# Patient Record
Sex: FEMALE | Race: BLACK OR AFRICAN AMERICAN | NOT HISPANIC OR LATINO | Employment: FULL TIME | URBAN - METROPOLITAN AREA
[De-identification: names, ages, dates, MRNs, and addresses within clinical notes are randomized per-mention and may not be internally consistent; named-entity substitution may affect disease eponyms.]

---

## 2018-03-06 ENCOUNTER — OFFICE VISIT (OUTPATIENT)
Dept: INTERNAL MEDICINE | Facility: CLINIC | Age: 43
End: 2018-03-06
Payer: COMMERCIAL

## 2018-03-06 VITALS
OXYGEN SATURATION: 98 % | SYSTOLIC BLOOD PRESSURE: 144 MMHG | HEART RATE: 67 BPM | TEMPERATURE: 98 F | DIASTOLIC BLOOD PRESSURE: 92 MMHG | BODY MASS INDEX: 46.39 KG/M2 | WEIGHT: 278.44 LBS | HEIGHT: 65 IN

## 2018-03-06 DIAGNOSIS — R60.0 BILATERAL LEG EDEMA: ICD-10-CM

## 2018-03-06 DIAGNOSIS — E04.9 PALPABLE THYROID: ICD-10-CM

## 2018-03-06 DIAGNOSIS — Z00.00 ANNUAL PHYSICAL EXAM: Primary | ICD-10-CM

## 2018-03-06 DIAGNOSIS — M32.9 SYSTEMIC LUPUS ERYTHEMATOSUS, UNSPECIFIED SLE TYPE, UNSPECIFIED ORGAN INVOLVEMENT STATUS: ICD-10-CM

## 2018-03-06 DIAGNOSIS — I10 ESSENTIAL HYPERTENSION: ICD-10-CM

## 2018-03-06 PROCEDURE — 99999 PR PBB SHADOW E&M-NEW PATIENT-LVL IV: CPT | Mod: PBBFAC,,, | Performed by: INTERNAL MEDICINE

## 2018-03-06 PROCEDURE — 99386 PREV VISIT NEW AGE 40-64: CPT | Mod: S$GLB,,, | Performed by: INTERNAL MEDICINE

## 2018-03-06 RX ORDER — FERROUS SULFATE 325(65) MG
325 TABLET ORAL
COMMUNITY
End: 2018-04-09

## 2018-03-06 NOTE — PROGRESS NOTES
Subjective:      Nitin Mcconnell is a 42 y.o. female who presents for annual exam.    Family History:  family history includes Diabetes in her maternal grandfather and paternal grandmother; Heart attack in her maternal grandmother; Heart failure in her maternal grandmother and mother; Kidney disease in her maternal grandfather.     2008 - diagnosed with SLE    BP home records show 128-154/  and previous provider wanted patient to begin treatment for HTN    Health Maintenance:  Health Maintenance       Date Due Completion Date    Lipid Panel 1975 ---    TETANUS VACCINE 06/16/1993 ---    Mammogram 06/16/2015 ---    Influenza Vaccine 08/01/2017 ---        Sees Dr. Ian Crawford with Rheumatology  PCP was Dr. Beau Purvis performed bypass  Dr. Eb Aponte with Vascular Surgery evaluated the patient  Dr. Ricardo Friday Endocrinology  Dr. Inge Becerra with Dermatology      Eye exam: Sept 2017, readers  Dental Exam: December 2017, needs dental work    MMG: due in June    Influenza: Oct 2017  Tetanus: UTD    Exercise: treadmill or elliptical at gym, start 4x weekly  Diet: fairly healthy  Body mass index is 46.34 kg/m².    Meds:   Current Outpatient Prescriptions:     CALCIUM CARB/VITAMIN D3/VIT K1 (VIACTIV ORAL), Take by mouth., Disp: , Rfl:     ferrous sulfate 325 mg (65 mg iron) Tab tablet, Take 325 mg by mouth daily with breakfast., Disp: , Rfl:     hydroxychloroquine (PLAQUENIL) 200 mg tablet, Take 1 tablet by mouth Twice daily., Disp: , Rfl:     multivitamin-Ca-iron-minerals (ONE-A-DAY WOMENS FORMULA) 27-0.4 mg Tab, Take 1 tablet by mouth Daily., Disp: , Rfl:     torsemide (DEMADEX) 10 MG Tab, Take 20 mg by mouth daily as needed. Ankle swellling, Disp: , Rfl:     PMHx:   Past Medical History:   Diagnosis Date    Blood transfusion     Connective tissue disease     Hypertension     Lupus mild       PSHx:  Past Surgical History:   Procedure Laterality Date    GASTRIC BYPASS   2009    HYSTERECTOMY      LIPECTOMY      LYMPH NODE BIOPSY      right arm    panniculectomy         SocHx:   Social History     Social History    Marital status: Single     Spouse name: N/A    Number of children: N/A    Years of education: N/A     Social History Main Topics    Smoking status: Never Smoker    Smokeless tobacco: Never Used    Alcohol use Yes      Comment: socially    Drug use: No    Sexual activity: Not Asked     Other Topics Concern    None     Social History Narrative    Works for [employer]       Review of Systems   Constitutional: Negative for chills, fatigue and fever.   HENT: Negative for congestion, ear discharge, ear pain, mouth sores, postnasal drip, rhinorrhea, sinus pressure and sore throat.    Eyes: Negative for redness and visual disturbance.   Respiratory: Negative for cough, chest tightness and shortness of breath.    Cardiovascular: Negative for chest pain, palpitations and leg swelling.   Gastrointestinal: Negative for abdominal pain, blood in stool, constipation, diarrhea, nausea and vomiting.   Endocrine: Positive for cold intolerance. Negative for heat intolerance.   Genitourinary: Negative for dysuria, hematuria and urgency.   Musculoskeletal: Negative for arthralgias and myalgias.   Skin: Negative for rash.        + skin changes after hysterectomy, patient concerned about dimpling appearance of thighs and fatty deposits   Neurological: Negative for dizziness, weakness, light-headedness, numbness and headaches.   Hematological: Negative for adenopathy.   Psychiatric/Behavioral: Negative for dysphoric mood and sleep disturbance. The patient is not nervous/anxious.        Objective:      Physical Exam   Constitutional: She is oriented to person, place, and time. Vital signs are normal. She appears well-developed and well-nourished. No distress.   HENT:   Head: Normocephalic and atraumatic.   Right Ear: Hearing, tympanic membrane, external ear and ear canal normal.  Tympanic membrane is not erythematous and not bulging.   Left Ear: Hearing, tympanic membrane, external ear and ear canal normal. Tympanic membrane is not erythematous and not bulging.   Nose: Nose normal.   Mouth/Throat: Uvula is midline, oropharynx is clear and moist and mucous membranes are normal. No oropharyngeal exudate or posterior oropharyngeal erythema.   Eyes: Conjunctivae, EOM and lids are normal. Pupils are equal, round, and reactive to light. No scleral icterus.   Neck: Normal range of motion. Neck supple. No thyroid mass present. Thyromegaly: thyroid feels more prominent, no distinct nodules palpated.   Cardiovascular: Normal rate, regular rhythm, normal heart sounds and intact distal pulses.    No murmur heard.  Pulmonary/Chest: Effort normal and breath sounds normal. She has no wheezes.   Abdominal: Soft. Bowel sounds are normal. She exhibits no distension. There is no tenderness. There is no rigidity, no rebound and no guarding.   Musculoskeletal: Normal range of motion. She exhibits no edema.   Lymphadenopathy:     She has no cervical adenopathy.        Right: No supraclavicular adenopathy present.        Left: No supraclavicular adenopathy present.   Neurological: She is alert and oriented to person, place, and time. She has normal strength and normal reflexes. Coordination and gait normal.   Skin: Skin is warm, dry and intact. No rash noted. She is not diaphoretic.   Psychiatric: She has a normal mood and affect.   Vitals reviewed.      Assessment:       1. Annual physical exam    2. Essential hypertension    3. Systemic lupus erythematosus, unspecified SLE type, unspecified organ involvement status    4. Bilateral leg edema    5. Palpable thyroid        Plan:       1. Annual physical exam  - CBC auto differential; Future  - Comprehensive metabolic panel; Future  - Ferritin; Future  - Hemoglobin A1c; Future  - Lipid panel; Future  - TSH; Future  - Urinalysis; Future  - Vitamin D; Future  - labs  sent to [facility]    2. Essential hypertension  - check labs and kidney function  - consider starting ACE-I, discussed some of adverse effects  - reduce sodium intake    3. Systemic lupus erythematosus, unspecified SLE type, unspecified organ involvement status  - symptoms stable, on HCQ, managed by Rheumatologist Dr. Crawford at outside facility    4. Bilateral leg edema  - Brain natriuretic peptide; Future    5. Palpable thyroid  - US Soft Tissue Head Neck Thyroid; Future    RTC in 1 month or sooner if needed    Chasity Dixon MD

## 2018-03-08 ENCOUNTER — TELEPHONE (OUTPATIENT)
Dept: INTERNAL MEDICINE | Facility: CLINIC | Age: 43
End: 2018-03-08

## 2018-03-08 LAB
ALBUMIN SERPL-MCNC: 4.1 G/DL (ref 3.6–5.1)
ALBUMIN/GLOB SERPL: 1.5 (CALC) (ref 1–2.5)
ALP SERPL-CCNC: 72 U/L (ref 33–115)
ALT SERPL-CCNC: 26 U/L (ref 6–29)
APPEARANCE UR: CLEAR
AST SERPL-CCNC: 22 U/L (ref 10–30)
BASOPHILS # BLD AUTO: 29 CELLS/UL (ref 0–200)
BASOPHILS NFR BLD AUTO: 0.7 %
BILIRUB SERPL-MCNC: 0.5 MG/DL (ref 0.2–1.2)
BILIRUB UR QL STRIP: NEGATIVE
BNP SERPL-MCNC: 64 PG/ML
BUN SERPL-MCNC: 13 MG/DL (ref 7–25)
BUN/CREAT SERPL: NORMAL (CALC) (ref 6–22)
CALCIUM SERPL-MCNC: 8.9 MG/DL (ref 8.6–10.2)
CHLORIDE SERPL-SCNC: 106 MMOL/L (ref 98–110)
CHOLEST SERPL-MCNC: 163 MG/DL
CHOLEST/HDLC SERPL: 2.9 (CALC)
CO2 SERPL-SCNC: 29 MMOL/L (ref 20–31)
COLOR UR: YELLOW
CREAT SERPL-MCNC: 0.94 MG/DL (ref 0.5–1.1)
EOSINOPHIL # BLD AUTO: 41 CELLS/UL (ref 15–500)
EOSINOPHIL NFR BLD AUTO: 1 %
ERYTHROCYTE [DISTWIDTH] IN BLOOD BY AUTOMATED COUNT: 11.9 % (ref 11–15)
FERRITIN SERPL-MCNC: 269 NG/ML (ref 10–232)
GFR SERPL CREATININE-BSD FRML MDRD: 75 ML/MIN/1.73M2
GLOBULIN SER CALC-MCNC: 2.8 G/DL (CALC) (ref 1.9–3.7)
GLUCOSE SERPL-MCNC: 84 MG/DL (ref 65–99)
GLUCOSE UR QL STRIP: NEGATIVE
HBA1C MFR BLD: 4.9 % OF TOTAL HGB
HCT VFR BLD AUTO: 37.6 % (ref 35–45)
HDLC SERPL-MCNC: 57 MG/DL
HGB BLD-MCNC: 11.9 G/DL (ref 11.7–15.5)
HGB UR QL STRIP: NEGATIVE
KETONES UR QL STRIP: NEGATIVE
LDLC SERPL CALC-MCNC: 90 MG/DL (CALC)
LEUKOCYTE ESTERASE UR QL STRIP: NEGATIVE
LYMPHOCYTES # BLD AUTO: 1349 CELLS/UL (ref 850–3900)
LYMPHOCYTES NFR BLD AUTO: 32.9 %
MCH RBC QN AUTO: 27.3 PG (ref 27–33)
MCHC RBC AUTO-ENTMCNC: 31.6 G/DL (ref 32–36)
MCV RBC AUTO: 86.2 FL (ref 80–100)
MONOCYTES # BLD AUTO: 361 CELLS/UL (ref 200–950)
MONOCYTES NFR BLD AUTO: 8.8 %
NEUTROPHILS # BLD AUTO: 2321 CELLS/UL (ref 1500–7800)
NEUTROPHILS NFR BLD AUTO: 56.6 %
NITRITE UR QL STRIP: NEGATIVE
NONHDLC SERPL-MCNC: 106 MG/DL (CALC)
PH UR STRIP: 5.5 [PH] (ref 5–8)
PLATELET # BLD AUTO: 185 THOUSAND/UL (ref 140–400)
PMV BLD REES-ECKER: 11.3 FL (ref 7.5–12.5)
POTASSIUM SERPL-SCNC: 4 MMOL/L (ref 3.5–5.3)
PROT SERPL-MCNC: 6.9 G/DL (ref 6.1–8.1)
PROT UR QL STRIP: NEGATIVE
RBC # BLD AUTO: 4.36 MILLION/UL (ref 3.8–5.1)
SODIUM SERPL-SCNC: 141 MMOL/L (ref 135–146)
SP GR UR STRIP: 1.02 (ref 1–1.03)
TRIGL SERPL-MCNC: 75 MG/DL
TSH SERPL-ACNC: 1.5 MIU/L
WBC # BLD AUTO: 4.1 THOUSAND/UL (ref 3.8–10.8)

## 2018-03-08 RX ORDER — FUROSEMIDE 40 MG/1
40 TABLET ORAL DAILY
Qty: 30 TABLET | Refills: 0 | Status: SHIPPED | OUTPATIENT
Start: 2018-03-08 | End: 2018-04-09 | Stop reason: ALTCHOICE

## 2018-03-08 NOTE — TELEPHONE ENCOUNTER
----- Message from Chasity Dixon MD sent at 3/8/2018  2:20 PM CST -----  Thyroid function, kidney function, liver function and cholesterol are normal.     Urinalysis is normal.     Hemoglobin A1c is normal so there is no diabetes or pre-diabetes.     Recommend trial of Lasix 40mg daily instead of demadex. Monitor blood pressures closely.     Vitamin D and iron levels are being processed.

## 2018-03-12 LAB
1,25(OH)2D SERPL-MCNC: 96 PG/ML (ref 18–72)
1,25(OH)2D2 SERPL-MCNC: <8 PG/ML
1,25(OH)2D3 SERPL-MCNC: 96 PG/ML

## 2018-03-18 ENCOUNTER — TELEPHONE (OUTPATIENT)
Dept: INTERNAL MEDICINE | Facility: CLINIC | Age: 43
End: 2018-03-18

## 2018-03-18 DIAGNOSIS — E04.2 MULTIPLE THYROID NODULES: Primary | ICD-10-CM

## 2018-03-19 NOTE — TELEPHONE ENCOUNTER
Spoke with patient, reviewed Dr Dixon notes. Will await call from Renetta to schedule referral and will fax b/p results tomorrow from work.

## 2018-03-19 NOTE — TELEPHONE ENCOUNTER
Imaging at [facility] showed multiple thyroid nodules that are large enough to sample. Recommend further evaluation and nodule aspiration with Endocrinology.    Has patient checked BP? Would recommend starting lisinopril 5mg daily if BP > 140/90's at home.

## 2018-03-22 ENCOUNTER — OFFICE VISIT (OUTPATIENT)
Dept: ENDOCRINOLOGY | Facility: CLINIC | Age: 43
End: 2018-03-22
Payer: COMMERCIAL

## 2018-03-22 VITALS
SYSTOLIC BLOOD PRESSURE: 134 MMHG | WEIGHT: 274.94 LBS | BODY MASS INDEX: 45.81 KG/M2 | DIASTOLIC BLOOD PRESSURE: 82 MMHG | HEIGHT: 65 IN | HEART RATE: 69 BPM

## 2018-03-22 DIAGNOSIS — E55.9 VITAMIN D DEFICIENCY: ICD-10-CM

## 2018-03-22 DIAGNOSIS — E04.2 MULTIPLE THYROID NODULES: Primary | ICD-10-CM

## 2018-03-22 PROCEDURE — 99999 PR PBB SHADOW E&M-EST. PATIENT-LVL IV: CPT | Mod: PBBFAC,,, | Performed by: INTERNAL MEDICINE

## 2018-03-22 PROCEDURE — 99204 OFFICE O/P NEW MOD 45 MIN: CPT | Mod: S$GLB,,, | Performed by: INTERNAL MEDICINE

## 2018-03-22 NOTE — ASSESSMENT & PLAN NOTE
History of vitamin D deficiency    Ordered level, as patient recently completed ergo 50K x8 weeks.

## 2018-03-22 NOTE — ASSESSMENT & PLAN NOTE
I have reviewed management options including observation, FNA or surgery.  All of the patients questions were answered.    Discussed indications for a FNA  Discussed possible FNA results (benign, FLUS,  follicular or hurthle lesion, suspicious for cancer, cancer and non diagnostic)     Reviewed that a non diagnostic or FLUS would require a repeat FNA with molecular markers    Will proceed with FNA - >2cm     Discussed indications for repeat FNA as well as surgical indications (abnormal FNA, compressive symptoms or interval change)     If FNA is negative then plan follow up in 1 year with TSH and thyroid u/s prior

## 2018-03-22 NOTE — PROGRESS NOTES
Subjective:       Patient ID: Nitin Mcconnell is a 42 y.o. female.    Chief Complaint: Thyroid Problem    HPI     With regards to the thyroid nodule:    No difficulty breathing swallowing   No voice changes  No FH of thyroid cancer  No personal history of radiation treatment or exposure     No signs or symptoms of hyper or hypothyroidism    No weight changes  No bowel changes  No heat or cold intolerance   No hair nail or skin changes  No cp, palpations or sob    3 aunts - with thyroidectomy secondary to enlarged gland     Review of Systems   Constitutional: Negative for unexpected weight change.   Eyes: Negative for visual disturbance.   Respiratory: Negative for shortness of breath.    Cardiovascular: Negative for chest pain.   Gastrointestinal: Negative for abdominal pain.   Genitourinary: Negative for urgency.   Musculoskeletal: Negative for arthralgias.   Skin: Negative for wound.   Neurological: Negative for headaches.   Hematological: Does not bruise/bleed easily.   Psychiatric/Behavioral: Negative for sleep disturbance.         Objective:      Physical Exam   Constitutional: She appears well-developed.   HENT:   Right Ear: External ear normal.   Left Ear: External ear normal.   Nose: Nose normal.   Hearing normal  Dentition good   Neck: No tracheal deviation present. No thyromegaly present.   Cardiovascular: Normal rate.    No murmur heard.  Pulmonary/Chest: Effort normal and breath sounds normal.   Abdominal: Soft. There is no tenderness. No hernia.   Musculoskeletal: She exhibits no edema.   Neurological: She has normal reflexes. No cranial nerve deficit.   Skin: No rash noted.   No nodules   Psychiatric: She has a normal mood and affect. Judgment normal.   Vitals reviewed.        US 3/12/18 done at [facility], 2 smaller nodules on right, left lobe has a 3.8 x 3.3 x 2.8 cm nodule and 2.6 x 1.7 x 1.5 cm nodule    Ultrasound was scanned to chart.    Assessment:       1. Multiple thyroid nodules    2. Vitamin D  deficiency          Plan:       Multiple thyroid nodules  I have reviewed management options including observation, FNA or surgery.  All of the patients questions were answered.    Discussed indications for a FNA  Discussed possible FNA results (benign, FLUS,  follicular or hurthle lesion, suspicious for cancer, cancer and non diagnostic)     Reviewed that a non diagnostic or FLUS would require a repeat FNA with molecular markers    Will proceed with FNA - >2cm     Discussed indications for repeat FNA as well as surgical indications (abnormal FNA, compressive symptoms or interval change)     If FNA is negative then plan follow up in 1 year with TSH and thyroid u/s prior        Vitamin D deficiency  History of vitamin D deficiency    Ordered level, as patient recently completed ergo 50K x8 weeks.    Rica Gerardo MD  Endocrinology Fellow     This case has been reviewed with staff, Dr. Dumont.

## 2018-03-23 NOTE — PROGRESS NOTES
I have reviewed the case, examined the patient, discussed with the fellow, and concur with the fellow's history, physical, assessment, and plan.    Luis Carlos Dumont MD

## 2018-04-09 ENCOUNTER — OFFICE VISIT (OUTPATIENT)
Dept: INTERNAL MEDICINE | Facility: CLINIC | Age: 43
End: 2018-04-09
Payer: COMMERCIAL

## 2018-04-09 ENCOUNTER — LAB VISIT (OUTPATIENT)
Dept: LAB | Facility: HOSPITAL | Age: 43
End: 2018-04-09
Attending: INTERNAL MEDICINE
Payer: COMMERCIAL

## 2018-04-09 VITALS
DIASTOLIC BLOOD PRESSURE: 86 MMHG | HEIGHT: 65 IN | WEIGHT: 272.06 LBS | HEART RATE: 72 BPM | BODY MASS INDEX: 45.33 KG/M2 | TEMPERATURE: 98 F | SYSTOLIC BLOOD PRESSURE: 138 MMHG

## 2018-04-09 DIAGNOSIS — E04.2 MULTIPLE THYROID NODULES: ICD-10-CM

## 2018-04-09 DIAGNOSIS — I10 ESSENTIAL HYPERTENSION: ICD-10-CM

## 2018-04-09 DIAGNOSIS — R60.0 BILATERAL LEG EDEMA: ICD-10-CM

## 2018-04-09 DIAGNOSIS — E65 LOCALIZED ADIPOSITY: ICD-10-CM

## 2018-04-09 DIAGNOSIS — I10 ESSENTIAL HYPERTENSION: Primary | ICD-10-CM

## 2018-04-09 LAB
ANION GAP SERPL CALC-SCNC: 9 MMOL/L
BUN SERPL-MCNC: 15 MG/DL
CALCIUM SERPL-MCNC: 9 MG/DL
CHLORIDE SERPL-SCNC: 103 MMOL/L
CO2 SERPL-SCNC: 29 MMOL/L
CREAT SERPL-MCNC: 0.9 MG/DL
EST. GFR  (AFRICAN AMERICAN): >60 ML/MIN/1.73 M^2
EST. GFR  (NON AFRICAN AMERICAN): >60 ML/MIN/1.73 M^2
GLUCOSE SERPL-MCNC: 84 MG/DL
POTASSIUM SERPL-SCNC: 3.6 MMOL/L
SODIUM SERPL-SCNC: 141 MMOL/L

## 2018-04-09 PROCEDURE — 36415 COLL VENOUS BLD VENIPUNCTURE: CPT | Mod: PO

## 2018-04-09 PROCEDURE — 99999 PR PBB SHADOW E&M-EST. PATIENT-LVL III: CPT | Mod: PBBFAC,,, | Performed by: INTERNAL MEDICINE

## 2018-04-09 PROCEDURE — 80048 BASIC METABOLIC PNL TOTAL CA: CPT

## 2018-04-09 PROCEDURE — 99214 OFFICE O/P EST MOD 30 MIN: CPT | Mod: S$GLB,,, | Performed by: INTERNAL MEDICINE

## 2018-04-09 RX ORDER — TORSEMIDE 20 MG/1
20 TABLET ORAL DAILY
Qty: 30 TABLET | Refills: 2 | Status: SHIPPED | OUTPATIENT
Start: 2018-04-09 | End: 2019-03-28 | Stop reason: SDUPTHER

## 2018-04-09 NOTE — PROGRESS NOTES
Subjective:       Patient ID: Nitin Mcconnell is a 42 y.o. female who presents for Follow-up (1 mo); Hypertension; and Leg Swelling      Hypertension   This is a chronic problem. The current episode started more than 1 month ago. The problem has been waxing and waning since onset. The problem is controlled. Associated symptoms include peripheral edema (leg edema). Pertinent negatives include no chest pain, headaches, orthopnea, palpitations, PND or shortness of breath. There are no associated agents to hypertension. Risk factors for coronary artery disease include obesity. Past treatments include diuretics. The current treatment provides mild improvement. There are no compliance problems.  There is no history of kidney disease or heart failure. There is no history of chronic renal disease.   Edema   This is a chronic problem. The current episode started more than 1 month ago. The problem occurs constantly. The problem has been gradually improving. Pertinent negatives include no abdominal pain, arthralgias, chest pain, chills, congestion, coughing, fatigue, fever, headaches, myalgias, nausea, rash, vomiting or weakness. Nothing aggravates the symptoms. She has tried nothing for the symptoms.      Lost 6 lbs since last visit, has been exercising regularly, 30 minutes on elliptical regularly. Avoids high-sodium foods. Has used Demadex in the past with good results. Was last prescribed Lasix so she has been alternating between Lasix and Demadex since last visit. Mild improvement in swelling.      Review of Systems   Constitutional: Negative for chills, fatigue and fever.   HENT: Negative for congestion and rhinorrhea.    Eyes: Negative for redness and visual disturbance.   Respiratory: Negative for cough, chest tightness and shortness of breath.    Cardiovascular: Positive for leg swelling. Negative for chest pain, palpitations, orthopnea and PND.   Gastrointestinal: Negative for abdominal pain, diarrhea, nausea and  vomiting.   Musculoskeletal: Negative for arthralgias and myalgias.   Skin: Negative for rash.   Neurological: Negative for dizziness, weakness, light-headedness and headaches.       Objective:      Physical Exam   Constitutional: She is oriented to person, place, and time. Vital signs are normal. She appears well-developed and well-nourished. No distress.   HENT:   Head: Normocephalic and atraumatic.   Right Ear: Hearing and external ear normal.   Left Ear: Hearing and external ear normal.   Nose: Nose normal.   Mouth/Throat: Uvula is midline.   Eyes: Lids are normal. No scleral icterus.   Neck: Full passive range of motion without pain.   Cardiovascular: Normal rate, regular rhythm, normal heart sounds and intact distal pulses.    No murmur heard.  Pulmonary/Chest: Effort normal and breath sounds normal. She has no wheezes.   Abdominal: Soft. Bowel sounds are normal. She exhibits no distension. There is no hepatosplenomegaly. There is no tenderness. There is no rigidity, no rebound and no guarding.   Musculoskeletal: Normal range of motion. She exhibits edema (trace-1+ nonpitting BLE).        Right ankle: She exhibits swelling.        Left ankle: She exhibits swelling.   Neurological: She is alert and oriented to person, place, and time.   Skin: Skin is warm, dry and intact. No rash noted. She is not diaphoretic.   Psychiatric: She has a normal mood and affect.   Vitals reviewed.      Assessment:       1. Essential hypertension    2. Bilateral leg edema    3. Multiple thyroid nodules    4. Localized adiposity        Plan:       1. Essential hypertension  - continue to monitor BP periodically  - change from Lasix ro Demadex  - torsemide (DEMADEX) 20 MG Tab; Take 1 tablet (20 mg total) by mouth once daily.  Dispense: 30 tablet; Refill: 2  - Basic metabolic panel; Future    2. Bilateral leg edema  - kidney function, liver function and BNP normal  - torsemide (DEMADEX) 20 MG Tab; Take 1 tablet (20 mg total) by mouth  once daily.  Dispense: 30 tablet; Refill: 2  - 2D echo with color flow doppler; Future  - COMPRESSION STOCKINGS    3. Multiple thyroid nodules  - evaluated by Endocrinology, planning FNA     4. Localized adiposity  - will obtain records from Dermatologist and other providers    RTC in 3 months or sooner if needed    Chasity Dixon MD

## 2018-04-10 ENCOUNTER — CLINICAL SUPPORT (OUTPATIENT)
Dept: CARDIOLOGY | Facility: CLINIC | Age: 43
End: 2018-04-10
Attending: INTERNAL MEDICINE
Payer: COMMERCIAL

## 2018-04-10 DIAGNOSIS — R60.0 BILATERAL LEG EDEMA: ICD-10-CM

## 2018-04-10 LAB
DIASTOLIC DYSFUNCTION: NO
ESTIMATED PA SYSTOLIC PRESSURE: 30.04
MITRAL VALVE REGURGITATION: NORMAL
RETIRED EF AND QEF - SEE NOTES: 55 (ref 55–65)
TRICUSPID VALVE REGURGITATION: NORMAL

## 2018-04-10 PROCEDURE — 93306 TTE W/DOPPLER COMPLETE: CPT | Mod: S$GLB,,, | Performed by: INTERNAL MEDICINE

## 2018-04-16 ENCOUNTER — PATIENT MESSAGE (OUTPATIENT)
Dept: INTERNAL MEDICINE | Facility: CLINIC | Age: 43
End: 2018-04-16

## 2018-04-16 DIAGNOSIS — R60.0 BILATERAL LEG EDEMA: Primary | ICD-10-CM

## 2018-04-19 ENCOUNTER — PATIENT MESSAGE (OUTPATIENT)
Dept: ENDOCRINOLOGY | Facility: CLINIC | Age: 43
End: 2018-04-19

## 2018-04-23 ENCOUNTER — PATIENT MESSAGE (OUTPATIENT)
Dept: ENDOCRINOLOGY | Facility: CLINIC | Age: 43
End: 2018-04-23

## 2018-04-25 ENCOUNTER — TELEPHONE (OUTPATIENT)
Dept: ENDOCRINOLOGY | Facility: CLINIC | Age: 43
End: 2018-04-25

## 2018-04-25 NOTE — TELEPHONE ENCOUNTER
Talk to pt she tells that jose from preauthorization said that she will contract her stated that she needs a referral for her biopsy. I told her that pre service deal with that matter. We just have the order put in a scheduled it for the patient. She say that jose from pre authorized will call her back.

## 2018-04-25 NOTE — TELEPHONE ENCOUNTER
----- Message from Monika Luis sent at 4/25/2018  1:00 PM CDT -----  Contact: Self- [phone]  Pt is schedule to have tarun on 4/27. She call PA & was told that it needs a referral attach  To the order to get an authorization/ pre- service w/ [facility]. Please call pt once complete if she can keep her appt.

## 2018-04-27 ENCOUNTER — HOSPITAL ENCOUNTER (OUTPATIENT)
Dept: ENDOCRINOLOGY | Facility: CLINIC | Age: 43
Discharge: HOME | End: 2018-04-27
Attending: INTERNAL MEDICINE
Payer: COMMERCIAL

## 2018-04-27 DIAGNOSIS — E04.2 MULTIPLE THYROID NODULES: ICD-10-CM

## 2018-04-27 PROCEDURE — 76942 ECHO GUIDE FOR BIOPSY: CPT | Mod: S$GLB,,, | Performed by: INTERNAL MEDICINE

## 2018-04-27 PROCEDURE — 88173 CYTOPATH EVAL FNA REPORT: CPT | Mod: 26,,, | Performed by: PATHOLOGY

## 2018-04-27 PROCEDURE — 88173 CYTOPATH EVAL FNA REPORT: CPT | Performed by: PATHOLOGY

## 2018-04-27 PROCEDURE — 10022 US FINE NEEDLE ASPIRATION THYROID MULTI SITE (XPD): CPT | Mod: 59,S$GLB,, | Performed by: INTERNAL MEDICINE

## 2018-05-01 ENCOUNTER — TELEPHONE (OUTPATIENT)
Dept: ENDOCRINOLOGY | Facility: CLINIC | Age: 43
End: 2018-05-01

## 2018-05-01 NOTE — TELEPHONE ENCOUNTER
----- Message from Valentine Newman sent at 5/1/2018  8:31 AM CDT -----  Contact: Oleg / Jazmyn [phone]  Lab has too many slides. The paperwork says 9 but she has 10 slides. Oleg can be reached at [phone].

## 2018-05-02 ENCOUNTER — TELEPHONE (OUTPATIENT)
Dept: ENDOCRINOLOGY | Facility: CLINIC | Age: 43
End: 2018-05-02

## 2018-05-02 DIAGNOSIS — E04.2 MULTIPLE THYROID NODULES: Primary | ICD-10-CM

## 2018-05-02 NOTE — TELEPHONE ENCOUNTER
Called patient to discuss biopsy results.    1. FNA Left Mid Thyroid- [facility] Thyroid Cytology Category: Benign  Benign follicular epithelial cells, colloid and macrophages  2. FNA Left Inferior Thyroid- [facility] Thyroid Cytology Category: Unsatisfactory    Recommended repeat FNA of L inferior nodule with molecular markers in 4-6 weeks.  Patient v/u, and agreed to plan of care.  Patient notes that her birthday is around that time and she would like to schedule the procedure after her birthday - around the week of June 18.    Rica Gerardo MD  Endocrinology Fellow

## 2018-06-04 ENCOUNTER — PATIENT MESSAGE (OUTPATIENT)
Dept: INTERNAL MEDICINE | Facility: CLINIC | Age: 43
End: 2018-06-04

## 2018-06-04 DIAGNOSIS — Z12.39 BREAST CANCER SCREENING: Primary | ICD-10-CM

## 2018-06-20 ENCOUNTER — HOSPITAL ENCOUNTER (OUTPATIENT)
Dept: RADIOLOGY | Facility: HOSPITAL | Age: 43
Discharge: HOME | End: 2018-06-20
Attending: INTERNAL MEDICINE
Payer: COMMERCIAL

## 2018-06-20 DIAGNOSIS — Z12.39 BREAST CANCER SCREENING: ICD-10-CM

## 2018-06-20 PROCEDURE — 77067 SCR MAMMO BI INCL CAD: CPT | Mod: 26,,, | Performed by: RADIOLOGY

## 2018-06-20 PROCEDURE — 77063 BREAST TOMOSYNTHESIS BI: CPT | Mod: 26,,, | Performed by: RADIOLOGY

## 2018-06-20 PROCEDURE — 77067 SCR MAMMO BI INCL CAD: CPT | Mod: TC

## 2018-06-22 ENCOUNTER — HOSPITAL ENCOUNTER (OUTPATIENT)
Dept: ENDOCRINOLOGY | Facility: CLINIC | Age: 43
Discharge: HOME | End: 2018-06-22
Attending: INTERNAL MEDICINE
Payer: COMMERCIAL

## 2018-06-22 DIAGNOSIS — E04.2 MULTIPLE THYROID NODULES: ICD-10-CM

## 2018-06-22 PROCEDURE — 76942 ECHO GUIDE FOR BIOPSY: CPT | Mod: S$GLB,,, | Performed by: INTERNAL MEDICINE

## 2018-06-22 PROCEDURE — 88173 CYTOPATH EVAL FNA REPORT: CPT | Mod: 26,,, | Performed by: PATHOLOGY

## 2018-06-22 PROCEDURE — 88173 CYTOPATH EVAL FNA REPORT: CPT | Performed by: PATHOLOGY

## 2018-06-22 PROCEDURE — 10022 US FINE NEEDLE ASPIRATION WITH IMAGING: CPT | Mod: S$GLB,,, | Performed by: INTERNAL MEDICINE

## 2018-06-27 ENCOUNTER — TELEPHONE (OUTPATIENT)
Dept: ENDOCRINOLOGY | Facility: CLINIC | Age: 43
End: 2018-06-27

## 2018-06-27 NOTE — TELEPHONE ENCOUNTER
----- Message from Papo Nguyen MD sent at 6/27/2018  7:37 AM CDT -----  Your patient with Dr. Gerardo.

## 2018-06-27 NOTE — TELEPHONE ENCOUNTER
Hi - please let pt know - L inferior thyroid nodule biopsy was benign (aka not cancer).  I would recommend patient followup with us in 1 year for repeat US, call ahead 1-2 months for ultrasound order.     Thank you.

## 2018-06-28 ENCOUNTER — TELEPHONE (OUTPATIENT)
Dept: ENDOCRINOLOGY | Facility: CLINIC | Age: 43
End: 2018-06-28

## 2018-06-28 NOTE — TELEPHONE ENCOUNTER
Called patient to discuss biopsy.  Directed patient to review on the patient portal.    Benign.

## 2018-07-09 ENCOUNTER — OFFICE VISIT (OUTPATIENT)
Dept: INTERNAL MEDICINE | Facility: CLINIC | Age: 43
End: 2018-07-09
Payer: COMMERCIAL

## 2018-07-09 ENCOUNTER — LAB VISIT (OUTPATIENT)
Dept: LAB | Facility: HOSPITAL | Age: 43
End: 2018-07-09
Attending: INTERNAL MEDICINE
Payer: COMMERCIAL

## 2018-07-09 VITALS
OXYGEN SATURATION: 96 % | DIASTOLIC BLOOD PRESSURE: 82 MMHG | BODY MASS INDEX: 45.77 KG/M2 | SYSTOLIC BLOOD PRESSURE: 126 MMHG | RESPIRATION RATE: 18 BRPM | TEMPERATURE: 98 F | HEART RATE: 72 BPM | HEIGHT: 65 IN | WEIGHT: 274.69 LBS

## 2018-07-09 DIAGNOSIS — R60.0 BILATERAL LEG EDEMA: Primary | ICD-10-CM

## 2018-07-09 DIAGNOSIS — M32.9 SYSTEMIC LUPUS ERYTHEMATOSUS, UNSPECIFIED SLE TYPE, UNSPECIFIED ORGAN INVOLVEMENT STATUS: ICD-10-CM

## 2018-07-09 DIAGNOSIS — I10 ESSENTIAL HYPERTENSION: ICD-10-CM

## 2018-07-09 DIAGNOSIS — E04.2 MULTIPLE THYROID NODULES: ICD-10-CM

## 2018-07-09 DIAGNOSIS — R60.0 BILATERAL LEG EDEMA: ICD-10-CM

## 2018-07-09 LAB
ANION GAP SERPL CALC-SCNC: 8 MMOL/L
BUN SERPL-MCNC: 13 MG/DL
CALCIUM SERPL-MCNC: 8.9 MG/DL
CHLORIDE SERPL-SCNC: 108 MMOL/L
CO2 SERPL-SCNC: 25 MMOL/L
CREAT SERPL-MCNC: 0.9 MG/DL
EST. GFR  (AFRICAN AMERICAN): >60 ML/MIN/1.73 M^2
EST. GFR  (NON AFRICAN AMERICAN): >60 ML/MIN/1.73 M^2
GLUCOSE SERPL-MCNC: 82 MG/DL
POTASSIUM SERPL-SCNC: 4 MMOL/L
SODIUM SERPL-SCNC: 141 MMOL/L

## 2018-07-09 PROCEDURE — 80048 BASIC METABOLIC PNL TOTAL CA: CPT

## 2018-07-09 PROCEDURE — 99213 OFFICE O/P EST LOW 20 MIN: CPT | Mod: S$GLB,,, | Performed by: INTERNAL MEDICINE

## 2018-07-09 PROCEDURE — 99999 PR PBB SHADOW E&M-EST. PATIENT-LVL III: CPT | Mod: PBBFAC,,, | Performed by: INTERNAL MEDICINE

## 2018-07-09 PROCEDURE — 36415 COLL VENOUS BLD VENIPUNCTURE: CPT | Mod: PO

## 2018-07-09 NOTE — PROGRESS NOTES
Subjective:       Patient ID: Nitin Mcconnell is a 43 y.o. female who presents for Follow-up (thyroid check and mammogram); Hypertension; and Edema  MMG done 6/2018, thyroid FNA benign.  She is accompanied by her mother.    Hypertension   This is a chronic problem. The current episode started more than 1 month ago. The problem is unchanged. The problem is controlled. Associated symptoms include peripheral edema. Pertinent negatives include no anxiety, chest pain, headaches, palpitations or shortness of breath. There are no associated agents to hypertension. Risk factors for coronary artery disease include obesity. Past treatments include diuretics. The current treatment provides moderate improvement. Compliance problems include exercise.  There is no history of kidney disease or heart failure. There is no history of chronic renal disease.   Edema   This is a chronic problem. The current episode started more than 1 month ago. The problem occurs intermittently. The problem has been waxing and waning. Pertinent negatives include no abdominal pain, arthralgias, chest pain, chills, congestion, coughing, fatigue, fever, headaches, joint swelling, myalgias, nausea, rash, urinary symptoms, vertigo, vomiting or weakness. She has tried position changes for the symptoms.      Tried compression stockings but uncomfortable, reports good urine output since changed from Lasix to Demadex. Has not been exercising regularly but plans to resume soon.       Review of Systems   Constitutional: Negative for chills, fatigue and fever.   HENT: Negative for congestion and sinus pressure.    Eyes: Negative for redness and visual disturbance.   Respiratory: Negative for cough and shortness of breath.    Cardiovascular: Positive for leg swelling. Negative for chest pain and palpitations.   Gastrointestinal: Negative for abdominal pain, diarrhea, nausea and vomiting.   Genitourinary: Negative for dysuria and hematuria.   Musculoskeletal:  Negative for arthralgias, joint swelling and myalgias.        Intermittent muscle cramping   Skin: Negative for rash.   Neurological: Negative for dizziness, vertigo, weakness, light-headedness and headaches.       Objective:      Physical Exam   Constitutional: She is oriented to person, place, and time. Vital signs are normal. She appears well-developed and well-nourished. No distress.   HENT:   Head: Normocephalic and atraumatic.   Right Ear: Hearing and external ear normal.   Left Ear: Hearing and external ear normal.   Nose: Nose normal.   Mouth/Throat: Uvula is midline.   Eyes: Lids are normal.   Neck: Full passive range of motion without pain.   Cardiovascular: Normal rate, regular rhythm, normal heart sounds and intact distal pulses.    No murmur heard.  Pulmonary/Chest: Effort normal and breath sounds normal. She has no wheezes.   Abdominal: Soft. Bowel sounds are normal. She exhibits no distension. There is no tenderness.   Musculoskeletal: Normal range of motion. She exhibits edema.   Neurological: She is alert and oriented to person, place, and time.   Skin: Skin is warm, dry and intact. No rash noted. She is not diaphoretic.   Psychiatric: She has a normal mood and affect.   Vitals reviewed.      Assessment:       1. Bilateral leg edema    2. Essential hypertension    3. Multiple thyroid nodules    4. Systemic lupus erythematosus, unspecified SLE type, unspecified organ involvement status        Plan:       1. Bilateral leg edema  - Basic metabolic panel; Future  - elevate legs daily, continue diuretic daily  - will await information records from outside vascular surgeon    2. Essential hypertension  - BP stable, continue demadex  - Basic metabolic panel; Future    3. Multiple thyroid nodules  - s/p FNA, benign    4. Systemic lupus erythematosus, unspecified SLE type, unspecified organ involvement status  - stable, on HCQ    RTC in 4 months or sooner if needed    Chasity Dixon MD

## 2018-08-29 ENCOUNTER — OFFICE VISIT (OUTPATIENT)
Dept: INTERNAL MEDICINE | Facility: CLINIC | Age: 43
End: 2018-08-29
Payer: COMMERCIAL

## 2018-08-29 ENCOUNTER — LAB VISIT (OUTPATIENT)
Dept: LAB | Facility: HOSPITAL | Age: 43
End: 2018-08-29
Attending: INTERNAL MEDICINE
Payer: COMMERCIAL

## 2018-08-29 DIAGNOSIS — R10.9 INTESTINAL CRAMPS: ICD-10-CM

## 2018-08-29 DIAGNOSIS — R25.2 MUSCLE CRAMP: ICD-10-CM

## 2018-08-29 DIAGNOSIS — R10.84 GENERALIZED ABDOMINAL PAIN: Primary | ICD-10-CM

## 2018-08-29 DIAGNOSIS — R03.0 ELEVATED BLOOD PRESSURE READING: ICD-10-CM

## 2018-08-29 LAB
ALBUMIN SERPL BCP-MCNC: 3.9 G/DL
ALP SERPL-CCNC: 93 U/L
ALT SERPL W/O P-5'-P-CCNC: 24 U/L
ANION GAP SERPL CALC-SCNC: 10 MMOL/L
AST SERPL-CCNC: 24 U/L
BILIRUB SERPL-MCNC: 0.5 MG/DL
BUN SERPL-MCNC: 18 MG/DL
CALCIUM SERPL-MCNC: 8.9 MG/DL
CHLORIDE SERPL-SCNC: 104 MMOL/L
CO2 SERPL-SCNC: 26 MMOL/L
CREAT SERPL-MCNC: 0.9 MG/DL
EST. GFR  (AFRICAN AMERICAN): >60 ML/MIN/1.73 M^2
EST. GFR  (NON AFRICAN AMERICAN): >60 ML/MIN/1.73 M^2
GLUCOSE SERPL-MCNC: 79 MG/DL
MAGNESIUM SERPL-MCNC: 2.2 MG/DL
POTASSIUM SERPL-SCNC: 4.1 MMOL/L
PROT SERPL-MCNC: 7.2 G/DL
SODIUM SERPL-SCNC: 140 MMOL/L

## 2018-08-29 PROCEDURE — 99999 PR PBB SHADOW E&M-EST. PATIENT-LVL III: CPT | Mod: PBBFAC,,, | Performed by: INTERNAL MEDICINE

## 2018-08-29 PROCEDURE — 80053 COMPREHEN METABOLIC PANEL: CPT

## 2018-08-29 PROCEDURE — 36415 COLL VENOUS BLD VENIPUNCTURE: CPT | Mod: PO

## 2018-08-29 PROCEDURE — 99213 OFFICE O/P EST LOW 20 MIN: CPT | Mod: S$GLB,,, | Performed by: INTERNAL MEDICINE

## 2018-08-29 PROCEDURE — 83735 ASSAY OF MAGNESIUM: CPT

## 2018-08-29 NOTE — PROGRESS NOTES
Subjective:       Patient ID: Nitin Mcconnell is a 43 y.o. female who presents for Abdominal Pain      Abdominal Pain   This is a new problem. The current episode started in the past 7 days. The problem occurs intermittently (pain is very brief). The problem has been waxing and waning. The pain is located in the periumbilical region. The pain is moderate. The quality of the pain is sharp. The abdominal pain does not radiate. Associated symptoms include myalgias (spasms in calf at times). Pertinent negatives include no arthralgias, constipation, diarrhea, dysuria, fever, frequency, headaches, hematuria, nausea or vomiting. The pain is aggravated by certain positions (associated with laughing). The pain is relieved by nothing.        Review of Systems   Constitutional: Negative for chills and fever.   HENT: Negative for congestion and sinus pressure.    Eyes: Negative for redness and visual disturbance.   Respiratory: Negative for cough and shortness of breath.    Cardiovascular: Negative for chest pain and palpitations.   Gastrointestinal: Positive for abdominal pain. Negative for blood in stool, constipation, diarrhea, nausea and vomiting.   Genitourinary: Negative for dysuria, flank pain, frequency and hematuria.   Musculoskeletal: Positive for myalgias (spasms in calf at times). Negative for arthralgias.   Skin: Negative for rash.   Neurological: Negative for dizziness, weakness, numbness and headaches.       Objective:      Physical Exam   Constitutional: She is oriented to person, place, and time. Vital signs are normal. She appears well-developed and well-nourished. No distress.   HENT:   Head: Normocephalic and atraumatic.   Right Ear: Hearing and external ear normal.   Left Ear: Hearing and external ear normal.   Nose: Nose normal.   Mouth/Throat: Uvula is midline.   Eyes: Lids are normal.   Neck: Full passive range of motion without pain.   Cardiovascular: Normal rate, regular rhythm, normal heart sounds  and intact distal pulses.   No murmur heard.  Pulmonary/Chest: Effort normal and breath sounds normal. She has no wheezes.   Abdominal: Soft. Bowel sounds are normal. She exhibits no distension. There is no tenderness. There is no rigidity and no guarding.   Musculoskeletal: Normal range of motion.   Neurological: She is alert and oriented to person, place, and time.   Skin: Skin is warm, dry and intact. No rash noted. She is not diaphoretic.   Psychiatric: She has a normal mood and affect.   Vitals reviewed.      Assessment/Plan:       1. Generalized abdominal pain  - brief, intermittent, possibly abdominal wall spasms vs intestinal cramping    2. Intestinal cramps  - call if more frequent, may use intestinal antispasmodic    3. Muscle cramp  - Magnesium; Future  - Comprehensive metabolic panel; Future    4. Elevated blood pressure reading  - begin monitoring BP periodically, minimize sodium intake    Chasity Dixon MD

## 2018-09-01 VITALS
BODY MASS INDEX: 45.88 KG/M2 | TEMPERATURE: 98 F | SYSTOLIC BLOOD PRESSURE: 138 MMHG | HEIGHT: 65 IN | HEART RATE: 70 BPM | DIASTOLIC BLOOD PRESSURE: 92 MMHG | WEIGHT: 275.38 LBS | RESPIRATION RATE: 16 BRPM

## 2019-01-09 ENCOUNTER — PATIENT MESSAGE (OUTPATIENT)
Dept: RHEUMATOLOGY | Facility: CLINIC | Age: 44
End: 2019-01-09

## 2019-02-25 ENCOUNTER — OFFICE VISIT (OUTPATIENT)
Dept: RHEUMATOLOGY | Facility: CLINIC | Age: 44
End: 2019-02-25
Payer: COMMERCIAL

## 2019-02-25 ENCOUNTER — HOSPITAL ENCOUNTER (OUTPATIENT)
Dept: RADIOLOGY | Facility: HOSPITAL | Age: 44
Discharge: HOME | End: 2019-02-25
Attending: INTERNAL MEDICINE
Payer: COMMERCIAL

## 2019-02-25 VITALS
SYSTOLIC BLOOD PRESSURE: 157 MMHG | HEART RATE: 64 BPM | BODY MASS INDEX: 46.87 KG/M2 | WEIGHT: 281.31 LBS | HEIGHT: 65 IN | DIASTOLIC BLOOD PRESSURE: 92 MMHG

## 2019-02-25 DIAGNOSIS — M32.9 SYSTEMIC LUPUS ERYTHEMATOSUS, UNSPECIFIED SLE TYPE, UNSPECIFIED ORGAN INVOLVEMENT STATUS: ICD-10-CM

## 2019-02-25 DIAGNOSIS — E55.9 VITAMIN D DEFICIENCY: ICD-10-CM

## 2019-02-25 DIAGNOSIS — M32.9 SYSTEMIC LUPUS ERYTHEMATOSUS, UNSPECIFIED SLE TYPE, UNSPECIFIED ORGAN INVOLVEMENT STATUS: Primary | ICD-10-CM

## 2019-02-25 DIAGNOSIS — R60.0 BILATERAL LEG EDEMA: ICD-10-CM

## 2019-02-25 DIAGNOSIS — L65.9 ALOPECIA: ICD-10-CM

## 2019-02-25 PROCEDURE — 71046 X-RAY EXAM CHEST 2 VIEWS: CPT | Mod: TC

## 2019-02-25 PROCEDURE — 99999 PR PBB SHADOW E&M-EST. PATIENT-LVL III: CPT | Mod: PBBFAC,,, | Performed by: INTERNAL MEDICINE

## 2019-02-25 PROCEDURE — 3080F PR MOST RECENT DIASTOLIC BLOOD PRESSURE >= 90 MM HG: ICD-10-PCS | Mod: CPTII,S$GLB,, | Performed by: INTERNAL MEDICINE

## 2019-02-25 PROCEDURE — 99999 PR PBB SHADOW E&M-EST. PATIENT-LVL III: ICD-10-PCS | Mod: PBBFAC,,, | Performed by: INTERNAL MEDICINE

## 2019-02-25 PROCEDURE — 3077F PR MOST RECENT SYSTOLIC BLOOD PRESSURE >= 140 MM HG: ICD-10-PCS | Mod: CPTII,S$GLB,, | Performed by: INTERNAL MEDICINE

## 2019-02-25 PROCEDURE — 3008F BODY MASS INDEX DOCD: CPT | Mod: CPTII,S$GLB,, | Performed by: INTERNAL MEDICINE

## 2019-02-25 PROCEDURE — 99205 OFFICE O/P NEW HI 60 MIN: CPT | Mod: S$GLB,,, | Performed by: INTERNAL MEDICINE

## 2019-02-25 PROCEDURE — 71046 X-RAY EXAM CHEST 2 VIEWS: CPT | Mod: 26,,, | Performed by: RADIOLOGY

## 2019-02-25 PROCEDURE — 71046 XR CHEST PA AND LATERAL: ICD-10-PCS | Mod: 26,,, | Performed by: RADIOLOGY

## 2019-02-25 PROCEDURE — 99205 PR OFFICE/OUTPT VISIT, NEW, LEVL V, 60-74 MIN: ICD-10-PCS | Mod: S$GLB,,, | Performed by: INTERNAL MEDICINE

## 2019-02-25 PROCEDURE — 3080F DIAST BP >= 90 MM HG: CPT | Mod: CPTII,S$GLB,, | Performed by: INTERNAL MEDICINE

## 2019-02-25 PROCEDURE — 3008F PR BODY MASS INDEX (BMI) DOCUMENTED: ICD-10-PCS | Mod: CPTII,S$GLB,, | Performed by: INTERNAL MEDICINE

## 2019-02-25 PROCEDURE — 3077F SYST BP >= 140 MM HG: CPT | Mod: CPTII,S$GLB,, | Performed by: INTERNAL MEDICINE

## 2019-02-25 ASSESSMENT — ROUTINE ASSESSMENT OF PATIENT INDEX DATA (RAPID3)
TOTAL RAPID3 SCORE: .67
PSYCHOLOGICAL DISTRESS SCORE: 0
PATIENT GLOBAL ASSESSMENT SCORE: 1
FATIGUE SCORE: 0
PAIN SCORE: 1
MDHAQ FUNCTION SCORE: 0
AM STIFFNESS SCORE: 0, NO

## 2019-02-25 NOTE — PROGRESS NOTES
"Subjective:       Patient ID: Nitin Mcconnell is a 43 y.o. female.    Chief Complaint: Lupus    HPI:  Nitin Mcconnell is a 43 y.o. female diagnosed at age 31 with lupus.  Started as upper respiratory symptoms that did not respond to antibiotics.  She had fever and low BP as well as SOB.  She was intubated and on ventilator for a week.  She was at [facility].  Dr. Martin diagnosed lupus.  Hospitalized 3/8/07 to 3/30/07.  She had elevated pressure in eyes, kidney involvement.  She was discharged with a PICC line for antibiotics.  She was found to be allergic to heparin due to thrombocytopenia that developed after flushing PICC line with heparin.  She had hair loss.  Had enlarged lymph node under arm in past and biopsy was normal.  Was treated with Plaquenil and steroids.  Only took steroid for 1 year.     She denies any flares.     Swelling in ankles for over 2years.     Lupus Review of Systems  Alopecia: yes  Photosensitivity: no   Raynaud's: no  Oral or nasal ulcers: occasional oral ulcers  Rashes:  Intermittent malar rash; occasional rash in crease of arm and neck  No pleurisy or pericarditis.  No seizures, psychosis, or stroke.  No venous or arterial clots.  History of being on coumadin for 3 months as a precaution.   Pregnancy hx (if applicable): no miscarriages (never pregnant)        Review of Systems   Constitutional: Negative.    HENT: Negative.    Cardiovascular: Positive for leg swelling.   Endocrine: Negative.    Genitourinary: Negative.    Musculoskeletal: Negative.    Skin:        Alopecia  0.5 cm tender nodule anterior left shin   Allergic/Immunologic: Negative.    Neurological: Negative.    Hematological: Negative.    Psychiatric/Behavioral: Negative.          Objective:   BP (!) 157/92   Pulse 64   Ht 5' 5" (1.651 m)   Wt 127.6 kg (281 lb 4.9 oz)   LMP 07/13/2013   BMI 46.81 kg/m²      Physical Exam   Constitutional: She is oriented to person, place, and time and well-developed, " well-nourished, and in no distress.   HENT:   Head: Normocephalic and atraumatic.   Eyes: Conjunctivae and EOM are normal.   Neck: Neck supple.   Cardiovascular: Normal rate, regular rhythm and normal heart sounds.    Pulmonary/Chest: Effort normal and breath sounds normal.   Abdominal: Soft. Bowel sounds are normal.   Neurological: She is alert and oriented to person, place, and time. Gait normal.   Skin: Skin is warm and dry.     Diffuse hair thinning.  Bald spot in front   Psychiatric: Mood and affect normal.            LABS    10/19/18  JEROME 1:160  RNP 3.4 (nl<1)  Assessment:       1.  SLE.    2.  Immunosuppression.  Eye exam for Plaquenil 10/2018  3.  Elevated pressure in eyes  4.  Obesity.  Gastric bypass 2009  5.  Thyroid nodules.  Being monitored.  Aspiration was normal  6.  Mother with RA  7.  Ankle swelling bilateral.  Grandmother and mother had CHF    8.  Alopecia.  Family history of hair loss.     Plan:       1.  Labs  2.  CXR  3.  2D echo  4.  Consider dermatology to evaluate     RTO 3 months/prn

## 2019-02-26 ENCOUNTER — HOSPITAL ENCOUNTER (OUTPATIENT)
Dept: CARDIOLOGY | Facility: CLINIC | Age: 44
Discharge: HOME | End: 2019-02-26
Attending: INTERNAL MEDICINE
Payer: COMMERCIAL

## 2019-02-26 VITALS
BODY MASS INDEX: 46.82 KG/M2 | DIASTOLIC BLOOD PRESSURE: 90 MMHG | WEIGHT: 281 LBS | SYSTOLIC BLOOD PRESSURE: 158 MMHG | HEART RATE: 60 BPM | HEIGHT: 65 IN

## 2019-02-26 DIAGNOSIS — R60.0 BILATERAL LEG EDEMA: ICD-10-CM

## 2019-02-26 LAB
APPEARANCE UR: CLEAR
B2 GLYCOPROT1 IGA SER-ACNC: NORMAL
B2 GLYCOPROT1 IGG SER-ACNC: NORMAL
B2 GLYCOPROT1 IGM SER-ACNC: NORMAL
BILIRUB UR QL STRIP: NEGATIVE
CARDIOLIPIN IGA SER IA-ACNC: NORMAL
CARDIOLIPIN IGG SER IA-ACNC: NORMAL
CARDIOLIPIN IGM SER IA-ACNC: NORMAL
COLOR UR: YELLOW
CREAT UR-MCNC: 191 MG/DL (ref 20–275)
GLUCOSE UR QL STRIP: NEGATIVE
HAV IGM SERPL QL IA: NORMAL
HBV CORE IGM SERPL QL IA: NORMAL
HBV SURFACE AG SERPL QL IA: NORMAL
HBV SURFACE AG SERPL QL NT: NORMAL
HCV AB S/CO SERPL IA: NORMAL
HCV AB SERPL QL IA: NORMAL
HGB UR QL STRIP: NEGATIVE
HIV 1+2 AB+HIV1 P24 AG SERPL QL IA: NORMAL
INTERPRETATION: NORMAL
KETONES UR QL STRIP: NEGATIVE
LA 2 SCREEN W REFLEX-IMP: NORMAL
LEUKOCYTE ESTERASE UR QL STRIP: NEGATIVE
NITRITE UR QL STRIP: NEGATIVE
PH UR STRIP: 5.5 [PH] (ref 5–8)
PROT UR QL STRIP: NEGATIVE
PROT UR-MCNC: 10 MG/DL (ref 5–24)
PROT/CREAT UR: 52 MG/G CREAT (ref 21–161)
PS IGA SER-ACNC: NORMAL
PS IGG SER-ACNC: NORMAL
PS IGM SER-ACNC: NORMAL
SCREEN APTT: NORMAL S
SCREEN DRVVT: NORMAL S
SP GR UR STRIP: 1.03 (ref 1–1.03)

## 2019-02-26 PROCEDURE — 93306 TTE W/DOPPLER COMPLETE: CPT | Mod: S$GLB,,, | Performed by: INTERNAL MEDICINE

## 2019-02-26 PROCEDURE — 93306 TRANSTHORACIC ECHO (TTE) COMPLETE (CUPID ONLY): ICD-10-PCS | Mod: S$GLB,,, | Performed by: INTERNAL MEDICINE

## 2019-02-27 ENCOUNTER — PATIENT MESSAGE (OUTPATIENT)
Dept: RHEUMATOLOGY | Facility: CLINIC | Age: 44
End: 2019-02-27

## 2019-02-27 LAB
ASCENDING AORTA: 2.75 CM
AV INDEX (PROSTH): 0.57
AV MEAN GRADIENT: 5.85 MMHG
AV PEAK GRADIENT: 9.12 MMHG
AV VALVE AREA: 1.88 CM2
AV VELOCITY RATIO: 0.66
BSA FOR ECHO PROCEDURE: 2.42 M2
CV ECHO LV RWT: 0.34 CM
DOP CALC AO PEAK VEL: 1.51 M/S
DOP CALC AO VTI: 41.21 CM
DOP CALC LVOT AREA: 3.27 CM2
DOP CALC LVOT DIAMETER: 2.04 CM
DOP CALC LVOT PEAK VEL: 1 M/S
DOP CALC LVOT STROKE VOLUME: 77.39 CM3
DOP CALCLVOT PEAK VEL VTI: 23.69 CM
E WAVE DECELERATION TIME: 269.55 MSEC
E/A RATIO: 1.75
E/E' RATIO: 9.3
ECHO LV POSTERIOR WALL: 0.88 CM (ref 0.6–1.1)
FRACTIONAL SHORTENING: 26 % (ref 28–44)
INTERVENTRICULAR SEPTUM: 0.86 CM (ref 0.6–1.1)
LA MAJOR: 5.22 CM
LA MINOR: 5.13 CM
LA WIDTH: 4.22 CM
LEFT ATRIUM SIZE: 4.09 CM
LEFT ATRIUM VOLUME INDEX: 33.2 ML/M2
LEFT ATRIUM VOLUME: 75.92 CM3
LEFT INTERNAL DIMENSION IN SYSTOLE: 3.89 CM (ref 2.1–4)
LEFT VENTRICLE DIASTOLIC VOLUME INDEX: 57.45 ML/M2
LEFT VENTRICLE DIASTOLIC VOLUME: 131.31 ML
LEFT VENTRICLE MASS INDEX: 71.4 G/M2
LEFT VENTRICLE SYSTOLIC VOLUME INDEX: 28.6 ML/M2
LEFT VENTRICLE SYSTOLIC VOLUME: 65.41 ML
LEFT VENTRICULAR INTERNAL DIMENSION IN DIASTOLE: 5.23 CM (ref 3.5–6)
LEFT VENTRICULAR MASS: 163.3 G
LV LATERAL E/E' RATIO: 7.75
LV SEPTAL E/E' RATIO: 11.63
MV PEAK A VEL: 0.53 M/S
MV PEAK E VEL: 0.93 M/S
PISA TR MAX VEL: 2.58 M/S
PULM VEIN S/D RATIO: 1.27
PV PEAK D VEL: 0.55 M/S
PV PEAK S VEL: 0.7 M/S
RA MAJOR: 4.87 CM
RA WIDTH: 3.25 CM
RIGHT VENTRICULAR END-DIASTOLIC DIMENSION: 3.65 CM
RV TISSUE DOPPLER FREE WALL SYSTOLIC VELOCITY 1 (APICAL 4 CHAMBER VIEW): 9.14 M/S
SINUS: 2.82 CM
STJ: 2.4 CM
TDI LATERAL: 0.12
TDI SEPTAL: 0.08
TDI: 0.1
TR MAX PG: 26.63 MMHG
TRICUSPID ANNULAR PLANE SYSTOLIC EXCURSION: 2.13 CM

## 2019-02-28 ENCOUNTER — PATIENT MESSAGE (OUTPATIENT)
Dept: RHEUMATOLOGY | Facility: CLINIC | Age: 44
End: 2019-02-28

## 2019-02-28 DIAGNOSIS — R60.0 BILATERAL LEG EDEMA: ICD-10-CM

## 2019-02-28 DIAGNOSIS — I27.20 PULMONARY HYPERTENSION: ICD-10-CM

## 2019-02-28 DIAGNOSIS — M32.9 SYSTEMIC LUPUS ERYTHEMATOSUS, UNSPECIFIED SLE TYPE, UNSPECIFIED ORGAN INVOLVEMENT STATUS: Primary | ICD-10-CM

## 2019-02-28 NOTE — TELEPHONE ENCOUNTER
Pulmonary artery pressure is 30 on 2D echo patient with a history of systemic lupus erythematosus.  Patient inform the results.  Will have her evaluated by cardiology for pulmonary HTN.

## 2019-03-01 LAB
1,25(OH)2D SERPL-MCNC: 111 PG/ML (ref 18–72)
1,25(OH)2D2 SERPL-MCNC: <8 PG/ML
1,25(OH)2D3 SERPL-MCNC: 111 PG/ML
ALBUMIN SERPL-MCNC: 4 G/DL (ref 3.6–5.1)
ALBUMIN/GLOB SERPL: 1.4 (CALC) (ref 1–2.5)
ALP SERPL-CCNC: 83 U/L (ref 33–115)
ALT SERPL-CCNC: 28 U/L (ref 6–29)
ANA PAT SER IF-IMP: ABNORMAL
ANA SER QL IF: POSITIVE
ANA TITR SER IF: ABNORMAL TITER
AST SERPL-CCNC: 21 U/L (ref 10–30)
B2 GLYCOPROT1 IGA SER-ACNC: <9 SAU
B2 GLYCOPROT1 IGG SER-ACNC: <9 SGU
B2 GLYCOPROT1 IGM SER-ACNC: <9 SMU
BASOPHILS # BLD AUTO: 41 CELLS/UL (ref 0–200)
BASOPHILS NFR BLD AUTO: 0.9 %
BILIRUB SERPL-MCNC: 0.5 MG/DL (ref 0.2–1.2)
BUN SERPL-MCNC: 17 MG/DL (ref 7–25)
BUN/CREAT SERPL: ABNORMAL (CALC) (ref 6–22)
C3 SERPL-MCNC: 139 MG/DL (ref 83–193)
C4 SERPL-MCNC: 39 MG/DL (ref 15–57)
CALCIUM SERPL-MCNC: 8.5 MG/DL (ref 8.6–10.2)
CARDIOLIPIN IGA SER IA-ACNC: <11 APL
CARDIOLIPIN IGG SER IA-ACNC: <14 GPL
CARDIOLIPIN IGM SER IA-ACNC: <12 MPL
CCP IGG SERPL-ACNC: <16 UNITS
CH50 SERPL-ACNC: >60 U/ML (ref 31–60)
CHLORIDE SERPL-SCNC: 105 MMOL/L (ref 98–110)
CK SERPL-CCNC: 211 U/L (ref 29–143)
CO2 SERPL-SCNC: 27 MMOL/L (ref 20–32)
CREAT SERPL-MCNC: 0.88 MG/DL (ref 0.5–1.1)
CRP SERPL-MCNC: 2.7 MG/L
DAT POLY-SP REAG RBC QL: NEGATIVE
DSDNA AB SER-ACNC: <1 IU/ML
ENA SS-A AB SER IA-ACNC: NORMAL AI
ENA SS-B AB SER IA-ACNC: NORMAL AI
EOSINOPHIL # BLD AUTO: 50 CELLS/UL (ref 15–500)
EOSINOPHIL NFR BLD AUTO: 1.1 %
ERYTHROCYTE [DISTWIDTH] IN BLOOD BY AUTOMATED COUNT: 12.2 % (ref 11–15)
ERYTHROCYTE [SEDIMENTATION RATE] IN BLOOD BY WESTERGREN METHOD: 9 MM/H
GAMMA INTERFERON BACKGROUND BLD IA-ACNC: 0.04 IU/ML
GFRSERPLBLD MDRD-ARVRAT: 80 ML/MIN/1.73M2
GLOBULIN SER CALC-MCNC: 2.8 G/DL (CALC) (ref 1.9–3.7)
GLUCOSE SERPL-MCNC: 81 MG/DL (ref 65–139)
HAV IGM SERPL QL IA: NORMAL
HBV CORE IGM SERPL QL IA: NORMAL
HBV SURFACE AG SERPL QL IA: NORMAL
HCT VFR BLD AUTO: 38.1 % (ref 35–45)
HCV AB S/CO SERPL IA: 0.12
HCV AB SERPL QL IA: NORMAL
HGB BLD-MCNC: 11.8 G/DL (ref 11.7–15.5)
HIV 1+2 AB+HIV1 P24 AG SERPL QL IA: NORMAL
INTERPRETATION: NORMAL
LA 2 SCREEN W REFLEX-IMP: NORMAL
LYMPHOCYTES # BLD AUTO: 1413 CELLS/UL (ref 850–3900)
LYMPHOCYTES NFR BLD AUTO: 31.4 %
M TB IFN-G BLD-IMP: NEGATIVE
M TB IFN-G CD4+ BCKGRND COR BLD-ACNC: 0 IU/ML
MCH RBC QN AUTO: 27 PG (ref 27–33)
MCHC RBC AUTO-ENTMCNC: 31 G/DL (ref 32–36)
MCV RBC AUTO: 87.2 FL (ref 80–100)
MITOGEN IGNF BCKGRD COR BLD-ACNC: >10 IU/ML
MONOCYTES # BLD AUTO: 477 CELLS/UL (ref 200–950)
MONOCYTES NFR BLD AUTO: 10.6 %
NEUTROPHILS # BLD AUTO: 2520 CELLS/UL (ref 1500–7800)
NEUTROPHILS NFR BLD AUTO: 56 %
PLATELET # BLD AUTO: 188 THOUSAND/UL (ref 140–400)
PMV BLD REES-ECKER: 11.8 FL (ref 7.5–12.5)
POTASSIUM SERPL-SCNC: 4.1 MMOL/L (ref 3.5–5.3)
PROT SERPL-MCNC: 6.8 G/DL (ref 6.1–8.1)
PS IGA SER-ACNC: <20 U/ML
PS IGG SER-ACNC: <10 U/ML
PS IGM SER-ACNC: <25 U/ML
RBC # BLD AUTO: 4.37 MILLION/UL (ref 3.8–5.1)
RHEUMATOID FACT SERPL-ACNC: <14 IU/ML
SCREEN APTT: 34 SECONDS
SCREEN DRVVT: 37 SECONDS
SODIUM SERPL-SCNC: 141 MMOL/L (ref 135–146)
TB2 - NIL: 0.02 IU/ML
WBC # BLD AUTO: 4.5 THOUSAND/UL (ref 3.8–10.8)

## 2019-03-06 ENCOUNTER — PATIENT MESSAGE (OUTPATIENT)
Dept: RHEUMATOLOGY | Facility: CLINIC | Age: 44
End: 2019-03-06

## 2019-03-11 ENCOUNTER — PATIENT MESSAGE (OUTPATIENT)
Dept: RHEUMATOLOGY | Facility: CLINIC | Age: 44
End: 2019-03-11

## 2019-03-12 ENCOUNTER — PATIENT MESSAGE (OUTPATIENT)
Dept: RHEUMATOLOGY | Facility: CLINIC | Age: 44
End: 2019-03-12

## 2019-03-12 NOTE — TELEPHONE ENCOUNTER
Patient informed of lab results.  She stopped taking calcium and will restart. Patient has not received cardiology appt.  Will have staff find out

## 2019-03-25 ENCOUNTER — PATIENT MESSAGE (OUTPATIENT)
Dept: RHEUMATOLOGY | Facility: CLINIC | Age: 44
End: 2019-03-25

## 2019-03-28 DIAGNOSIS — I10 ESSENTIAL HYPERTENSION: ICD-10-CM

## 2019-03-28 DIAGNOSIS — R60.0 BILATERAL LEG EDEMA: ICD-10-CM

## 2019-03-28 RX ORDER — TORSEMIDE 20 MG/1
TABLET ORAL
Qty: 30 TABLET | Refills: 2 | Status: SHIPPED | OUTPATIENT
Start: 2019-03-28 | End: 2019-08-16 | Stop reason: SDUPTHER

## 2019-04-09 DIAGNOSIS — I27.9 CHRONIC PULMONARY HEART DISEASE: ICD-10-CM

## 2019-04-09 DIAGNOSIS — Z79.899 POLYPHARMACY: Primary | ICD-10-CM

## 2019-04-09 DIAGNOSIS — R06.82 TACHYPNEA: ICD-10-CM

## 2019-04-24 PROBLEM — D84.9 IMMUNOSUPPRESSION: Status: ACTIVE | Noted: 2019-04-24

## 2019-04-24 PROBLEM — E66.01 CLASS 3 SEVERE OBESITY DUE TO EXCESS CALORIES WITHOUT SERIOUS COMORBIDITY IN ADULT: Status: ACTIVE | Noted: 2019-04-24

## 2019-04-24 PROBLEM — E66.813 CLASS 3 SEVERE OBESITY DUE TO EXCESS CALORIES WITHOUT SERIOUS COMORBIDITY IN ADULT: Status: ACTIVE | Noted: 2019-04-24

## 2019-05-06 ENCOUNTER — OFFICE VISIT (OUTPATIENT)
Dept: RHEUMATOLOGY | Facility: CLINIC | Age: 44
End: 2019-05-06
Payer: COMMERCIAL

## 2019-05-06 VITALS
SYSTOLIC BLOOD PRESSURE: 139 MMHG | BODY MASS INDEX: 46.75 KG/M2 | HEART RATE: 63 BPM | HEIGHT: 65 IN | DIASTOLIC BLOOD PRESSURE: 86 MMHG | WEIGHT: 280.63 LBS

## 2019-05-06 DIAGNOSIS — M32.9 SYSTEMIC LUPUS ERYTHEMATOSUS, UNSPECIFIED SLE TYPE, UNSPECIFIED ORGAN INVOLVEMENT STATUS: Primary | ICD-10-CM

## 2019-05-06 DIAGNOSIS — E66.01 CLASS 3 SEVERE OBESITY DUE TO EXCESS CALORIES WITHOUT SERIOUS COMORBIDITY WITH BODY MASS INDEX (BMI) OF 45.0 TO 49.9 IN ADULT: ICD-10-CM

## 2019-05-06 DIAGNOSIS — L65.9 ALOPECIA: ICD-10-CM

## 2019-05-06 DIAGNOSIS — I27.20 PULMONARY HYPERTENSION: ICD-10-CM

## 2019-05-06 DIAGNOSIS — E55.9 VITAMIN D DEFICIENCY: ICD-10-CM

## 2019-05-06 DIAGNOSIS — D84.9 IMMUNOSUPPRESSION: ICD-10-CM

## 2019-05-06 PROCEDURE — 3079F PR MOST RECENT DIASTOLIC BLOOD PRESSURE 80-89 MM HG: ICD-10-PCS | Mod: CPTII,S$GLB,, | Performed by: INTERNAL MEDICINE

## 2019-05-06 PROCEDURE — 99214 OFFICE O/P EST MOD 30 MIN: CPT | Mod: S$GLB,,, | Performed by: INTERNAL MEDICINE

## 2019-05-06 PROCEDURE — 3008F PR BODY MASS INDEX (BMI) DOCUMENTED: ICD-10-PCS | Mod: CPTII,S$GLB,, | Performed by: INTERNAL MEDICINE

## 2019-05-06 PROCEDURE — 3075F SYST BP GE 130 - 139MM HG: CPT | Mod: CPTII,S$GLB,, | Performed by: INTERNAL MEDICINE

## 2019-05-06 PROCEDURE — 3008F BODY MASS INDEX DOCD: CPT | Mod: CPTII,S$GLB,, | Performed by: INTERNAL MEDICINE

## 2019-05-06 PROCEDURE — 3079F DIAST BP 80-89 MM HG: CPT | Mod: CPTII,S$GLB,, | Performed by: INTERNAL MEDICINE

## 2019-05-06 PROCEDURE — 3075F PR MOST RECENT SYSTOLIC BLOOD PRESS GE 130-139MM HG: ICD-10-PCS | Mod: CPTII,S$GLB,, | Performed by: INTERNAL MEDICINE

## 2019-05-06 PROCEDURE — 99999 PR PBB SHADOW E&M-EST. PATIENT-LVL III: ICD-10-PCS | Mod: PBBFAC,,, | Performed by: INTERNAL MEDICINE

## 2019-05-06 PROCEDURE — 99999 PR PBB SHADOW E&M-EST. PATIENT-LVL III: CPT | Mod: PBBFAC,,, | Performed by: INTERNAL MEDICINE

## 2019-05-06 PROCEDURE — 99214 PR OFFICE/OUTPT VISIT, EST, LEVL IV, 30-39 MIN: ICD-10-PCS | Mod: S$GLB,,, | Performed by: INTERNAL MEDICINE

## 2019-05-06 RX ORDER — HYDROXYCHLOROQUINE SULFATE 200 MG/1
200 TABLET, FILM COATED ORAL 2 TIMES DAILY
Qty: 60 TABLET | Refills: 3 | Status: SHIPPED | OUTPATIENT
Start: 2019-05-06 | End: 2019-09-12 | Stop reason: SDUPTHER

## 2019-05-06 ASSESSMENT — ROUTINE ASSESSMENT OF PATIENT INDEX DATA (RAPID3)
PSYCHOLOGICAL DISTRESS SCORE: 0
PATIENT GLOBAL ASSESSMENT SCORE: 1
MDHAQ FUNCTION SCORE: 0
TOTAL RAPID3 SCORE: .33
AM STIFFNESS SCORE: 0, NO
FATIGUE SCORE: 1.5
PAIN SCORE: 0

## 2019-05-06 NOTE — PROGRESS NOTES
"Subjective:       Patient ID: Nitin Mcconnell is a 43 y.o. female.    Chief Complaint: Lupus    HPI:  Nitin Mcconnell is a 43 y.o. female diagnosed at age 31 with lupus.  Started as upper respiratory symptoms that did not respond to antibiotics.  She had fever and low BP as well as SOB.  She was intubated and on ventilator for a week.  She was at [facility].  Dr. Martin diagnosed lupus.  Hospitalized 3/8/07 to 3/30/07.  She had elevated pressure in eyes, kidney involvement.  She was discharged with a PICC line for antibiotics.  She was found to be allergic to heparin due to thrombocytopenia that developed after flushing PICC line with heparin.  She had hair loss.  Had enlarged lymph node under arm in past and biopsy was normal.  Was treated with Plaquenil and steroids.  Only took steroid for 1 year.     She denies any flares since last visit.  Exercising 4-5 days a week for 30 minutes on treadmill or elliptical.   Still with swelling in ankles for over 2 years.     Lupus Review of Systems  Alopecia: yes  Photosensitivity: no   Raynaud's: no  Oral or nasal ulcers: occasional oral ulcers  Rashes:  Intermittent malar rash; occasional rash in crease of arm and neck  No pleurisy or pericarditis.  No seizures, psychosis, or stroke.  No venous or arterial clots.  History of being on coumadin for 3 months as a precaution.   Pregnancy hx (if applicable): no miscarriages (never pregnant)        Review of Systems   Constitutional: Negative.    HENT: Negative.    Cardiovascular: Positive for leg swelling.   Endocrine: Negative.    Genitourinary: Negative.    Musculoskeletal: Negative.    Skin:        Alopecia  0.5 cm tender nodule anterior left shin   Allergic/Immunologic: Negative.    Neurological: Negative.    Hematological: Negative.    Psychiatric/Behavioral: Negative.          Objective:   /86   Pulse 63   Ht 5' 5" (1.651 m)   Wt 127.3 kg (280 lb 10.3 oz)   LMP 07/13/2013   BMI 46.70 kg/m²    "   Physical Exam   Constitutional: She is oriented to person, place, and time and well-developed, well-nourished, and in no distress.   HENT:   Head: Normocephalic and atraumatic.   Eyes: Conjunctivae and EOM are normal.   Neck: Neck supple.   Cardiovascular: Normal rate, regular rhythm and normal heart sounds.    Pulmonary/Chest: Effort normal and breath sounds normal.   Abdominal: Soft. Bowel sounds are normal.   Neurological: She is alert and oriented to person, place, and time. Gait normal.   Skin: Skin is warm and dry.     Psychiatric: Mood and affect normal.            LABS    Component      Latest Ref Rng & Units 2/25/2019   Glucose      65 - 139 mg/dL 81   BUN, Bld      7 - 25 mg/dL 17   Creatinine      0.50 - 1.10 mg/dL 0.88   eGFR if non       > OR = 60 mL/min/1.73m2 80   eGFR if       > OR = 60 mL/min/1.73m2 93   BUN/Creatinine Ratio      6 - 22 (calc) NOT APPLICABLE   Sodium      135 - 146 mmol/L 141   Potassium      3.5 - 5.3 mmol/L 4.1   Chloride      98 - 110 mmol/L 105   CO2      20 - 32 mmol/L 27   Calcium      8.6 - 10.2 mg/dL 8.5 (L)   PROTEIN TOTAL      6.1 - 8.1 g/dL 6.8   Albumin      3.6 - 5.1 g/dL 4.0   Globulin, Total      1.9 - 3.7 g/dL (calc) 2.8   Albumin/Globulin Ratio      1.0 - 2.5 (calc) 1.4   BILIRUBIN TOTAL      0.2 - 1.2 mg/dL 0.5   Alkaline Phosphatase      33 - 115 U/L 83   AST      10 - 30 U/L 21   ALT      6 - 29 U/L 28   Color, UA      YELLOW YELLOW   Appearance, UA      CLEAR CLEAR   Specific Jeffrey, UA      1.001 - 1.035 1.026   pH, UA      5.0 - 8.0 5.5   Glucose, UA      NEGATIVE NEGATIVE   Bilirubin, UA      NEGATIVE NEGATIVE   Ketones, UA      NEGATIVE NEGATIVE   Occult Blood UA      NEGATIVE NEGATIVE   Protein, UA      NEGATIVE NEGATIVE   NITRITE UA      NEGATIVE NEGATIVE   Leukocytes, UA      NEGATIVE NEGATIVE   QuantiFERON-TB Gold Plus      NEGATIVE NEGATIVE   NIL      IU/mL 0.04   Mitogen - Nil      IU/mL >10.00   TB1 - Nil      IU/mL  0.00   TB2 - Nil      IU/mL 0.02   Vitamin D, 1,25 (OH)2      18 - 72 pg/mL 111 (H)   Vitamin D3, 1,25 (OH)2      pg/mL 111   Vitamin D2, 1,25 (OH)2      pg/mL <8   Creatinine, Random Ur      20 - 275 mg/dL 191   Protein/Creatinine Ratio      21 - 161 mg/g creat 52   Protein, Total Random Urine      5 - 24 mg/dL 10   Cardiolipin Ab IgA      APL <11   Cardiolipin Ab IgG      GPL <14   Cardiolipin Ab IgM      MPL <12   Phosphatidylserine Ab IgA      U/mL <20   Phosphatidylserine Ab (IgG)      U/mL <10   Phosphatidylserine Ab ( IgM)      U/mL <25   Hepatitis C Ab      NON-REACTIVE NON-REACTIVE   Signal/Cutoff      <1.00 0.12   Lupus Anticoagulant      NOT DETECTED SEE NOTE   PTT LA Screen      < OR = 40 seconds 34   JEROME Pattern       SPECKLED (A)   JEROME Titer      titer 1:160 (H)   Creatine Kinase, Total      29 - 143 U/L 211 (H)   CRP      <8.0 mg/L 2.7   Cyclic Citrullinated Peptide (CCP) Ab (IgG)      UNITS <16   Rheumatoid Factor      <14 IU/mL <14   JEROME      NEGATIVE POSITIVE (A)   Anti-SSA Antibody      <1.0 NEG AI <1.0 NEG   Anti-SSB Antibody      <1.0 NEG AI <1.0 NEG   ds DNA Ab      IU/mL <1   ELOISA POLYSPECIFIC INTERP      NEGATIVE NEGATIVE   Complement, Total (CH50)      31 - 60 U/mL >60 (H)   Sed Rate      < OR = 20 mm/h 9   Hep A IgM      NON-REACTIVE NON-REACTIVE   Hepatitis B Surface Ag      NON-REACTIVE NON-REACTIVE   Hep B C IgM      NON-REACTIVE NON-REACTIVE   HIV Ag/Ab 4th Gen      NON-REACTIVE NON-REACTIVE   DRVVT Screen      < OR = 45 seconds 37   B2 Glycoprotein I IgA      < OR = 20 ALESIA <9   B2 Glycoprotein I IgM      < OR = 20 SMU <9   B2 Glycoprotein I IgG      < OR = 20 SGU <9   Interpretation       SEE NOTE     Assessment:       1.  SLE.    2.  Immunosuppression.  Eye exam for Plaquenil 10/2018  3.  Elevated pressure in eyes  4.  Obesity.  Gastric bypass 2009.  Started exercising.  5.  Thyroid nodules.  Being monitored.  Aspiration was normal  6.  Mother with RA  7.  Ankle swelling bilateral.   Grandmother and mother had CHF    8.  Alopecia.  Family history of hair loss.   9.  Pulmonary HTN    Plan:       1.  Labs  2.  See Dr. Felton regarding pulmonary HTN  3.  Follow with current dermatology to evaluate   4.  Patient to encouraged to reconsider Weight [employer].  5.  Started on exercising.  Will join Religious group.    6.  Speak with wellness dietician through insurance      RTO 3 months/prn

## 2019-05-08 ENCOUNTER — PATIENT MESSAGE (OUTPATIENT)
Dept: RHEUMATOLOGY | Facility: CLINIC | Age: 44
End: 2019-05-08

## 2019-05-08 DIAGNOSIS — R60.0 BILATERAL LEG EDEMA: ICD-10-CM

## 2019-05-08 DIAGNOSIS — M32.9 SYSTEMIC LUPUS ERYTHEMATOSUS, UNSPECIFIED SLE TYPE, UNSPECIFIED ORGAN INVOLVEMENT STATUS: Primary | ICD-10-CM

## 2019-05-08 DIAGNOSIS — L65.9 ALOPECIA: ICD-10-CM

## 2019-05-08 DIAGNOSIS — I27.20 PULMONARY HYPERTENSION: ICD-10-CM

## 2019-05-08 DIAGNOSIS — D84.9 IMMUNOSUPPRESSION: ICD-10-CM

## 2019-05-13 ENCOUNTER — LAB VISIT (OUTPATIENT)
Dept: LAB | Facility: HOSPITAL | Age: 44
End: 2019-05-13
Attending: INTERNAL MEDICINE
Payer: COMMERCIAL

## 2019-05-13 DIAGNOSIS — M32.9 SYSTEMIC LUPUS ERYTHEMATOSUS, UNSPECIFIED SLE TYPE, UNSPECIFIED ORGAN INVOLVEMENT STATUS: ICD-10-CM

## 2019-05-13 DIAGNOSIS — L65.9 ALOPECIA: ICD-10-CM

## 2019-05-13 DIAGNOSIS — R60.0 BILATERAL LEG EDEMA: ICD-10-CM

## 2019-05-13 DIAGNOSIS — I27.20 PULMONARY HYPERTENSION: ICD-10-CM

## 2019-05-13 DIAGNOSIS — D84.9 IMMUNOSUPPRESSION: ICD-10-CM

## 2019-05-13 LAB
ALBUMIN SERPL BCP-MCNC: 4.2 G/DL (ref 3.5–5.2)
ALP SERPL-CCNC: 98 U/L (ref 55–135)
ALT SERPL W/O P-5'-P-CCNC: 33 U/L (ref 10–44)
ANION GAP SERPL CALC-SCNC: 15 MMOL/L (ref 8–16)
AST SERPL-CCNC: 29 U/L (ref 10–40)
BASOPHILS # BLD AUTO: 0.05 K/UL (ref 0–0.2)
BASOPHILS NFR BLD: 0.9 % (ref 0–1.9)
BILIRUB SERPL-MCNC: 0.5 MG/DL (ref 0.1–1)
BUN SERPL-MCNC: 25 MG/DL (ref 6–20)
C3 SERPL-MCNC: 154 MG/DL (ref 50–180)
C4 SERPL-MCNC: 42 MG/DL (ref 11–44)
CALCIUM SERPL-MCNC: 9.5 MG/DL (ref 8.7–10.5)
CHLORIDE SERPL-SCNC: 101 MMOL/L (ref 95–110)
CK SERPL-CCNC: 240 U/L (ref 20–180)
CO2 SERPL-SCNC: 23 MMOL/L (ref 23–29)
CREAT SERPL-MCNC: 1.1 MG/DL (ref 0.5–1.4)
CRP SERPL-MCNC: 3.1 MG/L (ref 0–8.2)
DIFFERENTIAL METHOD: ABNORMAL
EOSINOPHIL # BLD AUTO: 0.1 K/UL (ref 0–0.5)
EOSINOPHIL NFR BLD: 1.6 % (ref 0–8)
ERYTHROCYTE [DISTWIDTH] IN BLOOD BY AUTOMATED COUNT: 12.8 % (ref 11.5–14.5)
ERYTHROCYTE [SEDIMENTATION RATE] IN BLOOD BY WESTERGREN METHOD: 31 MM/HR (ref 0–36)
EST. GFR  (AFRICAN AMERICAN): >60 ML/MIN/1.73 M^2
EST. GFR  (NON AFRICAN AMERICAN): >60 ML/MIN/1.73 M^2
GLUCOSE SERPL-MCNC: 83 MG/DL (ref 70–110)
HCT VFR BLD AUTO: 40.1 % (ref 37–48.5)
HGB BLD-MCNC: 12.4 G/DL (ref 12–16)
IMM GRANULOCYTES # BLD AUTO: 0.01 K/UL (ref 0–0.04)
IMM GRANULOCYTES NFR BLD AUTO: 0.2 % (ref 0–0.5)
LYMPHOCYTES # BLD AUTO: 2 K/UL (ref 1–4.8)
LYMPHOCYTES NFR BLD: 35.3 % (ref 18–48)
MCH RBC QN AUTO: 27.4 PG (ref 27–31)
MCHC RBC AUTO-ENTMCNC: 30.9 G/DL (ref 32–36)
MCV RBC AUTO: 89 FL (ref 82–98)
MONOCYTES # BLD AUTO: 0.7 K/UL (ref 0.3–1)
MONOCYTES NFR BLD: 12 % (ref 4–15)
NEUTROPHILS # BLD AUTO: 2.8 K/UL (ref 1.8–7.7)
NEUTROPHILS NFR BLD: 50 % (ref 38–73)
NRBC BLD-RTO: 0 /100 WBC
PLATELET # BLD AUTO: 210 K/UL (ref 150–350)
PMV BLD AUTO: 11.8 FL (ref 9.2–12.9)
POTASSIUM SERPL-SCNC: 4 MMOL/L (ref 3.5–5.1)
PROT SERPL-MCNC: 7.8 G/DL (ref 6–8.4)
RBC # BLD AUTO: 4.52 M/UL (ref 4–5.4)
SODIUM SERPL-SCNC: 139 MMOL/L (ref 136–145)
WBC # BLD AUTO: 5.67 K/UL (ref 3.9–12.7)

## 2019-05-13 PROCEDURE — 85652 RBC SED RATE AUTOMATED: CPT

## 2019-05-13 PROCEDURE — 82550 ASSAY OF CK (CPK): CPT

## 2019-05-13 PROCEDURE — 86140 C-REACTIVE PROTEIN: CPT

## 2019-05-13 PROCEDURE — 86225 DNA ANTIBODY NATIVE: CPT

## 2019-05-13 PROCEDURE — 85025 COMPLETE CBC W/AUTO DIFF WBC: CPT

## 2019-05-13 PROCEDURE — 86160 COMPLEMENT ANTIGEN: CPT

## 2019-05-13 PROCEDURE — 80053 COMPREHEN METABOLIC PANEL: CPT

## 2019-05-13 PROCEDURE — 36415 COLL VENOUS BLD VENIPUNCTURE: CPT | Mod: PO

## 2019-05-13 PROCEDURE — 86160 COMPLEMENT ANTIGEN: CPT | Mod: 59

## 2019-05-14 ENCOUNTER — PATIENT MESSAGE (OUTPATIENT)
Dept: RHEUMATOLOGY | Facility: CLINIC | Age: 44
End: 2019-05-14

## 2019-05-14 LAB — DSDNA AB SER-ACNC: NORMAL [IU]/ML

## 2019-05-30 NOTE — PROGRESS NOTES
Subjective:    Patient ID:  Nitin Mcconnell is a 43 y.o. female who presents for evaluation of Pulmonary Hypertension.    HPI     42 yo woman with SLE, HTN, obesity, referred by Dr Mishra for eval of PH    Pt reports she is here today both because of her echo results and also because she retains fluid in her ankles. Has CHF in her family- mom had it and grandmother passed away from it.  Pt says it's not often that she gets SOB- works out with a coworker- 30 min on treadmill and 30 min on elliptical 4-5 days a week. occasionally gets SOB going up flight of steps, bending over, and occasionally at rest. No SOB with her ADLs  Every now and then gets a little sharp pain in her chest for a second or so, but not often- occurs at rest, the other day it happened when walking.  Sleeps on 2-3 pillows for comfort.  Has been told she does snore, and had a sleep study done in 2009- was + for OS and bariatric surgery was recommended with repeat sleep study after wt loss.  She did have bariatric surgery but was not successful in the long run with wt loss. Takes torsemide every other day with good UOP, the swelling goes down alittle though not completely. Comes back over the course of the day. Wears compression stockings.     SH: lives with mom, cut back on salt as mom has CHF  Nonsmoker, occasional Etoh  Took metabolife in her 20's for wt loss    FH: HF as above      Six Minute Walk Test:   427  m (   m in    )                                              O2 sat  99 ->96  %                                                           HR 77  -> 96                                                                 BP  143 / 69  ->137 /63                                                         Kennedy   0.5  -> 0.5    Echo      TTE 2/26/19  · Left ventricular size is at the upper limits of normal.  · Normal left ventricular systolic function. The estimated ejection fraction is 55%  · Normal LV diastolic function.  · Normal right  "ventricular systolic function.  · Mild tricuspid regurgitation.  · The estimated PA systolic pressure is 30 mm Hg.  · IVC was not clearly visualized.  RV 3.65cm, TAPSE 2.13cm        Results for orders placed or performed in visit on 04/10/18   2D echo with color flow doppler   Result Value Ref Range    QEF 55 55 - 65    Mitral Valve Regurgitation MILD     Diastolic Dysfunction No     Est. PA Systolic Pressure 30.04     Tricuspid Valve Regurgitation MILD        EKG   /    /      RHC   /      /  n/a      CXR   2/25/19  The lungs are symmetrically expanded.  No large consolidation, pleural fluid, or pneumothorax.    The cardiac silhouette is normal in size.  The hilar contours and mediastinum are unremarkable.    The osseous structures are intact.  No evidence of pneumoperitoneum.  Surgical clips project over the right axilla.            CT Chest    /    /     n/a    PFTs     /     /  N/a    V/Q Scan  /    /  n/a    Review of Systems   Constitution: Negative for chills, fever, malaise/fatigue and weight gain.   HENT: Negative.    Eyes: Negative.    Cardiovascular: Positive for leg swelling. Negative for chest pain, dyspnea on exertion, near-syncope, orthopnea, palpitations, paroxysmal nocturnal dyspnea and syncope.   Respiratory: Positive for sleep disturbances due to breathing and snoring. Negative for cough and shortness of breath.    Endocrine: Negative.    Skin: Negative.    Musculoskeletal: Negative.    Gastrointestinal: Negative for bloating, abdominal pain and change in bowel habit.   Neurological: Negative for dizziness and light-headedness.   Psychiatric/Behavioral: Negative for depression.        Objective:  /77 (BP Location: Left arm, Patient Position: Sitting, BP Method: Large (Automatic))   Pulse (!) 57   Ht 5' 5" (1.651 m)   Wt 123 kg (271 lb 2.7 oz)   LMP 07/13/2013   SpO2 98%   BMI 45.12 kg/m²       Physical Exam   Constitutional: She is oriented to person, place, and time. She appears " well-developed and well-nourished.   HENT:   Head: Normocephalic and atraumatic.   Eyes: Right eye exhibits no discharge. Left eye exhibits no discharge.   Neck: Neck supple. No JVD present. No thyromegaly present.   Cardiovascular: Normal rate and regular rhythm. Exam reveals no gallop and no friction rub.   No murmur heard.       Pulmonary/Chest: Effort normal and breath sounds normal. No respiratory distress. She has no wheezes. She has no rales.   Abdominal: Soft. Bowel sounds are normal. She exhibits no distension. There is no tenderness.   obese   Musculoskeletal: Normal range of motion. She exhibits no edema or tenderness.   1+ nonpitting edema to above ankles   Neurological: She is alert and oriented to person, place, and time. No cranial nerve deficit. Coordination normal.   Skin: Skin is warm and dry. No rash noted.   Psychiatric: She has a normal mood and affect. Judgment and thought content normal.           Chemistry        Component Value Date/Time     05/31/2019 0745    K 3.7 05/31/2019 0745     05/31/2019 0745    CO2 31 (H) 05/31/2019 0745    BUN 16 05/31/2019 0745    CREATININE 1.1 05/31/2019 0745    GLU 98 05/31/2019 0745        Component Value Date/Time    CALCIUM 9.5 05/31/2019 0745    ALKPHOS 93 05/31/2019 0745    AST 26 05/31/2019 0745    ALT 31 05/31/2019 0745    BILITOT 0.5 05/31/2019 0745    ESTGFRAFRICA >60.0 05/31/2019 0745    EGFRNONAA >60.0 05/31/2019 0745            Magnesium   Date Value Ref Range Status   05/31/2019 2.0 1.6 - 2.6 mg/dL Final       Lab Results   Component Value Date    WBC 4.23 05/31/2019    HGB 12.0 05/31/2019    HCT 39.1 05/31/2019    MCV 90 05/31/2019     05/31/2019       Lab Results   Component Value Date    INR 1.0 07/18/2013       BNP   Date Value Ref Range Status   05/31/2019 <10 0 - 99 pg/mL Final     Comment:     Values of less than 100 pg/ml are consistent with non-CHF populations.   03/07/2018 64 <100 pg/mL Final     Comment:        BNP  levels increase with age in the general  population with the highest values seen in  individuals greater than 75 years of age.  Reference: J. Am. Amira. Cardiol. 2002; 40:976-982.            No results found for: LDH          Assessment:       1. Pulmonary hypertension- AT RISK- very mildly increased by echo with normal RV size and fxn. RF include her SLE, untreated RAQUEL, and borderline control of systemic HTN   2. Essential hypertension    3. Systemic lupus erythematosus, unspecified SLE type, unspecified organ involvement status    4. Class 3 severe obesity due to excess calories without serious comorbidity with body mass index (BMI) of 45.0 to 49.9 in adult    5. Bilateral leg edema      WHO Group: likely 2,3 Functional Class 1-2     Plan:       No changes today as I will not be following her longterm- needs to track BP at home and let her PCP know if consistently running over 130/80    Needs repeat Sleep study ASAP ( I ordered consult today)    Cont Compression stockings    Keep salt intake to under 2000 mg sodium, fluids to under 2 L (64 oz)    Continue exercising daily    Annual echo- if PASP>40 please send pt back and we will set up RHC

## 2019-05-31 ENCOUNTER — INITIAL CONSULT (OUTPATIENT)
Dept: TRANSPLANT | Facility: CLINIC | Age: 44
End: 2019-05-31
Payer: COMMERCIAL

## 2019-05-31 ENCOUNTER — HOSPITAL ENCOUNTER (OUTPATIENT)
Dept: PULMONOLOGY | Facility: CLINIC | Age: 44
Discharge: HOME | End: 2019-05-31
Payer: COMMERCIAL

## 2019-05-31 ENCOUNTER — LAB VISIT (OUTPATIENT)
Dept: LAB | Facility: HOSPITAL | Age: 44
End: 2019-05-31
Attending: INTERNAL MEDICINE
Payer: COMMERCIAL

## 2019-05-31 VITALS
BODY MASS INDEX: 45.18 KG/M2 | OXYGEN SATURATION: 98 % | SYSTOLIC BLOOD PRESSURE: 134 MMHG | HEIGHT: 65 IN | HEART RATE: 57 BPM | WEIGHT: 271.19 LBS | HEIGHT: 65 IN | DIASTOLIC BLOOD PRESSURE: 77 MMHG | BODY MASS INDEX: 46.82 KG/M2 | WEIGHT: 281 LBS

## 2019-05-31 DIAGNOSIS — M32.9 SYSTEMIC LUPUS ERYTHEMATOSUS, UNSPECIFIED SLE TYPE, UNSPECIFIED ORGAN INVOLVEMENT STATUS: ICD-10-CM

## 2019-05-31 DIAGNOSIS — I10 ESSENTIAL HYPERTENSION: ICD-10-CM

## 2019-05-31 DIAGNOSIS — R60.0 BILATERAL LEG EDEMA: ICD-10-CM

## 2019-05-31 DIAGNOSIS — Z79.899 POLYPHARMACY: ICD-10-CM

## 2019-05-31 DIAGNOSIS — I27.20 PULMONARY HYPERTENSION: Primary | ICD-10-CM

## 2019-05-31 DIAGNOSIS — G47.33 OSA (OBSTRUCTIVE SLEEP APNEA): ICD-10-CM

## 2019-05-31 DIAGNOSIS — E66.01 CLASS 3 SEVERE OBESITY DUE TO EXCESS CALORIES WITHOUT SERIOUS COMORBIDITY WITH BODY MASS INDEX (BMI) OF 45.0 TO 49.9 IN ADULT: ICD-10-CM

## 2019-05-31 DIAGNOSIS — R06.82 TACHYPNEA: ICD-10-CM

## 2019-05-31 DIAGNOSIS — I27.9 CHRONIC PULMONARY HEART DISEASE: ICD-10-CM

## 2019-05-31 LAB
ALBUMIN SERPL BCP-MCNC: 3.9 G/DL (ref 3.5–5.2)
ALP SERPL-CCNC: 93 U/L (ref 55–135)
ALT SERPL W/O P-5'-P-CCNC: 31 U/L (ref 10–44)
ANION GAP SERPL CALC-SCNC: 7 MMOL/L (ref 8–16)
AST SERPL-CCNC: 26 U/L (ref 10–40)
BASOPHILS # BLD AUTO: 0.03 K/UL (ref 0–0.2)
BASOPHILS NFR BLD: 0.7 % (ref 0–1.9)
BILIRUB SERPL-MCNC: 0.5 MG/DL (ref 0.1–1)
BNP SERPL-MCNC: <10 PG/ML (ref 0–99)
BUN SERPL-MCNC: 16 MG/DL (ref 6–20)
CALCIUM SERPL-MCNC: 9.5 MG/DL (ref 8.7–10.5)
CHLORIDE SERPL-SCNC: 101 MMOL/L (ref 95–110)
CO2 SERPL-SCNC: 31 MMOL/L (ref 23–29)
CREAT SERPL-MCNC: 1.1 MG/DL (ref 0.5–1.4)
DIFFERENTIAL METHOD: ABNORMAL
EOSINOPHIL # BLD AUTO: 0.1 K/UL (ref 0–0.5)
EOSINOPHIL NFR BLD: 1.7 % (ref 0–8)
ERYTHROCYTE [DISTWIDTH] IN BLOOD BY AUTOMATED COUNT: 12.5 % (ref 11.5–14.5)
EST. GFR  (AFRICAN AMERICAN): >60 ML/MIN/1.73 M^2
EST. GFR  (NON AFRICAN AMERICAN): >60 ML/MIN/1.73 M^2
GLUCOSE SERPL-MCNC: 98 MG/DL (ref 70–110)
HCT VFR BLD AUTO: 39.1 % (ref 37–48.5)
HGB BLD-MCNC: 12 G/DL (ref 12–16)
IMM GRANULOCYTES # BLD AUTO: 0.01 K/UL (ref 0–0.04)
IMM GRANULOCYTES NFR BLD AUTO: 0.2 % (ref 0–0.5)
LYMPHOCYTES # BLD AUTO: 1.3 K/UL (ref 1–4.8)
LYMPHOCYTES NFR BLD: 30.7 % (ref 18–48)
MAGNESIUM SERPL-MCNC: 2 MG/DL (ref 1.6–2.6)
MCH RBC QN AUTO: 27.6 PG (ref 27–31)
MCHC RBC AUTO-ENTMCNC: 30.7 G/DL (ref 32–36)
MCV RBC AUTO: 90 FL (ref 82–98)
MONOCYTES # BLD AUTO: 0.5 K/UL (ref 0.3–1)
MONOCYTES NFR BLD: 12.5 % (ref 4–15)
NEUTROPHILS # BLD AUTO: 2.3 K/UL (ref 1.8–7.7)
NEUTROPHILS NFR BLD: 54.2 % (ref 38–73)
NRBC BLD-RTO: 0 /100 WBC
PLATELET # BLD AUTO: 205 K/UL (ref 150–350)
PMV BLD AUTO: 11.1 FL (ref 9.2–12.9)
POTASSIUM SERPL-SCNC: 3.7 MMOL/L (ref 3.5–5.1)
PROT SERPL-MCNC: 7.5 G/DL (ref 6–8.4)
RBC # BLD AUTO: 4.35 M/UL (ref 4–5.4)
SODIUM SERPL-SCNC: 139 MMOL/L (ref 136–145)
WBC # BLD AUTO: 4.23 K/UL (ref 3.9–12.7)

## 2019-05-31 PROCEDURE — 99999 PR PBB SHADOW E&M-EST. PATIENT-LVL IV: CPT | Mod: PBBFAC,,, | Performed by: INTERNAL MEDICINE

## 2019-05-31 PROCEDURE — 3008F BODY MASS INDEX DOCD: CPT | Mod: CPTII,S$GLB,, | Performed by: INTERNAL MEDICINE

## 2019-05-31 PROCEDURE — 94618 PULMONARY STRESS TESTING: ICD-10-PCS | Mod: S$GLB,,, | Performed by: INTERNAL MEDICINE

## 2019-05-31 PROCEDURE — 99999 PR PBB SHADOW E&M-EST. PATIENT-LVL IV: ICD-10-PCS | Mod: PBBFAC,,, | Performed by: INTERNAL MEDICINE

## 2019-05-31 PROCEDURE — 83880 ASSAY OF NATRIURETIC PEPTIDE: CPT

## 2019-05-31 PROCEDURE — 99205 OFFICE O/P NEW HI 60 MIN: CPT | Mod: S$GLB,,, | Performed by: INTERNAL MEDICINE

## 2019-05-31 PROCEDURE — 36415 COLL VENOUS BLD VENIPUNCTURE: CPT

## 2019-05-31 PROCEDURE — 3078F PR MOST RECENT DIASTOLIC BLOOD PRESSURE < 80 MM HG: ICD-10-PCS | Mod: CPTII,S$GLB,, | Performed by: INTERNAL MEDICINE

## 2019-05-31 PROCEDURE — 85025 COMPLETE CBC W/AUTO DIFF WBC: CPT

## 2019-05-31 PROCEDURE — 94618 PULMONARY STRESS TESTING: CPT | Mod: S$GLB,,, | Performed by: INTERNAL MEDICINE

## 2019-05-31 PROCEDURE — 3078F DIAST BP <80 MM HG: CPT | Mod: CPTII,S$GLB,, | Performed by: INTERNAL MEDICINE

## 2019-05-31 PROCEDURE — 83735 ASSAY OF MAGNESIUM: CPT

## 2019-05-31 PROCEDURE — 3075F PR MOST RECENT SYSTOLIC BLOOD PRESS GE 130-139MM HG: ICD-10-PCS | Mod: CPTII,S$GLB,, | Performed by: INTERNAL MEDICINE

## 2019-05-31 PROCEDURE — 3008F PR BODY MASS INDEX (BMI) DOCUMENTED: ICD-10-PCS | Mod: CPTII,S$GLB,, | Performed by: INTERNAL MEDICINE

## 2019-05-31 PROCEDURE — 80053 COMPREHEN METABOLIC PANEL: CPT

## 2019-05-31 PROCEDURE — 99205 PR OFFICE/OUTPT VISIT, NEW, LEVL V, 60-74 MIN: ICD-10-PCS | Mod: S$GLB,,, | Performed by: INTERNAL MEDICINE

## 2019-05-31 PROCEDURE — 3075F SYST BP GE 130 - 139MM HG: CPT | Mod: CPTII,S$GLB,, | Performed by: INTERNAL MEDICINE

## 2019-05-31 NOTE — PATIENT INSTRUCTIONS
No changes    Sleep study    Compression stockings    Keep salt intake to under 2000 mg sodium, fluids to under 2 L (64 oz)    Continue exercising daily    Annual echo

## 2019-06-03 NOTE — PROCEDURES
Nitin Mcconnell is a 43 y.o.  female patient, who presents for a 6 minute walk test ordered by Evette Felton MD.  The diagnosis is Pulmonary Hypertension.  The patient's BMI is 46.9 kg/m2.  Predicted distance (lower limit of normal) is 334.65 meters.      Test Results:    The test was completed without stopping.  The total time walked was 360 seconds.  During walking, the patient reported:  No complaints.  The patient used no assistive devices during testing.     05/31/2019---------Distance: 426.72 meters (1400 feet)     O2 Sat % Supplemental Oxygen Heart Rate Blood Pressure Kennedy Scale   Pre-exercise  (Resting) 99 % Room Air 77 bpm 143/69 mmHg 0.5   During Exercise 96 % Room Air 96 bpm 137/63 mmHg 0.5   Post-exercise  (Recovery) 98 % Room Air  79 bpm       Recovery Time:  61 seconds    Performing nurse/tech:  Trent MARTIN      PREVIOUS STUDY:   The patient has not had a previous study.      CLINICAL INTERPRETATION:  Six minute walk distance is 426.72 meters (1400 feet) with very, very light dyspnea.  During exercise, there was desaturation while breathing room air.  Both blood pressure and heart rate remained stable with walking.  The patient did not report non-pulmonary symptoms during exercise.  No previous study performed.  Based upon age and body mass index, exercise capacity is normal.

## 2019-07-16 ENCOUNTER — TELEPHONE (OUTPATIENT)
Dept: INTERNAL MEDICINE | Facility: CLINIC | Age: 44
End: 2019-07-16

## 2019-07-18 ENCOUNTER — PATIENT MESSAGE (OUTPATIENT)
Dept: INTERNAL MEDICINE | Facility: CLINIC | Age: 44
End: 2019-07-18

## 2019-07-18 DIAGNOSIS — Z12.31 ENCOUNTER FOR SCREENING MAMMOGRAM FOR HIGH-RISK PATIENT: ICD-10-CM

## 2019-07-18 DIAGNOSIS — Z00.00 ANNUAL PHYSICAL EXAM: Primary | ICD-10-CM

## 2019-07-18 NOTE — TELEPHONE ENCOUNTER
Advised pt is due for annual physical; no orders for mammo or tsh u/s in chart. Do you want her to have that done before she comes in? Please advise; thank you

## 2019-07-19 NOTE — TELEPHONE ENCOUNTER
Annual labs and mammogram were entered. She may schedule these but also needs to make annual appt.     Endocrinology (Dr. Dumont) recommended 1 year thyroid US and office visit. She should call that department for f/u appt and US.

## 2019-07-25 ENCOUNTER — OFFICE VISIT (OUTPATIENT)
Dept: SLEEP MEDICINE | Facility: CLINIC | Age: 44
End: 2019-07-25
Payer: COMMERCIAL

## 2019-07-25 VITALS
HEIGHT: 65 IN | WEIGHT: 271.5 LBS | DIASTOLIC BLOOD PRESSURE: 76 MMHG | HEART RATE: 71 BPM | SYSTOLIC BLOOD PRESSURE: 120 MMHG | BODY MASS INDEX: 45.23 KG/M2

## 2019-07-25 DIAGNOSIS — D84.9 IMMUNOSUPPRESSION: ICD-10-CM

## 2019-07-25 DIAGNOSIS — I27.20 PULMONARY HYPERTENSION: ICD-10-CM

## 2019-07-25 DIAGNOSIS — G47.33 OBSTRUCTIVE SLEEP APNEA: Primary | ICD-10-CM

## 2019-07-25 DIAGNOSIS — M32.9 SYSTEMIC LUPUS ERYTHEMATOSUS, UNSPECIFIED SLE TYPE, UNSPECIFIED ORGAN INVOLVEMENT STATUS: ICD-10-CM

## 2019-07-25 PROCEDURE — 99999 PR PBB SHADOW E&M-EST. PATIENT-LVL III: CPT | Mod: PBBFAC,,, | Performed by: NURSE PRACTITIONER

## 2019-07-25 PROCEDURE — 99203 OFFICE O/P NEW LOW 30 MIN: CPT | Mod: S$GLB,,, | Performed by: NURSE PRACTITIONER

## 2019-07-25 PROCEDURE — 3008F PR BODY MASS INDEX (BMI) DOCUMENTED: ICD-10-PCS | Mod: CPTII,S$GLB,, | Performed by: NURSE PRACTITIONER

## 2019-07-25 PROCEDURE — 3074F SYST BP LT 130 MM HG: CPT | Mod: CPTII,S$GLB,, | Performed by: NURSE PRACTITIONER

## 2019-07-25 PROCEDURE — 3078F PR MOST RECENT DIASTOLIC BLOOD PRESSURE < 80 MM HG: ICD-10-PCS | Mod: CPTII,S$GLB,, | Performed by: NURSE PRACTITIONER

## 2019-07-25 PROCEDURE — 3078F DIAST BP <80 MM HG: CPT | Mod: CPTII,S$GLB,, | Performed by: NURSE PRACTITIONER

## 2019-07-25 PROCEDURE — 3074F PR MOST RECENT SYSTOLIC BLOOD PRESSURE < 130 MM HG: ICD-10-PCS | Mod: CPTII,S$GLB,, | Performed by: NURSE PRACTITIONER

## 2019-07-25 PROCEDURE — 3008F BODY MASS INDEX DOCD: CPT | Mod: CPTII,S$GLB,, | Performed by: NURSE PRACTITIONER

## 2019-07-25 PROCEDURE — 99999 PR PBB SHADOW E&M-EST. PATIENT-LVL III: ICD-10-PCS | Mod: PBBFAC,,, | Performed by: NURSE PRACTITIONER

## 2019-07-25 PROCEDURE — 99203 PR OFFICE/OUTPT VISIT, NEW, LEVL III, 30-44 MIN: ICD-10-PCS | Mod: S$GLB,,, | Performed by: NURSE PRACTITIONER

## 2019-07-25 NOTE — PROGRESS NOTES
Nitin Mcconnell was seen as a new patient, referred by Dr. Felton , for the management of obstructive sleep apnea.     CHIEF COMPLAINT: Snoring    HISTORY OF PRESENT ILLNESS:Nitin Mcconnell a 44 y.o. female presents for the management of obstructive sleep apnea. She was dx'd with mild RAQUEL (no REM during study) 2009 (245#) as part of gastric bypass workup. She never was treated and lost wgt/down to low 200's. She is now ~ 30# over study wgt and has PHTN. She is occasionally dyspneic at rest. +snores. Denies witnessed apneic pauses. Attending gym with friend. Nocturia 0-1x.  Sleeps side and back. Infrequent am headaches.     Denies symptoms of restless legs or kicking during sleep.     PSG 2009 AHI 12.1/low sat 96% (no REM)    EPWORTH SLEEPINESS SCALE 7/18/2019   Sitting and reading 1   Watching TV 2   Sitting, inactive in a public place (e.g. a theatre or a meeting) 0   As a passenger in a car for an hour without a break 1   Lying down to rest in the afternoon when circumstances permit 2   Sitting and talking to someone 0   Sitting quietly after a lunch without alcohol 1   In a car, while stopped for a few minutes in traffic 0   Total score 7     Sleep Clinic New Patient 7/18/2019   What time do you go to bed on a week day? (Give a range) 10pm to midnight   What time do you go to bed on a day off? (Give a range) 10pm to midnight   How long does it take you to fall asleep? (Give a range) 1 hour or longer   On average, how many times per night do you wake up? once   How long does it take you to fall back into sleep? (Give a range) 30 minutes or less   What time do you wake up to start your day on a week day? (Give a range) 4am   What time do you wake up to start your day on a day off? (Give a range) 5am-9am   What time do you get out of bed? (Give a range) 4am during the week, weekends vary   On average, how many hours do you sleep? 4-5 hours   On average, how many naps do you take per day? none   Rate your  "sleep quality from 0 to 5 (0-poor, 5-great). 4   Have you experienced:  Weight gain?   How much weight have you lost or gained (in lbs.) in the last year? 10lbs   On average, how many times per night do you go to the bathroom?  one time   Have you ever had a sleep study/CPAP machine/surgery for sleep apnea? Yes   Have you ever had a CPAP machine for sleep apnea? No   Have you ever had surgery for sleep apnea? No     FH: negative sleep d/o  SOCIAL HISTORY: single.  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  No   Sore throat or dry mouth in the morning? Sometimes   Irregular or very fast heart beat?  No   Shortness of breath?  Sometimes   Acid reflux? No   Body aches and pains?  Sometimes   Morning headaches? Sometimes   Dizziness? No   Mood changes?  No   Do you exercise?  No   Do you feel like moving your legs a lot?  No     TTE 2/26/2019: EF 55%  estimated PA systolic pressure is 30 mm Hg      PHYSICAL EXAM:   /76   Pulse 71   Ht 5' 5" (1.651 m)   Wt 123.2 kg (271 lb 8 oz)   LMP 07/13/2013   BMI 45.18 kg/m²   GENERAL: morbid obese body habitus, well groomed   HEENT: Conjunctivae are non-erythematous; Pupils equal, round, and reactive to light; Nose is symmetrical  SKIN: On face and neck: No abrasions, no rashes, no lesions  RESPIRATORY: Normal chest expansion and non-labored breathing at rest.   EXTREMITIES: No edema. No clubbing. No cyanosis. Station normal. Gait normal.   NEURO/PSYCH: Oriented to time, place and person. Normal attention span and concentration. Affect is full. Mood is normal.       ASSESSMENT:     RAQUEL, mild, no REM could represent an underestimate of severity. Dx'd in 2009. Had gastric bypass surgery and lost wgt but then gained back. Current 30# over PSG wgt. Never began treatment. NEEDS REQUAL PSG. Also has pulmonary hypertension and SLE with immunosupression.  "     PLAN:   1. Polysomnogram ,  discussed plan of care    2. Discussed etiology of RAQUEL and potential ramifications of untreated RAQUEL, including stroke, heart disease, HTN.  We discussed potential treatment options, which could include weight loss (10-15%), body positioning, continuous positive airway pressure (CPAP-definitive), mandibular advancement splint by dentist, or referral for surgical consideration.   3. The patient was advised to abstain from driving should she feel sleepy or drowsy.   4. Encouraged continued weight loss efforts/gym attendance for potential improvement of RAQUEL and overall health benefits    Thank you for allowing me the opportunity to participate in the care of your patient

## 2019-07-25 NOTE — LETTER
July 25, 2019      Evette Felton MD  [address]  [facility] [address]           [facility]  [address] [facility] [address]  Phone: [phone]  Fax: [phone]          Patient: Nitin Mcconnell   MR Number: [id number]   YOB: 1975   Date of Visit: 7/25/2019       Dear Dr. Evette Felton:    Thank you for referring Nitin Mcconnell to me for evaluation. Attached you will find relevant portions of my assessment and plan of care.    If you have questions, please do not hesitate to call me. I look forward to following Nitin Mcconnell along with you.    Sincerely,    Priscilla Rivera, NP    Enclosure  CC:  No Recipients    If you would like to receive this communication electronically, please contact [email or web address] or [phone] to request more information on [employer] Link access.    For providers and/or their staff who would like to refer a patient to [facility], please contact us through our one-stop-shop provider referral line, [facility], at [phone].    If you feel you have received this communication in error or would no longer like to receive these types of communications, please e-mail [email or web address]

## 2019-08-01 ENCOUNTER — HOSPITAL ENCOUNTER (OUTPATIENT)
Dept: RADIOLOGY | Facility: HOSPITAL | Age: 44
Discharge: HOME | End: 2019-08-01
Attending: INTERNAL MEDICINE
Payer: COMMERCIAL

## 2019-08-01 DIAGNOSIS — Z12.31 ENCOUNTER FOR SCREENING MAMMOGRAM FOR HIGH-RISK PATIENT: ICD-10-CM

## 2019-08-01 PROCEDURE — 77067 SCR MAMMO BI INCL CAD: CPT | Mod: TC

## 2019-08-01 PROCEDURE — 77067 SCR MAMMO BI INCL CAD: CPT | Mod: 26,,, | Performed by: RADIOLOGY

## 2019-08-01 PROCEDURE — 77063 BREAST TOMOSYNTHESIS BI: CPT | Mod: 26,,, | Performed by: RADIOLOGY

## 2019-08-01 PROCEDURE — 77067 MAMMO DIGITAL SCREENING BILAT WITH TOMOSYNTHESIS_CAD: ICD-10-PCS | Mod: 26,,, | Performed by: RADIOLOGY

## 2019-08-01 PROCEDURE — 77063 MAMMO DIGITAL SCREENING BILAT WITH TOMOSYNTHESIS_CAD: ICD-10-PCS | Mod: 26,,, | Performed by: RADIOLOGY

## 2019-08-07 ENCOUNTER — TELEPHONE (OUTPATIENT)
Dept: SLEEP MEDICINE | Facility: CLINIC | Age: 44
End: 2019-08-07

## 2019-08-08 DIAGNOSIS — G47.33 OBSTRUCTIVE SLEEP APNEA: Primary | ICD-10-CM

## 2019-08-12 ENCOUNTER — TELEPHONE (OUTPATIENT)
Dept: SLEEP MEDICINE | Facility: OTHER | Age: 44
End: 2019-08-12

## 2019-08-16 ENCOUNTER — TELEPHONE (OUTPATIENT)
Dept: SLEEP MEDICINE | Facility: OTHER | Age: 44
End: 2019-08-16

## 2019-08-16 DIAGNOSIS — R60.0 BILATERAL LEG EDEMA: ICD-10-CM

## 2019-08-16 DIAGNOSIS — I10 ESSENTIAL HYPERTENSION: ICD-10-CM

## 2019-08-18 RX ORDER — TORSEMIDE 20 MG/1
TABLET ORAL
Qty: 30 TABLET | Refills: 0 | Status: SHIPPED | OUTPATIENT
Start: 2019-08-18 | End: 2019-12-20 | Stop reason: ALTCHOICE

## 2019-08-26 ENCOUNTER — TELEPHONE (OUTPATIENT)
Dept: SLEEP MEDICINE | Facility: OTHER | Age: 44
End: 2019-08-26

## 2019-09-04 ENCOUNTER — LAB VISIT (OUTPATIENT)
Dept: LAB | Facility: HOSPITAL | Age: 44
End: 2019-09-04
Attending: INTERNAL MEDICINE
Payer: COMMERCIAL

## 2019-09-04 DIAGNOSIS — Z00.00 ANNUAL PHYSICAL EXAM: ICD-10-CM

## 2019-09-04 LAB
ALBUMIN SERPL BCP-MCNC: 3.8 G/DL (ref 3.5–5.2)
ALP SERPL-CCNC: 83 U/L (ref 38–126)
ALT SERPL W/O P-5'-P-CCNC: 31 U/L (ref 10–44)
ANION GAP SERPL CALC-SCNC: 2 MMOL/L (ref 8–16)
AST SERPL-CCNC: 43 U/L (ref 15–46)
BASOPHILS # BLD AUTO: 0.01 K/UL (ref 0–0.2)
BASOPHILS NFR BLD: 0.2 % (ref 0–1.9)
BILIRUB SERPL-MCNC: 0.5 MG/DL (ref 0.1–1)
BUN SERPL-MCNC: 15 MG/DL (ref 7–17)
CALCIUM SERPL-MCNC: 8.9 MG/DL (ref 8.7–10.5)
CHLORIDE SERPL-SCNC: 109 MMOL/L (ref 95–110)
CHOLEST SERPL-MCNC: 158 MG/DL (ref 120–199)
CHOLEST/HDLC SERPL: 3.4 {RATIO} (ref 2–5)
CO2 SERPL-SCNC: 30 MMOL/L (ref 23–29)
CREAT SERPL-MCNC: 0.92 MG/DL (ref 0.5–1.4)
DIFFERENTIAL METHOD: ABNORMAL
EOSINOPHIL # BLD AUTO: 0.1 K/UL (ref 0–0.5)
EOSINOPHIL NFR BLD: 2.2 % (ref 0–8)
ERYTHROCYTE [DISTWIDTH] IN BLOOD BY AUTOMATED COUNT: 13.3 % (ref 11.5–14.5)
EST. GFR  (AFRICAN AMERICAN): >60 ML/MIN/1.73 M^2
EST. GFR  (NON AFRICAN AMERICAN): >60 ML/MIN/1.73 M^2
ESTIMATED AVG GLUCOSE: 94 MG/DL (ref 68–131)
GLUCOSE SERPL-MCNC: 87 MG/DL (ref 70–110)
HBA1C MFR BLD HPLC: 4.9 % (ref 4–5.6)
HCT VFR BLD AUTO: 37.6 % (ref 37–48.5)
HDLC SERPL-MCNC: 46 MG/DL (ref 40–75)
HDLC SERPL: 29.1 % (ref 20–50)
HGB BLD-MCNC: 11.6 G/DL (ref 12–16)
LDLC SERPL CALC-MCNC: 94.4 MG/DL (ref 63–159)
LYMPHOCYTES # BLD AUTO: 1.4 K/UL (ref 1–4.8)
LYMPHOCYTES NFR BLD: 27.6 % (ref 18–48)
MCH RBC QN AUTO: 27.2 PG (ref 27–31)
MCHC RBC AUTO-ENTMCNC: 30.9 G/DL (ref 32–36)
MCV RBC AUTO: 88 FL (ref 82–98)
MONOCYTES # BLD AUTO: 0.5 K/UL (ref 0.3–1)
MONOCYTES NFR BLD: 10.4 % (ref 4–15)
NEUTROPHILS # BLD AUTO: 2.9 K/UL (ref 1.8–7.7)
NEUTROPHILS NFR BLD: 59.6 % (ref 38–73)
NONHDLC SERPL-MCNC: 112 MG/DL
PLATELET # BLD AUTO: 179 K/UL (ref 150–350)
PMV BLD AUTO: 11.2 FL (ref 9.2–12.9)
POTASSIUM SERPL-SCNC: 4.1 MMOL/L (ref 3.5–5.1)
PROT SERPL-MCNC: 6.8 G/DL (ref 6–8.4)
RBC # BLD AUTO: 4.26 M/UL (ref 4–5.4)
SODIUM SERPL-SCNC: 141 MMOL/L (ref 136–145)
TRIGL SERPL-MCNC: 88 MG/DL (ref 30–150)
TSH SERPL DL<=0.005 MIU/L-ACNC: 2.46 UIU/ML (ref 0.4–4)
WBC # BLD AUTO: 4.89 K/UL (ref 3.9–12.7)

## 2019-09-04 PROCEDURE — 80053 COMPREHEN METABOLIC PANEL: CPT | Mod: PO

## 2019-09-04 PROCEDURE — 85025 COMPLETE CBC W/AUTO DIFF WBC: CPT | Mod: PO

## 2019-09-04 PROCEDURE — 36415 COLL VENOUS BLD VENIPUNCTURE: CPT | Mod: PO

## 2019-09-04 PROCEDURE — 83036 HEMOGLOBIN GLYCOSYLATED A1C: CPT

## 2019-09-04 PROCEDURE — 84443 ASSAY THYROID STIM HORMONE: CPT | Mod: PO

## 2019-09-04 PROCEDURE — 80061 LIPID PANEL: CPT

## 2019-09-05 ENCOUNTER — TELEPHONE (OUTPATIENT)
Dept: SLEEP MEDICINE | Facility: OTHER | Age: 44
End: 2019-09-05

## 2019-09-06 ENCOUNTER — TELEPHONE (OUTPATIENT)
Dept: SLEEP MEDICINE | Facility: OTHER | Age: 44
End: 2019-09-06

## 2019-09-09 ENCOUNTER — OFFICE VISIT (OUTPATIENT)
Dept: INTERNAL MEDICINE | Facility: CLINIC | Age: 44
End: 2019-09-09
Payer: COMMERCIAL

## 2019-09-09 VITALS
HEART RATE: 64 BPM | WEIGHT: 270.5 LBS | HEIGHT: 65 IN | SYSTOLIC BLOOD PRESSURE: 132 MMHG | DIASTOLIC BLOOD PRESSURE: 88 MMHG | TEMPERATURE: 98 F | BODY MASS INDEX: 45.07 KG/M2

## 2019-09-09 DIAGNOSIS — Z00.00 ANNUAL PHYSICAL EXAM: Primary | ICD-10-CM

## 2019-09-09 DIAGNOSIS — E66.01 MORBID OBESITY WITH BMI OF 45.0-49.9, ADULT: ICD-10-CM

## 2019-09-09 DIAGNOSIS — I10 ESSENTIAL HYPERTENSION: ICD-10-CM

## 2019-09-09 DIAGNOSIS — E04.2 MULTIPLE THYROID NODULES: ICD-10-CM

## 2019-09-09 DIAGNOSIS — Z98.84 S/P GASTRIC BYPASS: ICD-10-CM

## 2019-09-09 DIAGNOSIS — D84.9 IMMUNOSUPPRESSION: ICD-10-CM

## 2019-09-09 DIAGNOSIS — M32.9 SYSTEMIC LUPUS ERYTHEMATOSUS, UNSPECIFIED SLE TYPE, UNSPECIFIED ORGAN INVOLVEMENT STATUS: ICD-10-CM

## 2019-09-09 PROCEDURE — 3079F DIAST BP 80-89 MM HG: CPT | Mod: CPTII,S$GLB,, | Performed by: INTERNAL MEDICINE

## 2019-09-09 PROCEDURE — 3075F PR MOST RECENT SYSTOLIC BLOOD PRESS GE 130-139MM HG: ICD-10-PCS | Mod: CPTII,S$GLB,, | Performed by: INTERNAL MEDICINE

## 2019-09-09 PROCEDURE — 3075F SYST BP GE 130 - 139MM HG: CPT | Mod: CPTII,S$GLB,, | Performed by: INTERNAL MEDICINE

## 2019-09-09 PROCEDURE — 99396 PREV VISIT EST AGE 40-64: CPT | Mod: 25,S$GLB,, | Performed by: INTERNAL MEDICINE

## 2019-09-09 PROCEDURE — 90471 IMMUNIZATION ADMIN: CPT | Mod: S$GLB,,, | Performed by: INTERNAL MEDICINE

## 2019-09-09 PROCEDURE — 3079F PR MOST RECENT DIASTOLIC BLOOD PRESSURE 80-89 MM HG: ICD-10-PCS | Mod: CPTII,S$GLB,, | Performed by: INTERNAL MEDICINE

## 2019-09-09 PROCEDURE — 90714 TD VACC NO PRESV 7 YRS+ IM: CPT | Mod: S$GLB,,, | Performed by: INTERNAL MEDICINE

## 2019-09-09 PROCEDURE — 99396 PR PREVENTIVE VISIT,EST,40-64: ICD-10-PCS | Mod: 25,S$GLB,, | Performed by: INTERNAL MEDICINE

## 2019-09-09 PROCEDURE — 90471 TD VACCINE GREATER THAN OR EQUAL TO 7YO PRESERVATIVE FREE IM: ICD-10-PCS | Mod: S$GLB,,, | Performed by: INTERNAL MEDICINE

## 2019-09-09 PROCEDURE — 90714 TD VACCINE GREATER THAN OR EQUAL TO 7YO PRESERVATIVE FREE IM: ICD-10-PCS | Mod: S$GLB,,, | Performed by: INTERNAL MEDICINE

## 2019-09-09 PROCEDURE — 99999 PR PBB SHADOW E&M-EST. PATIENT-LVL IV: CPT | Mod: PBBFAC,,, | Performed by: INTERNAL MEDICINE

## 2019-09-09 PROCEDURE — 99999 PR PBB SHADOW E&M-EST. PATIENT-LVL IV: ICD-10-PCS | Mod: PBBFAC,,, | Performed by: INTERNAL MEDICINE

## 2019-09-09 NOTE — PROGRESS NOTES
Subjective:      Nitin Mcconnell is a 44 y.o. female who presents for annual exam.    Family History:  family history includes Diabetes in her maternal grandfather and paternal grandmother; Heart attack in her maternal grandmother; Heart failure in her maternal grandmother and mother; Kidney disease in her maternal grandfather; Rheum arthritis in her mother; Thyroid disease in her maternal aunt and maternal grandmother.    Health Maintenance:  Health Maintenance       Date Due Completion Date    TETANUS VACCINE 1993 ---    Pneumococcal Vaccine (Medium Risk) (1 of 1 - PPSV23) 1994 ---    Influenza Vaccine (1) 2019 ---    Mammogram 2021    Override on 6/3/2017: Done        Eye exam: due in October  Dental Exam: every 6 months  OB/GYN: Dr. Hanks  S/p hysterectomy  MM2019    Influenza: due  Tetanus: due    Body mass index is 45.01 kg/m².    Meds:   Current Outpatient Medications:     CALCIUM CARB/VITAMIN D3/VIT K1 (VIACTIV ORAL), Take by mouth once daily. , Disp: , Rfl:     multivitamin-Ca-iron-minerals (ONE-A-DAY WOMENS FORMULA) 27-0.4 mg Tab, Take 1 tablet by mouth Daily., Disp: , Rfl:     torsemide (DEMADEX) 20 MG Tab, TAKE 1 TABLET(20 MG) BY MOUTH EVERY DAY, Disp: 30 tablet, Rfl: 0    hydroxychloroquine (PLAQUENIL) 200 mg tablet, TAKE 1 TABLET(200 MG) BY MOUTH TWICE DAILY, Disp: 60 tablet, Rfl: 0    PMHx:   Past Medical History:   Diagnosis Date    Blood transfusion     Breast cyst     Connective tissue disease     Hypertension     Lupus mild       PSHx:  Past Surgical History:   Procedure Laterality Date    BIOPSY N/A 2013    [facility]formed [facility] OR 2ND FLR    BREAST BIOPSY Right 2008    rt axilla    GASTRIC BYPASS  2009    HYSTERECTOMY      @38yrs of age    HYSTERECTOMY, TOTAL, ABDOMINAL Bilateral 2013    [facility]    LIPECTOMY      LYMPH NODE BIOPSY      right arm    OOPHORECTOMY       @38yrs of age    panniculectomy         SocHx:   Social History     Socioeconomic History    Marital status: Single     Spouse name: Not on file    Number of children: Not on file    Years of education: Not on file    Highest education level: Not on file   Occupational History    Not on file   Social Needs    Financial resource strain: Not hard at all    Food insecurity:     Worry: Never true     Inability: Never true    Transportation needs:     Medical: No     Non-medical: No   Tobacco Use    Smoking status: Never Smoker    Smokeless tobacco: Never Used   Substance and Sexual Activity    Alcohol use: Yes     Frequency: Monthly or less     Drinks per session: 1 or 2     Binge frequency: Never     Comment: socially    Drug use: No    Sexual activity: Never   Lifestyle    Physical activity:     Days per week: Not on file     Minutes per session: Not on file    Stress: Not at all   Relationships    Social connections:     Talks on phone: More than three times a week     Gets together: Twice a week     Attends [employer] service: Not on file     Active member of club or organization: Yes     Attends meetings of clubs or organizations: More than 4 times per year     Relationship status: Never    Other Topics Concern    Not on file   Social History Narrative    Works for [employer]       Review of Systems   Constitutional: Negative for activity change, chills, diaphoresis, fever and unexpected weight change.   HENT: Negative for congestion, dental problem, ear pain, hearing loss, postnasal drip, rhinorrhea, sinus pressure, sore throat and trouble swallowing.    Eyes: Negative for discharge, redness and visual disturbance.   Respiratory: Negative for cough, chest tightness, shortness of breath and wheezing.    Cardiovascular: Positive for leg swelling. Negative for chest pain and palpitations.   Gastrointestinal: Negative for abdominal pain, blood in stool, constipation, diarrhea, nausea  and vomiting.   Endocrine: Negative for cold intolerance, heat intolerance, polydipsia and polyuria.   Genitourinary: Negative for difficulty urinating, dysuria, frequency, hematuria and menstrual problem.   Musculoskeletal: Negative for arthralgias, joint swelling, myalgias and neck pain.        Mild right foot pain   Skin: Negative for rash and wound.   Neurological: Negative for dizziness, weakness, numbness and headaches.   Hematological: Negative for adenopathy.   Psychiatric/Behavioral: Negative for confusion, dysphoric mood and sleep disturbance. The patient is not nervous/anxious.            Answers for HPI/ROS submitted by the patient on 9/2/2019   activity change: No  unexpected weight change: No  neck pain: No  hearing loss: No  rhinorrhea: No  trouble swallowing: No  eye discharge: No  visual disturbance: No  chest tightness: No  wheezing: No  chest pain: No  palpitations: No  blood in stool: No  constipation: No  vomiting: No  diarrhea: No  polydipsia: No  polyuria: No  difficulty urinating: No  hematuria: No  menstrual problem: No  dysuria: No  joint swelling: No  arthralgias: No  headaches: No  weakness: No  confusion: No  dysphoric mood: No      Objective:      Physical Exam   Constitutional: She is oriented to person, place, and time. Vital signs are normal. She appears well-developed and well-nourished. No distress.   HENT:   Head: Normocephalic and atraumatic.   Right Ear: Hearing, tympanic membrane, external ear and ear canal normal. Tympanic membrane is not erythematous and not bulging.   Left Ear: Hearing, tympanic membrane, external ear and ear canal normal. Tympanic membrane is not erythematous and not bulging.   Nose: Nose normal.   Mouth/Throat: Uvula is midline, oropharynx is clear and moist and mucous membranes are normal. No oropharyngeal exudate or posterior oropharyngeal erythema.   Eyes: Pupils are equal, round, and reactive to light. Conjunctivae, EOM and lids are normal. No scleral  icterus.   Neck: Normal range of motion. Neck supple. Thyromegaly (palpable) present. No thyroid mass present.   Cardiovascular: Normal rate, regular rhythm, normal heart sounds and intact distal pulses.   No murmur heard.  Pulmonary/Chest: Effort normal and breath sounds normal. She has no wheezes.   Abdominal: Soft. Bowel sounds are normal. There is no tenderness. There is no rigidity, no rebound and no guarding.   Musculoskeletal: She exhibits no edema.        Right foot: There is normal range of motion and no deformity.   Feet:   Right Foot:   Skin Integrity: Negative for skin breakdown (no tenderness) or dry skin.   Lymphadenopathy:     She has no cervical adenopathy.        Right: No supraclavicular adenopathy present.        Left: No supraclavicular adenopathy present.   Neurological: She is alert and oriented to person, place, and time. She has normal reflexes. Coordination and gait normal.   Skin: Skin is warm, dry and intact. No rash noted. She is not diaphoretic.   Psychiatric: She has a normal mood and affect.   Vitals reviewed.      Assessment:       1. Annual physical exam    2. Essential hypertension    3. Systemic lupus erythematosus, unspecified SLE type, unspecified organ involvement status    4. Multiple thyroid nodules    5. Morbid obesity with BMI of 45.0-49.9, adult    6. Immunosuppression    7. S/P gastric bypass        Plan:       1. Annual physical exam  - reviewed recent labs with patient  - will request recent vitamin levels   - will investigate right lateral foot pain further if worsens    2. Essential hypertension  - stable, on demadex    3. Systemic lupus erythematosus, unspecified SLE type, unspecified organ involvement status  - stable, managed by Rheumatology, on HCQ    4. Multiple thyroid nodules  - US Soft Tissue Head Neck Thyroid; Future    5. Morbid obesity with BMI of 45.0-49.9, adult  - continue with physical activity regularly, healthy diet    6. Immunosuppression    7. S/p  gastric bypass  - check vit A, Vit D, iron, B1, B1, FA, copper, zinc if not recently checked    RTC in 6 months or sooner if needed    Chasity Dixon MD

## 2019-09-09 NOTE — PATIENT INSTRUCTIONS
To Do List:       Injections:  tetanus    Misc. Tests:  Thyroid ultrasound    Follow-up appointments:    6 months

## 2019-09-12 DIAGNOSIS — E66.01 CLASS 3 SEVERE OBESITY DUE TO EXCESS CALORIES WITHOUT SERIOUS COMORBIDITY WITH BODY MASS INDEX (BMI) OF 45.0 TO 49.9 IN ADULT: ICD-10-CM

## 2019-09-12 DIAGNOSIS — M32.9 SYSTEMIC LUPUS ERYTHEMATOSUS, UNSPECIFIED SLE TYPE, UNSPECIFIED ORGAN INVOLVEMENT STATUS: ICD-10-CM

## 2019-09-12 RX ORDER — HYDROXYCHLOROQUINE SULFATE 200 MG/1
TABLET, FILM COATED ORAL
Qty: 60 TABLET | Refills: 0 | Status: SHIPPED | OUTPATIENT
Start: 2019-09-12 | End: 2019-10-13 | Stop reason: SDUPTHER

## 2019-09-14 PROBLEM — Z98.84 S/P GASTRIC BYPASS: Status: ACTIVE | Noted: 2019-09-14

## 2019-09-17 ENCOUNTER — HOSPITAL ENCOUNTER (OUTPATIENT)
Dept: RADIOLOGY | Facility: HOSPITAL | Age: 44
Discharge: HOME | End: 2019-09-17
Attending: INTERNAL MEDICINE
Payer: COMMERCIAL

## 2019-09-17 DIAGNOSIS — E04.2 MULTIPLE THYROID NODULES: ICD-10-CM

## 2019-09-17 PROCEDURE — 76536 US EXAM OF HEAD AND NECK: CPT | Mod: TC,PO

## 2019-09-28 ENCOUNTER — PATIENT MESSAGE (OUTPATIENT)
Dept: INTERNAL MEDICINE | Facility: CLINIC | Age: 44
End: 2019-09-28

## 2019-10-03 ENCOUNTER — TELEPHONE (OUTPATIENT)
Dept: SLEEP MEDICINE | Facility: OTHER | Age: 44
End: 2019-10-03

## 2019-10-04 ENCOUNTER — TELEPHONE (OUTPATIENT)
Dept: SLEEP MEDICINE | Facility: OTHER | Age: 44
End: 2019-10-04

## 2019-10-05 ENCOUNTER — IMMUNIZATION (OUTPATIENT)
Dept: INTERNAL MEDICINE | Facility: CLINIC | Age: 44
End: 2019-10-05
Payer: COMMERCIAL

## 2019-10-05 PROCEDURE — 90686 FLU VACCINE (QUAD) GREATER THAN OR EQUAL TO 3YO PRESERVATIVE FREE IM: ICD-10-PCS | Mod: S$GLB,,, | Performed by: INTERNAL MEDICINE

## 2019-10-05 PROCEDURE — 90686 IIV4 VACC NO PRSV 0.5 ML IM: CPT | Mod: S$GLB,,, | Performed by: INTERNAL MEDICINE

## 2019-10-05 PROCEDURE — 90471 IMMUNIZATION ADMIN: CPT | Mod: S$GLB,,, | Performed by: INTERNAL MEDICINE

## 2019-10-05 PROCEDURE — 90471 FLU VACCINE (QUAD) GREATER THAN OR EQUAL TO 3YO PRESERVATIVE FREE IM: ICD-10-PCS | Mod: S$GLB,,, | Performed by: INTERNAL MEDICINE

## 2019-10-13 ENCOUNTER — TELEPHONE (OUTPATIENT)
Dept: RHEUMATOLOGY | Facility: CLINIC | Age: 44
End: 2019-10-13

## 2019-10-13 DIAGNOSIS — E66.01 CLASS 3 SEVERE OBESITY DUE TO EXCESS CALORIES WITHOUT SERIOUS COMORBIDITY WITH BODY MASS INDEX (BMI) OF 45.0 TO 49.9 IN ADULT: ICD-10-CM

## 2019-10-13 DIAGNOSIS — M32.9 SYSTEMIC LUPUS ERYTHEMATOSUS, UNSPECIFIED SLE TYPE, UNSPECIFIED ORGAN INVOLVEMENT STATUS: ICD-10-CM

## 2019-10-13 RX ORDER — HYDROXYCHLOROQUINE SULFATE 200 MG/1
TABLET, FILM COATED ORAL
Qty: 60 TABLET | Refills: 0 | Status: SHIPPED | OUTPATIENT
Start: 2019-10-13 | End: 2019-11-11 | Stop reason: SDUPTHER

## 2019-10-15 ENCOUNTER — TELEPHONE (OUTPATIENT)
Dept: RHEUMATOLOGY | Facility: CLINIC | Age: 44
End: 2019-10-15

## 2019-11-04 ENCOUNTER — TELEPHONE (OUTPATIENT)
Dept: SLEEP MEDICINE | Facility: OTHER | Age: 44
End: 2019-11-04

## 2019-11-05 ENCOUNTER — HOSPITAL ENCOUNTER (OUTPATIENT)
Dept: SLEEP MEDICINE | Facility: OTHER | Age: 44
Discharge: HOME | End: 2019-11-05
Attending: NURSE PRACTITIONER
Payer: COMMERCIAL

## 2019-11-05 DIAGNOSIS — G47.33 OBSTRUCTIVE SLEEP APNEA: ICD-10-CM

## 2019-11-05 PROCEDURE — 95800 SLP STDY UNATTENDED: CPT

## 2019-11-06 ENCOUNTER — PATIENT MESSAGE (OUTPATIENT)
Dept: RHEUMATOLOGY | Facility: CLINIC | Age: 44
End: 2019-11-06

## 2019-11-11 DIAGNOSIS — M32.9 SYSTEMIC LUPUS ERYTHEMATOSUS, UNSPECIFIED SLE TYPE, UNSPECIFIED ORGAN INVOLVEMENT STATUS: ICD-10-CM

## 2019-11-11 DIAGNOSIS — E66.01 CLASS 3 SEVERE OBESITY DUE TO EXCESS CALORIES WITHOUT SERIOUS COMORBIDITY WITH BODY MASS INDEX (BMI) OF 45.0 TO 49.9 IN ADULT: ICD-10-CM

## 2019-11-11 RX ORDER — HYDROXYCHLOROQUINE SULFATE 200 MG/1
TABLET, FILM COATED ORAL
Qty: 60 TABLET | Refills: 0 | Status: SHIPPED | OUTPATIENT
Start: 2019-11-11 | End: 2019-12-17 | Stop reason: SDUPTHER

## 2019-11-12 PROCEDURE — 95800 SLP STDY UNATTENDED: CPT | Mod: 26,,, | Performed by: PSYCHIATRY & NEUROLOGY

## 2019-11-12 PROCEDURE — 95800 PR SLEEP STUDY, UNATTENDED, RECORD HEART RATE/O2 SAT/RESP ANAL/SLEEP TIME: ICD-10-PCS | Mod: 26,,, | Performed by: PSYCHIATRY & NEUROLOGY

## 2019-11-15 NOTE — PROCEDURES
SLEEP LAB (Jillian or [facility]) will contact you to schedulethe sleep study. Their number is [phone] (ext 2). Please call them if you do not hear from them in 2 weeks from now.  The [facility] is located on 7th floor of the [facility]; [facility] is located in [facility].    SLEEP CLINIC (my assistant) will call you when the sleep study results are ready - if you have not heard from us by 2 weeks from the date of the study, please call [phone] (ext 1) or you can use [facility] to contact me.    You are advised to abstain from driving should you feel sleepy or drowsy.

## 2019-11-18 ENCOUNTER — PATIENT MESSAGE (OUTPATIENT)
Dept: SLEEP MEDICINE | Facility: CLINIC | Age: 44
End: 2019-11-18

## 2019-11-18 NOTE — TELEPHONE ENCOUNTER
Please notify pt that 11/05/2019 sleep study results still consistent with obstructive sleep apnea. I'm unsure whether she wants to return to clinic to discuss results in detail with Priscilla or if she prefers at this time to proceed with ordering recommended CPAP treatment. Please assist pt with appointment prn or notify me should she prefer CPAP order at this time.

## 2019-12-04 ENCOUNTER — PATIENT OUTREACH (OUTPATIENT)
Dept: ADMINISTRATIVE | Facility: OTHER | Age: 44
End: 2019-12-04

## 2019-12-05 ENCOUNTER — OFFICE VISIT (OUTPATIENT)
Dept: SLEEP MEDICINE | Facility: CLINIC | Age: 44
End: 2019-12-05
Payer: COMMERCIAL

## 2019-12-05 VITALS
WEIGHT: 271.06 LBS | SYSTOLIC BLOOD PRESSURE: 121 MMHG | HEIGHT: 65 IN | DIASTOLIC BLOOD PRESSURE: 76 MMHG | BODY MASS INDEX: 45.16 KG/M2 | HEART RATE: 62 BPM

## 2019-12-05 DIAGNOSIS — G47.33 OBSTRUCTIVE SLEEP APNEA: Primary | ICD-10-CM

## 2019-12-05 DIAGNOSIS — I27.20 PULMONARY HYPERTENSION: ICD-10-CM

## 2019-12-05 DIAGNOSIS — E66.01 MORBID OBESITY WITH BMI OF 45.0-49.9, ADULT: ICD-10-CM

## 2019-12-05 PROCEDURE — 99999 PR PBB SHADOW E&M-EST. PATIENT-LVL III: ICD-10-PCS | Mod: PBBFAC,,, | Performed by: NURSE PRACTITIONER

## 2019-12-05 PROCEDURE — 3074F PR MOST RECENT SYSTOLIC BLOOD PRESSURE < 130 MM HG: ICD-10-PCS | Mod: CPTII,S$GLB,, | Performed by: NURSE PRACTITIONER

## 2019-12-05 PROCEDURE — 99999 PR PBB SHADOW E&M-EST. PATIENT-LVL III: CPT | Mod: PBBFAC,,, | Performed by: NURSE PRACTITIONER

## 2019-12-05 PROCEDURE — 99214 PR OFFICE/OUTPT VISIT, EST, LEVL IV, 30-39 MIN: ICD-10-PCS | Mod: S$GLB,,, | Performed by: NURSE PRACTITIONER

## 2019-12-05 PROCEDURE — 3078F PR MOST RECENT DIASTOLIC BLOOD PRESSURE < 80 MM HG: ICD-10-PCS | Mod: CPTII,S$GLB,, | Performed by: NURSE PRACTITIONER

## 2019-12-05 PROCEDURE — 3078F DIAST BP <80 MM HG: CPT | Mod: CPTII,S$GLB,, | Performed by: NURSE PRACTITIONER

## 2019-12-05 PROCEDURE — 3008F BODY MASS INDEX DOCD: CPT | Mod: CPTII,S$GLB,, | Performed by: NURSE PRACTITIONER

## 2019-12-05 PROCEDURE — 3074F SYST BP LT 130 MM HG: CPT | Mod: CPTII,S$GLB,, | Performed by: NURSE PRACTITIONER

## 2019-12-05 PROCEDURE — 3008F PR BODY MASS INDEX (BMI) DOCUMENTED: ICD-10-PCS | Mod: CPTII,S$GLB,, | Performed by: NURSE PRACTITIONER

## 2019-12-05 PROCEDURE — 99214 OFFICE O/P EST MOD 30 MIN: CPT | Mod: S$GLB,,, | Performed by: NURSE PRACTITIONER

## 2019-12-05 NOTE — PROGRESS NOTES
Nitin Mcconnell was seen as f/u for the management of obstructive sleep apnea.     She underwent a home sleep study reviewed with her today. Similar results to 2009 study. Ongoing occasional snoring, disrupted sleep and occasional am headaches. Denies excessive daytime sleepiness      HISTORY  7/2019  CHIEF COMPLAINT: Snoring    HISTORY OF PRESENT ILLNESS:Nitin Mcconnell a 44 y.o. female presents for the management of obstructive sleep apnea. She was dx'd with mild RAQULE (no REM during study) 2009 (245#) as part of gastric bypass workup. She never was treated and lost wgt/down to low 200's. She is now ~ 30# over study wgt and has PHTN. She is occasionally dyspneic at rest. +snores. Denies witnessed apneic pauses. Attending gym with friend. Nocturia 0-1x.  Sleeps side and back. Infrequent am headaches.     Denies symptoms of restless legs or kicking during sleep.     PSG 2009 AHI 12.1/low sat 96% (no REM)    EPWORTH SLEEPINESS SCALE 7/18/2019   Sitting and reading 1   Watching TV 2   Sitting, inactive in a public place (e.g. a theatre or a meeting) 0   As a passenger in a car for an hour without a break 1   Lying down to rest in the afternoon when circumstances permit 2   Sitting and talking to someone 0   Sitting quietly after a lunch without alcohol 1   In a car, while stopped for a few minutes in traffic 0   Total score 7     Sleep Clinic New Patient 7/18/2019   What time do you go to bed on a week day? (Give a range) 10pm to midnight   What time do you go to bed on a day off? (Give a range) 10pm to midnight   How long does it take you to fall asleep? (Give a range) 1 hour or longer   On average, how many times per night do you wake up? once   How long does it take you to fall back into sleep? (Give a range) 30 minutes or less   What time do you wake up to start your day on a week day? (Give a range) 4am   What time do you wake up to start your day on a day off? (Give a range) 5am-9am   What time do you get  "out of bed? (Give a range) 4am during the week, weekends vary   On average, how many hours do you sleep? 4-5 hours   On average, how many naps do you take per day? none   Rate your sleep quality from 0 to 5 (0-poor, 5-great). 4   Have you experienced:  Weight gain?   How much weight have you lost or gained (in lbs.) in the last year? 10lbs   On average, how many times per night do you go to the bathroom?  one time   Have you ever had a sleep study/CPAP machine/surgery for sleep apnea? Yes   Have you ever had a CPAP machine for sleep apnea? No   Have you ever had surgery for sleep apnea? No     SOCIAL HISTORY: single.  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  Difficulty breathing through the nose?  No   Sore throat or dry mouth in the morning? Sometimes   Irregular or very fast heart beat?  No   Shortness of breath?  Sometimes   Acid reflux? No   Body aches and pains?  Sometimes   Morning headaches? Sometimes   Dizziness? No   Mood changes?  No   Do you exercise?  No   Do you feel like moving your legs a lot?  No     TTE 2/26/2019: EF 55%  estimated PA systolic pressure is 30 mm Hg      PHYSICAL EXAM:   /76 (BP Location: Left arm, Patient Position: Sitting, BP Method: Large (Automatic))   Pulse 62   Ht 5' 5" (1.651 m)   Wt 123 kg (271 lb 0.9 oz)   LMP 07/13/2013   BMI 45.11 kg/m²   GENERAL: morbid obese body habitus, well groomed       ASSESSMENT:     RAQUEL, mild, no REM could represent an underestimate of severity. Dx'd in 2009. Had gastric bypass surgery and lost wgt but then gained back. Current 30# over PSG wgt. Never began treatment. NEEDS REQUAL PSG. Also has pulmonary hypertension and SLE with immunosupression.      PLAN:   1.APAP 6-18cm setup THS DME, rtc after 4-5 wks for adherence monitoring (PLAN overnight pulse ox to assess O2 once adherent)   2. Discussed etiology of RAQUEL and potential ramifications of untreated RAQUEL, including stroke, heart disease, HTN.  We " discussed potential treatment options, which could include weight loss (10-15%), body positioning, continuous positive airway pressure (CPAP-definitive), mandibular advancement splint by dentist, or referral for surgical consideration.   3. The patient was advised to abstain from driving should she feel sleepy or drowsy.   4. Encouraged continued weight loss efforts for potential improvement of RAQUEL and overall health benefits and see cards as advised re: pulm HTN mgt/continue meds

## 2019-12-15 ENCOUNTER — PATIENT OUTREACH (OUTPATIENT)
Dept: ADMINISTRATIVE | Facility: OTHER | Age: 44
End: 2019-12-15

## 2019-12-17 ENCOUNTER — HOSPITAL ENCOUNTER (OUTPATIENT)
Dept: RADIOLOGY | Facility: HOSPITAL | Age: 44
Discharge: HOME | End: 2019-12-17
Attending: INTERNAL MEDICINE
Payer: COMMERCIAL

## 2019-12-17 ENCOUNTER — OFFICE VISIT (OUTPATIENT)
Dept: RHEUMATOLOGY | Facility: CLINIC | Age: 44
End: 2019-12-17
Payer: COMMERCIAL

## 2019-12-17 VITALS
SYSTOLIC BLOOD PRESSURE: 135 MMHG | HEIGHT: 65 IN | WEIGHT: 270.94 LBS | HEART RATE: 63 BPM | DIASTOLIC BLOOD PRESSURE: 86 MMHG | BODY MASS INDEX: 45.14 KG/M2

## 2019-12-17 DIAGNOSIS — R06.02 SOB (SHORTNESS OF BREATH): ICD-10-CM

## 2019-12-17 DIAGNOSIS — E66.01 CLASS 3 SEVERE OBESITY DUE TO EXCESS CALORIES WITHOUT SERIOUS COMORBIDITY WITH BODY MASS INDEX (BMI) OF 45.0 TO 49.9 IN ADULT: ICD-10-CM

## 2019-12-17 DIAGNOSIS — L65.9 ALOPECIA: ICD-10-CM

## 2019-12-17 DIAGNOSIS — E55.9 VITAMIN D DEFICIENCY: ICD-10-CM

## 2019-12-17 DIAGNOSIS — I27.20 PULMONARY HYPERTENSION: ICD-10-CM

## 2019-12-17 DIAGNOSIS — R60.0 BILATERAL LEG EDEMA: ICD-10-CM

## 2019-12-17 DIAGNOSIS — M32.9 SYSTEMIC LUPUS ERYTHEMATOSUS, UNSPECIFIED SLE TYPE, UNSPECIFIED ORGAN INVOLVEMENT STATUS: Primary | ICD-10-CM

## 2019-12-17 DIAGNOSIS — D84.9 IMMUNOSUPPRESSION: ICD-10-CM

## 2019-12-17 PROCEDURE — 71046 X-RAY EXAM CHEST 2 VIEWS: CPT | Mod: TC,FY

## 2019-12-17 PROCEDURE — 71046 X-RAY EXAM CHEST 2 VIEWS: CPT | Mod: 26,,, | Performed by: RADIOLOGY

## 2019-12-17 PROCEDURE — 3008F BODY MASS INDEX DOCD: CPT | Mod: CPTII,S$GLB,, | Performed by: INTERNAL MEDICINE

## 2019-12-17 PROCEDURE — 99999 PR PBB SHADOW E&M-EST. PATIENT-LVL III: CPT | Mod: PBBFAC,,, | Performed by: INTERNAL MEDICINE

## 2019-12-17 PROCEDURE — 99999 PR PBB SHADOW E&M-EST. PATIENT-LVL III: ICD-10-PCS | Mod: PBBFAC,,, | Performed by: INTERNAL MEDICINE

## 2019-12-17 PROCEDURE — 99214 PR OFFICE/OUTPT VISIT, EST, LEVL IV, 30-39 MIN: ICD-10-PCS | Mod: S$GLB,,, | Performed by: INTERNAL MEDICINE

## 2019-12-17 PROCEDURE — 3075F SYST BP GE 130 - 139MM HG: CPT | Mod: CPTII,S$GLB,, | Performed by: INTERNAL MEDICINE

## 2019-12-17 PROCEDURE — 99214 OFFICE O/P EST MOD 30 MIN: CPT | Mod: S$GLB,,, | Performed by: INTERNAL MEDICINE

## 2019-12-17 PROCEDURE — 3079F DIAST BP 80-89 MM HG: CPT | Mod: CPTII,S$GLB,, | Performed by: INTERNAL MEDICINE

## 2019-12-17 PROCEDURE — 3079F PR MOST RECENT DIASTOLIC BLOOD PRESSURE 80-89 MM HG: ICD-10-PCS | Mod: CPTII,S$GLB,, | Performed by: INTERNAL MEDICINE

## 2019-12-17 PROCEDURE — 71046 XR CHEST PA AND LATERAL: ICD-10-PCS | Mod: 26,,, | Performed by: RADIOLOGY

## 2019-12-17 PROCEDURE — 3008F PR BODY MASS INDEX (BMI) DOCUMENTED: ICD-10-PCS | Mod: CPTII,S$GLB,, | Performed by: INTERNAL MEDICINE

## 2019-12-17 PROCEDURE — 3075F PR MOST RECENT SYSTOLIC BLOOD PRESS GE 130-139MM HG: ICD-10-PCS | Mod: CPTII,S$GLB,, | Performed by: INTERNAL MEDICINE

## 2019-12-17 RX ORDER — HYDROXYCHLOROQUINE SULFATE 200 MG/1
TABLET, FILM COATED ORAL
Qty: 60 TABLET | Refills: 5 | Status: SHIPPED | OUTPATIENT
Start: 2019-12-17 | End: 2020-06-13

## 2019-12-17 ASSESSMENT — ROUTINE ASSESSMENT OF PATIENT INDEX DATA (RAPID3)
MDHAQ FUNCTION SCORE: 0
PATIENT GLOBAL ASSESSMENT SCORE: 3
PSYCHOLOGICAL DISTRESS SCORE: 0
FATIGUE SCORE: 2
PAIN SCORE: 0
TOTAL RAPID3 SCORE: 1
AM STIFFNESS SCORE: 0, NO

## 2019-12-17 NOTE — PROGRESS NOTES
Rapid3 Question Responses and Scores 12/10/2019   MDHAQ Score 0   Psychologic Score 0   Pain Score 0   When you awakened in the morning OVER THE LAST WEEK, did you feel stiff? No   Fatigue Score 2   Global Health Score 3   RAPID3 Score 1       Answers for HPI/ROS submitted by the patient on 12/10/2019   fever: No  eye redness: No  headaches: No  shortness of breath: No  chest pain: No  trouble swallowing: No  diarrhea: No  constipation: No  unexpected weight change: No  genital sore: No  dysuria: No  During the last 3 days, have you had a skin rash?: No  Bruises or bleeds easily: No  cough: No

## 2019-12-17 NOTE — PROGRESS NOTES
Subjective:       Patient ID: Nitin Mcconnell is a 44 y.o. female.    Chief Complaint: Lupus    HPI:  Nitin Mcconnell is a 44 y.o. female diagnosed at age 31 with lupus.  Started as upper respiratory symptoms that did not respond to antibiotics.  She had fever and low BP as well as SOB.  She was intubated and on ventilator for a week.  She was at [facility].  Dr. Martin diagnosed lupus.  Hospitalized 3/8/07 to 3/30/07.  She had elevated pressure in eyes, kidney involvement.  She was discharged with a PICC line for antibiotics.  She was found to be allergic to heparin due to thrombocytopenia that developed after flushing PICC line with heparin.  She had hair loss.  Had enlarged lymph node under arm in past and biopsy was normal.  Was treated with Plaquenil and steroids.  Only took steroid for 1 year.     She denies any flares since last visit.  Ulcer in mouth a month ago.  Being evaluated for sleep apnea.  No longer exercising 4-5 days a week for 30 minutes on treadmill or elliptical.   Still with swelling in ankles for over 2 years.     Lupus Review of Systems  Alopecia: yes  Photosensitivity: no   Raynaud's: no  Oral or nasal ulcers: occasional oral ulcers  Rashes:  Intermittent malar rash; occasional rash in crease of arm and neck  No pleurisy or pericarditis.  No seizures, psychosis, or stroke.  No venous or arterial clots.  History of being on coumadin for 3 months as a precaution.   Pregnancy hx (if applicable): no miscarriages (never pregnant)        Review of Systems   Constitutional: Negative.  Negative for fever and unexpected weight change.   HENT: Negative.  Negative for trouble swallowing.    Eyes: Negative for redness.   Respiratory: Positive for shortness of breath (intermittent SOB). Negative for cough.    Cardiovascular: Positive for leg swelling. Negative for chest pain.   Gastrointestinal: Negative for constipation and diarrhea.   Endocrine: Negative.    Genitourinary: Negative.   "Negative for dysuria and genital sores.   Musculoskeletal: Negative.    Skin: Negative for rash.        Alopecia  0.5 cm tender nodule anterior left shin now resolved   Allergic/Immunologic: Negative.    Neurological: Negative.  Negative for headaches.   Hematological: Negative.  Does not bruise/bleed easily.   Psychiatric/Behavioral: Negative.          Objective:   /86 (BP Location: Right arm, Patient Position: Sitting, BP Method: Large (Automatic))   Pulse 63   Ht 5' 5" (1.651 m)   Wt 122.9 kg (270 lb 15.1 oz)   LMP 07/13/2013   BMI 45.09 kg/m²      Physical Exam   Constitutional: She is oriented to person, place, and time and well-developed, well-nourished, and in no distress.   HENT:   Head: Normocephalic and atraumatic.   Eyes: Conjunctivae and EOM are normal.   Neck: Neck supple.   Cardiovascular: Normal rate, regular rhythm and normal heart sounds.    Pulmonary/Chest: Effort normal and breath sounds normal.   Abdominal: Soft. Bowel sounds are normal.   Neurological: She is alert and oriented to person, place, and time. Gait normal.   Skin: Skin is warm and dry.     Psychiatric: Mood and affect normal.            LABS    Component      Latest Ref Rng & Units 9/4/2019 5/31/2019 5/13/2019   WBC      3.90 - 12.70 K/uL 4.89 4.23 5.67   RBC      4.00 - 5.40 M/uL 4.26 4.35 4.52   Hemoglobin      12.0 - 16.0 g/dL 11.6 (L) 12.0 12.4   Hematocrit      37.0 - 48.5 % 37.6 39.1 40.1   MCV      82 - 98 fL 88 90 89   MCH      27.0 - 31.0 pg 27.2 27.6 27.4   MCHC      32.0 - 36.0 g/dL 30.9 (L) 30.7 (L) 30.9 (L)   RDW      11.5 - 14.5 % 13.3 12.5 12.8   Platelets      150 - 350 K/uL 179 205 210   MPV      9.2 - 12.9 fL 11.2 11.1 11.8   Immature Granulocytes      0.0 - 0.5 %  0.2 0.2   Gran # (ANC)      1.8 - 7.7 K/uL 2.9 2.3 2.8   Immature Grans (Abs)      0.00 - 0.04 K/uL  0.01 0.01   Lymph #      1.0 - 4.8 K/uL 1.4 1.3 2.0   Mono #      0.3 - 1.0 K/uL 0.5 0.5 0.7   Eos #      0.0 - 0.5 K/uL 0.1 0.1 0.1   Baso #   "    0.00 - 0.20 K/uL 0.01 0.03 0.05   nRBC      0 /100 WBC  0 0   Gran%      38.0 - 73.0 % 59.6 54.2 50.0   Lymph%      18.0 - 48.0 % 27.6 30.7 35.3   Mono%      4.0 - 15.0 % 10.4 12.5 12.0   Eosinophil%      0.0 - 8.0 % 2.2 1.7 1.6   Basophil%      0.0 - 1.9 % 0.2 0.7 0.9   Differential Method       Automated Automated Automated   Sodium      136 - 145 mmol/L 141 139 139   Potassium      3.5 - 5.1 mmol/L 4.1 3.7 4.0   Chloride      95 - 110 mmol/L 109 101 101   CO2      23 - 29 mmol/L 30 (H) 31 (H) 23   Glucose      70 - 110 mg/dL 87 98 83   BUN, Bld      7 - 17 mg/dL 15 16 25 (H)   Creatinine      0.50 - 1.40 mg/dL 0.92 1.1 1.1   Calcium      8.7 - 10.5 mg/dL 8.9 9.5 9.5   PROTEIN TOTAL      6.0 - 8.4 g/dL 6.8 7.5 7.8   Albumin      3.5 - 5.2 g/dL 3.8 3.9 4.2   BILIRUBIN TOTAL      0.1 - 1.0 mg/dL 0.5 0.5 0.5   Alkaline Phosphatase      38 - 126 U/L 83 93 98   AST      15 - 46 U/L 43 26 29   ALT      10 - 44 U/L 31 31 33   Anion Gap      8 - 16 mmol/L 2 (L) 7 (L) 15   eGFR if African American      >60 mL/min/1.73 m:2 >60.0 >60.0 >60.0   eGFR if non African American      >60 mL/min/1.73 m:2 >60.0 >60.0 >60.0   Specimen UA       Urine, Clean Catch  Urine, Clean Catch   Color, UA      Yellow, Straw, Annabelle Yellow  Yellow   Appearance, UA      Clear Clear  Clear   pH, UA      5.0 - 8.0 5.0  5.0   Specific Gravity, UA      1.005 - 1.030 1.020  1.025   Protein, UA      Negative Negative  Negative   Glucose, UA      Negative Negative  Negative   Ketones, UA      Negative Negative  Negative   Bilirubin (UA)      Negative Negative  Negative   Occult Blood UA      Negative Negative  Negative   NITRITE UA      Negative Negative  Negative   UROBILINOGEN UA      <2.0 EU/dL Negative     Leukocytes, UA      Negative 1+ (A)  Negative   Cholesterol      120 - 199 mg/dL 158     Triglycerides      30 - 150 mg/dL 88     HDL      40 - 75 mg/dL 46     LDL Cholesterol External      63.0 - 159.0 mg/dL 94.4     Hdl/Cholesterol Ratio       20.0 - 50.0 % 29.1     Total Cholesterol/HDL Ratio      2.0 - 5.0 3.4     Non-HDL Cholesterol      mg/dL 112     WBC, UA      0 - 5 /hpf 1  1   Bacteria, UA      None-Occ /hpf   Rare   Squam Epithel, UA      /hpf   3   Hyaline Casts, UA      0-1/lpf /lpf   1   Microscopic Comment       SEE COMMENT  SEE COMMENT   Protein, Urine Random      0 - 15 mg/dL   <7   Creatinine, Random Ur      15.0 - 325.0 mg/dL   217.0   Prot/Creat Ratio, Ur      0.00 - 0.20   Unable to calculate   Hemoglobin A1C External      4.0 - 5.6 % 4.9     Estimated Avg Glucose      68 - 131 mg/dL 94     ds DNA Ab      Negative 1:10   Negative 1:10   Complement (C-3)      50 - 180 mg/dL   154   Complement (C-4)      11 - 44 mg/dL   42   CRP      0.0 - 8.2 mg/L   3.1   CPK      20 - 180 U/L   240 (H)   Sed Rate      0 - 36 mm/Hr   31   BNP      0 - 99 pg/mL  <10    Magnesium      1.6 - 2.6 mg/dL  2.0    TSH      0.400 - 4.000 uIU/mL 2.460       Assessment:       1.  SLE.    2.  Immunosuppression.  Eye exam for Plaquenil 10/2018  3.  Elevated pressure in eyes  4.  Obesity.  Gastric bypass 2009.  Started exercising.  5.  Thyroid nodules.  Being monitored.  Aspiration was normal  6.  Mother with RA  7.  Ankle swelling bilateral.  Grandmother and mother had CHF    8.  Alopecia.  Family history of hair loss.   9.  Pulmonary HTN  10.  SOB intermittent    Plan:       1.  Labs  2.  Will follow Dr. Felton regarding pulmonary HTN  3.  Follow with current dermatology to evaluate   4.  Patient to encouraged to reconsider Weight Watchers.  5.  Started on exercising.  Will join [employer].    6.  Speak with wellness dietician through insurance      RTO 3 months/prn

## 2019-12-18 ENCOUNTER — PATIENT MESSAGE (OUTPATIENT)
Dept: INTERNAL MEDICINE | Facility: CLINIC | Age: 44
End: 2019-12-18

## 2019-12-18 ENCOUNTER — PATIENT MESSAGE (OUTPATIENT)
Dept: RHEUMATOLOGY | Facility: CLINIC | Age: 44
End: 2019-12-18

## 2019-12-18 ENCOUNTER — TELEPHONE (OUTPATIENT)
Dept: TRANSPLANT | Facility: CLINIC | Age: 44
End: 2019-12-18

## 2019-12-18 DIAGNOSIS — I27.9 CHRONIC PULMONARY HEART DISEASE: ICD-10-CM

## 2019-12-18 DIAGNOSIS — Z79.899 POLYPHARMACY: Primary | ICD-10-CM

## 2019-12-18 DIAGNOSIS — R60.0 BILATERAL LEG EDEMA: Primary | ICD-10-CM

## 2019-12-18 DIAGNOSIS — R06.82 TACHYPNEA: ICD-10-CM

## 2019-12-18 NOTE — TELEPHONE ENCOUNTER
Patient self scheduled with Dr. Felton. Called patient to make sure she was ok and did not need an earlier appt. Dr. Felton at last visit had advices patient to follow with referring and return as needed.  Patient states that her torsemide was not working as well as it used to and that she had fluid in her feet and ankles. Saw Dr. Stone yesterday who ordered labs. Lab work is in Epic.    Dr. Felton  referred patient back to PCP who ordered torsemide and is currently managing BP and fluid.   Patient verbalized understanding. She wishes to keep the appointment in February with Dr. Felton.

## 2019-12-20 RX ORDER — BUMETANIDE 2 MG/1
2 TABLET ORAL DAILY
Qty: 30 TABLET | Refills: 0 | Status: SHIPPED | OUTPATIENT
Start: 2019-12-20 | End: 2020-09-14

## 2019-12-20 NOTE — TELEPHONE ENCOUNTER
Patient has not read previous portal message.    Called her and left a message for her to check her portal or give me a call.

## 2019-12-20 NOTE — TELEPHONE ENCOUNTER
Reviewed recent labs and within normal range.     Find out about any additional symptoms- SOB, cough, wheezing.    Stop Demadex. Will change to Bumex 2mg daily. Monitor BP closely. BMP in 1 month.    Will order compression stocking if she has not have any. Arrange venous US.

## 2019-12-23 NOTE — TELEPHONE ENCOUNTER
Spoke to pt.    She's on her way to lunch.  She said she will read her portal messages and call me back.

## 2019-12-23 NOTE — TELEPHONE ENCOUNTER
Spoke to pt.    No cough or wheezing.  SOB only when she walks stairs or bends down for too long.    She hasn't stopped the Torsemide yet.  Will stop today and start Bumex.    Will call us on Thursday with update on the edema of ankles and left foot and her blood pressure readings.

## 2019-12-31 ENCOUNTER — PATIENT MESSAGE (OUTPATIENT)
Dept: RHEUMATOLOGY | Facility: CLINIC | Age: 44
End: 2019-12-31

## 2020-01-03 ENCOUNTER — PATIENT MESSAGE (OUTPATIENT)
Dept: INTERNAL MEDICINE | Facility: CLINIC | Age: 45
End: 2020-01-03

## 2020-01-03 DIAGNOSIS — R60.0 LEG EDEMA: Primary | ICD-10-CM

## 2020-01-06 ENCOUNTER — PATIENT MESSAGE (OUTPATIENT)
Dept: INTERNAL MEDICINE | Facility: CLINIC | Age: 45
End: 2020-01-06

## 2020-01-17 ENCOUNTER — TELEPHONE (OUTPATIENT)
Dept: INTERNAL MEDICINE | Facility: CLINIC | Age: 45
End: 2020-01-17

## 2020-01-18 NOTE — TELEPHONE ENCOUNTER
Called patient and left her a message.    Has the swelling improved at all? Any shortness of breath or muscle cramping?

## 2020-01-31 ENCOUNTER — CLINICAL SUPPORT (OUTPATIENT)
Dept: CARDIOLOGY | Facility: CLINIC | Age: 45
End: 2020-01-31
Attending: INTERNAL MEDICINE
Payer: COMMERCIAL

## 2020-01-31 DIAGNOSIS — R60.0 BILATERAL LEG EDEMA: ICD-10-CM

## 2020-01-31 LAB
LEFT GREAT SAPHENOUS DISTAL THIGH DIA: 0.15 CM
LEFT GREAT SAPHENOUS JUNCTION DIA: 0.83 CM
LEFT GREAT SAPHENOUS KNEE DIA: 0.2 CM
LEFT GREAT SAPHENOUS MIDDLE THIGH DIA: 0.32 CM
LEFT SMALL SAPHENOUS KNEE DIA: 0.16 CM
LEFT SMALL SAPHENOUS SPJ DIA: 0.13 CM
RIGHT GREAT SAPHENOUS DISTAL THIGH DIA: 0.29 CM
RIGHT GREAT SAPHENOUS JUNCTION DIA: 0.84 CM
RIGHT GREAT SAPHENOUS KNEE DIA: 0.18 CM
RIGHT GREAT SAPHENOUS MIDDLE THIGH DIA: 0.33 CM
RIGHT GREAT SAPHENOUS PROXIMAL CALF DIA: 0.15 CM
RIGHT SMALL SAPHENOUS KNEE DIA: 0.3 CM
RIGHT SMALL SAPHENOUS SPJ DIA: 0.18 CM

## 2020-01-31 PROCEDURE — 93970 CV US LOWER VENOUS INSUFFICIENCY BILATERAL (CUPID ONLY): ICD-10-PCS | Mod: S$GLB,,, | Performed by: INTERNAL MEDICINE

## 2020-01-31 PROCEDURE — 93970 EXTREMITY STUDY: CPT | Mod: S$GLB,,, | Performed by: INTERNAL MEDICINE

## 2020-02-02 ENCOUNTER — PATIENT MESSAGE (OUTPATIENT)
Dept: INTERNAL MEDICINE | Facility: CLINIC | Age: 45
End: 2020-02-02

## 2020-02-02 DIAGNOSIS — I87.2 VENOUS REFLUX: ICD-10-CM

## 2020-02-02 DIAGNOSIS — R60.0 BILATERAL LEG EDEMA: Primary | ICD-10-CM

## 2020-02-05 NOTE — TELEPHONE ENCOUNTER
Spoke to patient about BLE edema and GSV reflux seen on venous US. Will refer to Vascular Cardiology for further evaluation. Please arrange    Will scan records from her previous Vascular Surgeon.

## 2020-02-06 ENCOUNTER — PATIENT MESSAGE (OUTPATIENT)
Dept: INTERNAL MEDICINE | Facility: CLINIC | Age: 45
End: 2020-02-06

## 2020-02-12 ENCOUNTER — PATIENT MESSAGE (OUTPATIENT)
Dept: INTERNAL MEDICINE | Facility: CLINIC | Age: 45
End: 2020-02-12

## 2020-02-13 ENCOUNTER — LAB VISIT (OUTPATIENT)
Dept: LAB | Facility: HOSPITAL | Age: 45
End: 2020-02-13
Attending: INTERNAL MEDICINE
Payer: COMMERCIAL

## 2020-02-13 ENCOUNTER — HOSPITAL ENCOUNTER (OUTPATIENT)
Dept: PULMONOLOGY | Facility: CLINIC | Age: 45
Discharge: HOME | End: 2020-02-13
Payer: COMMERCIAL

## 2020-02-13 ENCOUNTER — OFFICE VISIT (OUTPATIENT)
Dept: TRANSPLANT | Facility: CLINIC | Age: 45
End: 2020-02-13
Payer: COMMERCIAL

## 2020-02-13 ENCOUNTER — PATIENT OUTREACH (OUTPATIENT)
Dept: ADMINISTRATIVE | Facility: OTHER | Age: 45
End: 2020-02-13

## 2020-02-13 ENCOUNTER — CLINICAL SUPPORT (OUTPATIENT)
Dept: CARDIOLOGY | Facility: CLINIC | Age: 45
End: 2020-02-13
Attending: INTERNAL MEDICINE
Payer: COMMERCIAL

## 2020-02-13 VITALS
HEIGHT: 65 IN | BODY MASS INDEX: 45.66 KG/M2 | BODY MASS INDEX: 45.69 KG/M2 | WEIGHT: 274.06 LBS | SYSTOLIC BLOOD PRESSURE: 143 MMHG | OXYGEN SATURATION: 98 % | HEIGHT: 65 IN | HEART RATE: 53 BPM | WEIGHT: 274.25 LBS | DIASTOLIC BLOOD PRESSURE: 73 MMHG

## 2020-02-13 VITALS
WEIGHT: 274 LBS | BODY MASS INDEX: 45.65 KG/M2 | SYSTOLIC BLOOD PRESSURE: 130 MMHG | HEART RATE: 50 BPM | HEIGHT: 65 IN | DIASTOLIC BLOOD PRESSURE: 80 MMHG

## 2020-02-13 DIAGNOSIS — I27.20 PULMONARY HYPERTENSION: Primary | ICD-10-CM

## 2020-02-13 DIAGNOSIS — I10 ESSENTIAL HYPERTENSION: ICD-10-CM

## 2020-02-13 DIAGNOSIS — Z79.899 POLYPHARMACY: ICD-10-CM

## 2020-02-13 DIAGNOSIS — Z98.84 S/P GASTRIC BYPASS: ICD-10-CM

## 2020-02-13 DIAGNOSIS — I27.9 CHRONIC PULMONARY HEART DISEASE: ICD-10-CM

## 2020-02-13 DIAGNOSIS — R06.82 TACHYPNEA: ICD-10-CM

## 2020-02-13 DIAGNOSIS — E66.01 CLASS 3 SEVERE OBESITY DUE TO EXCESS CALORIES WITH SERIOUS COMORBIDITY AND BODY MASS INDEX (BMI) OF 45.0 TO 49.9 IN ADULT: ICD-10-CM

## 2020-02-13 DIAGNOSIS — R60.0 BILATERAL LEG EDEMA: ICD-10-CM

## 2020-02-13 DIAGNOSIS — G47.33 OBSTRUCTIVE SLEEP APNEA: ICD-10-CM

## 2020-02-13 DIAGNOSIS — E66.01 MORBID OBESITY WITH BMI OF 45.0-49.9, ADULT: ICD-10-CM

## 2020-02-13 DIAGNOSIS — M32.9 SYSTEMIC LUPUS ERYTHEMATOSUS, UNSPECIFIED SLE TYPE, UNSPECIFIED ORGAN INVOLVEMENT STATUS: ICD-10-CM

## 2020-02-13 LAB
ALBUMIN SERPL BCP-MCNC: 3.8 G/DL (ref 3.5–5.2)
ALP SERPL-CCNC: 88 U/L (ref 55–135)
ALT SERPL W/O P-5'-P-CCNC: 29 U/L (ref 10–44)
ANION GAP SERPL CALC-SCNC: 6 MMOL/L (ref 8–16)
AST SERPL-CCNC: 26 U/L (ref 10–40)
BASOPHILS # BLD AUTO: 0.03 K/UL (ref 0–0.2)
BASOPHILS NFR BLD: 0.7 % (ref 0–1.9)
BILIRUB SERPL-MCNC: 0.4 MG/DL (ref 0.1–1)
BNP SERPL-MCNC: 88 PG/ML (ref 0–99)
BUN SERPL-MCNC: 12 MG/DL (ref 6–20)
CALCIUM SERPL-MCNC: 8.6 MG/DL (ref 8.7–10.5)
CHLORIDE SERPL-SCNC: 107 MMOL/L (ref 95–110)
CO2 SERPL-SCNC: 29 MMOL/L (ref 23–29)
CREAT SERPL-MCNC: 0.9 MG/DL (ref 0.5–1.4)
DIFFERENTIAL METHOD: ABNORMAL
EOSINOPHIL # BLD AUTO: 0.1 K/UL (ref 0–0.5)
EOSINOPHIL NFR BLD: 1.3 % (ref 0–8)
ERYTHROCYTE [DISTWIDTH] IN BLOOD BY AUTOMATED COUNT: 13 % (ref 11.5–14.5)
EST. GFR  (AFRICAN AMERICAN): >60 ML/MIN/1.73 M^2
EST. GFR  (NON AFRICAN AMERICAN): >60 ML/MIN/1.73 M^2
GLUCOSE SERPL-MCNC: 84 MG/DL (ref 70–110)
HCT VFR BLD AUTO: 37.1 % (ref 37–48.5)
HGB BLD-MCNC: 11.2 G/DL (ref 12–16)
IMM GRANULOCYTES # BLD AUTO: 0.01 K/UL (ref 0–0.04)
IMM GRANULOCYTES NFR BLD AUTO: 0.2 % (ref 0–0.5)
LYMPHOCYTES # BLD AUTO: 1.7 K/UL (ref 1–4.8)
LYMPHOCYTES NFR BLD: 36.2 % (ref 18–48)
MAGNESIUM SERPL-MCNC: 1.9 MG/DL (ref 1.6–2.6)
MCH RBC QN AUTO: 27.5 PG (ref 27–31)
MCHC RBC AUTO-ENTMCNC: 30.2 G/DL (ref 32–36)
MCV RBC AUTO: 91 FL (ref 82–98)
MONOCYTES # BLD AUTO: 0.4 K/UL (ref 0.3–1)
MONOCYTES NFR BLD: 9.4 % (ref 4–15)
NEUTROPHILS # BLD AUTO: 2.4 K/UL (ref 1.8–7.7)
NEUTROPHILS NFR BLD: 52.2 % (ref 38–73)
NRBC BLD-RTO: 0 /100 WBC
PLATELET # BLD AUTO: 175 K/UL (ref 150–350)
PMV BLD AUTO: 10.9 FL (ref 9.2–12.9)
POTASSIUM SERPL-SCNC: 3.7 MMOL/L (ref 3.5–5.1)
PROT SERPL-MCNC: 6.8 G/DL (ref 6–8.4)
RBC # BLD AUTO: 4.07 M/UL (ref 4–5.4)
SODIUM SERPL-SCNC: 142 MMOL/L (ref 136–145)
WBC # BLD AUTO: 4.59 K/UL (ref 3.9–12.7)

## 2020-02-13 PROCEDURE — 36415 COLL VENOUS BLD VENIPUNCTURE: CPT

## 2020-02-13 PROCEDURE — 3077F SYST BP >= 140 MM HG: CPT | Mod: CPTII,S$GLB,, | Performed by: INTERNAL MEDICINE

## 2020-02-13 PROCEDURE — 3078F PR MOST RECENT DIASTOLIC BLOOD PRESSURE < 80 MM HG: ICD-10-PCS | Mod: CPTII,S$GLB,, | Performed by: INTERNAL MEDICINE

## 2020-02-13 PROCEDURE — 83735 ASSAY OF MAGNESIUM: CPT

## 2020-02-13 PROCEDURE — 3008F PR BODY MASS INDEX (BMI) DOCUMENTED: ICD-10-PCS | Mod: CPTII,S$GLB,, | Performed by: INTERNAL MEDICINE

## 2020-02-13 PROCEDURE — 80053 COMPREHEN METABOLIC PANEL: CPT

## 2020-02-13 PROCEDURE — 93306 TTE W/DOPPLER COMPLETE: CPT | Mod: S$GLB,,, | Performed by: INTERNAL MEDICINE

## 2020-02-13 PROCEDURE — 83880 ASSAY OF NATRIURETIC PEPTIDE: CPT

## 2020-02-13 PROCEDURE — 3078F DIAST BP <80 MM HG: CPT | Mod: CPTII,S$GLB,, | Performed by: INTERNAL MEDICINE

## 2020-02-13 PROCEDURE — 3077F PR MOST RECENT SYSTOLIC BLOOD PRESSURE >= 140 MM HG: ICD-10-PCS | Mod: CPTII,S$GLB,, | Performed by: INTERNAL MEDICINE

## 2020-02-13 PROCEDURE — 99214 OFFICE O/P EST MOD 30 MIN: CPT | Mod: S$GLB,,, | Performed by: INTERNAL MEDICINE

## 2020-02-13 PROCEDURE — 94618 PULMONARY STRESS TESTING: ICD-10-PCS | Mod: S$GLB,,, | Performed by: INTERNAL MEDICINE

## 2020-02-13 PROCEDURE — 99214 PR OFFICE/OUTPT VISIT, EST, LEVL IV, 30-39 MIN: ICD-10-PCS | Mod: S$GLB,,, | Performed by: INTERNAL MEDICINE

## 2020-02-13 PROCEDURE — 85025 COMPLETE CBC W/AUTO DIFF WBC: CPT

## 2020-02-13 PROCEDURE — 99999 PR PBB SHADOW E&M-EST. PATIENT-LVL II: ICD-10-PCS | Mod: PBBFAC,,,

## 2020-02-13 PROCEDURE — 99999 PR PBB SHADOW E&M-EST. PATIENT-LVL IV: ICD-10-PCS | Mod: PBBFAC,,, | Performed by: INTERNAL MEDICINE

## 2020-02-13 PROCEDURE — 94618 PULMONARY STRESS TESTING: CPT | Mod: S$GLB,,, | Performed by: INTERNAL MEDICINE

## 2020-02-13 PROCEDURE — 93306 ECHO (CUPID ONLY): ICD-10-PCS | Mod: S$GLB,,, | Performed by: INTERNAL MEDICINE

## 2020-02-13 PROCEDURE — 3008F BODY MASS INDEX DOCD: CPT | Mod: CPTII,S$GLB,, | Performed by: INTERNAL MEDICINE

## 2020-02-13 PROCEDURE — 99999 PR PBB SHADOW E&M-EST. PATIENT-LVL IV: CPT | Mod: PBBFAC,,, | Performed by: INTERNAL MEDICINE

## 2020-02-13 PROCEDURE — 99999 PR PBB SHADOW E&M-EST. PATIENT-LVL II: CPT | Mod: PBBFAC,,,

## 2020-02-13 RX ORDER — HYDROCHLOROTHIAZIDE 25 MG/1
25 TABLET ORAL DAILY
Qty: 30 TABLET | Refills: 11 | Status: SHIPPED | OUTPATIENT
Start: 2020-02-13 | End: 2021-02-12 | Stop reason: SDUPTHER

## 2020-02-13 NOTE — TELEPHONE ENCOUNTER
Thyroid US (9/17/19) results were sent by email on 10/3/19.     Vascular US (1/31/20) results were sent by email on 2/5/20.    See notes.

## 2020-02-13 NOTE — PATIENT INSTRUCTIONS
caro's seasonings on internet- they label what is low salt and no salt-     My favorite is [place] dreaming for steak and chicken    marvin also has low salt seasoning blends that taste better    Lets try hctz 25mg once a day in the morning    Check your weights every morning after getting out of bed and urinating. If your weight goes up 3# overnight or 5# in one week and you feel more swollen, than take a bumex that evening    Continue tracking blood pressure at home and bring log to next visit. Send me a message in my [employer] if you see your pressures are running over 130/80 consistently     Lets get an echo- if it look good, I will pass you off into Dr Lantigua's hands    We should do an echo every year

## 2020-02-13 NOTE — PROGRESS NOTES
Subjective:    Patient ID:  Nitin Mcconnell is a 44 y.o. female who presents for follow-up of Pulmonary Hypertension.    HPI     43 yo woman with SLE, HTN, obesity, referred by Dr Mishra for eval of PH and now here for f/u    Pt started going to the gym last month- hasnt had any SOB with cardio- started wearing CPAP also and her HA have gotten better. Here today as she has had some swelling in her ankles and feet- her PCP ordered a LE US which showed reflux in the pop veins.  Goes to the bathroom but doesn't seem to have helped with the swelling. Has noticed swelling in the toes of her left foot as well. Uses compression stockings but the swelling hasn't gone down. She was started on bumex 2mg daily which she takes at night so she doesn't have to run to the BR at work    Walks the treadmill for a hour 2.5-2.6 mph-  does elliptical too without LAZAR. Switched to sea salt thinking it was better-  bp at home has been 120's/70's- 130 highest (she forgot her list though)      She did have bariatric surgery but was not successful in the long run with wt loss. Takes torsemide every other day with good UOP, the swelling goes down alittle though not completely. Comes back over the course of the day. Wears compression stockings.     SH: lives with mom, cut back on salt as mom has CHF  Nonsmoker, occasional Etoh  Took metabolife in her 20's for wt loss    FH: HF as above      Six Minute Walk Test:   396 m ( 427  m in may 2019    )                                              O2 sat  99 ->99  %                                                           HR 56  -> 106                                                                 BP  165 / 81  ->175 /63                                                         Kennedy   0  -> 0.5    Echo      TTE 2/26/19  · Left ventricular size is at the upper limits of normal.  · Normal left ventricular systolic function. The estimated ejection fraction is 55%  · Normal LV diastolic  "function.  · Normal right ventricular systolic function.  · Mild tricuspid regurgitation.  · The estimated PA systolic pressure is 30 mm Hg.  · IVC was not clearly visualized.  RV 3.65cm, TAPSE 2.13cm        Results for orders placed or performed in visit on 04/10/18   2D echo with color flow doppler   Result Value Ref Range    QEF 55 55 - 65    Mitral Valve Regurgitation MILD     Diastolic Dysfunction No     Est. PA Systolic Pressure 30.04     Tricuspid Valve Regurgitation MILD        EKG   /    /      RHC   /      /  n/a      CXR   2/25/19  The lungs are symmetrically expanded.  No large consolidation, pleural fluid, or pneumothorax.    The cardiac silhouette is normal in size.  The hilar contours and mediastinum are unremarkable.    The osseous structures are intact.  No evidence of pneumoperitoneum.  Surgical clips project over the right axilla.            CT Chest    /    /     n/a    PFTs     /     /  N/a    V/Q Scan  /    /  n/a    Review of Systems   Constitution: Negative for chills, fever, malaise/fatigue and weight gain.   HENT: Negative.    Eyes: Negative.    Cardiovascular: Positive for leg swelling. Negative for chest pain, dyspnea on exertion, near-syncope, orthopnea, palpitations, paroxysmal nocturnal dyspnea and syncope.   Respiratory: Positive for sleep disturbances due to breathing and snoring. Negative for cough and shortness of breath.    Endocrine: Negative.    Skin: Negative.    Musculoskeletal: Negative.    Gastrointestinal: Negative for bloating, abdominal pain and change in bowel habit.   Neurological: Negative for dizziness and light-headedness.   Psychiatric/Behavioral: Negative for depression.        Objective:  BP (!) 143/73 (BP Location: Left arm, Patient Position: Sitting, BP Method: X-Large (Automatic))   Pulse (!) 53   Ht 5' 5" (1.651 m)   Wt 124.4 kg (274 lb 4 oz)   LMP 07/13/2013   SpO2 98%   BMI 45.64 kg/m²       Physical Exam   Constitutional: She is oriented to person, " place, and time. She appears well-developed and well-nourished.   HENT:   Head: Normocephalic and atraumatic.   Eyes: Right eye exhibits no discharge. Left eye exhibits no discharge.   Neck: Neck supple. No JVD present. No thyromegaly present.   Cardiovascular: Normal rate and regular rhythm. Exam reveals no gallop and no friction rub.   No murmur heard.       Pulmonary/Chest: Effort normal and breath sounds normal. No respiratory distress. She has no wheezes. She has no rales.   Abdominal: Soft. Bowel sounds are normal. She exhibits no distension. There is no tenderness.   obese   Musculoskeletal: Normal range of motion. She exhibits no edema or tenderness.   1+ nonpitting edema to above ankles   Neurological: She is alert and oriented to person, place, and time. No cranial nerve deficit. Coordination normal.   Skin: Skin is warm and dry. No rash noted.   Psychiatric: She has a normal mood and affect. Judgment and thought content normal.           Chemistry        Component Value Date/Time     02/13/2020 1243    K 3.7 02/13/2020 1243     02/13/2020 1243    CO2 29 02/13/2020 1243    BUN 12 02/13/2020 1243    CREATININE 0.9 02/13/2020 1243    GLU 84 02/13/2020 1243        Component Value Date/Time    CALCIUM 8.6 (L) 02/13/2020 1243    ALKPHOS 88 02/13/2020 1243    AST 26 02/13/2020 1243    ALT 29 02/13/2020 1243    BILITOT 0.4 02/13/2020 1243    ESTGFRAFRICA >60.0 02/13/2020 1243    EGFRNONAA >60.0 02/13/2020 1243            Magnesium   Date Value Ref Range Status   02/13/2020 1.9 1.6 - 2.6 mg/dL Final       Lab Results   Component Value Date    WBC 4.59 02/13/2020    HGB 11.2 (L) 02/13/2020    HCT 37.1 02/13/2020    MCV 91 02/13/2020     02/13/2020       Lab Results   Component Value Date    INR 1.0 07/18/2013       BNP   Date Value Ref Range Status   02/13/2020 88 0 - 99 pg/mL Final     Comment:     Values of less than 100 pg/ml are consistent with non-CHF populations.   12/17/2019 <10 0 - 99  pg/mL Final     Comment:     Values of less than 100 pg/ml are consistent with non-CHF populations.   05/31/2019 <10 0 - 99 pg/mL Final     Comment:     Values of less than 100 pg/ml are consistent with non-CHF populations.       No results found for: LDH          Assessment:       1. Pulmonary hypertension- AT RISK- very mildly increased by echo with normal RV size and fxn. RF include her SLE, untreated RAQUEL, and borderline control of systemic HTN   2. Essential hypertension    3. Systemic lupus erythematosus, unspecified SLE type, unspecified organ involvement status    4. Class 3 severe obesity due to excess calories without serious comorbidity with body mass index (BMI) of 45.0 to 49.9 in adult    5. Bilateral leg edema      WHO Group: likely 2,3 Functional Class 1-2     Plan:       Do not think her swelling is coming from either PH or HF but given her uncontrolled systemic HTN and lupus will order repeat echo just to be sure. Agree with referral to [facility] med which is scheduled for next week.    Will start hctz 25mg daily as I think shewill have better bp control with this than bumex. Should save the bumex for if wt increases 3# overnight or 53 in a  Week with increased swelling    Continue tracking blood pressure at home and bring log to next visit.asked me to let me know if consistently running over 130/80    Cont Compression stockings    Keep salt intake to under 2000 mg sodium, fluids to under 2 L (64 oz)    Continue exercising daily    Annual echo- if PASP>40 please send pt back and we will set up RHC

## 2020-02-14 LAB
ASCENDING AORTA: 2.34 CM
AV INDEX (PROSTH): 0.74
AV MEAN GRADIENT: 4 MMHG
AV PEAK GRADIENT: 8 MMHG
AV VALVE AREA: 2.33 CM2
AV VELOCITY RATIO: 0.89
BSA FOR ECHO PROCEDURE: 2.39 M2
CV ECHO LV RWT: 0.23 CM
DOP CALC AO PEAK VEL: 1.45 M/S
DOP CALC AO VTI: 38.8 CM
DOP CALC LVOT AREA: 3.1 CM2
DOP CALC LVOT DIAMETER: 2 CM
DOP CALC LVOT PEAK VEL: 1.29 M/S
DOP CALC LVOT STROKE VOLUME: 90.31 CM3
DOP CALCLVOT PEAK VEL VTI: 28.76 CM
E WAVE DECELERATION TIME: 130.85 MSEC
E/A RATIO: 1.44
E/E' RATIO: 9.1 M/S
ECHO LV POSTERIOR WALL: 0.59 CM (ref 0.6–1.1)
FRACTIONAL SHORTENING: 35 % (ref 28–44)
INTERVENTRICULAR SEPTUM: 0.63 CM (ref 0.6–1.1)
IVRT: 0.08 MSEC
LA MAJOR: 5.19 CM
LA MINOR: 5.24 CM
LA WIDTH: 3.4 CM
LEFT ATRIUM SIZE: 3.89 CM
LEFT ATRIUM VOLUME INDEX: 25.9 ML/M2
LEFT ATRIUM VOLUME: 58.63 CM3
LEFT INTERNAL DIMENSION IN SYSTOLE: 3.37 CM (ref 2.1–4)
LEFT VENTRICLE DIASTOLIC VOLUME INDEX: 56.12 ML/M2
LEFT VENTRICLE DIASTOLIC VOLUME: 126.9 ML
LEFT VENTRICLE MASS INDEX: 45 G/M2
LEFT VENTRICLE SYSTOLIC VOLUME INDEX: 20.6 ML/M2
LEFT VENTRICLE SYSTOLIC VOLUME: 46.52 ML
LEFT VENTRICULAR INTERNAL DIMENSION IN DIASTOLE: 5.15 CM (ref 3.5–6)
LEFT VENTRICULAR MASS: 102.01 G
LV LATERAL E/E' RATIO: 8.27 M/S
LV SEPTAL E/E' RATIO: 10.11 M/S
MV PEAK A VEL: 0.63 M/S
MV PEAK E VEL: 0.91 M/S
PISA TR MAX VEL: 2.64 M/S
PULM VEIN S/D RATIO: 2.19
PV PEAK D VEL: 0.26 M/S
PV PEAK S VEL: 0.57 M/S
PV PEAK VELOCITY: 0.94 CM/S
RA MAJOR: 5.04 CM
RA PRESSURE: 3 MMHG
RA WIDTH: 2.3 CM
RIGHT VENTRICULAR END-DIASTOLIC DIMENSION: 3.04 CM
SINUS: 2.46 CM
STJ: 2.22 CM
TDI LATERAL: 0.11 M/S
TDI SEPTAL: 0.09 M/S
TDI: 0.1 M/S
TR MAX PG: 28 MMHG
TRICUSPID ANNULAR PLANE SYSTOLIC EXCURSION: 2.86 CM
TV REST PULMONARY ARTERY PRESSURE: 31 MMHG

## 2020-02-17 ENCOUNTER — OFFICE VISIT (OUTPATIENT)
Dept: CARDIOLOGY | Facility: CLINIC | Age: 45
End: 2020-02-17
Payer: COMMERCIAL

## 2020-02-17 VITALS
BODY MASS INDEX: 44.95 KG/M2 | SYSTOLIC BLOOD PRESSURE: 140 MMHG | WEIGHT: 269.81 LBS | HEART RATE: 56 BPM | HEIGHT: 65 IN | DIASTOLIC BLOOD PRESSURE: 100 MMHG

## 2020-02-17 DIAGNOSIS — Z98.84 S/P GASTRIC BYPASS: ICD-10-CM

## 2020-02-17 DIAGNOSIS — I87.2 VENOUS INSUFFICIENCY OF BOTH LOWER EXTREMITIES: ICD-10-CM

## 2020-02-17 DIAGNOSIS — E66.01 MORBID OBESITY WITH BMI OF 45.0-49.9, ADULT: Primary | ICD-10-CM

## 2020-02-17 DIAGNOSIS — R60.0 BILATERAL LEG EDEMA: ICD-10-CM

## 2020-02-17 DIAGNOSIS — I87.2 VENOUS REFLUX: ICD-10-CM

## 2020-02-17 DIAGNOSIS — I89.0 LYMPHEDEMA OF BOTH LOWER EXTREMITIES: ICD-10-CM

## 2020-02-17 PROCEDURE — 3080F PR MOST RECENT DIASTOLIC BLOOD PRESSURE >= 90 MM HG: ICD-10-PCS | Mod: CPTII,S$GLB,, | Performed by: INTERNAL MEDICINE

## 2020-02-17 PROCEDURE — 99214 PR OFFICE/OUTPT VISIT, EST, LEVL IV, 30-39 MIN: ICD-10-PCS | Mod: S$GLB,,, | Performed by: INTERNAL MEDICINE

## 2020-02-17 PROCEDURE — 99999 PR PBB SHADOW E&M-EST. PATIENT-LVL IV: ICD-10-PCS | Mod: PBBFAC,,, | Performed by: INTERNAL MEDICINE

## 2020-02-17 PROCEDURE — 3008F BODY MASS INDEX DOCD: CPT | Mod: CPTII,S$GLB,, | Performed by: INTERNAL MEDICINE

## 2020-02-17 PROCEDURE — 3008F PR BODY MASS INDEX (BMI) DOCUMENTED: ICD-10-PCS | Mod: CPTII,S$GLB,, | Performed by: INTERNAL MEDICINE

## 2020-02-17 PROCEDURE — 3077F SYST BP >= 140 MM HG: CPT | Mod: CPTII,S$GLB,, | Performed by: INTERNAL MEDICINE

## 2020-02-17 PROCEDURE — 99999 PR PBB SHADOW E&M-EST. PATIENT-LVL IV: CPT | Mod: PBBFAC,,, | Performed by: INTERNAL MEDICINE

## 2020-02-17 PROCEDURE — 3080F DIAST BP >= 90 MM HG: CPT | Mod: CPTII,S$GLB,, | Performed by: INTERNAL MEDICINE

## 2020-02-17 PROCEDURE — 99214 OFFICE O/P EST MOD 30 MIN: CPT | Mod: S$GLB,,, | Performed by: INTERNAL MEDICINE

## 2020-02-17 PROCEDURE — 3077F PR MOST RECENT SYSTOLIC BLOOD PRESSURE >= 140 MM HG: ICD-10-PCS | Mod: CPTII,S$GLB,, | Performed by: INTERNAL MEDICINE

## 2020-02-17 NOTE — PATIENT INSTRUCTIONS
Assessment/Plan:  Nitin Mcconnell is a 44 y.o. female with a past medical history of HTN, lupus, morbid obesity s/p gastric bypass surgery in 2009, who presents for an initial appointment.     1. BLE Edema- Presentation due to combination of lymphedema with BLE venous insufficiency.  BLE Venous Reflux Study on 1/31/2020 revealed BLE venous reflux with no evidence of DVT.  Echo on 2/13/2020 showed normal left ventricular systolic function with EF of 60%; normal right ventricular systolic function; normal LV diastolic function; mild TR/MR; estimated PA systolic pressure is 31 mmHg; normal central venous pressure (3 mmHg).  Refer to lymphedema clinic.  Pt instructed to elevate legs when resting.  Pt would benefit from significant weight loss.      2. Morbid Obesity s/p Gastric Bypass Surgery in 2009- Refer to nutrition for assistance with weight loss.     Follow up in 1 month

## 2020-02-17 NOTE — LETTER
February 17, 2020      Chasity Dixon MD  [address]  [place] [address]           [place] [facility]  [address]  [facility] [place] [place] [address]  Phone: [phone]          Patient: Nitin Mcconnell   MR Number: [id number]   YOB: 1975   Date of Visit: 2/17/2020       Dear Dr. Chasity Dixon:    Thank you for referring Nitin Mcconnell to me for evaluation. Attached you will find relevant portions of my assessment and plan of care.    If you have questions, please do not hesitate to call me. I look forward to following Nitin Mcconnell along with you.    Sincerely,    Kristian Escobar MD PhD    Enclosure  CC:  No Recipients    If you would like to receive this communication electronically, please contact [email or web address] or [phone] to request more information on Wire [employer] access.    For providers and/or their staff who would like to refer a patient to [facility], please contact us through our one-stop-shop provider referral line, Roseann Fonseca, at [phone].    If you feel you have received this communication in error or would no longer like to receive these types of communications, please e-mail [email or web address]

## 2020-02-17 NOTE — PROGRESS NOTES
[facility]    CC: Leg Swelling     Patient ID: Nitin Mcconnell is a 44 y.o. female with a past medical history of HTN, lupus, morbid obesity s/p gastric bypass surgery in 2009, who presents for an initial appointment.  Pertinent history/events are as follows:     -Pt kindly referred by Dr. Dixon for evaluation of bilateral leg edema/venous reflux.    HPI:  Mr. Mcconnell reports first noticing swelling in both legs approximately 3 years ago.  States she was recently switched from torsemide to bumex a few weeks ago with no significant improvement in leg swelling.  States she also wears compression hose with no significant improvement in leg swelling.  No smoking history.;  BLE Venous Reflux Study on 1/31/2020 revealed BLE venous reflux with no evidence of DVT.  Echo on 2/13/2020 showed normal left ventricular systolic function with EF of 60%; normal right ventricular systolic function; normal LV diastolic function; mild TR/MR; estimated PA systolic pressure is 31 mmHg; normal central venous pressure (3 mmHg).  Exam shows BLE's with non-pitting edema (L>R) consistent with lymphedema.  Trace pitting edema noted    Past Medical History:   Diagnosis Date    Blood transfusion     Breast cyst     Connective tissue disease     Hypertension     Lupus mild     Past Surgical History:   Procedure Laterality Date    BREAST BIOPSY Right 2008    rt axilla    GASTRIC BYPASS  2009    HYSTERECTOMY      @38yrs of age    LIPECTOMY      LYMPH NODE BIOPSY      right arm    OOPHORECTOMY      @38yrs of age    panniculectomy       Social History     Socioeconomic History    Marital status: Single     Spouse name: Not on file    Number of children: Not on file    Years of education: Not on file    Highest education level: Not on file   Occupational History    Not on file   Social Needs    Financial resource strain: Not hard at all    Food insecurity:     Worry: Never true     Inability: Never true     Transportation needs:     Medical: No     Non-medical: No   Tobacco Use    Smoking status: Never Smoker    Smokeless tobacco: Never Used   Substance and Sexual Activity    Alcohol use: Yes     Frequency: Monthly or less     Drinks per session: 1 or 2     Binge frequency: Never     Comment: socially    Drug use: No    Sexual activity: Never   Lifestyle    Physical activity:     Days per week: Not on file     Minutes per session: Not on file    Stress: Not at all   Relationships    Social connections:     Talks on phone: More than three times a week     Gets together: Twice a week     Attends Mandaen service: Not on file     Active member of club or organization: Yes     Attends meetings of clubs or organizations: More than 4 times per year     Relationship status: Never    Other Topics Concern    Not on file   Social History Narrative    Works for [employer]     Family History   Problem Relation Age of Onset    Heart failure Mother     Rheum arthritis Mother     Heart failure Maternal Grandmother     Heart attack Maternal Grandmother     Thyroid disease Maternal Grandmother     Diabetes Maternal Grandfather     Kidney disease Maternal Grandfather     Diabetes Paternal Grandmother     Thyroid disease Maternal Aunt     Lupus Neg Hx        Review of patient's allergies indicates:   Allergen Reactions    Heparin analogues      Other reaction(s): low platelets    Shellfish containing products Hives     Crawfish, no reaction to IV iodine in the past.       Medication List with Changes/Refills   Current Medications    BUMETANIDE (BUMEX) 2 MG TABLET    Take 1 tablet (2 mg total) by mouth once daily.    CALCIUM CARB/VITAMIN D3/VIT K1 (VIACTIV ORAL)    Take by mouth once daily.     HYDROCHLOROTHIAZIDE (HYDRODIURIL) 25 MG TABLET    Take 1 tablet (25 mg total) by mouth once daily.    HYDROXYCHLOROQUINE (PLAQUENIL) 200 MG TABLET    TAKE 1 TABLET(200 MG) BY MOUTH TWICE DAILY     MULTIVITAMIN-CA-IRON-MINERALS (ONE-A-DAY WOMENS FORMULA) 27-0.4 MG TAB    Take 1 tablet by mouth Daily.       Review of Systems  Constitution: Denies chills, fever, and sweats.  HENT: Denies headaches or blurry vision.  Cardiovascular: Denies chest pain or irregular heart beat.  Respiratory: Denies cough or shortness of breath.  Gastrointestinal: Denies abdominal pain, nausea, or vomiting.  Musculoskeletal: Positive for leg swelling.    Neurological: Denies dizziness or focal weakness.  Psychiatric/Behavioral: Normal mental status.  Hematologic/Lymphatic: Denies bleeding problem or easy bruising/bleeding.  Skin: Denies rash or suspicious lesions    Physical Examination  LMP 07/13/2013     Constitutional: Morbidly obese female, no acute distress, conversant  HEENT: Sclera anicteric, Pupils equal, round and reactive to light, extraocular motions intact, Oropharynx clear  Neck: No JVD, no carotid bruits  Cardiovascular: regular rate and rhythm, no murmur, rubs or gallops, normal S1/S2  Pulmonary: Clear to auscultation bilaterally  Abdominal: Abdomen soft, nontender, nondistended, positive bowel sounds  Extremities: BLE's with non-pitting edema (L>R) consistent with lymphedema  Trace pitting edema noted   Pulses:  Carotid pulses are 2+ on the right side, and 2+ on the left side.  Radial pulses are 2+ on the right side, and 2+ on the left side.   Femoral pulses are 2+ on the right side, and 2+ on the left side.  Popliteal pulses are 2+ on the right side, and 2+ on the left side.   Dorsalis pedis pulses are 2+ on the right side, and 2+ on the left side.   Posterior tibial pulses are 2+ on the right side, and 2+ on the left side.    Skin: No ecchymosis, erythema, or ulcers  Psych: Alert and oriented x 3, appropriate affect  Neuro: CNII-XII intact, no focal deficits    Labs:  Most Recent Data  CBC:   Lab Results   Component Value Date    WBC 4.59 02/13/2020    HGB 11.2 (L) 02/13/2020    HCT 37.1 02/13/2020      02/13/2020    MCV 91 02/13/2020    RDW 13.0 02/13/2020     BMP:   Lab Results   Component Value Date     02/13/2020    K 3.7 02/13/2020     02/13/2020    CO2 29 02/13/2020    BUN 12 02/13/2020    CREATININE 0.9 02/13/2020    GLU 84 02/13/2020    CALCIUM 8.6 (L) 02/13/2020    MG 1.9 02/13/2020     LFTS;   Lab Results   Component Value Date    PROT 6.8 02/13/2020    ALBUMIN 3.8 02/13/2020    BILITOT 0.4 02/13/2020    AST 26 02/13/2020    ALKPHOS 88 02/13/2020    ALT 29 02/13/2020     COAGS:   Lab Results   Component Value Date    INR 1.0 07/18/2013     FLP:   Lab Results   Component Value Date    CHOL 158 09/04/2019    HDL 46 09/04/2019    LDLCALC 94.4 09/04/2019    TRIG 88 09/04/2019    CHOLHDL 29.1 09/04/2019     CARDIAC:   Lab Results   Component Value Date    BNP 88 02/13/2020       BLE Venous Reflux Study 1/31/2020:  No evidence of lower extremity DVT bilaterally.  Minimal right anterior accessory GSV reflux.  Left proximal anterior accessory GSV reflux.  Left proximal GSV reflux.    Echo 2/13/2020:  · Normal left ventricular systolic function. The estimated ejection fraction is 60%.  · Normal right ventricular systolic function.  · Normal LV diastolic function.  · Mild tricuspid regurgitation.  · Mild mitral regurgitation.  · The estimated PA systolic pressure is 31 mmHg.  · Normal central venous pressure (3 mmHg).      Assessment/Plan:  Nitin Mcconnell is a 44 y.o. female with a past medical history of HTN, lupus, morbid obesity s/p gastric bypass surgery in 2009, who presents for an initial appointment.     1. BLE Edema- Presentation due to combination of lymphedema with BLE venous insufficiency.  BLE Venous Reflux Study on 1/31/2020 revealed BLE venous reflux with no evidence of DVT.  Echo on 2/13/2020 showed normal left ventricular systolic function with EF of 60%; normal right ventricular systolic function; normal LV diastolic function; mild TR/MR; estimated PA systolic pressure is 31 mmHg;  normal central venous pressure (3 mmHg).  Refer to lymphedema clinic.  Pt instructed to elevate legs when resting.  Pt would benefit from significant weight loss.      2. Morbid Obesity s/p Gastric Bypass Surgery in 2009- Refer to nutrition for assistance with weight loss.     Follow up in 1 month    Total duration of face to face visit time 45 minutes.  Total time spent counseling greater than fifty percent of total visit time.  Counseling included discussion regarding imaging findings, diagnosis, possibilities, treatment options, risks and benefits.  The patient had many questions regarding the options and long-term effects.    Kristian Escobar MD, PhD  Interventional Cardiology

## 2020-02-19 NOTE — PROCEDURES
Nitin Mcconnell is a 44 y.o.  female patient, who presents for a 6 minute walk test ordered by MD Jose Francisco.  The diagnosis is Pulmonary Hypertension.  The patient's BMI is 45.6 kg/m2.  Predicted distance (lower limit of normal) is 336.94 meters.      Test Results:    The test was completed without stopping.  The total time walked was 360 seconds.  During walking, the patient reported:  No complaints.  The patient used no assistive devices during testing.     02/13/2020---------Distance: 396.24 meters (1300 feet)     O2 Sat % Supplemental Oxygen Heart Rate Blood Pressure Kennedy Scale   Pre-exercise  (Resting) 99 % Room Air 56 bpm 165/81 mmHg 0   During Exercise 99 % Room Air 106 bpm 175/94 mmHg 0.5   Post-exercise  (Recovery) 98 % Room Air  67 bpm 158/75 mmHg      Recovery Time: 172 seconds    Performing nurse/tech: Cecilia MARTIN      PREVIOUS STUDY:   05/31/2019---------Distance: 426.72 meters (1400 feet)       O2 Sat % Supplemental Oxygen Heart Rate Blood Pressure Kennedy Scale   Pre-exercise  (Resting) 99 % Room Air 77 bpm 143/69 mmHg 0.5   During Exercise   96 % Room Air 96 bpm 137/63 mmHg 0.5   Post-exercise  (Recovery) 98 % Room Air  79 bpm           CLINICAL INTERPRETATION:  Six minute walk distance is 396.24 meters (1300 feet) with very, very light dyspnea.  During exercise, there was no significant desaturation while breathing room air.  Both blood pressure and heart rate increased significantly with walking.  Bradycardia was present prior to exercise.  The patient did not report non-pulmonary symptoms during exercise.  Since the previous study in May 2019, exercise capacity is unchanged.  Based upon age and body mass index, exercise capacity is normal.

## 2020-03-01 ENCOUNTER — PATIENT MESSAGE (OUTPATIENT)
Dept: TRANSPLANT | Facility: CLINIC | Age: 45
End: 2020-03-01

## 2020-03-02 ENCOUNTER — CLINICAL SUPPORT (OUTPATIENT)
Dept: REHABILITATION | Facility: HOSPITAL | Age: 45
End: 2020-03-02
Attending: INTERNAL MEDICINE
Payer: COMMERCIAL

## 2020-03-02 DIAGNOSIS — R60.0 BILATERAL LEG EDEMA: ICD-10-CM

## 2020-03-02 DIAGNOSIS — I87.2 VENOUS INSUFFICIENCY OF BOTH LOWER EXTREMITIES: ICD-10-CM

## 2020-03-02 DIAGNOSIS — I87.2 VENOUS REFLUX: ICD-10-CM

## 2020-03-02 DIAGNOSIS — I89.0 LYMPHEDEMA OF BOTH LOWER EXTREMITIES: ICD-10-CM

## 2020-03-02 DIAGNOSIS — E66.01 MORBID OBESITY WITH BMI OF 45.0-49.9, ADULT: ICD-10-CM

## 2020-03-02 DIAGNOSIS — Z98.84 S/P GASTRIC BYPASS: ICD-10-CM

## 2020-03-02 PROCEDURE — 97162 PT EVAL MOD COMPLEX 30 MIN: CPT | Mod: PO

## 2020-03-02 PROCEDURE — 97110 THERAPEUTIC EXERCISES: CPT | Mod: PO

## 2020-03-02 NOTE — PROGRESS NOTES
[facility]  Physical Therapy Initial Evaluation    Date: 3/2/2020   Name: Nitin Mcconnell  Clinic Number: [id number]    Therapy Diagnosis:   Encounter Diagnoses   Name Primary?    Bilateral leg edema     Venous reflux     Morbid obesity with BMI of 45.0-49.9, adult     S/P gastric bypass     Venous insufficiency of both lower extremities     Lymphedema of both lower extremities      Physician: Kristian Escobar MD*    Physician Orders: PT Eval and Treat   Medical Diagnosis from Referral:   R60.0 (ICD-10-CM) - Bilateral leg edema   I87.2 (ICD-10-CM) - Venous reflux   E66.01,Z68.42 (ICD-10-CM) - Morbid obesity with BMI of 45.0-49.9, adult   Z98.84 (ICD-10-CM) - S/P gastric bypass   I87.2 (ICD-10-CM) - Venous insufficiency of both lower extremities   I89.0 (ICD-10-CM) - Lymphedema of both lower extremities   Evaluation Date: 3/2/2020  Authorization Period Expiration: 02/16/2021  Plan of Care Expiration: 04/13/2020  Visit # / Visits authorized: 1/1    Time In: 3:00 pm  Time Out: 4:00 pm   Total Appointment Time (timed & untimed codes): 60 minutes    Precautions: Standard    Subjective   Date of onset: chronic - 2 years   History of current condition - Nitin reports: she has a long standing history with lower leg swelling. Patient states she has been dealing with this for about 2 years. Patient states she went to see her PCP in which she received fluid pills to help reduce the swelling, however the swelling was still present. Patient states she was referred to a vascular doctor who then diagnosed her with lower extremity lymphedema. Patient states she is not having any pain at the moment, however does sometimes have bouts of pain in her R knee. Patient states she mostly feels the pain with steps up and down and squatting. Patient states she joined a gym and has been on a persistent walking routine. Patient states she is currently taking a diuretic pill for her high blood pressure  which is helping with the fluid in her legs. Patient states she does go to the gym and walks on the treadmill. Patient denies having had any recent falls. Patient states she intermittently feels numbness and slight tingling of the heel when first taking up in the morning.      Medical History:   Past Medical History:   Diagnosis Date    Blood transfusion     Breast cyst     Connective tissue disease     Hypertension     Lupus mild       Surgical History:   Nitin Mcconnell  has a past surgical history that includes Gastric bypass (2009); Lymph node biopsy; panniculectomy; Lipectomy; Breast biopsy (Right, 2008); Hysterectomy; and Oophorectomy.    Medications:   Nitin has a current medication list which includes the following prescription(s): bumetanide, calcium carb/vitamin d3/vit k1, ergocalciferol (vitamin d2), hydrochlorothiazide, hydroxychloroquine, and multivitamin-ca-iron-minerals.    Allergies:   Review of patient's allergies indicates:   Allergen Reactions    Heparin analogues      Other reaction(s): low platelets    Shellfish containing products Hives     Crawfish, no reaction to IV iodine in the past.        Imaging, none:     Prior Therapy: none   Social History: single-story with one step into home lives with their family  Occupation:    Prior Level of Function: independent with no pain  Current Level of Function: independent with no pain but increased fluid     Pain:  Current 0/10, worst 0/10, best 0/10   Location: bilateral lower legs - right knee with intermittent bouts of sharp pain   Description: Sharp  Aggravating Factors: Walking and stairs   Easing Factors: rest    Pts goals: to get condition under control     Objective     Observation: increased swelling of bilateral lower extremity, L > R with increase swelling of the LLE at the knee medial joint line and ankle/foot    Posture: patient ambulated into the clinic with mild trenedelenburg gait pattern       Range of  Motion:   Knee Left active Right Active   Flexion 110 125   Extension 0 0       Lower Extremity Strength  Right LE  Left LE    Knee extension: 5/5 Knee extension: 5/5   Knee flexion: 5/5 Knee flexion: 4+/5   Hip flexion: 5/5 Hip flexion: 5/5   Hip extension:  4+/5 Hip extension: 4+/5   Hip abduction: 4+/5 Hip abduction: 4+/5   Hip adduction: 4+/5 Hip adduction 4+/5   Ankle dorsiflexion: 5/5 Ankle dorsiflexion: 5/5         Special Tests:   Left Right   Valgus Stress Test Negative Negative   Varus Stress test Negative Negative   Lachman's test Negative Negative   Posterior Lachman Negative Negative   Patellar Grind Test Negative Negative        Function:    - SLS R: 30 seconds no LOB even surface   - SLS L: 22 seconds moderate sway even surface   - Squat: knees over toes; slightly shift weight forward; pain in R knee   - Sit <--> Stand:unremarkable   - Bed Mobility: unremarkable       Joint Mobility: R/L  Patellar mobility normal in all directions     Palpation: unremarkable     Sensation: light touch intact     Lower Edema:     Girth Measurement Ankle Mid-Calf Mid-Patella Figure 8   Left 9.3 cm 40 cm   58 cm 66 cm   Right 8 cm 40 cm   52 cm  64 cm         Limitation/Restriction for FOTO Leg Survey    Therapist reviewed FOTO scores for Nitin Mcconnell on 3/2/2020.   FOTO documents entered into [employer] - see Media section.    Limitation Score: 27%         TREATMENT   Treatment Time In: 3:50 pm   Treatment Time Out: 4:00 pm   Total Treatment time (time-based codes) separate from Evaluation: 10 minutes    Nitin received therapeutic exercises to develop strength, endurance, ROM, flexibility and core stabilization for 10 minutes including: HEP Review and Development     Date  03/02/2020   VISIT 1/1   FOTO 1/5   POC EXP. DATE 04/13/2020   FACE-TO-FACE 04/02/2020       Nu-Step  --       TABLE:    HSS w/ strap --   QS    Bridge  --   SAQ --   Heel slides --   SLR --   Hip abduction SL   Hip adduction SL       SEATED:     LAQs --   Seated Hip Flex --       STANDING:    Heel raises --   Mini squats --   Step Ups --   Step Downs --       Initials BJ       Home Exercises and Patient Education Provided    Education provided:   - PT/Pt. Roles and responsibilities   - perform HEP in pain-free range    Written Home Exercises Provided: yes.  Exercises were reviewed and Nitin was able to demonstrate them prior to the end of the session.  Nitin demonstrated good  understanding of the education provided.     See EMR under Patient Instructions for exercises provided 3/2/2020.    Assessment   Nitin is a 44 y.o. female referred to outpatient Physical Therapy with a medical diagnosis of Bilateral leg edema. Pt presents with increased swelling of bilateral lower legs, with a significant differences at the mid patella and ankle joint (figure 8). No significant strength deficits were noted. Right patella mobilizations deemed normal with no increase in symptoms with knee special test. Patient also demonstrated mild limitations in (B) knee flexion ROM, however limitations suspected to be due to increased adipose tissue and swelling of the lower legs.     Pt prognosis is Good.   Pt will benefit from skilled outpatient Physical Therapy to address the deficits stated above and in the chart below, provide pt/family education, and to maximize pt's level of independence.     Plan of care discussed with patient: Yes  Pt's spiritual, cultural and educational needs considered and patient is agreeable to the plan of care and goals as stated below:     Anticipated Barriers for therapy: none    Medical Necessity is demonstrated by the following  History  Co-morbidities and personal factors that may impact the plan of care Co-morbidities:   Allergies, Arthritis, BMI over 30, High Blood Pressure, Prior Surgery    Personal Factors:   no deficits     high   Examination  Body Structures and Functions, activity limitations and participation restrictions that may  impact the plan of care Body Regions:   lower extremities    Body Systems:    gross symmetry  ROM  balance  gait    Participation Restrictions:   No modalities     Activity limitations:   Learning and applying knowledge  no deficits    General Tasks and Commands  no deficits    Communication  no deficits    Mobility  stairs    Self care  no deficits    Domestic Life  doing house work (cleaning house, washing dishes, laundry)    Interactions/Relationships  no deficits    Life Areas  no deficits    Community and Social Life  community life  recreation and leisure         moderate   Clinical Presentation evolving clinical presentation with changing clinical characteristics moderate   Decision Making/ Complexity Score: moderate     Goals:  Short Term Goals: 3 weeks   Patient will be independent and complaint with HEP.   Reduce global swelling by 5 cm in order to demonstrate increased fluid circulation bilaterally.   Patient will be able to perform L single stance for 30 seconds with no LOB in order to reduce risk of falls.     Long Term Goals: 6 weeks   Patient will be independent and complaint with updated gym HEP.   Increase gross LE strength by 1-2 muscle grades in order to ease difficulty of performing light weights at gym.  Patient will be able to perform light strengthening at home gym with min-no pain/difficulty.   Reduce global swelling by 5-10 cm in order to demonstrate increased fluid circulation bilaterally.     Plan   Plan of care Certification: 3/2/2020 to 04/13/2020.    Outpatient Physical Therapy 2 times weekly for 6 weeks to include the following interventions: Gait Training, Manual Therapy, Moist Heat/ Ice, Neuromuscular Re-ed, Patient Education, Therapeutic Activites and Therapeutic Exercise.     Elena Cam, PT, DPT

## 2020-03-02 NOTE — PATIENT INSTRUCTIONS
Heel Raise (Sitting)  STANDING         Raise heels, keeping toes on floor.  Repeat 10 times per set. Do 3 sets per session. Do 2 sessions per day.     [email or web address]     Copyright © [employer]. All rights reserved.       Supine Hamstring Stretch    Raise your leg with belt around heel of foot. Raise leg until a stretch is felt in the back of the thigh. Keep knee straight. Hold 10 seconds.   Repeat 10 times each side per set. Do 1 sets per session. Do 2 sessions per day.    Strengthening: Straight Leg Raise (Phase 1)        Tighten muscles on front of right thigh, then lift leg from surface, keeping knee locked.   Repeat 10 times per set. Do 1 sets per session. Do 2 sessions per day.     [email or web address]     Copyright © [employer]. All rights reserved.          Knee  (LAQ)        Sit up tall, tighten abdominals. Straighten one leg and then relax it. Repeat with other leg.  Repeat 10 times. Do 2 sessions per day.     [email or web address]     Seated Marching    Sit, both feet flat, tighten abdominals. Lift right knee toward ceiling. Lower and lift left knee toward the ceiling. Repeat, alternating sides.  Complete 1 sets of 10 repetitions. Perform 2 sessions per day.      ROM: Plantar / Dorsiflexion        With left leg relaxed, gently flex and extend ankle. Move through full range of motion. Avoid pain.  Repeat 10 times per set. Do 3 sets per session. Do 1 sessions per day.     [email or web address]/34     Copyright © [employer]. All rights reserved.

## 2020-03-06 ENCOUNTER — PATIENT OUTREACH (OUTPATIENT)
Dept: ADMINISTRATIVE | Facility: OTHER | Age: 45
End: 2020-03-06

## 2020-03-09 ENCOUNTER — LAB VISIT (OUTPATIENT)
Dept: LAB | Facility: HOSPITAL | Age: 45
End: 2020-03-09
Attending: INTERNAL MEDICINE
Payer: COMMERCIAL

## 2020-03-09 ENCOUNTER — CLINICAL SUPPORT (OUTPATIENT)
Dept: INFECTIOUS DISEASES | Facility: CLINIC | Age: 45
End: 2020-03-09
Payer: COMMERCIAL

## 2020-03-09 ENCOUNTER — OFFICE VISIT (OUTPATIENT)
Dept: RHEUMATOLOGY | Facility: CLINIC | Age: 45
End: 2020-03-09
Payer: COMMERCIAL

## 2020-03-09 VITALS
SYSTOLIC BLOOD PRESSURE: 128 MMHG | WEIGHT: 271.19 LBS | HEIGHT: 65 IN | HEART RATE: 69 BPM | BODY MASS INDEX: 45.18 KG/M2 | DIASTOLIC BLOOD PRESSURE: 80 MMHG

## 2020-03-09 DIAGNOSIS — L65.9 ALOPECIA: ICD-10-CM

## 2020-03-09 DIAGNOSIS — I27.20 PULMONARY HYPERTENSION: ICD-10-CM

## 2020-03-09 DIAGNOSIS — E55.9 VITAMIN D DEFICIENCY: ICD-10-CM

## 2020-03-09 DIAGNOSIS — E66.01 CLASS 3 SEVERE OBESITY DUE TO EXCESS CALORIES WITHOUT SERIOUS COMORBIDITY WITH BODY MASS INDEX (BMI) OF 45.0 TO 49.9 IN ADULT: ICD-10-CM

## 2020-03-09 DIAGNOSIS — D84.9 IMMUNOSUPPRESSION: ICD-10-CM

## 2020-03-09 DIAGNOSIS — M32.9 SYSTEMIC LUPUS ERYTHEMATOSUS, UNSPECIFIED SLE TYPE, UNSPECIFIED ORGAN INVOLVEMENT STATUS: ICD-10-CM

## 2020-03-09 DIAGNOSIS — M32.9 SYSTEMIC LUPUS ERYTHEMATOSUS, UNSPECIFIED SLE TYPE, UNSPECIFIED ORGAN INVOLVEMENT STATUS: Primary | ICD-10-CM

## 2020-03-09 LAB
25(OH)D3+25(OH)D2 SERPL-MCNC: 34 NG/ML (ref 30–96)
ALBUMIN SERPL BCP-MCNC: 4 G/DL (ref 3.5–5.2)
ALP SERPL-CCNC: 86 U/L (ref 55–135)
ALT SERPL W/O P-5'-P-CCNC: 33 U/L (ref 10–44)
ANION GAP SERPL CALC-SCNC: 10 MMOL/L (ref 8–16)
AST SERPL-CCNC: 29 U/L (ref 10–40)
BASOPHILS # BLD AUTO: 0.05 K/UL (ref 0–0.2)
BASOPHILS NFR BLD: 0.9 % (ref 0–1.9)
BILIRUB SERPL-MCNC: 0.5 MG/DL (ref 0.1–1)
BILIRUB UR QL STRIP: NEGATIVE
BUN SERPL-MCNC: 20 MG/DL (ref 6–20)
C3 SERPL-MCNC: 148 MG/DL (ref 50–180)
C4 SERPL-MCNC: 45 MG/DL (ref 11–44)
CALCIUM SERPL-MCNC: 8.7 MG/DL (ref 8.7–10.5)
CHLORIDE SERPL-SCNC: 102 MMOL/L (ref 95–110)
CK SERPL-CCNC: 174 U/L (ref 20–180)
CLARITY UR REFRACT.AUTO: CLEAR
CO2 SERPL-SCNC: 27 MMOL/L (ref 23–29)
COLOR UR AUTO: YELLOW
CREAT SERPL-MCNC: 1.1 MG/DL (ref 0.5–1.4)
CREAT UR-MCNC: 120 MG/DL (ref 15–325)
CRP SERPL-MCNC: 2.7 MG/L (ref 0–8.2)
DIFFERENTIAL METHOD: ABNORMAL
EOSINOPHIL # BLD AUTO: 0.1 K/UL (ref 0–0.5)
EOSINOPHIL NFR BLD: 1.9 % (ref 0–8)
ERYTHROCYTE [DISTWIDTH] IN BLOOD BY AUTOMATED COUNT: 12.3 % (ref 11.5–14.5)
ERYTHROCYTE [SEDIMENTATION RATE] IN BLOOD BY WESTERGREN METHOD: 16 MM/HR (ref 0–36)
EST. GFR  (AFRICAN AMERICAN): >60 ML/MIN/1.73 M^2
EST. GFR  (NON AFRICAN AMERICAN): >60 ML/MIN/1.73 M^2
GLUCOSE SERPL-MCNC: 73 MG/DL (ref 70–110)
GLUCOSE UR QL STRIP: NEGATIVE
HCT VFR BLD AUTO: 40.8 % (ref 37–48.5)
HGB BLD-MCNC: 12.3 G/DL (ref 12–16)
HGB UR QL STRIP: NEGATIVE
IMM GRANULOCYTES # BLD AUTO: 0.01 K/UL (ref 0–0.04)
IMM GRANULOCYTES NFR BLD AUTO: 0.2 % (ref 0–0.5)
KETONES UR QL STRIP: NEGATIVE
LEUKOCYTE ESTERASE UR QL STRIP: NEGATIVE
LYMPHOCYTES # BLD AUTO: 1.7 K/UL (ref 1–4.8)
LYMPHOCYTES NFR BLD: 32.6 % (ref 18–48)
MCH RBC QN AUTO: 27.6 PG (ref 27–31)
MCHC RBC AUTO-ENTMCNC: 30.1 G/DL (ref 32–36)
MCV RBC AUTO: 92 FL (ref 82–98)
MONOCYTES # BLD AUTO: 0.7 K/UL (ref 0.3–1)
MONOCYTES NFR BLD: 12.4 % (ref 4–15)
NEUTROPHILS # BLD AUTO: 2.8 K/UL (ref 1.8–7.7)
NEUTROPHILS NFR BLD: 52 % (ref 38–73)
NITRITE UR QL STRIP: NEGATIVE
NRBC BLD-RTO: 0 /100 WBC
PH UR STRIP: 5 [PH] (ref 5–8)
PLATELET # BLD AUTO: 203 K/UL (ref 150–350)
PMV BLD AUTO: 11.6 FL (ref 9.2–12.9)
POTASSIUM SERPL-SCNC: 4 MMOL/L (ref 3.5–5.1)
PROT SERPL-MCNC: 7.7 G/DL (ref 6–8.4)
PROT UR QL STRIP: NEGATIVE
PROT UR-MCNC: <7 MG/DL (ref 0–15)
PROT/CREAT UR: NORMAL MG/G{CREAT} (ref 0–0.2)
RBC # BLD AUTO: 4.45 M/UL (ref 4–5.4)
SODIUM SERPL-SCNC: 139 MMOL/L (ref 136–145)
SP GR UR STRIP: 1.02 (ref 1–1.03)
URN SPEC COLLECT METH UR: NORMAL
WBC # BLD AUTO: 5.34 K/UL (ref 3.9–12.7)

## 2020-03-09 PROCEDURE — 99999 PR PBB SHADOW E&M-EST. PATIENT-LVL III: ICD-10-PCS | Mod: PBBFAC,,, | Performed by: INTERNAL MEDICINE

## 2020-03-09 PROCEDURE — 99214 PR OFFICE/OUTPT VISIT, EST, LEVL IV, 30-39 MIN: ICD-10-PCS | Mod: S$GLB,,, | Performed by: INTERNAL MEDICINE

## 2020-03-09 PROCEDURE — 3074F SYST BP LT 130 MM HG: CPT | Mod: CPTII,S$GLB,, | Performed by: INTERNAL MEDICINE

## 2020-03-09 PROCEDURE — 81003 URINALYSIS AUTO W/O SCOPE: CPT

## 2020-03-09 PROCEDURE — 85025 COMPLETE CBC W/AUTO DIFF WBC: CPT

## 2020-03-09 PROCEDURE — 3079F PR MOST RECENT DIASTOLIC BLOOD PRESSURE 80-89 MM HG: ICD-10-PCS | Mod: CPTII,S$GLB,, | Performed by: INTERNAL MEDICINE

## 2020-03-09 PROCEDURE — 80053 COMPREHEN METABOLIC PANEL: CPT

## 2020-03-09 PROCEDURE — 86160 COMPLEMENT ANTIGEN: CPT

## 2020-03-09 PROCEDURE — 86160 COMPLEMENT ANTIGEN: CPT | Mod: 59

## 2020-03-09 PROCEDURE — 99214 OFFICE O/P EST MOD 30 MIN: CPT | Mod: S$GLB,,, | Performed by: INTERNAL MEDICINE

## 2020-03-09 PROCEDURE — 99999 PR PBB SHADOW E&M-EST. PATIENT-LVL III: CPT | Mod: PBBFAC,,, | Performed by: INTERNAL MEDICINE

## 2020-03-09 PROCEDURE — 85652 RBC SED RATE AUTOMATED: CPT

## 2020-03-09 PROCEDURE — 82550 ASSAY OF CK (CPK): CPT

## 2020-03-09 PROCEDURE — 36415 COLL VENOUS BLD VENIPUNCTURE: CPT

## 2020-03-09 PROCEDURE — 86225 DNA ANTIBODY NATIVE: CPT

## 2020-03-09 PROCEDURE — 3008F PR BODY MASS INDEX (BMI) DOCUMENTED: ICD-10-PCS | Mod: CPTII,S$GLB,, | Performed by: INTERNAL MEDICINE

## 2020-03-09 PROCEDURE — 3008F BODY MASS INDEX DOCD: CPT | Mod: CPTII,S$GLB,, | Performed by: INTERNAL MEDICINE

## 2020-03-09 PROCEDURE — 90471 IMMUNIZATION ADMIN: CPT | Mod: S$GLB,,, | Performed by: INTERNAL MEDICINE

## 2020-03-09 PROCEDURE — 3079F DIAST BP 80-89 MM HG: CPT | Mod: CPTII,S$GLB,, | Performed by: INTERNAL MEDICINE

## 2020-03-09 PROCEDURE — 90670 PCV13 VACCINE IM: CPT | Mod: S$GLB,,, | Performed by: INTERNAL MEDICINE

## 2020-03-09 PROCEDURE — 84156 ASSAY OF PROTEIN URINE: CPT

## 2020-03-09 PROCEDURE — 82306 VITAMIN D 25 HYDROXY: CPT

## 2020-03-09 PROCEDURE — 90471 PNEUMOCOCCAL CONJUGATE VACCINE 13-VALENT LESS THAN 5YO & GREATER THAN: ICD-10-PCS | Mod: S$GLB,,, | Performed by: INTERNAL MEDICINE

## 2020-03-09 PROCEDURE — 3074F PR MOST RECENT SYSTOLIC BLOOD PRESSURE < 130 MM HG: ICD-10-PCS | Mod: CPTII,S$GLB,, | Performed by: INTERNAL MEDICINE

## 2020-03-09 PROCEDURE — 86140 C-REACTIVE PROTEIN: CPT

## 2020-03-09 PROCEDURE — 90670 PNEUMOCOCCAL CONJUGATE VACCINE 13-VALENT LESS THAN 5YO & GREATER THAN: ICD-10-PCS | Mod: S$GLB,,, | Performed by: INTERNAL MEDICINE

## 2020-03-09 ASSESSMENT — ROUTINE ASSESSMENT OF PATIENT INDEX DATA (RAPID3)
PATIENT GLOBAL ASSESSMENT SCORE: .5
TOTAL RAPID3 SCORE: .33
FATIGUE SCORE: 0
PAIN SCORE: .5
AM STIFFNESS SCORE: 0, NO
PSYCHOLOGICAL DISTRESS SCORE: 0
MDHAQ FUNCTION SCORE: 0

## 2020-03-09 NOTE — PROGRESS NOTES
Rapid3 Question Responses and Scores 3/8/2020   MDHAQ Score 0   Psychologic Score 0   Pain Score 0.5   When you awakened in the morning OVER THE LAST WEEK, did you feel stiff? No   Fatigue Score 0   Global Health Score 0.5   RAPID3 Score 0.33       Answers for HPI/ROS submitted by the patient on 3/8/2020   fever: No  eye redness: No  headaches: No  shortness of breath: No  chest pain: No  trouble swallowing: No  diarrhea: No  constipation: No  unexpected weight change: No  genital sore: No  dysuria: No  During the last 3 days, have you had a skin rash?: No  Bruises or bleeds easily: No  cough: No

## 2020-03-09 NOTE — PROGRESS NOTES
Subjective:       Patient ID: Nitin Mcconnell is a 44 y.o. female.    Chief Complaint: Lupus    HPI:  Nitin Mcconnell is a 44 y.o. female diagnosed at age 31 with lupus.  Started as upper respiratory symptoms that did not respond to antibiotics.  She had fever and low BP as well as SOB.  She was intubated and on ventilator for a week.  She was at [facility].  Dr. Martin diagnosed lupus.  Hospitalized 3/8/07 to 3/30/07.  She had elevated pressure in eyes, kidney involvement.  She was discharged with a PICC line for antibiotics.  She was found to be allergic to heparin due to thrombocytopenia that developed after flushing PICC line with heparin.  She had hair loss.  Had enlarged lymph node under arm in past and biopsy was normal.  Was treated with Plaquenil and steroids.  Only took steroid for 1 year.     She denies any flares since last visit.  Ulcer in mouth one month ago.  Being evaluated for sleep apnea.  Restarting exercise 4-5 days a week for 30 minutes on treadmill or elliptical.   Still with swelling in ankles for over 2 years.   Diagnosed with lymph edema and will start therapy.     Lupus Review of Systems  Alopecia: yes  Photosensitivity: no   Raynaud's: no  Oral or nasal ulcers: occasional oral ulcers  Rashes:  Intermittent malar rash; occasional rash in crease of arm and neck  No pleurisy or pericarditis.  No seizures, psychosis, or stroke.  No venous or arterial clots.  History of being on coumadin for 3 months as a precaution.   Pregnancy hx (if applicable): no miscarriages (never pregnant)        Review of Systems   Constitutional: Negative.  Negative for fever and unexpected weight change.   HENT: Negative.  Negative for trouble swallowing.    Eyes: Negative for redness.   Respiratory: Negative for cough and shortness of breath.    Cardiovascular: Positive for leg swelling. Negative for chest pain.   Gastrointestinal: Negative for constipation and diarrhea.   Endocrine: Negative.   "  Genitourinary: Negative.  Negative for dysuria and genital sores.   Musculoskeletal: Negative.    Skin: Negative for rash.        Alopecia     Allergic/Immunologic: Negative.    Neurological: Negative.  Negative for headaches.   Hematological: Negative.  Does not bruise/bleed easily.   Psychiatric/Behavioral: Negative.          Objective:   /80 (BP Location: Left arm, Patient Position: Sitting, BP Method: Large (Automatic))   Pulse 69   Ht 5' 5" (1.651 m)   Wt 123 kg (271 lb 2.7 oz)   LMP 07/13/2013   BMI 45.12 kg/m²      Physical Exam   Constitutional: She is oriented to person, place, and time and well-developed, well-nourished, and in no distress.   HENT:   Head: Normocephalic and atraumatic.   Eyes: Conjunctivae and EOM are normal.   Neck: Neck supple.   Cardiovascular: Normal rate, regular rhythm and normal heart sounds.    Pulmonary/Chest: Effort normal and breath sounds normal.   Abdominal: Soft. Bowel sounds are normal.   Neurological: She is alert and oriented to person, place, and time. Gait normal.   Skin: Skin is warm and dry.     Psychiatric: Mood and affect normal.            LABS    Component      Latest Ref Rng & Units 2/13/2020 12/17/2019   WBC      3.90 - 12.70 K/uL 4.59 4.29   RBC      4.00 - 5.40 M/uL 4.07 4.51   Hemoglobin      12.0 - 16.0 g/dL 11.2 (L) 12.3   Hematocrit      37.0 - 48.5 % 37.1 40.8   MCV      82 - 98 fL 91 91   MCH      27.0 - 31.0 pg 27.5 27.3   MCHC      32.0 - 36.0 g/dL 30.2 (L) 30.1 (L)   RDW      11.5 - 14.5 % 13.0 12.6   Platelets      150 - 350 K/uL 175 197   MPV      9.2 - 12.9 fL 10.9 11.1   Immature Granulocytes      0.0 - 0.5 % 0.2 0.2   Gran # (ANC)      1.8 - 7.7 K/uL 2.4 2.3   Immature Grans (Abs)      0.00 - 0.04 K/uL 0.01 0.01   Lymph #      1.0 - 4.8 K/uL 1.7 1.5   Mono #      0.3 - 1.0 K/uL 0.4 0.4   Eos #      0.0 - 0.5 K/uL 0.1 0.1   Baso #      0.00 - 0.20 K/uL 0.03 0.04   nRBC      0 /100 WBC 0 0   Gran%      38.0 - 73.0 % 52.2 53.4   Lymph%   "    18.0 - 48.0 % 36.2 34.5   Mono%      4.0 - 15.0 % 9.4 9.6   Eosinophil%      0.0 - 8.0 % 1.3 1.4   Basophil%      0.0 - 1.9 % 0.7 0.9   Differential Method       Automated Automated   Sodium      136 - 145 mmol/L 142 142   Potassium      3.5 - 5.1 mmol/L 3.7 3.9   Chloride      95 - 110 mmol/L 107 104   CO2      23 - 29 mmol/L 29 29   Glucose      70 - 110 mg/dL 84 89   BUN, Bld      6 - 20 mg/dL 12 17   Creatinine      0.5 - 1.4 mg/dL 0.9 1.1   Calcium      8.7 - 10.5 mg/dL 8.6 (L) 9.2   PROTEIN TOTAL      6.0 - 8.4 g/dL 6.8 7.9   Albumin      3.5 - 5.2 g/dL 3.8 4.3   BILIRUBIN TOTAL      0.1 - 1.0 mg/dL 0.4 0.6   Alkaline Phosphatase      55 - 135 U/L 88 93   AST      10 - 40 U/L 26 30   ALT      10 - 44 U/L 29 37   Anion Gap      8 - 16 mmol/L 6 (L) 9   eGFR if African American      >60 mL/min/1.73 m:2 >60.0 >60.0   eGFR if non African American      >60 mL/min/1.73 m:2 >60.0 >60.0   BNP      0 - 99 pg/mL 88 <10   ds DNA Ab      Negative 1:10  Negative 1:10   Complement (C-3)      50 - 180 mg/dL  154   Complement (C-4)      11 - 44 mg/dL  45 (H)   CPK      20 - 180 U/L  172   Sed Rate      0 - 36 mm/Hr  16   CRP      0.0 - 8.2 mg/L  2.2   Vit D, 25-Hydroxy      30 - 96 ng/mL  27 (L)   Magnesium      1.6 - 2.6 mg/dL 1.9      Assessment:       1.  SLE.    2.  Immunosuppression.  Eye exam for Plaquenil 10/2019  3.  Elevated pressure in eyes  4.  Obesity.  Gastric bypass 2009.  Started exercising.  5.  Thyroid nodules.  Being monitored.  Aspiration was normal  6.  Mother with RA  7.  Ankle swelling bilateral.  Grandmother and mother had CHF    8.  Alopecia.  Family history of hair loss.   9.  Pulmonary HTN  10.  SOB intermittent  11.  Lymph edema.  Will start therapy    Plan:       1.  Labs  2.  Will follow Dr. Felton regarding pulmonary HTN  3.  Follow with current dermatology to evaluate   4.  Patient to encouraged to reconsider Weight [employer]/Noom.  5.  Started on exercising.     6.  Speak with wellness  dietician through insurance  7.  Prevnar 13 today      RTO 3 months/prn

## 2020-03-10 ENCOUNTER — PATIENT MESSAGE (OUTPATIENT)
Dept: RHEUMATOLOGY | Facility: CLINIC | Age: 45
End: 2020-03-10

## 2020-03-10 ENCOUNTER — CLINICAL SUPPORT (OUTPATIENT)
Dept: REHABILITATION | Facility: HOSPITAL | Age: 45
End: 2020-03-10
Attending: INTERNAL MEDICINE
Payer: COMMERCIAL

## 2020-03-10 ENCOUNTER — TELEPHONE (OUTPATIENT)
Dept: REHABILITATION | Facility: HOSPITAL | Age: 45
End: 2020-03-10

## 2020-03-10 DIAGNOSIS — R60.0 LOWER EXTREMITY EDEMA: ICD-10-CM

## 2020-03-10 DIAGNOSIS — M79.89 SWELLING OF LOWER LEG: ICD-10-CM

## 2020-03-10 DIAGNOSIS — R29.898 WEAKNESS OF LOWER EXTREMITY, UNSPECIFIED LATERALITY: ICD-10-CM

## 2020-03-10 LAB — DSDNA AB SER-ACNC: NORMAL [IU]/ML

## 2020-03-10 PROCEDURE — 97110 THERAPEUTIC EXERCISES: CPT | Mod: PO

## 2020-03-10 NOTE — TELEPHONE ENCOUNTER
Called to discuss plan of care and recommendation from Priscilla Mckeon. Patient is to see lymphedema specialist before being seen in traditional outpatient therapy. The [facility] [place] does not have a lymphedema specialist at this time to provide the services needed.

## 2020-03-10 NOTE — PROGRESS NOTES
"  Physical Therapy Daily Treatment Note     Name: Nitin Mcconnell  Clinic Number: [id number]    Therapy Diagnosis:   Encounter Diagnoses   Name Primary?    Weakness of lower extremity, unspecified laterality     Swelling of lower leg     Lower extremity edema      Physician: Kristian Escobar MD*    Visit Date: 3/10/2020    Physician Orders: PT Eval and Treat   Medical Diagnosis from Referral:   R60.0 (ICD-10-CM) - Bilateral leg edema   I87.2 (ICD-10-CM) - Venous reflux   E66.01,Z68.42 (ICD-10-CM) - Morbid obesity with BMI of 45.0-49.9, adult   Z98.84 (ICD-10-CM) - S/P gastric bypass   I87.2 (ICD-10-CM) - Venous insufficiency of both lower extremities   I89.0 (ICD-10-CM) - Lymphedema of both lower extremities   Evaluation Date: 3/2/2020  Authorization Period Expiration: 02/16/2021  Plan of Care Expiration: 04/13/2020  Visit # / Visits authorized: 2/12     Time In: 5:00 pm  Time Out: 5:38 pm   Total Appointment Time (timed & untimed codes): 38 minutes     Precautions: Standard    Subjective     Pt reports: she did her HEP and it was fine; she is having no pain today   She was compliant with home exercise program.  Response to previous treatment: N/A  Functional change: felt okay    Pain: 0/10  Location: bilateral lower legs     Objective     Nitin received therapeutic exercises to develop strength, endurance, ROM, flexibility and core stabilization for 38 minutes including:       Date  03/02/2020   VISIT 2/12   FOTO 2/5   POC EXP. DATE 04/13/2020   FACE-TO-FACE 04/02/2020         Nu-Step  L1 x 5'         TABLE:     HSS w/ strap 10 x 10"   QS  10 x 5"   Bridge  1 x 15   SAQ 1 x 15 x 1#   Heel slides 1 x 15    SLR 1 x 15 x 1#   Hip abduction 1 x 15 SL   Hip adduction 1 x 15 SL         SEATED:     LAQs 1 x 15 x 1#   Seated Hip Flex 1 x 15 x 1#         STANDING:     Heel raises 1 x 15   Mini squats 1 x 10    Step Ups L1 1 x 15    Step Downs L1 1 x 15    Hip ABD 1 x 10 RTB   Hip Ext  1 x 10 RTB         Initials " BJ         Home Exercises and Patient Education Provided     Education provided:   - lymphedema appointment      Written Home Exercises Provided: yes.  Exercises were reviewed and Nitin was able to demonstrate them prior to the end of the session.  Nitin demonstrated good  understanding of the education provided.      See EMR under Patient Instructions for exercises provided 3/2/2020.      Assessment     Nitin completed the above exercise with verbal and tactile cueing to perform with proper form and correct technique. Patient did experience some muscle fatigue with (B) hip strengthening, however it did subside by the end of treatment. Patient did have difficulty performing standing hip strengthening and required multiple verbal cues to perform correctly.     Nitin is progressing well towards her goals.   Pt prognosis is Good.     Pt will continue to benefit from skilled outpatient physical therapy to address the deficits listed in the problem list box on initial evaluation, provide pt/family education and to maximize pt's level of independence in the home and community environment.     Pt's spiritual, cultural and educational needs considered and pt agreeable to plan of care and goals.     Anticipated barriers to physical therapy: none    Goals:   Short Term Goals: 3 weeks   Patient will be independent and complaint with HEP. IN PROGRESS  Reduce global swelling by 5 cm in order to demonstrate increased fluid circulation bilaterally. IN PROGRESS  Patient will be able to perform L single stance for 30 seconds with no LOB in order to reduce risk of falls. IN PROGRESS       Long Term Goals: 6 weeks   Patient will be independent and complaint with updated gym HEP. IN PROGRESS  Increase gross LE strength by 1-2 muscle grades in order to ease difficulty of performing light weights at gym. IN PROGRESS  Patient will be able to perform light strengthening at home gym with min-no pain/difficulty. IN PROGRESS  Reduce  global swelling by 5-10 cm in order to demonstrate increased fluid circulation bilaterally. IN PROGRESS    Plan     Increase all repetitions next visit.     Elena Cam, PT, DPT

## 2020-03-17 ENCOUNTER — PATIENT MESSAGE (OUTPATIENT)
Dept: CARDIOLOGY | Facility: CLINIC | Age: 45
End: 2020-03-17

## 2020-03-18 ENCOUNTER — PATIENT MESSAGE (OUTPATIENT)
Dept: RHEUMATOLOGY | Facility: CLINIC | Age: 45
End: 2020-03-18

## 2020-03-19 ENCOUNTER — OFFICE VISIT (OUTPATIENT)
Dept: INTERNAL MEDICINE | Facility: CLINIC | Age: 45
End: 2020-03-19
Payer: COMMERCIAL

## 2020-03-19 VITALS
TEMPERATURE: 98 F | RESPIRATION RATE: 17 BRPM | SYSTOLIC BLOOD PRESSURE: 110 MMHG | HEART RATE: 66 BPM | WEIGHT: 264.75 LBS | BODY MASS INDEX: 44.11 KG/M2 | HEIGHT: 65 IN | DIASTOLIC BLOOD PRESSURE: 76 MMHG

## 2020-03-19 DIAGNOSIS — I89.0 LYMPHEDEMA OF BOTH LOWER EXTREMITIES: ICD-10-CM

## 2020-03-19 DIAGNOSIS — E78.00 ELEVATED LDL CHOLESTEROL LEVEL: ICD-10-CM

## 2020-03-19 DIAGNOSIS — I10 ESSENTIAL HYPERTENSION: Primary | ICD-10-CM

## 2020-03-19 PROBLEM — R60.0 LOWER EXTREMITY EDEMA: Status: RESOLVED | Noted: 2020-03-10 | Resolved: 2020-03-19

## 2020-03-19 PROBLEM — M79.89 SWELLING OF LOWER LEG: Status: RESOLVED | Noted: 2020-03-10 | Resolved: 2020-03-19

## 2020-03-19 PROBLEM — E55.9 VITAMIN D DEFICIENCY: Status: RESOLVED | Noted: 2018-03-22 | Resolved: 2020-03-19

## 2020-03-19 PROCEDURE — 3074F SYST BP LT 130 MM HG: CPT | Mod: CPTII,S$GLB,, | Performed by: INTERNAL MEDICINE

## 2020-03-19 PROCEDURE — 3074F PR MOST RECENT SYSTOLIC BLOOD PRESSURE < 130 MM HG: ICD-10-PCS | Mod: CPTII,S$GLB,, | Performed by: INTERNAL MEDICINE

## 2020-03-19 PROCEDURE — 99213 PR OFFICE/OUTPT VISIT, EST, LEVL III, 20-29 MIN: ICD-10-PCS | Mod: S$GLB,,, | Performed by: INTERNAL MEDICINE

## 2020-03-19 PROCEDURE — 3008F PR BODY MASS INDEX (BMI) DOCUMENTED: ICD-10-PCS | Mod: CPTII,S$GLB,, | Performed by: INTERNAL MEDICINE

## 2020-03-19 PROCEDURE — 99999 PR PBB SHADOW E&M-EST. PATIENT-LVL IV: CPT | Mod: PBBFAC,,, | Performed by: INTERNAL MEDICINE

## 2020-03-19 PROCEDURE — 99213 OFFICE O/P EST LOW 20 MIN: CPT | Mod: S$GLB,,, | Performed by: INTERNAL MEDICINE

## 2020-03-19 PROCEDURE — 99999 PR PBB SHADOW E&M-EST. PATIENT-LVL IV: ICD-10-PCS | Mod: PBBFAC,,, | Performed by: INTERNAL MEDICINE

## 2020-03-19 PROCEDURE — 3078F DIAST BP <80 MM HG: CPT | Mod: CPTII,S$GLB,, | Performed by: INTERNAL MEDICINE

## 2020-03-19 PROCEDURE — 3008F BODY MASS INDEX DOCD: CPT | Mod: CPTII,S$GLB,, | Performed by: INTERNAL MEDICINE

## 2020-03-19 PROCEDURE — 3078F PR MOST RECENT DIASTOLIC BLOOD PRESSURE < 80 MM HG: ICD-10-PCS | Mod: CPTII,S$GLB,, | Performed by: INTERNAL MEDICINE

## 2020-03-19 NOTE — PROGRESS NOTES
Subjective:       Patient ID: Nitin Mcconnell is a 44 y.o. female who presents for Leg Swelling (Edema) and Hypertension      Edema   This is a chronic problem. The current episode started more than 1 month ago. The problem occurs constantly. The problem has been unchanged. Pertinent negatives include no abdominal pain, arthralgias, chest pain, chills, congestion, coughing, fatigue, headaches, joint swelling, myalgias, nausea, neck pain, rash, urinary symptoms, vomiting or weakness. Nothing aggravates the symptoms. She has tried position changes (tried multiple diuretics) for the symptoms. The treatment provided mild relief.   Hypertension   This is a chronic problem. The current episode started more than 1 year ago. The problem is unchanged. The problem is controlled. Associated symptoms include peripheral edema. Pertinent negatives include no chest pain, headaches, malaise/fatigue, neck pain, palpitations or shortness of breath. There are no associated agents to hypertension. Risk factors for coronary artery disease include obesity and sedentary lifestyle. Past treatments include diuretics. The current treatment provides moderate improvement. There are no compliance problems.  There is no history of kidney disease. There is no history of a hypertension causing med.      Patient lost 10 lbs in the last month. Started working with a lymphedema clinic. Lipid panel was tested during a screening in January 2020. LDL is slightly elevated at 107 based on normal range at [facility].      Review of Systems   Constitutional: Negative for activity change, chills, fatigue, malaise/fatigue and unexpected weight change.   HENT: Negative for congestion, hearing loss, rhinorrhea and trouble swallowing.    Eyes: Negative for discharge and visual disturbance.   Respiratory: Negative for cough, chest tightness, shortness of breath and wheezing.    Cardiovascular: Negative for chest pain and palpitations.   Gastrointestinal: Negative  for abdominal pain, blood in stool, constipation, diarrhea, nausea and vomiting.   Endocrine: Negative for polydipsia and polyuria.   Genitourinary: Negative for difficulty urinating, dysuria, frequency, hematuria and menstrual problem.   Musculoskeletal: Negative for arthralgias, joint swelling, myalgias and neck pain.   Skin: Negative for rash.   Neurological: Negative for weakness and headaches.   Psychiatric/Behavioral: Negative for confusion and dysphoric mood.         Answers for HPI/ROS submitted by the patient on 3/17/2020   activity change: No  unexpected weight change: No  neck pain: No  hearing loss: No  rhinorrhea: No  trouble swallowing: No  eye discharge: No  visual disturbance: No  chest tightness: No  wheezing: No  chest pain: No  palpitations: No  blood in stool: No  constipation: No  vomiting: No  diarrhea: No  polydipsia: No  polyuria: No  difficulty urinating: No  hematuria: No  menstrual problem: No  dysuria: No  joint swelling: No  arthralgias: No  headaches: No  weakness: No  confusion: No  dysphoric mood: No      Objective:      Physical Exam   Constitutional: She is oriented to person, place, and time. Vital signs are normal. She appears well-developed and well-nourished. No distress.   HENT:   Head: Normocephalic and atraumatic.   Right Ear: Hearing and external ear normal.   Left Ear: Hearing and external ear normal.   Nose: Nose normal.   Mouth/Throat: Uvula is midline.   Eyes: Lids are normal.   Neck: Normal range of motion.   Cardiovascular: Normal rate, regular rhythm, normal heart sounds and intact distal pulses.   No murmur heard.  Pulmonary/Chest: Effort normal and breath sounds normal. She has no wheezes.   Abdominal: Soft. Bowel sounds are normal. She exhibits no distension. There is no tenderness.   Musculoskeletal: Normal range of motion. She exhibits edema (ble).   Neurological: She is alert and oriented to person, place, and time.   Skin: Skin is warm, dry and intact. No rash  noted. She is not diaphoretic.   Psychiatric: She has a normal mood and affect.   Vitals reviewed.      Assessment/Plan:       1. Essential hypertension  - BP is controlled, she was recently switched from Bumex to HCTZ    2. Lymphedema of both lower extremities  - swelling related to lymphedema and small component of vascular insufficiency  - continue working with lymphedema therapists as needed  - elevate legs daily, increase physical activity    3. Elevated LDL cholesterol level  - LDL is 107, very slight elevation  - ok to try increase in dietary fiber, start psyllium fiber pills 1-2 daily with lots of water, reduce fat intake, increase physical activity  - Lipid panel; Future, repeat in June or July    RTC in 6 months for annual exam or sooner if needed    Chasity Dixon MD

## 2020-03-25 ENCOUNTER — PATIENT MESSAGE (OUTPATIENT)
Dept: SLEEP MEDICINE | Facility: CLINIC | Age: 45
End: 2020-03-25

## 2020-03-26 ENCOUNTER — PATIENT MESSAGE (OUTPATIENT)
Dept: RHEUMATOLOGY | Facility: CLINIC | Age: 45
End: 2020-03-26

## 2020-04-01 ENCOUNTER — PATIENT OUTREACH (OUTPATIENT)
Dept: ADMINISTRATIVE | Facility: OTHER | Age: 45
End: 2020-04-01

## 2020-04-01 ENCOUNTER — TELEPHONE (OUTPATIENT)
Dept: REHABILITATION | Facility: HOSPITAL | Age: 45
End: 2020-04-01

## 2020-04-01 NOTE — TELEPHONE ENCOUNTER
Patient: Nitin Mcconnell  Date: 4/1/2020  Diagnosis: LE  MRN: [id number]    Spoke with patient due to [facility] therapy following updates regarding COVID-19 closely and taking every precaution to ensure the safety of our patients, staff and community.  In an abundance of caution and in an effort to help reduce risk and limit community spread, we have decided to temporarily suspend lymphedema and edema therapy services and any appointments for patients who may be at increased risk to attend in-person therapy or where therapy is not critically needed at this time.     Pt was evaluated in [place] and was transferring to [facility] 4[facility]13[facility] for focus on lymphedema.    Additional plan of care, compression needs, understanding of mobility, skin care and hygiene, elevation and to avoid periods of dependency, and home exercise program were reviewed.    Further needs regarding lymphedema to be determined. Continue HEP provided in [place],  Pt voiced concerns re Lupus and in person sessions.   Contact information provided.    All patient questions were answered. Also stated to patient that we are exploring virtual methods of providing care and will be in touch over the next few weeks. Patient verbalized understanding to all.    4/1/2020

## 2020-04-02 ENCOUNTER — PATIENT MESSAGE (OUTPATIENT)
Dept: SLEEP MEDICINE | Facility: CLINIC | Age: 45
End: 2020-04-02

## 2020-04-02 ENCOUNTER — OFFICE VISIT (OUTPATIENT)
Dept: SLEEP MEDICINE | Facility: CLINIC | Age: 45
End: 2020-04-02
Payer: COMMERCIAL

## 2020-04-02 DIAGNOSIS — I27.20 PULMONARY HTN: Primary | ICD-10-CM

## 2020-04-02 DIAGNOSIS — G47.33 OBSTRUCTIVE SLEEP APNEA: ICD-10-CM

## 2020-04-02 PROCEDURE — 99214 OFFICE O/P EST MOD 30 MIN: CPT | Mod: CR,95,, | Performed by: NURSE PRACTITIONER

## 2020-04-02 PROCEDURE — 99214 PR OFFICE/OUTPT VISIT, EST, LEVL IV, 30-39 MIN: ICD-10-PCS | Mod: CR,95,, | Performed by: NURSE PRACTITIONER

## 2020-04-02 NOTE — PROGRESS NOTES
The patient location is:home  The chief complaint leading to consultation is: RAQUEL mgt  Visit type: Virtual visit with synchronous audio and video    Each patient to whom he or she provides medical services by telemedicine is:  (1) informed of the relationship between the physician and patient and the respective role of any other health care provider with respect to management of the patient; and (2) notified that he or she may decline to receive medical services by telemedicine and may withdraw from such care at any time. COVID-19    Notes: She got setup with apap 6-18cm in interim. Doing great!. Using Wisp mask w/o chin strap anymore. Denies oral drying. Denies nose drying. Denies presssure intolerance. Using it every night!! Snoring resolved and sleep is very consolidated. Less am headaches.   Encore:         PSG 2009 AHI 12.1/low sat    REVIEW OF SYSTEMS: In addition to above  Difficulty breathing through the nose?  No   Sore throat or dry mouth in the morning? Sometimes   Irregular or very fast heart beat?  No   Shortness of breath?  Sometimes   Acid reflux? No   Body aches and pains?  Sometimes   Morning headaches? improved   Dizziness? No   Mood changes?  No   Do you exercise?  No   Do you feel like moving your legs a lot?  No     TTE 2/2020: EF 60%  estimated PA systolic pressure is 31 mm Hg      EXAM:  GENERAL: morbid obese body habitus, well groomed       ASSESSMENT:     RAQUEL, mild, no REM could represent an underestimate of severity. Dx'd in 2009. Had gastric bypass surgery and lost wgt but then gained back. Current 30# over PSG wgt. Never began treatment. 4/2/20: Excellent adherence with PAP, qhs use. Benefiting from therapy. AHI<5  She has pulmonary hypertension and SLE with immunosupression.      PLAN:   1. Continue APAP 6-18cm. Supplies via [facility]. PLAN overnight pulse ox to assess O2   2. Discussed effectiveness of therapy and potential ramifications of untreated RAQUEL, including stroke, heart disease,  HTN  3. Continue to see cards as advised re: pulm HTN mgt/continue meds  Otherwise rtc annually

## 2020-04-06 ENCOUNTER — TELEPHONE (OUTPATIENT)
Dept: CARDIOLOGY | Facility: CLINIC | Age: 45
End: 2020-04-06

## 2020-04-06 NOTE — TELEPHONE ENCOUNTER
Pt is rescheduled to next available appt      ----- Message from Mishel Gaston sent at 4/6/2020  1:20 PM CDT -----  Contact: pt  Pt is returning your call and can be reached at [phone].    Thank you

## 2020-05-14 ENCOUNTER — PATIENT MESSAGE (OUTPATIENT)
Dept: RHEUMATOLOGY | Facility: CLINIC | Age: 45
End: 2020-05-14

## 2020-05-15 ENCOUNTER — PATIENT MESSAGE (OUTPATIENT)
Dept: RHEUMATOLOGY | Facility: CLINIC | Age: 45
End: 2020-05-15

## 2020-05-17 ENCOUNTER — PATIENT MESSAGE (OUTPATIENT)
Dept: RHEUMATOLOGY | Facility: CLINIC | Age: 45
End: 2020-05-17

## 2020-05-22 ENCOUNTER — PATIENT MESSAGE (OUTPATIENT)
Dept: RHEUMATOLOGY | Facility: CLINIC | Age: 45
End: 2020-05-22

## 2020-05-27 ENCOUNTER — HOSPITAL ENCOUNTER (OUTPATIENT)
Dept: RADIOLOGY | Facility: HOSPITAL | Age: 45
Discharge: HOME | End: 2020-05-27
Payer: COMMERCIAL

## 2020-05-27 DIAGNOSIS — S92.515A CLOSED NONDISPLACED FRACTURE OF PROXIMAL PHALANX OF LESSER TOE OF LEFT FOOT: ICD-10-CM

## 2020-05-27 PROCEDURE — 73630 X-RAY EXAM OF FOOT: CPT | Mod: TC,FY,PO,LT

## 2020-05-28 ENCOUNTER — PATIENT OUTREACH (OUTPATIENT)
Dept: ADMINISTRATIVE | Facility: OTHER | Age: 45
End: 2020-05-28

## 2020-06-01 ENCOUNTER — OFFICE VISIT (OUTPATIENT)
Dept: RHEUMATOLOGY | Facility: CLINIC | Age: 45
End: 2020-06-01
Payer: COMMERCIAL

## 2020-06-01 ENCOUNTER — PATIENT MESSAGE (OUTPATIENT)
Dept: RHEUMATOLOGY | Facility: CLINIC | Age: 45
End: 2020-06-01

## 2020-06-01 VITALS — SYSTOLIC BLOOD PRESSURE: 130 MMHG | HEART RATE: 76 BPM | DIASTOLIC BLOOD PRESSURE: 79 MMHG

## 2020-06-01 DIAGNOSIS — E66.01 CLASS 3 SEVERE OBESITY DUE TO EXCESS CALORIES WITHOUT SERIOUS COMORBIDITY WITH BODY MASS INDEX (BMI) OF 45.0 TO 49.9 IN ADULT: ICD-10-CM

## 2020-06-01 DIAGNOSIS — E55.9 VITAMIN D DEFICIENCY: ICD-10-CM

## 2020-06-01 DIAGNOSIS — M10.9 GOUT, ARTHRITIS: Primary | ICD-10-CM

## 2020-06-01 DIAGNOSIS — D84.9 IMMUNOSUPPRESSION: ICD-10-CM

## 2020-06-01 DIAGNOSIS — M32.9 SYSTEMIC LUPUS ERYTHEMATOSUS, UNSPECIFIED SLE TYPE, UNSPECIFIED ORGAN INVOLVEMENT STATUS: Primary | ICD-10-CM

## 2020-06-01 DIAGNOSIS — M10.9 GOUT, ARTHRITIS: ICD-10-CM

## 2020-06-01 PROCEDURE — 3075F SYST BP GE 130 - 139MM HG: CPT | Mod: CPTII,,, | Performed by: INTERNAL MEDICINE

## 2020-06-01 PROCEDURE — 3075F PR MOST RECENT SYSTOLIC BLOOD PRESS GE 130-139MM HG: ICD-10-PCS | Mod: CPTII,,, | Performed by: INTERNAL MEDICINE

## 2020-06-01 PROCEDURE — 3078F PR MOST RECENT DIASTOLIC BLOOD PRESSURE < 80 MM HG: ICD-10-PCS | Mod: CPTII,,, | Performed by: INTERNAL MEDICINE

## 2020-06-01 PROCEDURE — 99214 OFFICE O/P EST MOD 30 MIN: CPT | Mod: 95,,, | Performed by: INTERNAL MEDICINE

## 2020-06-01 PROCEDURE — 3078F DIAST BP <80 MM HG: CPT | Mod: CPTII,,, | Performed by: INTERNAL MEDICINE

## 2020-06-01 PROCEDURE — 99214 PR OFFICE/OUTPT VISIT, EST, LEVL IV, 30-39 MIN: ICD-10-PCS | Mod: 95,,, | Performed by: INTERNAL MEDICINE

## 2020-06-01 ASSESSMENT — ROUTINE ASSESSMENT OF PATIENT INDEX DATA (RAPID3)
PAIN SCORE: 3
AM STIFFNESS SCORE: 0, NO
PATIENT GLOBAL ASSESSMENT SCORE: 3
TOTAL RAPID3 SCORE: 2.78
FATIGUE SCORE: 0
PSYCHOLOGICAL DISTRESS SCORE: 0
MDHAQ FUNCTION SCORE: .7

## 2020-06-01 NOTE — PROGRESS NOTES
Rapid3 Question Responses and Scores 5/28/2020   MDHAQ Score 0.7   Psychologic Score 0   Pain Score 3   When you awakened in the morning OVER THE LAST WEEK, did you feel stiff? No   Fatigue Score 0   Global Health Score 3   RAPID3 Score 2.77       Answers for HPI/ROS submitted by the patient on 5/28/2020   fever: No  eye redness: No  headaches: No  shortness of breath: No  chest pain: No  trouble swallowing: No  diarrhea: No  constipation: No  unexpected weight change: No  genital sore: No  dysuria: No  During the last 3 days, have you had a skin rash?: No  Bruises or bleeds easily: No  cough: No

## 2020-06-01 NOTE — PROGRESS NOTES
Subjective:       Patient ID: Nitin Mcconnell is a 44 y.o. female.    Chief Complaint: Lupus and gout    HPI:  Nitin Mcconnell is a 44 y.o. female diagnosed at age 31 with lupus.  Started as upper respiratory symptoms that did not respond to antibiotics.  She had fever and low BP as well as SOB.  She was intubated and on ventilator for a week.  She was at [facility].  Dr. Martin diagnosed lupus.  Hospitalized 3/8/07 to 3/30/07.  She had elevated pressure in eyes, kidney involvement.  She was discharged with a PICC line for antibiotics.  She was found to be allergic to heparin due to thrombocytopenia that developed after flushing PICC line with heparin.  She had hair loss.  Had enlarged lymph node under arm in past and biopsy was normal.  Was treated with Plaquenil and steroids.  Only took steroid for 1 year.     She denies any flares since last visit.  Ulcer in mouth one month ago.  Being evaluated for sleep apnea.  Restarting exercise 4-5 days a week for 30 minutes on treadmill or elliptical.   Still with swelling in ankles for over 2 years.   Diagnosed with lymph edema and will start therapy.     Broke left 4th toe Mother's Day.  Saw orthopedic who told her to continue to maria del carmen tape.  Left big toe Tuesday after Mother's Day May 12th swollen, red, throbbing and warm after and was told she had gout in Urgent Care.   Was given indomethacin and colchicine did not help.  Pain is 6/10 ache worse with walking.     Lupus Review of Systems  Alopecia: yes  Photosensitivity: no   Raynaud's: no  Oral or nasal ulcers: occasional oral ulcers  Rashes:  Intermittent malar rash; occasional rash in crease of arm and neck  No pleurisy or pericarditis.  No seizures, psychosis, or stroke.  No venous or arterial clots.  History of being on coumadin for 3 months as a precaution.   Pregnancy hx (if applicable): no miscarriages (never pregnant)        Review of Systems   Constitutional: Negative.  Negative for fever and  unexpected weight change.   HENT: Negative.  Negative for trouble swallowing.    Eyes: Negative for redness.   Respiratory: Negative for cough and shortness of breath.    Cardiovascular: Positive for leg swelling. Negative for chest pain.   Gastrointestinal: Negative for constipation and diarrhea.   Endocrine: Negative.    Genitourinary: Negative.  Negative for dysuria and genital sores.   Musculoskeletal: Negative.    Skin: Negative for rash.        Alopecia     Allergic/Immunologic: Negative.    Neurological: Negative.  Negative for headaches.   Hematological: Negative.  Does not bruise/bleed easily.   Psychiatric/Behavioral: Negative.          Objective:   /79   Pulse 76   LMP 07/13/2013      Physical Exam   Constitutional: She is oriented to person, place, and time and well-developed, well-nourished, and in no distress.   HENT:   Head: Normocephalic and atraumatic.   Eyes: Conjunctivae and EOM are normal.   Neck: Neck supple.   Cardiovascular: Normal rate, regular rhythm and normal heart sounds.    Pulmonary/Chest: Effort normal and breath sounds normal.   Abdominal: Soft. Bowel sounds are normal.   Neurological: She is alert and oriented to person, place, and time. Gait normal.   Skin: Skin is warm and dry.     Psychiatric: Mood and affect normal.            LABS    Component      Latest Ref Rng & Units 2/13/2020 12/17/2019   WBC      3.90 - 12.70 K/uL 4.59 4.29   RBC      4.00 - 5.40 M/uL 4.07 4.51   Hemoglobin      12.0 - 16.0 g/dL 11.2 (L) 12.3   Hematocrit      37.0 - 48.5 % 37.1 40.8   MCV      82 - 98 fL 91 91   MCH      27.0 - 31.0 pg 27.5 27.3   MCHC      32.0 - 36.0 g/dL 30.2 (L) 30.1 (L)   RDW      11.5 - 14.5 % 13.0 12.6   Platelets      150 - 350 K/uL 175 197   MPV      9.2 - 12.9 fL 10.9 11.1   Immature Granulocytes      0.0 - 0.5 % 0.2 0.2   Gran # (ANC)      1.8 - 7.7 K/uL 2.4 2.3   Immature Grans (Abs)      0.00 - 0.04 K/uL 0.01 0.01   Lymph #      1.0 - 4.8 K/uL 1.7 1.5   Mono #       0.3 - 1.0 K/uL 0.4 0.4   Eos #      0.0 - 0.5 K/uL 0.1 0.1   Baso #      0.00 - 0.20 K/uL 0.03 0.04   nRBC      0 /100 WBC 0 0   Gran%      38.0 - 73.0 % 52.2 53.4   Lymph%      18.0 - 48.0 % 36.2 34.5   Mono%      4.0 - 15.0 % 9.4 9.6   Eosinophil%      0.0 - 8.0 % 1.3 1.4   Basophil%      0.0 - 1.9 % 0.7 0.9   Differential Method       Automated Automated   Sodium      136 - 145 mmol/L 142 142   Potassium      3.5 - 5.1 mmol/L 3.7 3.9   Chloride      95 - 110 mmol/L 107 104   CO2      23 - 29 mmol/L 29 29   Glucose      70 - 110 mg/dL 84 89   BUN, Bld      6 - 20 mg/dL 12 17   Creatinine      0.5 - 1.4 mg/dL 0.9 1.1   Calcium      8.7 - 10.5 mg/dL 8.6 (L) 9.2   PROTEIN TOTAL      6.0 - 8.4 g/dL 6.8 7.9   Albumin      3.5 - 5.2 g/dL 3.8 4.3   BILIRUBIN TOTAL      0.1 - 1.0 mg/dL 0.4 0.6   Alkaline Phosphatase      55 - 135 U/L 88 93   AST      10 - 40 U/L 26 30   ALT      10 - 44 U/L 29 37   Anion Gap      8 - 16 mmol/L 6 (L) 9   eGFR if African American      >60 mL/min/1.73 m:2 >60.0 >60.0   eGFR if non African American      >60 mL/min/1.73 m:2 >60.0 >60.0   BNP      0 - 99 pg/mL 88 <10   ds DNA Ab      Negative 1:10  Negative 1:10   Complement (C-3)      50 - 180 mg/dL  154   Complement (C-4)      11 - 44 mg/dL  45 (H)   CPK      20 - 180 U/L  172   Sed Rate      0 - 36 mm/Hr  16   CRP      0.0 - 8.2 mg/L  2.2   Vit D, 25-Hydroxy      30 - 96 ng/mL  27 (L)   Magnesium      1.6 - 2.6 mg/dL 1.9      Assessment:       1.  SLE.    2.  Immunosuppression.  Eye exam for Plaquenil 10/2019  3.  Elevated pressure in eyes  4.  Obesity.  Gastric bypass 2009.  Started exercising.  5.  Thyroid nodules.  Being monitored.  Aspiration was normal  6.  Mother with RA  7.  Ankle swelling bilateral.  Grandmother and mother had CHF    8.  Alopecia.  Family history of hair loss.   9.  Pulmonary HTN  10.  SOB intermittent  11.  Lymph edema.  Will start therapy  12.  Gout left 1st toe.  X-ray with possible erosion at first MTP reviewed  by me    Plan:       1.  Labs  2.  Will follow Dr. Felton regarding pulmonary HTN  3.  Follow with current dermatology to evaluate   4.  Patient to encouraged to reconsider Weight [employer]/Noom.  5.  Started on exercising.     6.  Speak with wellness dietician through insurance  7.  Medrol pack      RTO 3 months/prn;  She will update via email in 1 week.    The patient location is: home  The chief complaint leading to consultation is: lupus and gout    Visit type: TELE AUDIOVISUAL:06687    Face to Face time with patient:  20 minutes  20  minutes of total time spent on the encounter, which includes face to face time and non-face to face time preparing to see the patient (eg, review of tests), Obtaining and/or reviewing separately obtained history, Documenting clinical information in the electronic or other health record, Independently interpreting results (not separately reported) and communicating results to the patient/family/caregiver, or Care coordination (not separately reported).         Each patient to whom he or she provides medical services by telemedicine is:  (1) informed of the relationship between the physician and patient and the respective role of any other health care provider with respect to management of the patient; and (2) notified that he or she may decline to receive medical services by telemedicine and may withdraw from such care at any time.    Notes:  See above

## 2020-06-02 ENCOUNTER — LAB VISIT (OUTPATIENT)
Dept: LAB | Facility: HOSPITAL | Age: 45
End: 2020-06-02
Attending: INTERNAL MEDICINE
Payer: COMMERCIAL

## 2020-06-02 ENCOUNTER — PATIENT MESSAGE (OUTPATIENT)
Dept: RHEUMATOLOGY | Facility: CLINIC | Age: 45
End: 2020-06-02

## 2020-06-02 DIAGNOSIS — E66.01 CLASS 3 SEVERE OBESITY DUE TO EXCESS CALORIES WITHOUT SERIOUS COMORBIDITY WITH BODY MASS INDEX (BMI) OF 45.0 TO 49.9 IN ADULT: ICD-10-CM

## 2020-06-02 DIAGNOSIS — D84.9 IMMUNOSUPPRESSION: ICD-10-CM

## 2020-06-02 DIAGNOSIS — M32.9 SYSTEMIC LUPUS ERYTHEMATOSUS, UNSPECIFIED SLE TYPE, UNSPECIFIED ORGAN INVOLVEMENT STATUS: ICD-10-CM

## 2020-06-02 LAB
ALBUMIN SERPL BCP-MCNC: 4.4 G/DL (ref 3.5–5.2)
ALP SERPL-CCNC: 73 U/L (ref 38–126)
ALT SERPL W/O P-5'-P-CCNC: 34 U/L (ref 10–44)
ANION GAP SERPL CALC-SCNC: 10 MMOL/L (ref 8–16)
AST SERPL-CCNC: 36 U/L (ref 15–46)
BASOPHILS # BLD AUTO: 0.05 K/UL (ref 0–0.2)
BASOPHILS NFR BLD: 1.1 % (ref 0–1.9)
BILIRUB SERPL-MCNC: 0.6 MG/DL (ref 0.1–1)
BUN SERPL-MCNC: 22 MG/DL (ref 7–17)
C3 SERPL-MCNC: 146 MG/DL (ref 50–180)
C4 SERPL-MCNC: 47 MG/DL (ref 11–44)
CALCIUM SERPL-MCNC: 9.3 MG/DL (ref 8.7–10.5)
CCP AB SER IA-ACNC: <0.5 U/ML
CHLORIDE SERPL-SCNC: 100 MMOL/L (ref 95–110)
CK SERPL-CCNC: 149 U/L (ref 55–170)
CO2 SERPL-SCNC: 27 MMOL/L (ref 23–29)
CREAT SERPL-MCNC: 0.93 MG/DL (ref 0.5–1.4)
CRP SERPL-MCNC: 0.24 MG/DL (ref 0–1)
DIFFERENTIAL METHOD: ABNORMAL
EOSINOPHIL # BLD AUTO: 0.1 K/UL (ref 0–0.5)
EOSINOPHIL NFR BLD: 2.2 % (ref 0–8)
ERYTHROCYTE [DISTWIDTH] IN BLOOD BY AUTOMATED COUNT: 12.5 % (ref 11.5–14.5)
ERYTHROCYTE [SEDIMENTATION RATE] IN BLOOD BY WESTERGREN METHOD: 25 MM/HR (ref 0–20)
EST. GFR  (AFRICAN AMERICAN): >60 ML/MIN/1.73 M^2
EST. GFR  (NON AFRICAN AMERICAN): >60 ML/MIN/1.73 M^2
GLUCOSE SERPL-MCNC: 76 MG/DL (ref 70–110)
HCT VFR BLD AUTO: 40.3 % (ref 37–48.5)
HGB BLD-MCNC: 12.4 G/DL (ref 12–16)
IMM GRANULOCYTES # BLD AUTO: 0.01 K/UL (ref 0–0.04)
IMM GRANULOCYTES NFR BLD AUTO: 0.2 % (ref 0–0.5)
LYMPHOCYTES # BLD AUTO: 1.9 K/UL (ref 1–4.8)
LYMPHOCYTES NFR BLD: 41.5 % (ref 18–48)
MCH RBC QN AUTO: 27.1 PG (ref 27–31)
MCHC RBC AUTO-ENTMCNC: 30.8 G/DL (ref 32–36)
MCV RBC AUTO: 88 FL (ref 82–98)
MONOCYTES # BLD AUTO: 0.5 K/UL (ref 0.3–1)
MONOCYTES NFR BLD: 11.1 % (ref 4–15)
NEUTROPHILS # BLD AUTO: 2 K/UL (ref 1.8–7.7)
NEUTROPHILS NFR BLD: 43.9 % (ref 38–73)
NRBC BLD-RTO: 0 /100 WBC
PLATELET # BLD AUTO: 250 K/UL (ref 150–350)
PMV BLD AUTO: 10.9 FL (ref 9.2–12.9)
POTASSIUM SERPL-SCNC: 3.9 MMOL/L (ref 3.5–5.1)
PROT SERPL-MCNC: 7.9 G/DL (ref 6–8.4)
RBC # BLD AUTO: 4.58 M/UL (ref 4–5.4)
SODIUM SERPL-SCNC: 137 MMOL/L (ref 136–145)
URATE SERPL-MCNC: 8.9 MG/DL (ref 2.4–5.7)
WBC # BLD AUTO: 4.51 K/UL (ref 3.9–12.7)

## 2020-06-02 PROCEDURE — 86160 COMPLEMENT ANTIGEN: CPT | Mod: 59,PO

## 2020-06-02 PROCEDURE — 86200 CCP ANTIBODY: CPT | Mod: PO

## 2020-06-02 PROCEDURE — 86225 DNA ANTIBODY NATIVE: CPT | Mod: PO

## 2020-06-02 PROCEDURE — 82550 ASSAY OF CK (CPK): CPT | Mod: PO

## 2020-06-02 PROCEDURE — 85025 COMPLETE CBC W/AUTO DIFF WBC: CPT | Mod: PO

## 2020-06-02 PROCEDURE — 86160 COMPLEMENT ANTIGEN: CPT | Mod: PO

## 2020-06-02 PROCEDURE — 80053 COMPREHEN METABOLIC PANEL: CPT | Mod: PO

## 2020-06-02 PROCEDURE — 86140 C-REACTIVE PROTEIN: CPT | Mod: PO

## 2020-06-02 PROCEDURE — 36415 COLL VENOUS BLD VENIPUNCTURE: CPT | Mod: PO

## 2020-06-02 PROCEDURE — 85652 RBC SED RATE AUTOMATED: CPT

## 2020-06-02 PROCEDURE — 84550 ASSAY OF BLOOD/URIC ACID: CPT

## 2020-06-03 ENCOUNTER — PATIENT MESSAGE (OUTPATIENT)
Dept: RHEUMATOLOGY | Facility: CLINIC | Age: 45
End: 2020-06-03

## 2020-06-04 LAB — DSDNA AB SER-ACNC: NORMAL [IU]/ML

## 2020-06-04 RX ORDER — METHYLPREDNISOLONE 4 MG/1
TABLET ORAL
Qty: 21 TABLET | Refills: 0 | Status: SHIPPED | OUTPATIENT
Start: 2020-06-04 | End: 2020-09-14 | Stop reason: ALTCHOICE

## 2020-09-14 ENCOUNTER — OFFICE VISIT (OUTPATIENT)
Dept: INTERNAL MEDICINE | Facility: CLINIC | Age: 45
End: 2020-09-14
Payer: COMMERCIAL

## 2020-09-14 ENCOUNTER — PATIENT MESSAGE (OUTPATIENT)
Dept: INTERNAL MEDICINE | Facility: CLINIC | Age: 45
End: 2020-09-14

## 2020-09-14 VITALS
RESPIRATION RATE: 16 BRPM | BODY MASS INDEX: 44.82 KG/M2 | SYSTOLIC BLOOD PRESSURE: 110 MMHG | DIASTOLIC BLOOD PRESSURE: 68 MMHG | TEMPERATURE: 98 F | WEIGHT: 269 LBS | HEART RATE: 64 BPM | HEIGHT: 65 IN

## 2020-09-14 DIAGNOSIS — Z98.84 S/P GASTRIC BYPASS: ICD-10-CM

## 2020-09-14 DIAGNOSIS — M32.9 SYSTEMIC LUPUS ERYTHEMATOSUS, UNSPECIFIED SLE TYPE, UNSPECIFIED ORGAN INVOLVEMENT STATUS: ICD-10-CM

## 2020-09-14 DIAGNOSIS — Z00.00 ANNUAL PHYSICAL EXAM: Primary | ICD-10-CM

## 2020-09-14 DIAGNOSIS — Z12.31 ENCOUNTER FOR SCREENING MAMMOGRAM FOR HIGH-RISK PATIENT: ICD-10-CM

## 2020-09-14 DIAGNOSIS — I10 ESSENTIAL HYPERTENSION: ICD-10-CM

## 2020-09-14 DIAGNOSIS — E66.01 MORBID OBESITY WITH BMI OF 40.0-44.9, ADULT: ICD-10-CM

## 2020-09-14 DIAGNOSIS — E04.2 MULTIPLE THYROID NODULES: ICD-10-CM

## 2020-09-14 DIAGNOSIS — I89.0 LYMPHEDEMA OF BOTH LOWER EXTREMITIES: ICD-10-CM

## 2020-09-14 PROCEDURE — 90732 PNEUMOCOCCAL POLYSACCHARIDE VACCINE 23-VALENT =>2YO SQ IM: ICD-10-PCS | Mod: S$GLB,,, | Performed by: INTERNAL MEDICINE

## 2020-09-14 PROCEDURE — 99396 PREV VISIT EST AGE 40-64: CPT | Mod: 25,S$GLB,, | Performed by: INTERNAL MEDICINE

## 2020-09-14 PROCEDURE — 99999 PR PBB SHADOW E&M-EST. PATIENT-LVL III: CPT | Mod: PBBFAC,,, | Performed by: INTERNAL MEDICINE

## 2020-09-14 PROCEDURE — 99999 PR PBB SHADOW E&M-EST. PATIENT-LVL III: ICD-10-PCS | Mod: PBBFAC,,, | Performed by: INTERNAL MEDICINE

## 2020-09-14 PROCEDURE — 3074F PR MOST RECENT SYSTOLIC BLOOD PRESSURE < 130 MM HG: ICD-10-PCS | Mod: CPTII,S$GLB,, | Performed by: INTERNAL MEDICINE

## 2020-09-14 PROCEDURE — 3078F DIAST BP <80 MM HG: CPT | Mod: CPTII,S$GLB,, | Performed by: INTERNAL MEDICINE

## 2020-09-14 PROCEDURE — 3078F PR MOST RECENT DIASTOLIC BLOOD PRESSURE < 80 MM HG: ICD-10-PCS | Mod: CPTII,S$GLB,, | Performed by: INTERNAL MEDICINE

## 2020-09-14 PROCEDURE — 3008F PR BODY MASS INDEX (BMI) DOCUMENTED: ICD-10-PCS | Mod: CPTII,S$GLB,, | Performed by: INTERNAL MEDICINE

## 2020-09-14 PROCEDURE — 3074F SYST BP LT 130 MM HG: CPT | Mod: CPTII,S$GLB,, | Performed by: INTERNAL MEDICINE

## 2020-09-14 PROCEDURE — 90732 PPSV23 VACC 2 YRS+ SUBQ/IM: CPT | Mod: S$GLB,,, | Performed by: INTERNAL MEDICINE

## 2020-09-14 PROCEDURE — 99396 PR PREVENTIVE VISIT,EST,40-64: ICD-10-PCS | Mod: 25,S$GLB,, | Performed by: INTERNAL MEDICINE

## 2020-09-14 PROCEDURE — 90471 PNEUMOCOCCAL POLYSACCHARIDE VACCINE 23-VALENT =>2YO SQ IM: ICD-10-PCS | Mod: S$GLB,,, | Performed by: INTERNAL MEDICINE

## 2020-09-14 PROCEDURE — 3008F BODY MASS INDEX DOCD: CPT | Mod: CPTII,S$GLB,, | Performed by: INTERNAL MEDICINE

## 2020-09-14 PROCEDURE — 90471 IMMUNIZATION ADMIN: CPT | Mod: S$GLB,,, | Performed by: INTERNAL MEDICINE

## 2020-09-14 NOTE — PROGRESS NOTES
Subjective:      Nitin Mcconnell is a 45 y.o. female who presents for annual exam.      Family History:  family history includes Diabetes in her maternal grandfather and paternal grandmother; Heart attack in her maternal grandmother; Heart failure in her maternal grandmother and mother; Kidney disease in her maternal grandfather; Rheum arthritis in her mother; Thyroid disease in her maternal aunt and maternal grandmother.    Health Maintenance:  Health Maintenance       Date Due Completion Date    Pneumococcal Vaccine (Medium Risk) (1 of 1 - PPSV23) 1994 ---    Influenza Vaccine (1) 2020 10/5/2019    Mammogram 2021    Override on 6/3/2017: Done    Lipid Panel 2024    TETANUS VACCINE 2029        Eye exam: annual due in November[place]iles  Dental Exam: every 6 months  S/p hysterectomy  MM2019  Influenza: due  Tetanus: 2019    Body mass index is 44.76 kg/m².    Meds:   Current Outpatient Medications:     bumetanide (BUMEX) 2 MG tablet, Take 1 tablet (2 mg total) by mouth once daily., Disp: 30 tablet, Rfl: 0    CALCIUM CARB/VITAMIN D3/VIT K1 (VIACTIV ORAL), Take by mouth once daily. , Disp: , Rfl:     ergocalciferol, vitamin D2, (VITAMIN D ORAL), Take by mouth., Disp: , Rfl:     hydroCHLOROthiazide (HYDRODIURIL) 25 MG tablet, Take 1 tablet (25 mg total) by mouth once daily., Disp: 30 tablet, Rfl: 11    hydroxychloroquine (PLAQUENIL) 200 mg tablet, TAKE 1 TABLET BY MOUTH TWICE DAILY, Disp: 60 tablet, Rfl: 5    multivitamin-Ca-iron-minerals (ONE-A-DAY WOMENS FORMULA) 27-0.4 mg Tab, Take 1 tablet by mouth Daily., Disp: , Rfl:     PMHx:   Past Medical History:   Diagnosis Date    Blood transfusion     Breast cyst     Connective tissue disease     Hypertension     Lupus mild       PSHx:  Past Surgical History:   Procedure Laterality Date    BREAST BIOPSY Right 2008    rt axilla    GASTRIC BYPASS  2009    HYSTERECTOMY      @38yrs of age     LIPECTOMY      LYMPH NODE BIOPSY      right arm    OOPHORECTOMY      @38yrs of age    panniculectomy      JIAN-EN-Y PROCEDURE  2009    RNY bypass       SocHx:   Social History     Socioeconomic History    Marital status: Single     Spouse name: Not on file    Number of children: Not on file    Years of education: Not on file    Highest education level: Not on file   Occupational History    Not on file   Social Needs    Financial resource strain: Not hard at all    Food insecurity     Worry: Never true     Inability: Never true    Transportation needs     Medical: No     Non-medical: No   Tobacco Use    Smoking status: Never Smoker    Smokeless tobacco: Never Used   Substance and Sexual Activity    Alcohol use: Yes     Frequency: Monthly or less     Drinks per session: 1 or 2     Binge frequency: Never     Comment: socially    Drug use: No    Sexual activity: Never   Lifestyle    Physical activity     Days per week: Not on file     Minutes per session: Not on file    Stress: Not at all   Relationships    Social connections     Talks on phone: More than three times a week     Gets together: Twice a week     Attends Yarsanism service: Not on file     Active member of club or organization: Yes     Attends meetings of clubs or organizations: More than 4 times per year     Relationship status: Never    Other Topics Concern    Not on file   Social History Narrative    Works for [employer]       Review of Systems   Constitutional: Negative for activity change, chills, diaphoresis, fatigue, fever and unexpected weight change.   HENT: Negative for congestion, dental problem, ear discharge, ear pain, postnasal drip, rhinorrhea, sinus pressure, sore throat and trouble swallowing.    Eyes: Negative for discharge, redness and visual disturbance.   Respiratory: Negative for cough, chest tightness, shortness of breath and wheezing.    Cardiovascular: Positive for leg swelling (will start PT for  "lymphedema). Negative for chest pain and palpitations.   Gastrointestinal: Negative for abdominal pain, blood in stool, constipation, diarrhea, nausea and vomiting.   Endocrine: Negative for cold intolerance, heat intolerance, polydipsia and polyuria.   Genitourinary: Negative for difficulty urinating, dysuria, frequency, hematuria and urgency.   Musculoskeletal: Positive for arthralgias. Negative for myalgias and neck pain.        Broken toe recently and shortly afterwards, she had her first episode of gout.   Skin: Negative for rash and wound.   Neurological: Negative for dizziness, weakness, numbness and headaches.        Reports feeling "jittery" at times. Occurred recently for a few seconds. No associated symptoms. Resolved after eating a small piece of candy.   Hematological: Negative for adenopathy.   Psychiatric/Behavioral: Negative for confusion, dysphoric mood and sleep disturbance. The patient is not nervous/anxious.          Answers for HPI/ROS submitted by the patient on 9/11/2020   activity change: No  unexpected weight change: No  neck pain: No  hearing loss: No  rhinorrhea: No  trouble swallowing: No  eye discharge: No  visual disturbance: No  chest tightness: No  wheezing: No  chest pain: No  palpitations: No  blood in stool: No  constipation: No  vomiting: No  diarrhea: No  polydipsia: No  polyuria: No  difficulty urinating: No  hematuria: No  menstrual problem: No  dysuria: No  arthralgias: Yes  headaches: No  weakness: No  confusion: No  dysphoric mood: No          Objective:      Physical Exam  Vitals signs reviewed.   Constitutional:       General: She is not in acute distress.     Appearance: She is well-developed. She is not diaphoretic.   HENT:      Head: Normocephalic and atraumatic.      Right Ear: Hearing, tympanic membrane, ear canal and external ear normal. Tympanic membrane is not erythematous or bulging.      Left Ear: Hearing, tympanic membrane, ear canal and external ear normal. " Tympanic membrane is not erythematous or bulging.      Nose: Nose normal.      Mouth/Throat:      Mouth: Mucous membranes are moist.      Pharynx: Uvula midline. No oropharyngeal exudate or posterior oropharyngeal erythema.   Eyes:      General: Lids are normal. No scleral icterus.     Conjunctiva/sclera: Conjunctivae normal.      Pupils: Pupils are equal, round, and reactive to light.   Neck:      Musculoskeletal: Normal range of motion and neck supple.      Thyroid: No thyroid mass. Thyromegaly: right lobe is prominent.   Cardiovascular:      Rate and Rhythm: Normal rate and regular rhythm.      Heart sounds: Normal heart sounds. No murmur.   Pulmonary:      Effort: Pulmonary effort is normal.      Breath sounds: Normal breath sounds. No wheezing.   Abdominal:      General: Bowel sounds are normal. There is no distension.      Palpations: Abdomen is soft. Abdomen is not rigid.      Tenderness: There is no abdominal tenderness. There is no guarding or rebound.   Musculoskeletal: Normal range of motion.   Lymphadenopathy:      Cervical: No cervical adenopathy.      Upper Body:      Right upper body: No supraclavicular adenopathy.      Left upper body: No supraclavicular adenopathy.   Skin:     General: Skin is warm and dry.      Findings: No rash.   Neurological:      Mental Status: She is alert and oriented to person, place, and time.      Coordination: Coordination normal.      Gait: Gait normal.      Deep Tendon Reflexes: Reflexes are normal and symmetric.   Psychiatric:         Attention and Perception: Attention normal.         Mood and Affect: Mood normal.         Assessment:       1. Annual physical exam    2. Essential hypertension    3. Systemic lupus erythematosus, unspecified SLE type, unspecified organ involvement status    4. Multiple thyroid nodules    5. S/P gastric bypass    6. Morbid obesity with BMI of 40.0-44.9, adult    7. Lymphedema of both lower extremities    8. Encounter for screening  mammogram for high-risk patient        Plan:       1. Annual physical exam  - Hemoglobin A1C; Future  - CBC auto differential; Future  - Comprehensive metabolic panel; Future  - Lipid Panel; Future  - TSH; Future  - Urinalysis; Future  - Pneumococcal Polysaccharide Vaccine (23 Valent) (SQ/IM)    2. Essential hypertension  - BP is controlled, continue HCTZ  - Comprehensive metabolic panel; Future    3. Systemic lupus erythematosus, unspecified SLE type, unspecified organ involvement status  - stable, on HCQ, continue monitoring with Rheumatology    4. Multiple thyroid nodules  - TSH; Future  - US Soft Tissue Head Neck Thyroid; Future    5. S/P gastric bypass  - Ferritin; Future  - Folate; Future  - Vitamin B12; Future  - Vitamin D; Future  - Iron and TIBC; Future  - Vitamin B1; Future  - will supplement as needed    6. Morbid obesity with BMI of 40.0-44.9, adult  - increase physical activity    7. Lymphedema of both lower extremities  - plans to begin Physical Therapy    8. Encounter for screening mammogram for high-risk patient  - Mammo Digital Screening Bilat; Future    RTC in 6 months for f/u or sooner if needed    Chasity Dixon MD

## 2020-09-19 ENCOUNTER — LAB VISIT (OUTPATIENT)
Dept: LAB | Facility: HOSPITAL | Age: 45
End: 2020-09-19
Attending: INTERNAL MEDICINE
Payer: COMMERCIAL

## 2020-09-19 DIAGNOSIS — I10 ESSENTIAL HYPERTENSION: ICD-10-CM

## 2020-09-19 DIAGNOSIS — Z98.84 S/P GASTRIC BYPASS: ICD-10-CM

## 2020-09-19 DIAGNOSIS — E04.2 MULTIPLE THYROID NODULES: ICD-10-CM

## 2020-09-19 DIAGNOSIS — Z00.00 ANNUAL PHYSICAL EXAM: ICD-10-CM

## 2020-09-19 LAB
25(OH)D3+25(OH)D2 SERPL-MCNC: 35 NG/ML (ref 30–96)
ALBUMIN SERPL BCP-MCNC: 4 G/DL (ref 3.5–5.2)
ALP SERPL-CCNC: 73 U/L (ref 55–135)
ALT SERPL W/O P-5'-P-CCNC: 40 U/L (ref 10–44)
ANION GAP SERPL CALC-SCNC: 10 MMOL/L (ref 8–16)
AST SERPL-CCNC: 30 U/L (ref 10–40)
BASOPHILS # BLD AUTO: 0.04 K/UL (ref 0–0.2)
BASOPHILS NFR BLD: 0.8 % (ref 0–1.9)
BILIRUB SERPL-MCNC: 0.5 MG/DL (ref 0.1–1)
BUN SERPL-MCNC: 17 MG/DL (ref 6–20)
CALCIUM SERPL-MCNC: 8.9 MG/DL (ref 8.7–10.5)
CHLORIDE SERPL-SCNC: 102 MMOL/L (ref 95–110)
CHOLEST SERPL-MCNC: 178 MG/DL (ref 120–199)
CHOLEST/HDLC SERPL: 3.3 {RATIO} (ref 2–5)
CO2 SERPL-SCNC: 27 MMOL/L (ref 23–29)
CREAT SERPL-MCNC: 1 MG/DL (ref 0.5–1.4)
DIFFERENTIAL METHOD: ABNORMAL
EOSINOPHIL # BLD AUTO: 0.1 K/UL (ref 0–0.5)
EOSINOPHIL NFR BLD: 1.2 % (ref 0–8)
ERYTHROCYTE [DISTWIDTH] IN BLOOD BY AUTOMATED COUNT: 12.9 % (ref 11.5–14.5)
EST. GFR  (AFRICAN AMERICAN): >60 ML/MIN/1.73 M^2
EST. GFR  (NON AFRICAN AMERICAN): >60 ML/MIN/1.73 M^2
ESTIMATED AVG GLUCOSE: 97 MG/DL (ref 68–131)
FERRITIN SERPL-MCNC: 294 NG/ML (ref 20–300)
FOLATE SERPL-MCNC: 14.2 NG/ML (ref 4–24)
GLUCOSE SERPL-MCNC: 86 MG/DL (ref 70–110)
HBA1C MFR BLD HPLC: 5 % (ref 4–5.6)
HCT VFR BLD AUTO: 42.1 % (ref 37–48.5)
HDLC SERPL-MCNC: 54 MG/DL (ref 40–75)
HDLC SERPL: 30.3 % (ref 20–50)
HGB BLD-MCNC: 12.7 G/DL (ref 12–16)
IMM GRANULOCYTES # BLD AUTO: 0.01 K/UL (ref 0–0.04)
IMM GRANULOCYTES NFR BLD AUTO: 0.2 % (ref 0–0.5)
IRON SERPL-MCNC: 95 UG/DL (ref 30–160)
LDLC SERPL CALC-MCNC: 104 MG/DL (ref 63–159)
LYMPHOCYTES # BLD AUTO: 1.4 K/UL (ref 1–4.8)
LYMPHOCYTES NFR BLD: 28.9 % (ref 18–48)
MCH RBC QN AUTO: 27.5 PG (ref 27–31)
MCHC RBC AUTO-ENTMCNC: 30.2 G/DL (ref 32–36)
MCV RBC AUTO: 91 FL (ref 82–98)
MONOCYTES # BLD AUTO: 0.5 K/UL (ref 0.3–1)
MONOCYTES NFR BLD: 10.2 % (ref 4–15)
NEUTROPHILS # BLD AUTO: 2.9 K/UL (ref 1.8–7.7)
NEUTROPHILS NFR BLD: 58.7 % (ref 38–73)
NONHDLC SERPL-MCNC: 124 MG/DL
NRBC BLD-RTO: 0 /100 WBC
PLATELET # BLD AUTO: 218 K/UL (ref 150–350)
PMV BLD AUTO: 11.7 FL (ref 9.2–12.9)
POTASSIUM SERPL-SCNC: 4.1 MMOL/L (ref 3.5–5.1)
PROT SERPL-MCNC: 7.5 G/DL (ref 6–8.4)
RBC # BLD AUTO: 4.62 M/UL (ref 4–5.4)
SATURATED IRON: 26 % (ref 20–50)
SODIUM SERPL-SCNC: 139 MMOL/L (ref 136–145)
TOTAL IRON BINDING CAPACITY: 360 UG/DL (ref 250–450)
TRANSFERRIN SERPL-MCNC: 243 MG/DL (ref 200–375)
TRIGL SERPL-MCNC: 100 MG/DL (ref 30–150)
TSH SERPL DL<=0.005 MIU/L-ACNC: 1.83 UIU/ML (ref 0.4–4)
VIT B12 SERPL-MCNC: 293 PG/ML (ref 210–950)
WBC # BLD AUTO: 4.98 K/UL (ref 3.9–12.7)

## 2020-09-19 PROCEDURE — 80053 COMPREHEN METABOLIC PANEL: CPT

## 2020-09-19 PROCEDURE — 82746 ASSAY OF FOLIC ACID SERUM: CPT

## 2020-09-19 PROCEDURE — 84425 ASSAY OF VITAMIN B-1: CPT

## 2020-09-19 PROCEDURE — 84443 ASSAY THYROID STIM HORMONE: CPT

## 2020-09-19 PROCEDURE — 82607 VITAMIN B-12: CPT

## 2020-09-19 PROCEDURE — 82306 VITAMIN D 25 HYDROXY: CPT

## 2020-09-19 PROCEDURE — 85025 COMPLETE CBC W/AUTO DIFF WBC: CPT

## 2020-09-19 PROCEDURE — 83036 HEMOGLOBIN GLYCOSYLATED A1C: CPT

## 2020-09-19 PROCEDURE — 83540 ASSAY OF IRON: CPT

## 2020-09-19 PROCEDURE — 80061 LIPID PANEL: CPT

## 2020-09-19 PROCEDURE — 82728 ASSAY OF FERRITIN: CPT

## 2020-09-25 LAB — VIT B1 BLD-MCNC: 41 UG/L (ref 38–122)

## 2020-09-26 ENCOUNTER — HOSPITAL ENCOUNTER (OUTPATIENT)
Dept: RADIOLOGY | Facility: HOSPITAL | Age: 45
Discharge: HOME | End: 2020-09-26
Attending: INTERNAL MEDICINE
Payer: COMMERCIAL

## 2020-09-26 DIAGNOSIS — E04.2 MULTIPLE THYROID NODULES: ICD-10-CM

## 2020-09-26 DIAGNOSIS — Z12.31 ENCOUNTER FOR SCREENING MAMMOGRAM FOR HIGH-RISK PATIENT: ICD-10-CM

## 2020-09-26 PROCEDURE — 76536 US EXAM OF HEAD AND NECK: CPT | Mod: 26,,, | Performed by: RADIOLOGY

## 2020-09-26 PROCEDURE — 77063 BREAST TOMOSYNTHESIS BI: CPT | Mod: 26,,, | Performed by: RADIOLOGY

## 2020-09-26 PROCEDURE — 77067 SCR MAMMO BI INCL CAD: CPT | Mod: 26,,, | Performed by: RADIOLOGY

## 2020-09-26 PROCEDURE — 76536 US SOFT TISSUE HEAD NECK THYROID: ICD-10-PCS | Mod: 26,,, | Performed by: RADIOLOGY

## 2020-09-26 PROCEDURE — 76536 US EXAM OF HEAD AND NECK: CPT | Mod: TC

## 2020-09-26 PROCEDURE — 77067 MAMMO DIGITAL SCREENING BILAT WITH TOMO: ICD-10-PCS | Mod: 26,,, | Performed by: RADIOLOGY

## 2020-09-26 PROCEDURE — 77063 MAMMO DIGITAL SCREENING BILAT WITH TOMO: ICD-10-PCS | Mod: 26,,, | Performed by: RADIOLOGY

## 2020-09-26 PROCEDURE — 77067 SCR MAMMO BI INCL CAD: CPT | Mod: TC

## 2020-09-28 ENCOUNTER — TELEPHONE (OUTPATIENT)
Dept: INTERNAL MEDICINE | Facility: CLINIC | Age: 45
End: 2020-09-28

## 2020-09-28 NOTE — TELEPHONE ENCOUNTER
----- Message from Chasity Dixon MD sent at 9/27/2020  2:23 PM CDT -----  Message sent via patient portal.

## 2020-09-29 ENCOUNTER — PATIENT MESSAGE (OUTPATIENT)
Dept: INTERNAL MEDICINE | Facility: CLINIC | Age: 45
End: 2020-09-29

## 2020-09-29 ENCOUNTER — TELEPHONE (OUTPATIENT)
Dept: INTERNAL MEDICINE | Facility: CLINIC | Age: 45
End: 2020-09-29

## 2020-09-29 NOTE — TELEPHONE ENCOUNTER
Avoid time-release products. May try B12 that is dissolved under the tongue. E-mailed the patient.

## 2020-09-29 NOTE — TELEPHONE ENCOUNTER
----- Message from Chasity Dixon MD sent at 9/28/2020  8:32 PM CDT -----  Message sent via patient portal.

## 2020-09-30 ENCOUNTER — TELEPHONE (OUTPATIENT)
Dept: REHABILITATION | Facility: HOSPITAL | Age: 45
End: 2020-09-30

## 2020-09-30 NOTE — TELEPHONE ENCOUNTER
Pt contacted PT to resume care following suspension of services and HOLD due to COVID concern.    Pt was evaluated 3/2/20 with Elena Cam PT at [facility]. Pt was to transfer to [facility] location in April when COVID suspended lymphedema therapy services.     Pt was referred by Dr. Escobar 2/17/20 and is to contact that office for resume orders or reassessment if warranted.     Pt voiced understanding.

## 2020-10-03 ENCOUNTER — IMMUNIZATION (OUTPATIENT)
Dept: INTERNAL MEDICINE | Facility: CLINIC | Age: 45
End: 2020-10-03
Payer: COMMERCIAL

## 2020-10-03 PROCEDURE — 90471 FLU VACCINE (QUAD) GREATER THAN OR EQUAL TO 3YO PRESERVATIVE FREE IM: ICD-10-PCS | Mod: S$GLB,,, | Performed by: INTERNAL MEDICINE

## 2020-10-03 PROCEDURE — 90686 FLU VACCINE (QUAD) GREATER THAN OR EQUAL TO 3YO PRESERVATIVE FREE IM: ICD-10-PCS | Mod: S$GLB,,, | Performed by: INTERNAL MEDICINE

## 2020-10-03 PROCEDURE — 90471 IMMUNIZATION ADMIN: CPT | Mod: S$GLB,,, | Performed by: INTERNAL MEDICINE

## 2020-10-03 PROCEDURE — 99999 PR PBB SHADOW E&M-EST. PATIENT-LVL I: ICD-10-PCS | Mod: PBBFAC,,,

## 2020-10-03 PROCEDURE — 99999 PR PBB SHADOW E&M-EST. PATIENT-LVL I: CPT | Mod: PBBFAC,,,

## 2020-10-03 PROCEDURE — 90686 IIV4 VACC NO PRSV 0.5 ML IM: CPT | Mod: S$GLB,,, | Performed by: INTERNAL MEDICINE

## 2020-11-02 ENCOUNTER — OFFICE VISIT (OUTPATIENT)
Dept: CARDIOLOGY | Facility: CLINIC | Age: 45
End: 2020-11-02
Payer: COMMERCIAL

## 2020-11-02 VITALS
HEART RATE: 64 BPM | BODY MASS INDEX: 46.83 KG/M2 | DIASTOLIC BLOOD PRESSURE: 82 MMHG | SYSTOLIC BLOOD PRESSURE: 120 MMHG | WEIGHT: 281.06 LBS | HEIGHT: 65 IN

## 2020-11-02 DIAGNOSIS — E66.01 MORBID OBESITY WITH BMI OF 45.0-49.9, ADULT: ICD-10-CM

## 2020-11-02 DIAGNOSIS — I87.2 VENOUS INSUFFICIENCY OF BOTH LOWER EXTREMITIES: ICD-10-CM

## 2020-11-02 DIAGNOSIS — I89.0 LYMPHEDEMA OF BOTH LOWER EXTREMITIES: Primary | ICD-10-CM

## 2020-11-02 PROCEDURE — 99999 PR PBB SHADOW E&M-EST. PATIENT-LVL III: CPT | Mod: PBBFAC,,, | Performed by: INTERNAL MEDICINE

## 2020-11-02 PROCEDURE — 3008F BODY MASS INDEX DOCD: CPT | Mod: CPTII,S$GLB,, | Performed by: INTERNAL MEDICINE

## 2020-11-02 PROCEDURE — 99999 PR PBB SHADOW E&M-EST. PATIENT-LVL III: ICD-10-PCS | Mod: PBBFAC,,, | Performed by: INTERNAL MEDICINE

## 2020-11-02 PROCEDURE — 3074F SYST BP LT 130 MM HG: CPT | Mod: CPTII,S$GLB,, | Performed by: INTERNAL MEDICINE

## 2020-11-02 PROCEDURE — 99215 PR OFFICE/OUTPT VISIT, EST, LEVL V, 40-54 MIN: ICD-10-PCS | Mod: S$GLB,,, | Performed by: INTERNAL MEDICINE

## 2020-11-02 PROCEDURE — 3079F PR MOST RECENT DIASTOLIC BLOOD PRESSURE 80-89 MM HG: ICD-10-PCS | Mod: CPTII,S$GLB,, | Performed by: INTERNAL MEDICINE

## 2020-11-02 PROCEDURE — 3008F PR BODY MASS INDEX (BMI) DOCUMENTED: ICD-10-PCS | Mod: CPTII,S$GLB,, | Performed by: INTERNAL MEDICINE

## 2020-11-02 PROCEDURE — 99215 OFFICE O/P EST HI 40 MIN: CPT | Mod: S$GLB,,, | Performed by: INTERNAL MEDICINE

## 2020-11-02 PROCEDURE — 3074F PR MOST RECENT SYSTOLIC BLOOD PRESSURE < 130 MM HG: ICD-10-PCS | Mod: CPTII,S$GLB,, | Performed by: INTERNAL MEDICINE

## 2020-11-02 PROCEDURE — 3079F DIAST BP 80-89 MM HG: CPT | Mod: CPTII,S$GLB,, | Performed by: INTERNAL MEDICINE

## 2020-11-02 RX ORDER — DESOXIMETASONE 2.5 MG/G
CREAM TOPICAL
COMMUNITY
Start: 2020-10-14

## 2020-11-02 RX ORDER — MUPIROCIN 20 MG/G
OINTMENT TOPICAL
COMMUNITY
Start: 2020-10-14 | End: 2021-05-24

## 2020-11-02 NOTE — PROGRESS NOTES
[facility]    CC: Leg Swelling     Patient ID: Nitin Mcconnell is a 44 y.o. female with a past medical history of HTN, lupus, morbid obesity s/p gastric bypass surgery in 2009, who presents for a follow up appointment.  Pertinent history/events are as follows:     -Pt kindly referred by Dr. Dixon for evaluation of bilateral leg edema/venous reflux.    On her prior visit on 02/17/20, Mr. Mcconnell reports first noticing swelling in both legs approximately 3 years ago.  States she was recently switched from torsemide to bumex a few weeks ago with no significant improvement in leg swelling.  States she also wears compression hose with no significant improvement in leg swelling.  No smoking history.;  BLE Venous Reflux Study on 1/31/2020 revealed BLE venous reflux with no evidence of DVT.  Echo on 2/13/2020 showed normal left ventricular systolic function with EF of 60%; normal right ventricular systolic function; normal LV diastolic function; mild TR/MR; estimated PA systolic pressure is 31 mmHg; normal central venous pressure (3 mmHg).  Exam shows BLE's with non-pitting edema (L>R) consistent with lymphedema.  Trace pitting edema noted  Plan:   BLE Edema- Presentation due to combination of lymphedema with BLE venous insufficiency. Refer to lymphedema clinic.  Pt instructed to elevate legs when resting.  Pt would benefit from significant weight loss.    Morbid Obesity s/p Gastric Bypass Surgery in 2009- Refer to nutrition for assistance with weight loss.     HPI: Patient reports continued swelling of her lower extremities. She has not been going to physical therapy due to pandemic. She denies any claudication or extremity pain at rest, symptoms of CLI or tissue atrophy.       Past Medical History:   Diagnosis Date    Blood transfusion     Breast cyst     Connective tissue disease     Hypertension     Lupus mild       Past Surgical History:   Procedure Laterality Date    BREAST BIOPSY Right  2008    rt axilla    GASTRIC BYPASS  2009    HYSTERECTOMY      @38yrs of age    LIPECTOMY      LYMPH NODE BIOPSY      right arm    OOPHORECTOMY      @38yrs of age    panniculectomy      JIAN-EN-Y PROCEDURE  2009    RNY bypass       Social History     Socioeconomic History    Marital status: Single     Spouse name: Not on file    Number of children: Not on file    Years of education: Not on file    Highest education level: Not on file   Occupational History    Not on file   Social Needs    Financial resource strain: Not hard at all    Food insecurity     Worry: Never true     Inability: Never true    Transportation needs     Medical: No     Non-medical: No   Tobacco Use    Smoking status: Never Smoker    Smokeless tobacco: Never Used   Substance and Sexual Activity    Alcohol use: Yes     Frequency: Monthly or less     Drinks per session: 1 or 2     Binge frequency: Never     Comment: socially    Drug use: No    Sexual activity: Never   Lifestyle    Physical activity     Days per week: Not on file     Minutes per session: Not on file    Stress: Not at all   Relationships    Social connections     Talks on phone: More than three times a week     Gets together: Twice a week     Attends Voodoo service: Not on file     Active member of club or organization: Yes     Attends meetings of clubs or organizations: More than 4 times per year     Relationship status: Never    Other Topics Concern    Not on file   Social History Narrative    Works for [employer]       Family History   Problem Relation Age of Onset    Heart failure Mother     Rheum arthritis Mother     Heart failure Maternal Grandmother     Heart attack Maternal Grandmother     Thyroid disease Maternal Grandmother     Diabetes Maternal Grandfather     Kidney disease Maternal Grandfather     Diabetes Paternal Grandmother     Thyroid disease Maternal Aunt     Lupus Neg Hx        Review of patient's allergies  "indicates:   Allergen Reactions    Heparin analogues      Other reaction(s): low platelets    Shellfish containing products Hives     Crawfish, no reaction to IV iodine in the past.       Medication List with Changes/Refills   Current Medications    CALCIUM CARB/VITAMIN D3/VIT K1 (VIACTIV ORAL)    Take by mouth once daily.     DESOXIMETASONE (TOPICORT) 0.25 % CREAM    APPLY TO SCALP NIGHTLY FOR 1 WEEK THEN APPLY WEEKLY FOR MAINTENANCE    ERGOCALCIFEROL, VITAMIN D2, (VITAMIN D2 ORAL)    Take 5,000 Int'l Units by mouth once daily.    HYDROCHLOROTHIAZIDE (HYDRODIURIL) 25 MG TABLET    Take 1 tablet (25 mg total) by mouth once daily.    HYDROXYCHLOROQUINE (PLAQUENIL) 200 MG TABLET    TAKE 1 TABLET BY MOUTH TWICE DAILY    MULTIVITAMIN-CA-IRON-MINERALS (ONE-A-DAY WOMENS FORMULA) 27-0.4 MG TAB    Take 1 tablet by mouth Daily.    MUPIROCIN (BACTROBAN) 2 % OINTMENT    APPLY BID TO BIOPSY SITE ON SCALP       Review of Systems  Constitution: Denies chills, fever, and sweats.  HENT: Denies headaches or blurry vision.  Cardiovascular: Denies chest pain or irregular heart beat.  Respiratory: Denies cough or shortness of breath.  Gastrointestinal: Denies abdominal pain, nausea, or vomiting.  Musculoskeletal: Positive for leg swelling.    Neurological: Denies dizziness or focal weakness.  Psychiatric/Behavioral: Normal mental status.  Hematologic/Lymphatic: Denies bleeding problem or easy bruising/bleeding.  Skin: Denies rash or suspicious lesions    Physical Examination  /82   Pulse 64   Ht 5' 5" (1.651 m)   Wt 127.5 kg (281 lb 1.4 oz)   LMP 07/13/2013   BMI 46.78 kg/m²     Constitutional: Morbidly obese female, no acute distress, conversant  HEENT: Sclera anicteric, Pupils equal, round and reactive to light, extraocular motions intact, Oropharynx clear  Neck: No JVD, no carotid bruits  Cardiovascular: regular rate and rhythm, no murmur, rubs or gallops, normal S1/S2  Pulmonary: Clear to auscultation " bilaterally  Abdominal: Abdomen soft, nontender, nondistended, positive bowel sounds  Extremities: BLE's with non-pitting edema (L>R) consistent with lymphedema  Trace pitting edema noted,   Pulses:  Carotid pulses are 2+ on the right side, and 2+ on the left side.  Radial pulses are 2+ on the right side, and 2+ on the left side.   Femoral pulses are 2+ on the right side, and 2+ on the left side.  Popliteal pulses are 2+ on the right side, and 2+ on the left side.   Dorsalis pedis pulses are 2+ on the right side, and 2+ on the left side.   Posterior tibial pulses are 2+ on the right side, and 2+ on the left side.    Skin: No ecchymosis, erythema, or ulcers  Psych: Alert and oriented x 3, appropriate affect  Neuro: CNII-XII intact, no focal deficits    Labs:  Most Recent Data  CBC:   Lab Results   Component Value Date    WBC 4.98 09/19/2020    HGB 12.7 09/19/2020    HCT 42.1 09/19/2020     09/19/2020    MCV 91 09/19/2020    RDW 12.9 09/19/2020     BMP:   Lab Results   Component Value Date     09/19/2020    K 4.1 09/19/2020     09/19/2020    CO2 27 09/19/2020    BUN 17 09/19/2020    CREATININE 1.0 09/19/2020    GLU 86 09/19/2020    CALCIUM 8.9 09/19/2020    MG 1.9 02/13/2020     LFTS;   Lab Results   Component Value Date    PROT 7.5 09/19/2020    ALBUMIN 4.0 09/19/2020    BILITOT 0.5 09/19/2020    AST 30 09/19/2020    ALKPHOS 73 09/19/2020    ALT 40 09/19/2020     COAGS:   Lab Results   Component Value Date    INR 1.0 07/18/2013     FLP:   Lab Results   Component Value Date    CHOL 178 09/19/2020    HDL 54 09/19/2020    LDLCALC 104.0 09/19/2020    TRIG 100 09/19/2020    CHOLHDL 30.3 09/19/2020     CARDIAC:   Lab Results   Component Value Date    BNP 88 02/13/2020       BLE Venous Reflux Study 1/31/2020:  No evidence of lower extremity DVT bilaterally.  Minimal right anterior accessory GSV reflux.  Left proximal anterior accessory GSV reflux.  Left proximal GSV reflux.    Echo 2/13/2020:  · Normal  left ventricular systolic function. The estimated ejection fraction is 60%.  · Normal right ventricular systolic function.  · Normal LV diastolic function.  · Mild tricuspid regurgitation.  · Mild mitral regurgitation.  · The estimated PA systolic pressure is 31 mmHg.  · Normal central venous pressure (3 mmHg).    Assessment/Plan:  Nitin Mcconnell is a 44 y.o. female with a past medical history of HTN, lupus, morbid obesity s/p gastric bypass surgery in 2009, who presents for a follow up appointment.     1. BLE Edema- Presentation due to combination of lymphedema with BLE venous insufficiency.  BLE Venous Reflux Study on 1/31/2020 revealed BLE venous reflux with no evidence of DVT.  Echo on 2/13/2020 showed normal left ventricular systolic function with EF of 60%; normal right ventricular systolic function; normal LV diastolic function; mild TR/MR; estimated PA systolic pressure is 31 mmHg; normal central venous pressure (3 mmHg).    Plan:   -- Refer to lymphedema clinic.  Pt instructed to elevate legs when resting.  Pt would benefit from significant weight loss.      2. Morbid Obesity s/p Gastric Bypass Surgery in 2009- Refer to nutrition for assistance with weight loss.   -- Encourage moderate/aerobic exercise for minimum 30 minutes for 5 days in a week.     Follow up in 2 months     Total duration of face to face visit time 30 minutes  Total time spent counseling greater than fifty percent of total visit time.  Counseling included discussion regarding imaging findings, diagnosis, possibilities, treatment options, risks and benefits.  The patient had many questions regarding the options and long-term effects.        Jacquelyn West MD  Vascular Medicine Fellow PGY IV  Department of Vascular Medicine  [facility]

## 2020-11-02 NOTE — PATIENT INSTRUCTIONS
Problem: Lymphedema  Plan:    1) Lymphedema Clinic  2) Follow up with nutrition services  3) Follow up in 2 months

## 2020-11-08 ENCOUNTER — PATIENT MESSAGE (OUTPATIENT)
Dept: RHEUMATOLOGY | Facility: CLINIC | Age: 45
End: 2020-11-08

## 2020-11-09 ENCOUNTER — CLINICAL SUPPORT (OUTPATIENT)
Dept: REHABILITATION | Facility: HOSPITAL | Age: 45
End: 2020-11-09
Attending: STUDENT IN AN ORGANIZED HEALTH CARE EDUCATION/TRAINING PROGRAM
Payer: COMMERCIAL

## 2020-11-09 DIAGNOSIS — I89.0 LYMPHEDEMA OF BOTH LOWER EXTREMITIES: ICD-10-CM

## 2020-11-09 DIAGNOSIS — R29.898 WEAKNESS OF LOWER EXTREMITY, UNSPECIFIED LATERALITY: ICD-10-CM

## 2020-11-09 DIAGNOSIS — E66.01 MORBID OBESITY WITH BMI OF 45.0-49.9, ADULT: Primary | ICD-10-CM

## 2020-11-09 DIAGNOSIS — R60.0 BILATERAL LEG EDEMA: ICD-10-CM

## 2020-11-09 DIAGNOSIS — E66.01 CLASS 3 SEVERE OBESITY DUE TO EXCESS CALORIES WITH SERIOUS COMORBIDITY IN ADULT, UNSPECIFIED BMI: ICD-10-CM

## 2020-11-09 PROCEDURE — 97162 PT EVAL MOD COMPLEX 30 MIN: CPT | Mod: PO

## 2020-11-09 PROCEDURE — 97535 SELF CARE MNGMENT TRAINING: CPT | Mod: PO

## 2020-11-09 PROCEDURE — 97140 MANUAL THERAPY 1/> REGIONS: CPT | Mod: PO

## 2020-11-09 NOTE — PLAN OF CARE
[facility]  Physical Therapy Initial Evaluation    Name: Nitin Mcconnell  Clinic Number: [id number]    Therapy Diagnosis:   Encounter Diagnoses   Name Primary?    Lymphedema of both lower extremities     Morbid obesity with BMI of 45.0-49.9, adult Yes    Weakness of lower extremity, unspecified laterality     Class 3 severe obesity due to excess calories with serious comorbidity in adult, unspecified BMI     Bilateral leg edema      Physician: Jacquelyn West MD    Physician Orders: PT Eval and Treat - lymphedema  Medical Diagnosis from Referral:   Diagnosis   I89.0 (ICD-10-CM) - Lymphedema of both lower extremities   Evaluation Date: 11/9/2020  Authorization Period Expiration: 11/2/21  Plan of Care Expiration: 2/1/21  Visit # / Visits authorized: 1/ 20    Time In: 405p  Time Out: 510p  Total Billable Time: 65 minutes    Precautions: Standard and lupus, obesity    Subjective   Date of onset: swelling in both legs since last 1-2 years- tried fluid pills and not much success.  Cardiologist recommended Dr. Escobar- denies CHF - does run in family.  Monitoring for pulmonary hypertension.   History of current condition - Nitin reports: tried compression stockings but then broke L toe over summer- was wearing knee highs- [employer] - measured legs - not wearing recently.  No DVT, no cellulitis.   Lupus with Plaquenil 2 x daily- hydroxychloroquine- took steroids in past -March 2007 diagnosis with Lupus  Pt denies CHF, KF, DM and CA.   Fluid pill- not now  Blood thinner- no   Gastric bypass 2009- had obstruction procedure  panniculectomy 2011     Medical History:   Past Medical History:   Diagnosis Date    Blood transfusion     Breast cyst     Connective tissue disease     Hypertension     Lupus mild       Surgical History:   Nitin Mcconnell  has a past surgical history that includes Gastric bypass (2009); Lymph node biopsy; panniculectomy; Lipectomy; Hysterectomy;  Oophorectomy; Helder-en-Y procedure (2009); and Breast biopsy (Right, 2008).    Medications:   Nitin has a current medication list which includes the following prescription(s): calcium carb/vitamin d3/vit k1, desoximetasone, ergocalciferol (vitamin d2), hydrochlorothiazide, hydroxychloroquine, multivitamin-ca-iron-minerals, and mupirocin.    Allergies:   Review of patient's allergies indicates:   Allergen Reactions    Heparin analogues      Other reaction(s): low platelets    Shellfish containing products Hives     Crawfish, no reaction to IV iodine in the past.        Imaging, US: 1/31/20  Conclusion  No evidence of lower extremity DVT bilaterally.  Minimal right anterior accessory GSV reflux.  Left proximal anterior accessory GSV reflux.  Left proximal GSV reflux.     Surgery: gastric bypass  Previous Lymphedema Treatment: compression stockings, PT eval 2020 then COVID suspension  Prior Therapy: no  Social History: lives with mother  Work from home- sitting and computer  + Drive  Able to reach feet  Able to apply compression   Environmental barriers: threshold    Abuse/Neglect: no   Nutritional status: obesity BMI 46   Educational needs: met   Spiritual/Cultural: met   Fall risk: no    Occupation: works from home  Prior Level of Function: labored  Current Level of Function: min difficulties  Gait: no device    Transfers:MOD I   Bed Mobility: MOD I     Pain and Swelling:- tight full heavy not really pain,  Some OA R Knee  Current 2/10, worst 4/10, best 1/10   Location: right knee   Description: Dull, Tight and heavy  Aggravating Factors: Sitting, Standing, Walking, Night Time and Getting out of bed/chair  Easing Factors: elevation and compression    Pts goals: hoping to get legs down and manage    Objective     Obese female amb to dept no device, no compression- tennis shoes  Full thighs of obesity and body habitus  Amount of Swelling/Location of Swelling: mild/mod to B LE    Skin Integrity: dry, intact    Palpation/Texture: soft fullness, obesity, no ankle shelf - hips with lipedema characteristics   + Stemmer Sign  - Brittanie's Sign  Circulation: intact     Posture: wide base, obesity     Range of Motion - LE  Arom knee, ankle sitting or supine  (R) kf 115, DF 10   (L) , DF 5     Strength: functional screen of AROM against gravity and sit to stand transfers  wfl     Sensation: intact to lt touch B LE    Girth Measurements (in centimeters)  LANDMARK LEFT LE  11/9/20 RIGHT LE  11/9/20 DIFF   at eval   SBP + 20 84.0 cm 83.0 cm 1.0 cm   SBP + 10  78.0 cm 74.0 cm 4.0 cm   SBP 67.0 cm 62.0 cm 5.0 cm   10 below SBP 54.0 cm 53.0 cm 1.0 cm   20 below SBP 52.0 cm 48.0 cm 4.0 cm   30 below SBP 41.0 cm 37.0 cm 4.0 cm   35 below SBP 34.5 cm 31.0 cm 3.5 cm   Ankle 31.0 cm 31.0 cm 0 cm   Forefoot 24.0 cm 23.5 cm 0.5 cm       CMS Impairment/Limitation/Restriction for FOTO  Survey  Staff did not capture    Therapist reviewed FOTO scores for Nitin Mcconnell on 11/9/2020.   FOTO documents entered into [employer] - see Media section.       TREATMENT   Treatment Time In: 440p  Treatment Time Out: 510p  Total Treatment time separate from Evaluation: 30 minutes    Nitin received therapeutic exercises to develop endurance, ROM and flexibility for 5 minutes including:  Aps, knee ROM, avoid dependency, avoid immobility, elevation, walking with compression, deep breathing, use of muscle pump to assist venous return    Nitin received the following manual therapy techniques: Manual Lymphatic Drainage and compression suggestions were applied to the: B LE for 15 minutes, including:  Education and training in compression needs.  Complete Decongestive Therapy components and management with goals and plan of care review.    Demo of Manual Lymphatic Drainage and short stretch compression bandaging.     Pt has KH garments and should consider adding Bioflect leggings/capris or shapewear for thighs.    Encouraged reschedule of nutrition consult  and bariatric management  Wear knee high garments daily  Self Care/Home Management training for 15 minutes including:  Pt was educated in potential compression needs.  Demo of products including socks, garments, and Inelastic Velcro wraps.   Discussed cost/coverage and authorization per insurance with Durable Medical Equipment(DME) provider.  Compression require orders from referring provider and coverage or purchase of products from DME or self order.      Discussed wear schedule, don/doff, wash and management of products.  Size and compression class and AM/PM needs.    Consideration for tubigrip as form of temporary compression use until securing compression needs.   Consideration for shorts/tights or capris style compression to compliment lower leg compression to support size/shape of LE.   Product information provider.   Vendor list provided.    Informed insurance coverage of compression is per DME provider and typically group plans may not cover cost beyond pair of standard sized knee high garments or have some OOP expense.   Commitment to attendance as well as commitment to securing compression needs is critical to edema management.      Home Exercises and Patient Education Provided  Education provided:   - wear knee highs daily- compliment with tights/capris  - Pt was educated in lymphedema etiology and management plans.  Pt was provided with written risk reductions and precautions for managing lymphedema.      This patient is in agreement to participate in Lymphedema treatment.    Written Home Exercises Provided: Patient instructed to cont prior HEP.  Exercises were reviewed and Nitin was able to demonstrate them prior to the end of the session.  Nitin demonstrated fair  understanding of the education provided.     See EMR under Patient Instructions for exercises provided 11/9/2020.    Assessment   Nitin is a 45 y.o. female referred to outpatient Physical Therapy with a medical diagnosis of   Diagnosis    I89.0 (ICD-10-CM) - Lymphedema of both lower extremities     This patient presents s/p gastric bypass with obesity, weight loss and weight gain, Lupus, and CVI  resulting in: multifactorial lymphedema of the B LE likely secondary to obesity, increased pain, increased stiffness in the LE, as well as difficulty performing walking, cosmesis, clothing choice , compression needs, and placing the pt at higher risk of infection. Suggested small goals for weight management and activity tolerance while wearing compression.    Compression options are limited by size, shape, fit and wear tolerance as well as possible need for custom sizing with related cost considerations. .   Therapy will not be successful unless medical issues and obesity are addressed.  Therapy may assist with education and training regarding need for compression support. Then compliance with purchase of compression needs and compliance with daily use are required.    Pt prognosis is Fair.   Pt will benefit from skilled outpatient Physical Therapy to address the deficits stated above and in the chart below, provide pt/family education, and to maximize pt's level of independence.     Plan of care discussed with patient: Yes  Pt's spiritual, cultural and educational needs considered and patient is agreeable to the plan of care and goals as stated below:     Medical Necessity is demonstrated by the following  History  Co-morbidities and personal factors that may impact the plan of care Co-morbidities:   coping style/mechanism, excessive commute time/distance, financial considerations, high BMI, level of undertstanding of current condition, prior abdominal surgery and lupus, s/p gastric bypass, joint pain    Personal Factors:   age     moderate   Examination  Body Structures and Functions, activity limitations and participation restrictions that may impact the plan of care Body Regions:   lower extremities  trunk    Body Systems:    gross  symmetry  ROM  edema  scar formation  skin integrity  skin color  lymphedema, obesity skin changes    Participation Restrictions:   distance    Activity limitations:   Learning and applying knowledge  no deficits    General Tasks and Commands  no deficits    Communication  no deficits    Mobility  walking    Self care  dressing  looking after one's health  compression    Domestic Life  doing house work (cleaning house, washing dishes, laundry)    Interactions/Relationships  no deficits    Life Areas  no deficits    Community and Social Life  no deficits         moderate   Clinical Presentation evolving clinical presentation with changing clinical characteristics moderate   Decision Making/ Complexity Score: moderate     Anticipated Barriers for therapy: obesity, CVI lupus    The following goals were discussed with the patient and patient is in agreement with them as to be addressed in the treatment plan.     Short Term Goals: (6 weeks)  1. Patient will show decreased girth in B LE by up to 1 cm to allow for LE symmetry, shoe and clothing choice, and ability to apply needed compression.  (progressing, not met)   2. Patient will demonstrate 100% knowledge of lymphedema precautions and signs of infection to allow for reduced lymphedema risk, infection risk, and/or exacerbation of condition.  (progressing, not met)  3. Patient or caregiver will perform self-bandaging techniques and/or wearing of compression garments to allow for lymphatic drainage support, skin elasticity, and reduction in shape and size of limb. (progressing, not met)  4. Patient will perform self lymph drainage techniques to areas within reach to enhance lymphatic drainage and skin condition.  (progressing, not met)  5. Patient will tolerate daily activities with multilayered bandaging to allow for lymphatic and venous support.  (progressing, not met)    Long Term Goals: (12  weeks)  1. Patient will show decreased girth in B LE by up to 2 cm  to allow  for LE symmetry, shoe and clothing choice, and ability to apply needed compression daily.  (progressing, not met)  2. Patient will show reduction in density to mild or less with improved contour of limb to allow for cosmesis, LE symmetry, infection risk reduction, and clothing and compression choice.   (progressing, not met)  3. Patient to mitul/doff compression garment with daily compliance to assist in lymphedema management, skin elasticity, and tissue density.  (progressing, not met)  4. Pt to show improved postural awareness and alignment.  (progressing, not met)  5. Pt to be I and compliant with HEP to allow for increased function in affected limb.   (progressing, not met)  Plan   Plan of care Certification: 11/9/2020 to 2/1/21.    Outpatient Physical Therapy 2 times weekly for 10 weeks to include the following interventions: Patient Education, Self Care, Therapeutic Activites and Therapeutic Exercise. Complete Decongestive Therapy- compression and home equipment needs to be addressed and assisted.    Pt may be seen by a PTA as part of the Rehab treatment team.  Plan of Care was discussed with RENY Colunga, PT

## 2020-11-16 ENCOUNTER — OFFICE VISIT (OUTPATIENT)
Dept: RHEUMATOLOGY | Facility: CLINIC | Age: 45
End: 2020-11-16
Payer: COMMERCIAL

## 2020-11-16 ENCOUNTER — LAB VISIT (OUTPATIENT)
Dept: LAB | Facility: HOSPITAL | Age: 45
End: 2020-11-16
Attending: INTERNAL MEDICINE
Payer: COMMERCIAL

## 2020-11-16 VITALS — SYSTOLIC BLOOD PRESSURE: 127 MMHG | DIASTOLIC BLOOD PRESSURE: 83 MMHG | HEART RATE: 68 BPM

## 2020-11-16 DIAGNOSIS — Z78.0 MENOPAUSE: ICD-10-CM

## 2020-11-16 DIAGNOSIS — M10.9 GOUT, ARTHRITIS: ICD-10-CM

## 2020-11-16 DIAGNOSIS — L65.9 ALOPECIA: ICD-10-CM

## 2020-11-16 DIAGNOSIS — I27.20 PULMONARY HYPERTENSION: ICD-10-CM

## 2020-11-16 DIAGNOSIS — E66.01 CLASS 3 SEVERE OBESITY DUE TO EXCESS CALORIES WITHOUT SERIOUS COMORBIDITY WITH BODY MASS INDEX (BMI) OF 45.0 TO 49.9 IN ADULT: ICD-10-CM

## 2020-11-16 DIAGNOSIS — M32.9 SYSTEMIC LUPUS ERYTHEMATOSUS, UNSPECIFIED SLE TYPE, UNSPECIFIED ORGAN INVOLVEMENT STATUS: Primary | ICD-10-CM

## 2020-11-16 DIAGNOSIS — D84.9 IMMUNOSUPPRESSION: ICD-10-CM

## 2020-11-16 DIAGNOSIS — M32.9 SYSTEMIC LUPUS ERYTHEMATOSUS, UNSPECIFIED SLE TYPE, UNSPECIFIED ORGAN INVOLVEMENT STATUS: ICD-10-CM

## 2020-11-16 LAB
ALBUMIN SERPL BCP-MCNC: 4.1 G/DL (ref 3.5–5.2)
ALP SERPL-CCNC: 63 U/L (ref 38–126)
ALT SERPL W/O P-5'-P-CCNC: 37 U/L (ref 10–44)
ANION GAP SERPL CALC-SCNC: 4 MMOL/L (ref 8–16)
AST SERPL-CCNC: 37 U/L (ref 15–46)
BASOPHILS # BLD AUTO: 0.03 K/UL (ref 0–0.2)
BASOPHILS NFR BLD: 0.6 % (ref 0–1.9)
BILIRUB SERPL-MCNC: 0.6 MG/DL (ref 0.1–1)
BUN SERPL-MCNC: 20 MG/DL (ref 7–17)
CALCIUM SERPL-MCNC: 8.9 MG/DL (ref 8.7–10.5)
CHLORIDE SERPL-SCNC: 102 MMOL/L (ref 95–110)
CK SERPL-CCNC: 305 U/L (ref 55–170)
CO2 SERPL-SCNC: 33 MMOL/L (ref 23–29)
CREAT SERPL-MCNC: 1.26 MG/DL (ref 0.5–1.4)
CRP SERPL-MCNC: 0.3 MG/DL (ref 0–1)
DIFFERENTIAL METHOD: ABNORMAL
EOSINOPHIL # BLD AUTO: 0.1 K/UL (ref 0–0.5)
EOSINOPHIL NFR BLD: 1.9 % (ref 0–8)
ERYTHROCYTE [DISTWIDTH] IN BLOOD BY AUTOMATED COUNT: 12.6 % (ref 11.5–14.5)
ERYTHROCYTE [SEDIMENTATION RATE] IN BLOOD BY WESTERGREN METHOD: 20 MM/HR (ref 0–20)
EST. GFR  (AFRICAN AMERICAN): 59.5 ML/MIN/1.73 M^2
EST. GFR  (NON AFRICAN AMERICAN): 51.6 ML/MIN/1.73 M^2
GLUCOSE SERPL-MCNC: 85 MG/DL (ref 70–110)
HCT VFR BLD AUTO: 38.1 % (ref 37–48.5)
HGB BLD-MCNC: 11.8 G/DL (ref 12–16)
IMM GRANULOCYTES # BLD AUTO: 0.01 K/UL (ref 0–0.04)
IMM GRANULOCYTES NFR BLD AUTO: 0.2 % (ref 0–0.5)
LYMPHOCYTES # BLD AUTO: 1.5 K/UL (ref 1–4.8)
LYMPHOCYTES NFR BLD: 27.3 % (ref 18–48)
MCH RBC QN AUTO: 27.8 PG (ref 27–31)
MCHC RBC AUTO-ENTMCNC: 31 G/DL (ref 32–36)
MCV RBC AUTO: 90 FL (ref 82–98)
MONOCYTES # BLD AUTO: 0.5 K/UL (ref 0.3–1)
MONOCYTES NFR BLD: 10 % (ref 4–15)
NEUTROPHILS # BLD AUTO: 3.2 K/UL (ref 1.8–7.7)
NEUTROPHILS NFR BLD: 60 % (ref 38–73)
NRBC BLD-RTO: 0 /100 WBC
PLATELET # BLD AUTO: 223 K/UL (ref 150–350)
PMV BLD AUTO: 10.7 FL (ref 9.2–12.9)
POTASSIUM SERPL-SCNC: 4 MMOL/L (ref 3.5–5.1)
PROT SERPL-MCNC: 7.4 G/DL (ref 6–8.4)
RBC # BLD AUTO: 4.25 M/UL (ref 4–5.4)
SODIUM SERPL-SCNC: 139 MMOL/L (ref 136–145)
URATE SERPL-MCNC: 9.3 MG/DL (ref 2.4–5.7)
WBC # BLD AUTO: 5.32 K/UL (ref 3.9–12.7)

## 2020-11-16 PROCEDURE — 85025 COMPLETE CBC W/AUTO DIFF WBC: CPT | Mod: PO

## 2020-11-16 PROCEDURE — 86160 COMPLEMENT ANTIGEN: CPT | Mod: PO

## 2020-11-16 PROCEDURE — 84550 ASSAY OF BLOOD/URIC ACID: CPT

## 2020-11-16 PROCEDURE — 86140 C-REACTIVE PROTEIN: CPT | Mod: PO

## 2020-11-16 PROCEDURE — 99214 PR OFFICE/OUTPT VISIT, EST, LEVL IV, 30-39 MIN: ICD-10-PCS | Mod: 95,,, | Performed by: INTERNAL MEDICINE

## 2020-11-16 PROCEDURE — 99214 OFFICE O/P EST MOD 30 MIN: CPT | Mod: 95,,, | Performed by: INTERNAL MEDICINE

## 2020-11-16 PROCEDURE — 82550 ASSAY OF CK (CPK): CPT | Mod: PO

## 2020-11-16 PROCEDURE — 86160 COMPLEMENT ANTIGEN: CPT | Mod: 59,PO

## 2020-11-16 PROCEDURE — 80053 COMPREHEN METABOLIC PANEL: CPT | Mod: PO

## 2020-11-16 PROCEDURE — 1126F AMNT PAIN NOTED NONE PRSNT: CPT | Mod: ,,, | Performed by: INTERNAL MEDICINE

## 2020-11-16 PROCEDURE — 36415 COLL VENOUS BLD VENIPUNCTURE: CPT | Mod: PO

## 2020-11-16 PROCEDURE — 86225 DNA ANTIBODY NATIVE: CPT | Mod: PO

## 2020-11-16 PROCEDURE — 1126F PR PAIN SEVERITY QUANTIFIED, NO PAIN PRESENT: ICD-10-PCS | Mod: ,,, | Performed by: INTERNAL MEDICINE

## 2020-11-16 PROCEDURE — 85652 RBC SED RATE AUTOMATED: CPT

## 2020-11-16 ASSESSMENT — ROUTINE ASSESSMENT OF PATIENT INDEX DATA (RAPID3)
PAIN SCORE: 0
PATIENT GLOBAL ASSESSMENT SCORE: 0
MDHAQ FUNCTION SCORE: 0
AM STIFFNESS SCORE: 0, NO
FATIGUE SCORE: 0
TOTAL RAPID3 SCORE: 0
PSYCHOLOGICAL DISTRESS SCORE: 0

## 2020-11-16 NOTE — PROGRESS NOTES
Rapid3 Question Responses and Scores 11/13/2020   MDHAQ Score 0   Psychologic Score 0   Pain Score 0   When you awakened in the morning OVER THE LAST WEEK, did you feel stiff? No   Fatigue Score 0   Global Health Score 0   RAPID3 Score 0       Answers for HPI/ROS submitted by the patient on 11/13/2020   fever: No  eye redness: No  mouth sores: No  headaches: No  shortness of breath: No  chest pain: No  trouble swallowing: No  diarrhea: No  constipation: No  unexpected weight change: No  genital sore: No  dysuria: No  During the last 3 days, have you had a skin rash?: No  Bruises or bleeds easily: No  cough: No

## 2020-11-17 ENCOUNTER — PATIENT MESSAGE (OUTPATIENT)
Dept: RHEUMATOLOGY | Facility: CLINIC | Age: 45
End: 2020-11-17

## 2020-11-17 LAB
C3 SERPL-MCNC: 128 MG/DL (ref 50–180)
C4 SERPL-MCNC: 38 MG/DL (ref 11–44)
DSDNA AB SER-ACNC: NORMAL [IU]/ML

## 2020-11-30 ENCOUNTER — NUTRITION (OUTPATIENT)
Dept: NUTRITION | Facility: CLINIC | Age: 45
End: 2020-11-30
Payer: COMMERCIAL

## 2020-11-30 VITALS — HEIGHT: 65 IN | BODY MASS INDEX: 46.83 KG/M2 | WEIGHT: 281.06 LBS

## 2020-11-30 DIAGNOSIS — E66.01 MORBID OBESITY WITH BMI OF 45.0-49.9, ADULT: Primary | ICD-10-CM

## 2020-11-30 DIAGNOSIS — Z71.3 DIETARY COUNSELING: ICD-10-CM

## 2020-11-30 PROCEDURE — 97802 MEDICAL NUTRITION INDIV IN: CPT | Mod: S$GLB,,, | Performed by: DIETITIAN, REGISTERED

## 2020-11-30 PROCEDURE — 99999 PR PBB SHADOW E&M-EST. PATIENT-LVL III: ICD-10-PCS | Mod: PBBFAC,,,

## 2020-11-30 PROCEDURE — 99999 PR PBB SHADOW E&M-EST. PATIENT-LVL III: CPT | Mod: PBBFAC,,,

## 2020-11-30 PROCEDURE — 97802 PR MED NUTR THER, 1ST, INDIV, EA 15 MIN: ICD-10-PCS | Mod: S$GLB,,, | Performed by: DIETITIAN, REGISTERED

## 2020-11-30 NOTE — PROGRESS NOTES
"Referring Physician:Jacquelyn West MD     Reason for visit:  Chief Complaint   Patient presents with    Obesity    Gout    Nutrition Counseling      Initial Visit    :1975     Allergies Reviewed  Meds Reviewed    Anthropometrics  Weight:127.5 kg (281 lb 1.4 oz)  Height:5' 5" (1.651 m)  BMI:Body mass index is 46.78 kg/m².   IBW:   56.8 kg  +/-10%    Meds:  Outpatient Medications Prior to Visit   Medication Sig Dispense Refill    CALCIUM CARB/VITAMIN D3/VIT K1 (VIACTIV ORAL) Take by mouth once daily.       desoximetasone (TOPICORT) 0.25 % cream APPLY TO SCALP NIGHTLY FOR 1 WEEK THEN APPLY WEEKLY FOR MAINTENANCE      ergocalciferol, vitamin D2, (VITAMIN D2 ORAL) Take 5,000 Int'l Units by mouth once daily.      hydroCHLOROthiazide (HYDRODIURIL) 25 MG tablet Take 1 tablet (25 mg total) by mouth once daily. 30 tablet 11    hydroxychloroquine (PLAQUENIL) 200 mg tablet TAKE 1 TABLET BY MOUTH TWICE DAILY 60 tablet 5    multivitamin-Ca-iron-minerals (ONE-A-DAY WOMENS FORMULA) 27-0.4 mg Tab Take 1 tablet by mouth Daily.      mupirocin (BACTROBAN) 2 % ointment APPLY BID TO BIOPSY SITE ON SCALP       No facility-administered medications prior to visit.        Food/Drug Interactions Noted:  n/a    Vitamins/Supplements/Herbs:  MVI; Vit B-12; Calcium; Vit D    Labs:   Uric Acid  9.3     Nutrition Prescription:   1704 Kcals/day( 30 kcal/kg IBW),   46 g protein( 0.8 g/kg IBW)     Support System:    Pt and her mom live together; they share grocery shopping and cooking responsibilities    Diet Hx:   Pt had gastric bypass in . States she does not follow with bariatrics and has regained most of lost weight back.  Has been avoiding drinking Diet Cokes.  Snacks occasionally on chips primarily, and some fruit.  States she reads food labels for sodium/sugars.  States she broke a toe over the summer, and has since developed gout.  Has questions about foods/beverages recommended & to avoid for low purine diet.  States she " is willing to try to incorporate more vegetables into diet regimen; states favorite veggie is corn.  Is presently working from home.    Breakfast: 2 packets instant butter-flavored grits with morning star breakfast patties or sausage.  Coffee with cream and sugar or water or OJ.  Lunch:   Sometimes skips, or has leftovers, or popcorn.  Crystal Light or water.  Dinner:   Last night: spaghetti and meatballs.  Crystal Light.    Current activity level and/or physical limitations:    No exercise at this time; states she does have an exercise bike at home she could utilize    Motivation to make changes/anticipated barriers and/or expected adherence:   Pt seems interested in making changes to current diet regimen to promote weight loss    Nutrition-Focus Physical Findings:    Pt wearing face covering per COVID19 protocol; pt appears well nourished    Assessment:  Pt very attentive and asked numerous relevant questions about foods/beverages recommended & to avoid; can I drink alcohol?; reading food labels; sample meal plan and menus; healthy snacks; portion control; grocery shopping and cooking tips.  All questions answered, and she verbalized understanding of information.    Nutrition Diagnosis:   1) Obesity RT excess energy intake and physical inactivity AEB BMI > 40    2) Food- and nutrition-related knowledge deficit RT lack of prior exposure to information AEB dx gout      Recommendations:   1500 calorie, low fat, high fiber, low purine diet. Exercise goal:  30 minutes per day, 3-5 days per week as tolerated.  Handouts provided and reviewed: Low Purine/Purine-Restricted Nutrition Therapy;  Cardiac-TLC Nutrition Therapy; 1500 Calorie Sample 5-Day Menus; Weight Loss Tips; Cooking Tips for Weight Management; Get Fit Shopping List; Eat Fit Plan...Anytime/Anywhere;  Servings of Carbohydrates for Meal Planning; Walking Works; My Plate Planner;  Heart Healthy Eating:  Shopping Tips and Label Reading Tips; [facility] Snack List for  Diabetes        Strategies Implemented:    Portion control; read food labels    Consultation Time:45 minutes.  Communicated with referring healthcare provider:  Consult note available in pt's Epic chart per MD discretion  Follow Up:  Pt provided with dietitian contact number and advised to call with questions or make future appointment if further intervention needed.

## 2020-11-30 NOTE — PATIENT INSTRUCTIONS
1500 calorie, low fat, high fiber, low purine diet.  Exercise goal:  30 minutes per day, 3-5 days per week as tolerated.

## 2020-12-14 ENCOUNTER — CLINICAL SUPPORT (OUTPATIENT)
Dept: REHABILITATION | Facility: HOSPITAL | Age: 45
End: 2020-12-14
Attending: STUDENT IN AN ORGANIZED HEALTH CARE EDUCATION/TRAINING PROGRAM
Payer: COMMERCIAL

## 2020-12-14 DIAGNOSIS — I87.2 VENOUS INSUFFICIENCY OF BOTH LOWER EXTREMITIES: ICD-10-CM

## 2020-12-14 DIAGNOSIS — M79.89 SWELLING OF LOWER LIMB: ICD-10-CM

## 2020-12-14 DIAGNOSIS — I89.0 LYMPHEDEMA OF BOTH LOWER EXTREMITIES: Primary | ICD-10-CM

## 2020-12-14 DIAGNOSIS — E66.01 MORBID OBESITY WITH BMI OF 45.0-49.9, ADULT: ICD-10-CM

## 2020-12-14 DIAGNOSIS — R29.898 WEAKNESS OF BOTH LOWER EXTREMITIES: ICD-10-CM

## 2020-12-14 DIAGNOSIS — R60.0 BILATERAL LEG EDEMA: ICD-10-CM

## 2020-12-14 PROCEDURE — 97140 MANUAL THERAPY 1/> REGIONS: CPT | Mod: PO

## 2020-12-14 PROCEDURE — 97016 VASOPNEUMATIC DEVICE THERAPY: CPT | Mod: PO

## 2020-12-14 NOTE — PROGRESS NOTES
Physical Therapy Daily Treatment Note     Name: Nitin Mcconnell  Clinic Number: [id number]    Therapy Diagnosis:   Encounter Diagnoses   Name Primary?    Weakness of both lower extremities     Swelling of lower limb     Venous insufficiency of both lower extremities     Lymphedema of both lower extremities Yes    Morbid obesity with BMI of 45.0-49.9, adult     Bilateral leg edema      Physician: Jacquelyn West MD    Visit Date: 12/14/2020      Physician Orders: PT Eval and Treat - lymphedema  Medical Diagnosis from Referral:   Diagnosis   I89.0 (ICD-10-CM) - Lymphedema of both lower extremities   Evaluation Date: 11/9/2020  Authorization Period Expiration: 11/2/21  Plan of Care Expiration: 2/1/21  Visit # / Visits authorized: 2/ 20     Time In: 500p  Time Out: 605p  Total Billable Time: 65 minutes     Precautions: Standard and lupus, obesity    Subjective     Pt reports: purchasing capris compression and wears with knee high garments daily.  She was compliant with home exercise program.  Response to previous treatment: choose new compression  Functional change: has Lupus- manages well, likes Bioflect capris    Pain: 2/10  Location: bilateral LE - mild heaviness, some swelling in Left foot/toes      Objective     Obese female amb to dept no device, no compression- tennis shoes- pt admits daily compression- removed for PT session  Full thighs of obesity and body habitus  Amount of Swelling/Location of Swelling: mild/mod to B LE  L > R  Skin Integrity: dry, intact   Palpation/Texture: soft fullness, obesity, no ankle shelf - hips with lipedema characteristics   + Stemmer Sign  - Brittanie's Sign  Circulation: intact      Treatment:   Nitin received the following manual therapy techniques:- Manual Lymphatic Drainage were applied to the: L  LE for 45 minutes, including: MLD and short stretch compression bandaging   Advised on daily garment + capris Bioflect wear    MANUAL LYMPHATIC DRAINAGE (MLD):    While supine  with LEs elevated stimulation at terminus, along GI region, B inguinal regions, drainage of entire L LE james lower leg, ankle, and foot with return proximally,  Use of Aquaphor due to dryness.   Educated in self massage to abdominal areas, B inguinal areas, thigh, and remaining LE within reach.    SEQUENTIAL COMPRESSION PUMP: full leg sleeve applied to L LE  VES 5200 with default setting with distal pressures starting at 45mmHg entire LE    MULTILAYERED BANDAGING:  issued supplies and bandaged L LE with cotton stockinette, rosidal soft section dorsum of foot, 2 komprex wedges post malleoli, cellona cotton padding rolls ankle to knee, 1-8cm and 2- 10cm Rosidal K rolls foot to knee, to leave intact 12-24 hrs as tolerated, discontinue with any problems, return rolled bandages next session. Wash and wear schedules confirmed.     Nitin received therapeutic exercises to develop strength, endurance, ROM and flexibility for 5 minutes including: muscle pump assist while in bandaging  THERAPEUTIC EXERCISES:  Continue HEP of AROM, stretching, and postural correction.   Home Exercises Provided and Patient Education Provided     Education provided:    compression needs and wear schedule  PATIENT/FAMILY Education: bandaging wear schedule,  HEP,  Beginning of self massage,  Self or assisted bandaging, compression options, and Risk reduction    Written Home Exercises Provided: Patient instructed to cont prior HEP.  Exercises were reviewed and Nitin was able to demonstrate them prior to the end of the session.  Nitin demonstrated good  understanding of the education provided.       Assessment     Nitin is a 45 y.o. female referred to outpatient Physical Therapy with a medical diagnosis of   Diagnosis   I89.0 (ICD-10-CM) - Lymphedema of both lower extremities      This patient presents s/p gastric bypass with obesity, weight loss and weight gain, Lupus, and CVI  resulting in: multifactorial lymphedema of the B LE likely  secondary to obesity, increased pain, increased stiffness in the LE, as well as difficulty performing walking, cosmesis, clothing choice , compression needs, and placing the pt at higher risk of infection. Suggested small goals for weight management and activity tolerance while wearing compression.  Pt has already begun her compliance with recommended compression products.  Her L LE did show nice response to session and will initiate compression bandaging.  Pt is working on management of Lupus and weight with diet, exercise, and lifestyle choices. She has lipedema and obesity characteristics to her B LE.   Nitin is progressing well towards her goals.   Pt prognosis is Good.     Pt will continue to benefit from skilled outpatient physical therapy to address the deficits listed in the problem list box on initial evaluation, provide pt/family education and to maximize pt's level of independence in the home and community environment.     Pt's spiritual, cultural and educational needs considered and pt agreeable to plan of care and goals.    Anticipated Barriers for therapy: obesity, CVI lupus     The following goals were discussed with the patient and patient is in agreement with them as to be addressed in the treatment plan.      Short Term Goals: (6 weeks)  1. Patient will show decreased girth in B LE by up to 1 cm to allow for LE symmetry, shoe and clothing choice, and ability to apply needed compression.  (progressing, not met)   2. Patient will demonstrate 100% knowledge of lymphedema precautions and signs of infection to allow for reduced lymphedema risk, infection risk, and/or exacerbation of condition.  (progressing, not met)  3. Patient or caregiver will perform self-bandaging techniques and/or wearing of compression garments to allow for lymphatic drainage support, skin elasticity, and reduction in shape and size of limb. (progressing, not met)  4. Patient will perform self lymph drainage techniques to areas  within reach to enhance lymphatic drainage and skin condition.  (progressing, not met)  5. Patient will tolerate daily activities with multilayered bandaging to allow for lymphatic and venous support.  (progressing, not met)     Long Term Goals: (12  weeks)  1. Patient will show decreased girth in B LE by up to 2 cm  to allow for LE symmetry, shoe and clothing choice, and ability to apply needed compression daily.  (progressing, not met)  2. Patient will show reduction in density to mild or less with improved contour of limb to allow for cosmesis, LE symmetry, infection risk reduction, and clothing and compression choice.   (progressing, not met)  3. Patient to mitul/doff compression garment with daily compliance to assist in lymphedema management, skin elasticity, and tissue density.  (progressing, not met)  4. Pt to show improved postural awareness and alignment.  (progressing, not met)  5. Pt to be I and compliant with HEP to allow for increased function in affected limb.   (progressing, not met)    Plan   Continue PT  2x   weekly for Complete Decongestive Therapy:  Manual lymphatic drainage, Multilayered short stretch bandaging, Pneumatic compression, Therapeutic exercises, Patient education as deemed necessary to achieve stated goals.  Focus on compression needs  Start L LE and consider B LE as tolerated or as needed.     Yanira Koroma, PT

## 2020-12-16 ENCOUNTER — CLINICAL SUPPORT (OUTPATIENT)
Dept: REHABILITATION | Facility: HOSPITAL | Age: 45
End: 2020-12-16
Attending: STUDENT IN AN ORGANIZED HEALTH CARE EDUCATION/TRAINING PROGRAM
Payer: COMMERCIAL

## 2020-12-16 DIAGNOSIS — M79.89 SWELLING OF LOWER LIMB: ICD-10-CM

## 2020-12-16 DIAGNOSIS — I89.0 LYMPHEDEMA OF BOTH LOWER EXTREMITIES: Primary | ICD-10-CM

## 2020-12-16 DIAGNOSIS — I87.2 VENOUS INSUFFICIENCY OF BOTH LOWER EXTREMITIES: ICD-10-CM

## 2020-12-16 DIAGNOSIS — E66.01 MORBID OBESITY WITH BMI OF 45.0-49.9, ADULT: ICD-10-CM

## 2020-12-16 DIAGNOSIS — R60.0 BILATERAL LEG EDEMA: ICD-10-CM

## 2020-12-16 DIAGNOSIS — R29.898 WEAKNESS OF BOTH LOWER EXTREMITIES: ICD-10-CM

## 2020-12-16 PROCEDURE — 97140 MANUAL THERAPY 1/> REGIONS: CPT | Mod: PO

## 2020-12-16 PROCEDURE — 97016 VASOPNEUMATIC DEVICE THERAPY: CPT | Mod: PO

## 2020-12-16 NOTE — PROGRESS NOTES
Physical Therapy Daily Treatment Note     Name: Nitin Mcconnell  Clinic Number: [id number]    Therapy Diagnosis:   Encounter Diagnoses   Name Primary?    Weakness of both lower extremities     Swelling of lower limb     Venous insufficiency of both lower extremities     Lymphedema of both lower extremities Yes    Morbid obesity with BMI of 45.0-49.9, adult     Bilateral leg edema      Physician: Jacquelyn West MD    Visit Date: 12/16/2020      Physician Orders: PT Eval and Treat - lymphedema  Medical Diagnosis from Referral:   Diagnosis   I89.0 (ICD-10-CM) - Lymphedema of both lower extremities   Evaluation Date: 11/9/2020  Authorization Period Expiration: 11/2/21  Plan of Care Expiration: 2/1/21  Visit # / Visits authorized: 3/ 20     Time In: 450p  Time Out: 555p  Total Billable Time: 65 minutes     Precautions: Standard and lupus, obesity    Subjective     Pt reports: tolerating bandaging till next morning.   Pt wears capris compression with knee high garments daily.  She was compliant with home exercise program.  Response to previous treatment: bandaging and mild changes following session  Functional change: has Lupus- manages well, likes Bioflect capris- able to begin B  Relates medical history 2007 with ED to hospital to ICU with ventilation x 1 month - ultimately diagnosed with Lupus and stabilized with medications- effected kidneys, eyes, and skin rash- now stable but some lasting effects remains on Plaquenil     Pain: 2/10  Location: bilateral LE - mild heaviness, some swelling in Left foot/toes      Objective     Obese female amb to dept no device, no compression- tennis shoes- pt admits daily compression- removed for PT session  Full thighs of obesity and body habitus  Amount of Swelling/Location of Swelling: mild/mod to B LE  L > R  Skin Integrity: dry, intact   Palpation/Texture: soft fullness, obesity, no ankle shelf - hips with lipedema characteristics   + Stemmer Sign  - Brittanie's  Sign  Circulation: intact      Treatment:   Nitin received the following manual therapy techniques:- Manual Lymphatic Drainage were applied to the: L and R  LE for 55 minutes, including: MLD and short stretch compression bandaging   Advised on daily garment + capris Bioflect wear    MANUAL LYMPHATIC DRAINAGE (MLD):    While supine with LEs elevated stimulation at terminus, along GI region, B inguinal regions, drainage of entire R LE james lower leg, ankle, and foot with return proximally,  Use of Aquaphor due to dryness.   Educated in self massage to abdominal areas, B inguinal areas, thigh, and remaining LE within reach.    SEQUENTIAL COMPRESSION PUMP: full leg sleeve applied to one LE while simultaneously providing compression education and Manual Lymphatic Drainage, MLD, to other LE  VES 5200 with default setting with distal pressures starting at 45mmHg entire LE    MULTILAYERED BANDAGING:  issued supplies and bandaged L and R LE with cotton stockinette, rosidal soft section dorsum of foot, 2 komprex wedges post malleoli, cellona cotton padding rolls ankle to knee, 1-8cm and 2- 10cm Rosidal K rolls foot to knee, to leave intact 12-24 hrs as tolerated, discontinue with any problems, return rolled bandages next session. Wash and wear schedules confirmed.     Nitin received therapeutic exercises to develop strength, endurance, ROM and flexibility for 5 minutes including: muscle pump assist while in bandaging  THERAPEUTIC EXERCISES:  Continue HEP of AROM, stretching, and postural correction.   Home Exercises Provided and Patient Education Provided     Education provided:    compression needs and wear schedule  PATIENT/FAMILY Education: bandaging wear schedule,  HEP,  Beginning of self massage,  Self or assisted bandaging, compression options, and Risk reduction    Written Home Exercises Provided: Patient instructed to cont prior HEP.  Exercises were reviewed and Nitin was able to demonstrate them prior to the end  of the session.  Nitin demonstrated good  understanding of the education provided.   Assessment     Nitin is a 45 y.o. female referred to outpatient Physical Therapy with a medical diagnosis of   Diagnosis   I89.0 (ICD-10-CM) - Lymphedema of both lower extremities      This patient presents s/p gastric bypass with obesity, weight loss and weight gain, Lupus, and CVI  resulting in: multifactorial lymphedema of the B LE likely secondary to obesity, increased pain, increased stiffness in the LE, as well as difficulty performing walking, cosmesis, clothing choice , compression needs, and placing the pt at higher risk of infection. Suggested small goals for weight management and activity tolerance while wearing compression.    Complete Decongestive Therapy, CDT, including education and training in compression needs to support her B LE with obesity and size/shape changes post gastric bypass BMI 46.  Pt has been compliant with suggestions. Pt is working on management of Lupus and weight with diet, exercise, and lifestyle choices. She has lipedema and obesity characteristics to her B LE with significant Lupus history.  Nitin is progressing well towards her goals.   Pt prognosis is Good.     Pt will continue to benefit from skilled outpatient physical therapy to address the deficits listed in the problem list box on initial evaluation, provide pt/family education and to maximize pt's level of independence in the home and community environment.     Pt's spiritual, cultural and educational needs considered and pt agreeable to plan of care and goals.    Anticipated Barriers for therapy: obesity, CVI lupus     The following goals were discussed with the patient and patient is in agreement with them as to be addressed in the treatment plan.      Short Term Goals: (6 weeks)  1. Patient will show decreased girth in B LE by up to 1 cm to allow for LE symmetry, shoe and clothing choice, and ability to apply needed compression.   (progressing, not met)   2. Patient will demonstrate 100% knowledge of lymphedema precautions and signs of infection to allow for reduced lymphedema risk, infection risk, and/or exacerbation of condition.  (progressing, not met)  3. Patient or caregiver will perform self-bandaging techniques and/or wearing of compression garments to allow for lymphatic drainage support, skin elasticity, and reduction in shape and size of limb. (progressing, not met)  4. Patient will perform self lymph drainage techniques to areas within reach to enhance lymphatic drainage and skin condition.  (progressing, not met)  5. Patient will tolerate daily activities with multilayered bandaging to allow for lymphatic and venous support.  (progressing, not met)     Long Term Goals: (12  weeks)  1. Patient will show decreased girth in B LE by up to 2 cm  to allow for LE symmetry, shoe and clothing choice, and ability to apply needed compression daily.  (progressing, not met)  2. Patient will show reduction in density to mild or less with improved contour of limb to allow for cosmesis, LE symmetry, infection risk reduction, and clothing and compression choice.   (progressing, not met)  3. Patient to mitul/doff compression garment with daily compliance to assist in lymphedema management, skin elasticity, and tissue density.  (progressing, not met)  4. Pt to show improved postural awareness and alignment.  (progressing, not met)  5. Pt to be I and compliant with HEP to allow for increased function in affected limb.   (progressing, not met)    Plan   Continue PT  2x   weekly for Complete Decongestive Therapy:  Manual lymphatic drainage, Multilayered short stretch bandaging, Pneumatic compression, Therapeutic exercises, Patient education as deemed necessary to achieve stated goals.  Focus on compression needs  Yanira Koroma, PT

## 2020-12-18 NOTE — PROGRESS NOTES
Physical Therapy Daily Treatment Note     Name: Nitin Mcconnell  Clinic Number: [id number]    Therapy Diagnosis:   Encounter Diagnoses   Name Primary?    Weakness of both lower extremities     Swelling of lower limb     Venous insufficiency of both lower extremities     Lymphedema of both lower extremities Yes    Morbid obesity with BMI of 45.0-49.9, adult     Bilateral leg edema      Physician: Jacquelyn West MD    Visit Date: 12/21/2020      Physician Orders: PT Eval and Treat - lymphedema  Medical Diagnosis from Referral:   Diagnosis   I89.0 (ICD-10-CM) - Lymphedema of both lower extremities   Evaluation Date: 11/9/2020  Authorization Period Expiration: 11/2/21  Plan of Care Expiration: 2/1/21  Visit # / Visits authorized: 4/ 20     Time In: 5:25 pm  Time Out: 6:25 pm  Total Billable Time: 60 minutes     Precautions: Standard and lupus, obesity    Subjective     Pt reports: doing well and have been able to wear the bandages with no issues. Pt stated she has knee high compression and capris she wears daily - did not wear to therapy today because she knew she would be bandaged.   She was compliant with home exercise program.  Response to previous treatment: bandaging and mild changes following session  Functional change: has Lupus- manages well, likes Bioflect capris- able to begin B  Relates medical history 2007 with ED to hospital to ICU with ventilation x 1 month - ultimately diagnosed with Lupus and stabilized with medications- effected kidneys, eyes, and skin rash- now stable but some lasting effects remains on Plaquenil     Pain: 0/10  Location: bilateral LE - mild heaviness, some swelling in Left foot/toes      Objective     Obese female amb to dept no device, no compression- tennis shoes- pt admits daily compression- removed for PT session  Full thighs of obesity and body habitus  Amount of Swelling/Location of Swelling: mild/mod to B LE  L > R  Skin Integrity: dry, intact   Palpation/Texture: soft  fullness, obesity, no ankle shelf - hips with lipedema characteristics   + Stemmer Sign  - Brittanie's Sign  Circulation: intact      Treatment:   Nitin received the following manual therapy techniques:- Manual Lymphatic Drainage were applied to the: L and R  LE for 55 minutes, including: MLD and short stretch compression bandaging   Advised on daily garment + capris Bioflect wear    MANUAL LYMPHATIC DRAINAGE (MLD):    While supine with LEs elevated stimulation at terminus, along GI region, B inguinal regions, drainage of entire LLE james lower leg, ankle, and foot with return proximally,  Use of Aquaphor due to dryness.   Educated in self massage to abdominal areas, B inguinal areas, thigh, and remaining LE within reach.    SEQUENTIAL COMPRESSION PUMP: full leg sleeve applied to one LE while simultaneously providing compression education and Manual Lymphatic Drainage, MLD, to other LE  VES 5200 with default setting with distal pressures starting at 45mmHg entire LE    MULTILAYERED BANDAGING:  issued supplies and bandaged L and R LE with cotton stockinette, rosidal soft section dorsum of foot, 2 komprex wedges post malleoli, cellona cotton padding rolls ankle to knee, 1-8cm and 2- 10cm Rosidal K rolls foot to knee, to leave intact 12-24 hrs as tolerated, discontinue with any problems, return rolled bandages next session. Wash and wear schedules confirmed.     Nitin received therapeutic exercises to develop strength, endurance, ROM and flexibility for 5 minutes including: muscle pump assist while in bandaging  THERAPEUTIC EXERCISES:  Continue HEP of AROM, stretching, and postural correction.   Home Exercises Provided and Patient Education Provided     Education provided:    compression needs and wear schedule  PATIENT/FAMILY Education: bandaging wear schedule,  HEP,  Beginning of self massage,  Self or assisted bandaging, compression options, and Risk reduction    Written Home Exercises Provided: Patient instructed to  cont prior HEP.  Exercises were reviewed and Nitin was able to demonstrate them prior to the end of the session.  Nitin demonstrated good  understanding of the education provided.   Assessment     Nitin is a 45 y.o. female referred to outpatient Physical Therapy with a medical diagnosis of   Diagnosis   I89.0 (ICD-10-CM) - Lymphedema of both lower extremities      This patient presents s/p gastric bypass with obesity, weight loss and weight gain, Lupus, and CVI  resulting in: multifactorial lymphedema of the B LE likely secondary to obesity, increased pain, increased stiffness in the LE, as well as difficulty performing walking, cosmesis, clothing choice , compression needs, and placing the pt at higher risk of infection. Suggested small goals for weight management and activity tolerance while wearing compression.    Pt with a good tolerance to therapy sofar and reports noticing already less swelling in her legs since start of therapy . Continuing to progress Complete Decongestive Therapy, CDT, including education and training in compression needs to support her B LE with obesity and size/shape changes post gastric bypass BMI 46. Pt is working on management of Lupus and weight with diet, exercise, and lifestyle choices. She has lipedema and obesity characteristics to her B LE with significant Lupus history.    Nitin is progressing well towards her goals.   Pt prognosis is Good.     Pt will continue to benefit from skilled outpatient physical therapy to address the deficits listed in the problem list box on initial evaluation, provide pt/family education and to maximize pt's level of independence in the home and community environment.     Pt's spiritual, cultural and educational needs considered and pt agreeable to plan of care and goals.    Anticipated Barriers for therapy: obesity, CVI lupus     The following goals were discussed with the patient and patient is in agreement with them as to be addressed in  the treatment plan.      Short Term Goals: (6 weeks)  1. Patient will show decreased girth in B LE by up to 1 cm to allow for LE symmetry, shoe and clothing choice, and ability to apply needed compression.  (progressing, not met)   2. Patient will demonstrate 100% knowledge of lymphedema precautions and signs of infection to allow for reduced lymphedema risk, infection risk, and/or exacerbation of condition.  (progressing, not met)  3. Patient or caregiver will perform self-bandaging techniques and/or wearing of compression garments to allow for lymphatic drainage support, skin elasticity, and reduction in shape and size of limb. (progressing, not met)  4. Patient will perform self lymph drainage techniques to areas within reach to enhance lymphatic drainage and skin condition.  (progressing, not met)  5. Patient will tolerate daily activities with multilayered bandaging to allow for lymphatic and venous support.  (progressing, not met)     Long Term Goals: (12  weeks)  1. Patient will show decreased girth in B LE by up to 2 cm  to allow for LE symmetry, shoe and clothing choice, and ability to apply needed compression daily.  (progressing, not met)  2. Patient will show reduction in density to mild or less with improved contour of limb to allow for cosmesis, LE symmetry, infection risk reduction, and clothing and compression choice.   (progressing, not met)  3. Patient to mitul/doff compression garment with daily compliance to assist in lymphedema management, skin elasticity, and tissue density.  (progressing, not met)  4. Pt to show improved postural awareness and alignment.  (progressing, not met)  5. Pt to be I and compliant with HEP to allow for increased function in affected limb.   (progressing, not met)    Plan   Continue PT  2x   weekly for Complete Decongestive Therapy:  Manual lymphatic drainage, Multilayered short stretch bandaging, Pneumatic compression, Therapeutic exercises, Patient education as deemed  necessary to achieve stated goals.  Focus on compression needs  Sushma Guillen, PTA

## 2020-12-21 ENCOUNTER — CLINICAL SUPPORT (OUTPATIENT)
Dept: REHABILITATION | Facility: HOSPITAL | Age: 45
End: 2020-12-21
Attending: STUDENT IN AN ORGANIZED HEALTH CARE EDUCATION/TRAINING PROGRAM
Payer: COMMERCIAL

## 2020-12-21 DIAGNOSIS — R60.0 BILATERAL LEG EDEMA: ICD-10-CM

## 2020-12-21 DIAGNOSIS — M79.89 SWELLING OF LOWER LIMB: ICD-10-CM

## 2020-12-21 DIAGNOSIS — I89.0 LYMPHEDEMA OF BOTH LOWER EXTREMITIES: Primary | ICD-10-CM

## 2020-12-21 DIAGNOSIS — E66.01 MORBID OBESITY WITH BMI OF 45.0-49.9, ADULT: ICD-10-CM

## 2020-12-21 DIAGNOSIS — R29.898 WEAKNESS OF BOTH LOWER EXTREMITIES: ICD-10-CM

## 2020-12-21 DIAGNOSIS — I87.2 VENOUS INSUFFICIENCY OF BOTH LOWER EXTREMITIES: ICD-10-CM

## 2020-12-21 PROCEDURE — 97535 SELF CARE MNGMENT TRAINING: CPT | Mod: PO,CQ

## 2020-12-21 PROCEDURE — 97140 MANUAL THERAPY 1/> REGIONS: CPT | Mod: PO,CQ

## 2020-12-23 ENCOUNTER — CLINICAL SUPPORT (OUTPATIENT)
Dept: REHABILITATION | Facility: HOSPITAL | Age: 45
End: 2020-12-23
Attending: STUDENT IN AN ORGANIZED HEALTH CARE EDUCATION/TRAINING PROGRAM
Payer: COMMERCIAL

## 2020-12-23 DIAGNOSIS — R29.898 WEAKNESS OF BOTH LOWER EXTREMITIES: ICD-10-CM

## 2020-12-23 DIAGNOSIS — I87.2 VENOUS INSUFFICIENCY OF BOTH LOWER EXTREMITIES: ICD-10-CM

## 2020-12-23 DIAGNOSIS — I89.0 LYMPHEDEMA OF BOTH LOWER EXTREMITIES: Primary | ICD-10-CM

## 2020-12-23 DIAGNOSIS — E66.01 MORBID OBESITY WITH BMI OF 45.0-49.9, ADULT: ICD-10-CM

## 2020-12-23 DIAGNOSIS — R60.0 BILATERAL LEG EDEMA: ICD-10-CM

## 2020-12-23 DIAGNOSIS — M79.89 SWELLING OF LOWER LIMB: ICD-10-CM

## 2020-12-23 PROCEDURE — 97140 MANUAL THERAPY 1/> REGIONS: CPT | Mod: PO

## 2020-12-23 PROCEDURE — 97016 VASOPNEUMATIC DEVICE THERAPY: CPT | Mod: PO

## 2020-12-23 NOTE — PROGRESS NOTES
Physical Therapy Daily Treatment Note     Name: Nitin Mcconnell  Clinic Number: [id number]    Therapy Diagnosis:   Encounter Diagnoses   Name Primary?    Weakness of both lower extremities     Swelling of lower limb     Venous insufficiency of both lower extremities     Lymphedema of both lower extremities Yes    Morbid obesity with BMI of 45.0-49.9, adult     Bilateral leg edema      Physician: Jacquelyn West MD    Visit Date: 12/23/2020      Physician Orders: PT Eval and Treat - lymphedema  Medical Diagnosis from Referral:   Diagnosis   I89.0 (ICD-10-CM) - Lymphedema of both lower extremities   Evaluation Date: 11/9/2020  Authorization Period Expiration: 11/2/21  Plan of Care Expiration: 2/1/21  Visit # / Visits authorized: 5/ 20     Time In: 445p  Time Out: 545p  Total Billable Time: 60 minutes     Precautions: Standard and lupus, obesity    Subjective     Pt reports: doing well with management plan.  Pt wears capris compression with knee high garments daily.  She was compliant with home exercise program.  Response to previous treatment: good tolerance to compression.  Functional change: has Lupus- manages   Feels her compression choices are helpful  Lupus remains on Plaquenil     Pain: 2/10  Location: bilateral LE - mild heaviness, some swelling in Left foot/toes      Objective     Obese female amb to dept no device, no compression- tennis shoes- pt admits daily compression- removed for PT session  Full thighs of obesity and body habitus  Amount of Swelling/Location of Swelling: mild/mod to B LE  L > R  Skin Integrity: dry, intact   Palpation/Texture: soft fullness, obesity, no ankle shelf - hips with lipedema characteristics   + Stemmer Sign  - Brittanie's Sign  Circulation: intact     Girth Measurements (in centimeters)  LANDMARK LEFT LE  11/9/20 L LE  12/23/20 Diff  Of L RIGHT LE  11/9/20 R LE  12/23/20 Diff   Of R DIFF   at eval   SBP + 20 84.0 cm - - 83.0 cm - - 1.0 cm   SBP + 10  78.0 cm 77.0 1.0 74.0  cm 74.0 0 4.0 cm   SBP 67.0 cm 63.0 4.0 62.0 cm 60.0 2.0 5.0 cm   10 below SBP 54.0 cm 52.5 1.5 53.0 cm 51.0 2.0 1.0 cm   20 below SBP 52.0 cm 47.0 5.0 48.0 cm 45.0 3.0 4.0 cm   30 below SBP 41.0 cm 37.0 4.0 37.0 cm 35.0 2.0 4.0 cm   35 below SBP 34.5 cm 31.5 3.0 31.0 cm 29.5 1.5 3.5 cm   Ankle 31.0 cm 29.5 1.5 31.0 cm 29.5 1.5 0 cm   Forefoot 24.0 cm 23.5 0.5 23.5 cm 22.5 1.0 0.5 cm      Treatment:   Nitin received the following manual therapy techniques:- Manual Lymphatic Drainage were applied to the: L and R  LE for 55 minutes, including: MLD and short stretch compression bandaging   Advised on daily garment + capris Bioflect wear    MANUAL LYMPHATIC DRAINAGE (MLD):    While supine with LEs elevated stimulation at terminus, along GI region, B inguinal regions, drainage of entire R LE james lower leg, ankle, and foot with return proximally,  Use of Aquaphor due to dryness.   Educated in self massage to abdominal areas, B inguinal areas, thigh, and remaining LE within reach.    SEQUENTIAL COMPRESSION PUMP: full leg sleeve applied to one LE while simultaneously providing compression education and Manual Lymphatic Drainage, MLD, to other LE  VES 5200 with default setting with distal pressures starting at 45mmHg entire LE    MULTILAYERED BANDAGING:  issued supplies and bandaged L and R LE with cotton stockinette, rosidal soft section dorsum of foot, 2 komprex wedges post malleoli, cellona cotton padding rolls ankle to knee, 1-8cm and 2- 10cm Rosidal K rolls foot to knee, to leave intact 12-24 hrs as tolerated, discontinue with any problems, return rolled bandages next session. Wash and wear schedules confirmed.     Nitin received therapeutic exercises to develop strength, endurance, ROM and flexibility for 5 minutes including: muscle pump assist while in bandaging  THERAPEUTIC EXERCISES:  Continue HEP of AROM, stretching, and postural correction.   Home Exercises Provided and Patient Education Provided     Education  provided:    compression needs and wear schedule  PATIENT/FAMILY Education: bandaging wear schedule,  HEP,  Beginning of self massage,  Self or assisted bandaging, compression options, and Risk reduction    Written Home Exercises Provided: Patient instructed to cont prior HEP.  Exercises were reviewed and Nitin was able to demonstrate them prior to the end of the session.  Nitin demonstrated good  understanding of the education provided.   Assessment     Nitin is a 45 y.o. female referred to outpatient Physical Therapy with a medical diagnosis of   Diagnosis   I89.0 (ICD-10-CM) - Lymphedema of both lower extremities      This patient presents s/p gastric bypass with obesity, weight loss and weight gain, Lupus, and CVI  resulting in: multifactorial lymphedema of the B LE likely secondary to obesity, increased pain, increased stiffness in the LE, as well as difficulty performing walking, cosmesis, clothing choice , compression needs, and placing the pt at higher risk of infection. Suggested small goals for weight management and activity tolerance while wearing compression.    Pt has shown some progress with her B LE being softer and smaller post sessions. She has been compliant with daily garments and capris for full LE support.  Obesity and Lupus likely contribute to her LE size/shape and response.   Pt has been compliant with suggestions. Pt is working on management of Lupus and weight with diet, exercise, and lifestyle choices. She has lipedema and obesity characteristics to her B LE with significant Lupus history.  Nitin is progressing well towards her goals.   Pt prognosis is Good.     Pt will continue to benefit from skilled outpatient physical therapy to address the deficits listed in the problem list box on initial evaluation, provide pt/family education and to maximize pt's level of independence in the home and community environment.     Pt's spiritual, cultural and educational needs considered and pt  agreeable to plan of care and goals.    Anticipated Barriers for therapy: obesity, CVI lupus     The following goals were discussed with the patient and patient is in agreement with them as to be addressed in the treatment plan.      Short Term Goals: (6 weeks)  1. Patient will show decreased girth in B LE by up to 1 cm to allow for LE symmetry, shoe and clothing choice, and ability to apply needed compression.  ( met)   2. Patient will demonstrate 100% knowledge of lymphedema precautions and signs of infection to allow for reduced lymphedema risk, infection risk, and/or exacerbation of condition.  ( met)  3. Patient or caregiver will perform self-bandaging techniques and/or wearing of compression garments to allow for lymphatic drainage support, skin elasticity, and reduction in shape and size of limb.  met)  4. Patient will perform self lymph drainage techniques to areas within reach to enhance lymphatic drainage and skin condition.  (progressing  5. Patient will tolerate daily activities with multilayered bandaging to allow for lymphatic and venous support.  (met)     Long Term Goals: (12  weeks)  1. Patient will show decreased girth in B LE by up to 2 cm  to allow for LE symmetry, shoe and clothing choice, and ability to apply needed compression daily.  (progressing, met in several areas  2. Patient will show reduction in density to mild or less with improved contour of limb to allow for cosmesis, LE symmetry, infection risk reduction, and clothing and compression choice.   (progressing,)  3. Patient to mitul/doff compression garment with daily compliance to assist in lymphedema management, skin elasticity, and tissue density.   met)  4. Pt to show improved postural awareness and alignment.  met)  5. Pt to be I and compliant with HEP to allow for increased function in affected limb.   met)    Plan   Continue PT  2x   weekly for Complete Decongestive Therapy:  Manual lymphatic drainage, Multilayered short stretch  bandaging, Pneumatic compression, Therapeutic exercises, Patient education as deemed necessary to achieve stated goals.  Focus on compression needs  Consider 1-2 more visits and discharge planning in place.  Yanira Koroma, PT

## 2020-12-28 ENCOUNTER — CLINICAL SUPPORT (OUTPATIENT)
Dept: REHABILITATION | Facility: HOSPITAL | Age: 45
End: 2020-12-28
Attending: STUDENT IN AN ORGANIZED HEALTH CARE EDUCATION/TRAINING PROGRAM
Payer: COMMERCIAL

## 2020-12-28 DIAGNOSIS — I87.2 VENOUS INSUFFICIENCY OF BOTH LOWER EXTREMITIES: ICD-10-CM

## 2020-12-28 DIAGNOSIS — M79.89 SWELLING OF LOWER LIMB: ICD-10-CM

## 2020-12-28 DIAGNOSIS — R29.898 WEAKNESS OF BOTH LOWER EXTREMITIES: ICD-10-CM

## 2020-12-28 DIAGNOSIS — R60.0 BILATERAL LEG EDEMA: ICD-10-CM

## 2020-12-28 DIAGNOSIS — I89.0 LYMPHEDEMA OF BOTH LOWER EXTREMITIES: Primary | ICD-10-CM

## 2020-12-28 DIAGNOSIS — E66.01 MORBID OBESITY WITH BMI OF 45.0-49.9, ADULT: ICD-10-CM

## 2020-12-28 PROCEDURE — 97016 VASOPNEUMATIC DEVICE THERAPY: CPT | Mod: PO,CQ

## 2020-12-28 PROCEDURE — 97140 MANUAL THERAPY 1/> REGIONS: CPT | Mod: PO,CQ

## 2020-12-28 PROCEDURE — 97535 SELF CARE MNGMENT TRAINING: CPT | Mod: PO,CQ

## 2020-12-28 NOTE — PROGRESS NOTES
Physical Therapy Daily Treatment Note     Name: Nitin Mcconnell  Clinic Number: [id number]    Therapy Diagnosis:   Encounter Diagnoses   Name Primary?    Weakness of both lower extremities     Swelling of lower limb     Venous insufficiency of both lower extremities     Lymphedema of both lower extremities Yes    Morbid obesity with BMI of 45.0-49.9, adult     Bilateral leg edema      Physician: Jacquelyn West MD    Visit Date: 12/28/2020      Physician Orders: PT Eval and Treat - lymphedema  Medical Diagnosis from Referral:   Diagnosis   I89.0 (ICD-10-CM) - Lymphedema of both lower extremities   Evaluation Date: 11/9/2020  Authorization Period Expiration: 11/2/21  Plan of Care Expiration: 2/1/21  Visit # / Visits authorized: 6/ 20     Time In: 5:20 pm  Time Out: 6:20 pm  Total Billable Time: 60 minutes      Precautions: Standard and lupus, obesity    Subjective     Pt reports: Yanira measured her leg at her last session and she was very happy with the progress she has made.   Pt reports compliance with wearing her compression daily.     She was compliant with home exercise program.  Response to previous treatment: good tolerance to compression.  Functional change: has Lupus- manages   Feels her compression choices are helpful  Lupus remains on Plaquenil     Pain: 0/10  Location: bilateral LE - mild heaviness, some swelling in Left foot/toes      Objective     Obese female amb to dept no device, no compression- tennis shoes- pt admits daily compression- removed for PT session  Full thighs of obesity and body habitus  Amount of Swelling/Location of Swelling: mild/mod to B LE  L > R  Skin Integrity: dry, intact   Palpation/Texture: soft fullness, obesity, no ankle shelf - hips with lipedema characteristics   + Stemmer Sign  - Brittanie's Sign  Circulation: intact     Treatment:   Nitin received the following manual therapy techniques:- Manual Lymphatic Drainage were applied to the: L and R  LE for 55 minutes,  including: MLD and short stretch compression bandaging   Advised on daily garment + capris Bioflect wear    MANUAL LYMPHATIC DRAINAGE (MLD):    While supine with LEs elevated stimulation at terminus, along GI region, B inguinal regions, drainage of entire R LE james lower leg, ankle, and foot with return proximally,  Use of Aquaphor due to dryness.   Educated in self massage to abdominal areas, B inguinal areas, thigh, and remaining LE within reach.    SEQUENTIAL COMPRESSION PUMP: full leg sleeve applied to one LE while simultaneously providing compression education and Manual Lymphatic Drainage, MLD, to other LE  VES 5200 with default setting with distal pressures starting at 45mmHg entire LE    MULTILAYERED BANDAGING:  issued supplies and bandaged L and R LE with cotton stockinette, rosidal soft section dorsum of foot, 2 komprex wedges post malleoli, cellona cotton padding rolls ankle to knee, 1-8cm and 2- 10cm Rosidal K rolls foot to knee, to leave intact 12-24 hrs as tolerated, discontinue with any problems, return rolled bandages next session. Wash and wear schedules confirmed.     Nitin received therapeutic exercises to develop strength, endurance, ROM and flexibility for 5 minutes including: muscle pump assist while in bandaging  THERAPEUTIC EXERCISES:  Continue HEP of AROM, stretching, and postural correction.     Home Exercises Provided and Patient Education Provided     Education provided:   Compression needs and wear schedule    PATIENT/FAMILY Education: bandaging wear schedule,  HEP,  Beginning of self massage,  Self or assisted bandaging, compression options, and Risk reduction    Written Home Exercises Provided: Patient instructed to cont prior HEP.  Exercises were reviewed and Nitin was able to demonstrate them prior to the end of the session.  Nitin demonstrated good  understanding of the education provided.   Assessment     Nitin is a 45 y.o. female referred to outpatient Physical Therapy with  a medical diagnosis of   Diagnosis   I89.0 (ICD-10-CM) - Lymphedema of both lower extremities      This patient presents s/p gastric bypass with obesity, weight loss and weight gain, Lupus, and CVI  resulting in: multifactorial lymphedema of the B LE likely secondary to obesity, increased pain, increased stiffness in the LE, as well as difficulty performing walking, cosmesis, clothing choice , compression needs, and placing the pt at higher risk of infection. Suggested small goals for weight management and activity tolerance while wearing compression.    Pt has been responding very well and her legs have shown some reduction since start of therapy. She has remained compliant with use of her knee high compression and capris to complement for full LE support.     Pt is working on management of Lupus and weight with diet, exercise, and lifestyle choices. She has lipedema and obesity characteristics to her B LE with significant Lupus history. Pt is happy with her compression options and is doing well with self/home management - DC planning in process .     Nitin is progressing well towards her goals.   Pt prognosis is Good.     Pt will continue to benefit from skilled outpatient physical therapy to address the deficits listed in the problem list box on initial evaluation, provide pt/family education and to maximize pt's level of independence in the home and community environment.     Pt's spiritual, cultural and educational needs considered and pt agreeable to plan of care and goals.    Anticipated Barriers for therapy: obesity, CVI lupus     The following goals were discussed with the patient and patient is in agreement with them as to be addressed in the treatment plan.      Short Term Goals: (6 weeks)  1. Patient will show decreased girth in B LE by up to 1 cm to allow for LE symmetry, shoe and clothing choice, and ability to apply needed compression.  ( met)   2. Patient will demonstrate 100% knowledge of  lymphedema precautions and signs of infection to allow for reduced lymphedema risk, infection risk, and/or exacerbation of condition.  ( met)  3. Patient or caregiver will perform self-bandaging techniques and/or wearing of compression garments to allow for lymphatic drainage support, skin elasticity, and reduction in shape and size of limb.  met)  4. Patient will perform self lymph drainage techniques to areas within reach to enhance lymphatic drainage and skin condition.  (progressing  5. Patient will tolerate daily activities with multilayered bandaging to allow for lymphatic and venous support.  (met)     Long Term Goals: (12  weeks)  1. Patient will show decreased girth in B LE by up to 2 cm  to allow for LE symmetry, shoe and clothing choice, and ability to apply needed compression daily.  (progressing, met in several areas  2. Patient will show reduction in density to mild or less with improved contour of limb to allow for cosmesis, LE symmetry, infection risk reduction, and clothing and compression choice.   (progressing,)  3. Patient to mitul/doff compression garment with daily compliance to assist in lymphedema management, skin elasticity, and tissue density.   met)  4. Pt to show improved postural awareness and alignment.  met)  5. Pt to be I and compliant with HEP to allow for increased function in affected limb.   met)    Plan   Continue PT  2x   weekly for Complete Decongestive Therapy:  Manual lymphatic drainage, Multilayered short stretch bandaging, Pneumatic compression, Therapeutic exercises, Patient education as deemed necessary to achieve stated goals.    Sushma Guillen, PTA

## 2021-02-01 ENCOUNTER — CLINICAL SUPPORT (OUTPATIENT)
Dept: REHABILITATION | Facility: HOSPITAL | Age: 46
End: 2021-02-01
Attending: STUDENT IN AN ORGANIZED HEALTH CARE EDUCATION/TRAINING PROGRAM
Payer: COMMERCIAL

## 2021-02-01 ENCOUNTER — OFFICE VISIT (OUTPATIENT)
Dept: CARDIOLOGY | Facility: CLINIC | Age: 46
End: 2021-02-01
Payer: COMMERCIAL

## 2021-02-01 VITALS
BODY MASS INDEX: 46.83 KG/M2 | HEART RATE: 67 BPM | WEIGHT: 281.06 LBS | HEIGHT: 65 IN | SYSTOLIC BLOOD PRESSURE: 138 MMHG | DIASTOLIC BLOOD PRESSURE: 72 MMHG

## 2021-02-01 DIAGNOSIS — E66.01 MORBID OBESITY WITH BMI OF 45.0-49.9, ADULT: ICD-10-CM

## 2021-02-01 DIAGNOSIS — I10 ESSENTIAL HYPERTENSION: Primary | ICD-10-CM

## 2021-02-01 DIAGNOSIS — R60.0 BILATERAL LEG EDEMA: ICD-10-CM

## 2021-02-01 DIAGNOSIS — I89.0 LYMPHEDEMA OF BOTH LOWER EXTREMITIES: ICD-10-CM

## 2021-02-01 DIAGNOSIS — R29.898 WEAKNESS OF BOTH LOWER EXTREMITIES: ICD-10-CM

## 2021-02-01 DIAGNOSIS — E66.01 CLASS 3 SEVERE OBESITY DUE TO EXCESS CALORIES WITH SERIOUS COMORBIDITY IN ADULT, UNSPECIFIED BMI: ICD-10-CM

## 2021-02-01 DIAGNOSIS — I87.2 VENOUS INSUFFICIENCY OF BOTH LOWER EXTREMITIES: ICD-10-CM

## 2021-02-01 DIAGNOSIS — M79.89 SWELLING OF LOWER LIMB: ICD-10-CM

## 2021-02-01 DIAGNOSIS — I89.0 LYMPHEDEMA OF BOTH LOWER EXTREMITIES: Primary | ICD-10-CM

## 2021-02-01 DIAGNOSIS — R60.0 LOWER EXTREMITY EDEMA: ICD-10-CM

## 2021-02-01 DIAGNOSIS — M32.9 SYSTEMIC LUPUS ERYTHEMATOSUS, UNSPECIFIED SLE TYPE, UNSPECIFIED ORGAN INVOLVEMENT STATUS: ICD-10-CM

## 2021-02-01 DIAGNOSIS — G47.33 OBSTRUCTIVE SLEEP APNEA: ICD-10-CM

## 2021-02-01 PROCEDURE — 1126F PR PAIN SEVERITY QUANTIFIED, NO PAIN PRESENT: ICD-10-PCS | Mod: S$GLB,,, | Performed by: INTERNAL MEDICINE

## 2021-02-01 PROCEDURE — 99999 PR PBB SHADOW E&M-EST. PATIENT-LVL IV: CPT | Mod: PBBFAC,,, | Performed by: INTERNAL MEDICINE

## 2021-02-01 PROCEDURE — 3075F PR MOST RECENT SYSTOLIC BLOOD PRESS GE 130-139MM HG: ICD-10-PCS | Mod: CPTII,S$GLB,, | Performed by: INTERNAL MEDICINE

## 2021-02-01 PROCEDURE — 3008F PR BODY MASS INDEX (BMI) DOCUMENTED: ICD-10-PCS | Mod: CPTII,S$GLB,, | Performed by: INTERNAL MEDICINE

## 2021-02-01 PROCEDURE — 99999 PR PBB SHADOW E&M-EST. PATIENT-LVL IV: ICD-10-PCS | Mod: PBBFAC,,, | Performed by: INTERNAL MEDICINE

## 2021-02-01 PROCEDURE — 3078F DIAST BP <80 MM HG: CPT | Mod: CPTII,S$GLB,, | Performed by: INTERNAL MEDICINE

## 2021-02-01 PROCEDURE — 99215 OFFICE O/P EST HI 40 MIN: CPT | Mod: S$GLB,,, | Performed by: INTERNAL MEDICINE

## 2021-02-01 PROCEDURE — 99215 PR OFFICE/OUTPT VISIT, EST, LEVL V, 40-54 MIN: ICD-10-PCS | Mod: S$GLB,,, | Performed by: INTERNAL MEDICINE

## 2021-02-01 PROCEDURE — 3008F BODY MASS INDEX DOCD: CPT | Mod: CPTII,S$GLB,, | Performed by: INTERNAL MEDICINE

## 2021-02-01 PROCEDURE — 97140 MANUAL THERAPY 1/> REGIONS: CPT

## 2021-02-01 PROCEDURE — 1126F AMNT PAIN NOTED NONE PRSNT: CPT | Mod: S$GLB,,, | Performed by: INTERNAL MEDICINE

## 2021-02-01 PROCEDURE — 97016 VASOPNEUMATIC DEVICE THERAPY: CPT

## 2021-02-01 PROCEDURE — 3075F SYST BP GE 130 - 139MM HG: CPT | Mod: CPTII,S$GLB,, | Performed by: INTERNAL MEDICINE

## 2021-02-01 PROCEDURE — 3078F PR MOST RECENT DIASTOLIC BLOOD PRESSURE < 80 MM HG: ICD-10-PCS | Mod: CPTII,S$GLB,, | Performed by: INTERNAL MEDICINE

## 2021-02-11 ENCOUNTER — PATIENT MESSAGE (OUTPATIENT)
Dept: INTERNAL MEDICINE | Facility: CLINIC | Age: 46
End: 2021-02-11

## 2021-02-11 DIAGNOSIS — E66.01 CLASS 3 SEVERE OBESITY DUE TO EXCESS CALORIES WITHOUT SERIOUS COMORBIDITY WITH BODY MASS INDEX (BMI) OF 45.0 TO 49.9 IN ADULT: ICD-10-CM

## 2021-02-11 DIAGNOSIS — R06.02 SOB (SHORTNESS OF BREATH): ICD-10-CM

## 2021-02-11 DIAGNOSIS — M32.9 SYSTEMIC LUPUS ERYTHEMATOSUS, UNSPECIFIED SLE TYPE, UNSPECIFIED ORGAN INVOLVEMENT STATUS: ICD-10-CM

## 2021-02-12 ENCOUNTER — PATIENT MESSAGE (OUTPATIENT)
Dept: INTERNAL MEDICINE | Facility: CLINIC | Age: 46
End: 2021-02-12

## 2021-02-12 RX ORDER — HYDROXYCHLOROQUINE SULFATE 200 MG/1
200 TABLET, FILM COATED ORAL 2 TIMES DAILY
Qty: 60 TABLET | Refills: 5 | Status: CANCELLED | OUTPATIENT
Start: 2021-02-12

## 2021-02-12 RX ORDER — HYDROCHLOROTHIAZIDE 25 MG/1
25 TABLET ORAL DAILY
Qty: 30 TABLET | Refills: 5 | Status: SHIPPED | OUTPATIENT
Start: 2021-02-12 | End: 2021-08-05

## 2021-02-17 ENCOUNTER — LAB VISIT (OUTPATIENT)
Dept: LAB | Facility: HOSPITAL | Age: 46
End: 2021-02-17
Attending: INTERNAL MEDICINE
Payer: COMMERCIAL

## 2021-02-17 ENCOUNTER — OFFICE VISIT (OUTPATIENT)
Dept: CARDIOLOGY | Facility: CLINIC | Age: 46
End: 2021-02-17
Payer: COMMERCIAL

## 2021-02-17 VITALS
HEIGHT: 65 IN | OXYGEN SATURATION: 99 % | BODY MASS INDEX: 46.39 KG/M2 | HEART RATE: 66 BPM | WEIGHT: 278.44 LBS | SYSTOLIC BLOOD PRESSURE: 136 MMHG | DIASTOLIC BLOOD PRESSURE: 88 MMHG

## 2021-02-17 DIAGNOSIS — Z98.84 S/P GASTRIC BYPASS: ICD-10-CM

## 2021-02-17 DIAGNOSIS — I89.0 LYMPHEDEMA OF BOTH LOWER EXTREMITIES: ICD-10-CM

## 2021-02-17 DIAGNOSIS — G47.33 OBSTRUCTIVE SLEEP APNEA: ICD-10-CM

## 2021-02-17 DIAGNOSIS — M32.9 SYSTEMIC LUPUS ERYTHEMATOSUS, UNSPECIFIED SLE TYPE, UNSPECIFIED ORGAN INVOLVEMENT STATUS: ICD-10-CM

## 2021-02-17 DIAGNOSIS — E66.01 MORBID OBESITY WITH BMI OF 45.0-49.9, ADULT: ICD-10-CM

## 2021-02-17 DIAGNOSIS — R07.89 OTHER CHEST PAIN: ICD-10-CM

## 2021-02-17 DIAGNOSIS — R07.89 OTHER CHEST PAIN: Primary | ICD-10-CM

## 2021-02-17 DIAGNOSIS — R60.0 BILATERAL LEG EDEMA: ICD-10-CM

## 2021-02-17 PROBLEM — R07.9 CHEST PAIN: Status: ACTIVE | Noted: 2021-02-17

## 2021-02-17 LAB — TROPONIN I SERPL DL<=0.01 NG/ML-MCNC: <0.006 NG/ML (ref 0–0.03)

## 2021-02-17 PROCEDURE — 84484 ASSAY OF TROPONIN QUANT: CPT

## 2021-02-17 PROCEDURE — 36415 COLL VENOUS BLD VENIPUNCTURE: CPT | Mod: PO

## 2021-02-17 PROCEDURE — 3075F PR MOST RECENT SYSTOLIC BLOOD PRESS GE 130-139MM HG: ICD-10-PCS | Mod: CPTII,S$GLB,, | Performed by: INTERNAL MEDICINE

## 2021-02-17 PROCEDURE — 99215 PR OFFICE/OUTPT VISIT, EST, LEVL V, 40-54 MIN: ICD-10-PCS | Mod: 25,S$GLB,, | Performed by: INTERNAL MEDICINE

## 2021-02-17 PROCEDURE — 99999 PR PBB SHADOW E&M-EST. PATIENT-LVL IV: ICD-10-PCS | Mod: PBBFAC,,, | Performed by: INTERNAL MEDICINE

## 2021-02-17 PROCEDURE — 1126F AMNT PAIN NOTED NONE PRSNT: CPT | Mod: S$GLB,,, | Performed by: INTERNAL MEDICINE

## 2021-02-17 PROCEDURE — 99215 OFFICE O/P EST HI 40 MIN: CPT | Mod: 25,S$GLB,, | Performed by: INTERNAL MEDICINE

## 2021-02-17 PROCEDURE — 3075F SYST BP GE 130 - 139MM HG: CPT | Mod: CPTII,S$GLB,, | Performed by: INTERNAL MEDICINE

## 2021-02-17 PROCEDURE — 3008F PR BODY MASS INDEX (BMI) DOCUMENTED: ICD-10-PCS | Mod: CPTII,S$GLB,, | Performed by: INTERNAL MEDICINE

## 2021-02-17 PROCEDURE — 3079F PR MOST RECENT DIASTOLIC BLOOD PRESSURE 80-89 MM HG: ICD-10-PCS | Mod: CPTII,S$GLB,, | Performed by: INTERNAL MEDICINE

## 2021-02-17 PROCEDURE — 1126F PR PAIN SEVERITY QUANTIFIED, NO PAIN PRESENT: ICD-10-PCS | Mod: S$GLB,,, | Performed by: INTERNAL MEDICINE

## 2021-02-17 PROCEDURE — 99999 PR PBB SHADOW E&M-EST. PATIENT-LVL IV: CPT | Mod: PBBFAC,,, | Performed by: INTERNAL MEDICINE

## 2021-02-17 PROCEDURE — 3079F DIAST BP 80-89 MM HG: CPT | Mod: CPTII,S$GLB,, | Performed by: INTERNAL MEDICINE

## 2021-02-17 PROCEDURE — 93000 EKG 12-LEAD: ICD-10-PCS | Mod: S$GLB,ICN,, | Performed by: INTERNAL MEDICINE

## 2021-02-17 PROCEDURE — 93000 ELECTROCARDIOGRAM COMPLETE: CPT | Mod: S$GLB,ICN,, | Performed by: INTERNAL MEDICINE

## 2021-02-17 PROCEDURE — 3008F BODY MASS INDEX DOCD: CPT | Mod: CPTII,S$GLB,, | Performed by: INTERNAL MEDICINE

## 2021-02-17 RX ORDER — VITAMIN B COMPLEX
1 TABLET ORAL DAILY
COMMUNITY

## 2021-02-23 ENCOUNTER — PATIENT MESSAGE (OUTPATIENT)
Dept: RHEUMATOLOGY | Facility: CLINIC | Age: 46
End: 2021-02-23

## 2021-03-10 ENCOUNTER — HOSPITAL ENCOUNTER (OUTPATIENT)
Dept: CARDIOLOGY | Facility: HOSPITAL | Age: 46
Discharge: HOME | End: 2021-03-10
Attending: INTERNAL MEDICINE
Payer: COMMERCIAL

## 2021-03-10 VITALS — HEIGHT: 65 IN | BODY MASS INDEX: 46.32 KG/M2 | WEIGHT: 278 LBS

## 2021-03-10 DIAGNOSIS — R07.89 OTHER CHEST PAIN: ICD-10-CM

## 2021-03-10 LAB
ASCENDING AORTA: 2.82 CM
AV INDEX (PROSTH): 0.77
AV MEAN GRADIENT: 4 MMHG
AV PEAK GRADIENT: 6 MMHG
AV VALVE AREA: 2.4 CM2
AV VELOCITY RATIO: 0.82
BSA FOR ECHO PROCEDURE: 2.4 M2
CV ECHO LV RWT: 0.26 CM
CV STRESS BASE HR: 68 BPM
DIASTOLIC BLOOD PRESSURE: 76 MMHG
DOP CALC AO PEAK VEL: 1.25 M/S
DOP CALC AO VTI: 32.21 CM
DOP CALC LVOT AREA: 3.1 CM2
DOP CALC LVOT DIAMETER: 1.99 CM
DOP CALC LVOT PEAK VEL: 1.03 M/S
DOP CALC LVOT STROKE VOLUME: 77.41 CM3
DOP CALCLVOT PEAK VEL VTI: 24.9 CM
E WAVE DECELERATION TIME: 203.21 MSEC
E/A RATIO: 1.25
E/E' RATIO: 8 M/S
ECHO LV POSTERIOR WALL: 0.68 CM (ref 0.6–1.1)
FRACTIONAL SHORTENING: 33 % (ref 28–44)
INTERVENTRICULAR SEPTUM: 0.79 CM (ref 0.6–1.1)
IVRT: 99.9 MSEC
LA MAJOR: 5.19 CM
LA MINOR: 5.07 CM
LA WIDTH: 4.12 CM
LEFT ATRIUM SIZE: 3.58 CM
LEFT ATRIUM VOLUME INDEX MOD: 27.5 ML/M2
LEFT ATRIUM VOLUME INDEX: 28.2 ML/M2
LEFT ATRIUM VOLUME MOD: 62.78 CM3
LEFT ATRIUM VOLUME: 64.31 CM3
LEFT INTERNAL DIMENSION IN SYSTOLE: 3.49 CM (ref 2.1–4)
LEFT VENTRICLE DIASTOLIC VOLUME INDEX: 57.1 ML/M2
LEFT VENTRICLE DIASTOLIC VOLUME: 130.19 ML
LEFT VENTRICLE MASS INDEX: 57 G/M2
LEFT VENTRICLE SYSTOLIC VOLUME INDEX: 22.1 ML/M2
LEFT VENTRICLE SYSTOLIC VOLUME: 50.43 ML
LEFT VENTRICULAR INTERNAL DIMENSION IN DIASTOLE: 5.21 CM (ref 3.5–6)
LEFT VENTRICULAR MASS: 130.94 G
LV LATERAL E/E' RATIO: 7 M/S
LV SEPTAL E/E' RATIO: 9.33 M/S
MV PEAK A VEL: 0.67 M/S
MV PEAK E VEL: 0.84 M/S
MV STENOSIS PRESSURE HALF TIME: 58.93 MS
MV VALVE AREA P 1/2 METHOD: 3.73 CM2
OHS CV CPX 1 MINUTE RECOVERY HEART RATE: 123 BPM
OHS CV CPX 85 PERCENT MAX PREDICTED HEART RATE MALE: 141
OHS CV CPX ESTIMATED METS: 9
OHS CV CPX MAX PREDICTED HEART RATE: 166
OHS CV CPX PATIENT IS FEMALE: 1
OHS CV CPX PATIENT IS MALE: 0
OHS CV CPX PEAK DIASTOLIC BLOOD PRESSURE: 73 MMHG
OHS CV CPX PEAK HEAR RATE: 155 BPM
OHS CV CPX PEAK RATE PRESSURE PRODUCT: NORMAL
OHS CV CPX PEAK SYSTOLIC BLOOD PRESSURE: 195 MMHG
OHS CV CPX PERCENT MAX PREDICTED HEART RATE ACHIEVED: 93
OHS CV CPX RATE PRESSURE PRODUCT PRESENTING: 7956
PISA TR MAX VEL: 2.31 M/S
PULM VEIN S/D RATIO: 1.47
PV PEAK D VEL: 0.43 M/S
PV PEAK S VEL: 0.63 M/S
RA MAJOR: 4.91 CM
RA WIDTH: 2.9 CM
RIGHT VENTRICULAR END-DIASTOLIC DIMENSION: 3.14 CM
SINUS: 2.8 CM
STJ: 2.43 CM
STRESS ECHO POST EXERCISE DUR MIN: 5 MINUTES
STRESS ECHO POST EXERCISE DUR SEC: 32 SECONDS
SYSTOLIC BLOOD PRESSURE: 117 MMHG
TDI LATERAL: 0.12 M/S
TDI SEPTAL: 0.09 M/S
TDI: 0.11 M/S
TR MAX PG: 21 MMHG
TRICUSPID ANNULAR PLANE SYSTOLIC EXCURSION: 1.96 CM

## 2021-03-10 PROCEDURE — 93320 DOPPLER ECHO COMPLETE: CPT | Mod: 26,,, | Performed by: INTERNAL MEDICINE

## 2021-03-10 PROCEDURE — 93351 STRESS ECHO (CUPID ONLY): ICD-10-PCS | Mod: 26,,, | Performed by: INTERNAL MEDICINE

## 2021-03-10 PROCEDURE — 93325 STRESS ECHO (CUPID ONLY): ICD-10-PCS | Mod: 26,,, | Performed by: INTERNAL MEDICINE

## 2021-03-10 PROCEDURE — 93320 DOPPLER ECHO COMPLETE: CPT

## 2021-03-10 PROCEDURE — 93325 DOPPLER ECHO COLOR FLOW MAPG: CPT | Mod: 26,,, | Performed by: INTERNAL MEDICINE

## 2021-03-10 PROCEDURE — 93320 STRESS ECHO (CUPID ONLY): ICD-10-PCS | Mod: 26,,, | Performed by: INTERNAL MEDICINE

## 2021-03-10 PROCEDURE — 93351 STRESS TTE COMPLETE: CPT | Mod: 26,,, | Performed by: INTERNAL MEDICINE

## 2021-03-17 ENCOUNTER — OFFICE VISIT (OUTPATIENT)
Dept: INTERNAL MEDICINE | Facility: CLINIC | Age: 46
End: 2021-03-17
Payer: COMMERCIAL

## 2021-03-17 VITALS
SYSTOLIC BLOOD PRESSURE: 120 MMHG | HEIGHT: 65 IN | TEMPERATURE: 98 F | BODY MASS INDEX: 46.06 KG/M2 | RESPIRATION RATE: 16 BRPM | DIASTOLIC BLOOD PRESSURE: 85 MMHG | HEART RATE: 64 BPM | WEIGHT: 276.44 LBS | OXYGEN SATURATION: 99 %

## 2021-03-17 DIAGNOSIS — E66.01 CLASS 3 SEVERE OBESITY DUE TO EXCESS CALORIES WITH SERIOUS COMORBIDITY AND BODY MASS INDEX (BMI) OF 45.0 TO 49.9 IN ADULT: ICD-10-CM

## 2021-03-17 DIAGNOSIS — L72.9 SKIN CYST: ICD-10-CM

## 2021-03-17 DIAGNOSIS — D84.9 IMMUNOSUPPRESSION: ICD-10-CM

## 2021-03-17 DIAGNOSIS — I10 ESSENTIAL HYPERTENSION: Primary | ICD-10-CM

## 2021-03-17 DIAGNOSIS — I27.20 PULMONARY HYPERTENSION: ICD-10-CM

## 2021-03-17 DIAGNOSIS — M32.9 SYSTEMIC LUPUS ERYTHEMATOSUS, UNSPECIFIED SLE TYPE, UNSPECIFIED ORGAN INVOLVEMENT STATUS: ICD-10-CM

## 2021-03-17 PROCEDURE — 3074F SYST BP LT 130 MM HG: CPT | Mod: CPTII,S$GLB,, | Performed by: INTERNAL MEDICINE

## 2021-03-17 PROCEDURE — 99213 PR OFFICE/OUTPT VISIT, EST, LEVL III, 20-29 MIN: ICD-10-PCS | Mod: S$GLB,,, | Performed by: INTERNAL MEDICINE

## 2021-03-17 PROCEDURE — 99999 PR PBB SHADOW E&M-EST. PATIENT-LVL V: ICD-10-PCS | Mod: PBBFAC,,, | Performed by: INTERNAL MEDICINE

## 2021-03-17 PROCEDURE — 3079F PR MOST RECENT DIASTOLIC BLOOD PRESSURE 80-89 MM HG: ICD-10-PCS | Mod: CPTII,S$GLB,, | Performed by: INTERNAL MEDICINE

## 2021-03-17 PROCEDURE — 3074F PR MOST RECENT SYSTOLIC BLOOD PRESSURE < 130 MM HG: ICD-10-PCS | Mod: CPTII,S$GLB,, | Performed by: INTERNAL MEDICINE

## 2021-03-17 PROCEDURE — 1126F PR PAIN SEVERITY QUANTIFIED, NO PAIN PRESENT: ICD-10-PCS | Mod: S$GLB,,, | Performed by: INTERNAL MEDICINE

## 2021-03-17 PROCEDURE — 99213 OFFICE O/P EST LOW 20 MIN: CPT | Mod: S$GLB,,, | Performed by: INTERNAL MEDICINE

## 2021-03-17 PROCEDURE — 3008F BODY MASS INDEX DOCD: CPT | Mod: CPTII,S$GLB,, | Performed by: INTERNAL MEDICINE

## 2021-03-17 PROCEDURE — 3079F DIAST BP 80-89 MM HG: CPT | Mod: CPTII,S$GLB,, | Performed by: INTERNAL MEDICINE

## 2021-03-17 PROCEDURE — 99999 PR PBB SHADOW E&M-EST. PATIENT-LVL V: CPT | Mod: PBBFAC,,, | Performed by: INTERNAL MEDICINE

## 2021-03-17 PROCEDURE — 1126F AMNT PAIN NOTED NONE PRSNT: CPT | Mod: S$GLB,,, | Performed by: INTERNAL MEDICINE

## 2021-03-17 PROCEDURE — 3008F PR BODY MASS INDEX (BMI) DOCUMENTED: ICD-10-PCS | Mod: CPTII,S$GLB,, | Performed by: INTERNAL MEDICINE

## 2021-03-20 ENCOUNTER — IMMUNIZATION (OUTPATIENT)
Dept: PRIMARY CARE CLINIC | Facility: CLINIC | Age: 46
End: 2021-03-20
Payer: COMMERCIAL

## 2021-03-20 DIAGNOSIS — Z23 NEED FOR VACCINATION: Primary | ICD-10-CM

## 2021-03-20 PROCEDURE — 91300 PR SARS-COV- 2 COVID-19 VACCINE, NO PRSV, 30MCG/0.3ML, IM: CPT | Mod: S$GLB,,, | Performed by: INTERNAL MEDICINE

## 2021-03-20 PROCEDURE — 0001A PR IMMUNIZ ADMIN, SARS-COV-2 COVID-19 VACC, 30MCG/0.3ML, 1ST DOSE: CPT | Mod: CV19,S$GLB,, | Performed by: INTERNAL MEDICINE

## 2021-03-20 PROCEDURE — 0001A PR IMMUNIZ ADMIN, SARS-COV-2 COVID-19 VACC, 30MCG/0.3ML, 1ST DOSE: ICD-10-PCS | Mod: CV19,S$GLB,, | Performed by: INTERNAL MEDICINE

## 2021-03-20 PROCEDURE — 91300 PR SARS-COV- 2 COVID-19 VACCINE, NO PRSV, 30MCG/0.3ML, IM: ICD-10-PCS | Mod: S$GLB,,, | Performed by: INTERNAL MEDICINE

## 2021-03-20 RX ADMIN — Medication 0.3 ML: at 02:03

## 2021-04-11 ENCOUNTER — IMMUNIZATION (OUTPATIENT)
Dept: PRIMARY CARE CLINIC | Facility: CLINIC | Age: 46
End: 2021-04-11
Payer: COMMERCIAL

## 2021-04-11 DIAGNOSIS — Z23 NEED FOR VACCINATION: Primary | ICD-10-CM

## 2021-04-11 PROCEDURE — 91300 PR SARS-COV- 2 COVID-19 VACCINE, NO PRSV, 30MCG/0.3ML, IM: CPT | Mod: S$GLB,,, | Performed by: INTERNAL MEDICINE

## 2021-04-11 PROCEDURE — 91300 PR SARS-COV- 2 COVID-19 VACCINE, NO PRSV, 30MCG/0.3ML, IM: ICD-10-PCS | Mod: S$GLB,,, | Performed by: INTERNAL MEDICINE

## 2021-04-11 PROCEDURE — 0002A PR IMMUNIZ ADMIN, SARS-COV-2 COVID-19 VACC, 30MCG/0.3ML, 2ND DOSE: CPT | Mod: CV19,S$GLB,, | Performed by: INTERNAL MEDICINE

## 2021-04-11 PROCEDURE — 0002A PR IMMUNIZ ADMIN, SARS-COV-2 COVID-19 VACC, 30MCG/0.3ML, 2ND DOSE: ICD-10-PCS | Mod: CV19,S$GLB,, | Performed by: INTERNAL MEDICINE

## 2021-04-11 RX ADMIN — Medication 0.3 ML: at 02:04

## 2021-04-23 ENCOUNTER — APPOINTMENT (OUTPATIENT)
Dept: RADIOLOGY | Facility: CLINIC | Age: 46
End: 2021-04-23
Attending: INTERNAL MEDICINE
Payer: COMMERCIAL

## 2021-04-23 DIAGNOSIS — M32.9 SYSTEMIC LUPUS ERYTHEMATOSUS, UNSPECIFIED SLE TYPE, UNSPECIFIED ORGAN INVOLVEMENT STATUS: ICD-10-CM

## 2021-04-23 DIAGNOSIS — Z78.0 MENOPAUSE: ICD-10-CM

## 2021-04-23 DIAGNOSIS — E66.01 CLASS 3 SEVERE OBESITY DUE TO EXCESS CALORIES WITHOUT SERIOUS COMORBIDITY WITH BODY MASS INDEX (BMI) OF 45.0 TO 49.9 IN ADULT: ICD-10-CM

## 2021-04-23 DIAGNOSIS — L65.9 ALOPECIA: ICD-10-CM

## 2021-04-23 DIAGNOSIS — M10.9 GOUT, ARTHRITIS: ICD-10-CM

## 2021-04-23 DIAGNOSIS — D84.9 IMMUNOSUPPRESSION: ICD-10-CM

## 2021-04-23 DIAGNOSIS — I27.20 PULMONARY HYPERTENSION: ICD-10-CM

## 2021-04-23 PROCEDURE — 77080 DXA BONE DENSITY AXIAL: CPT | Mod: TC,PO

## 2021-04-23 PROCEDURE — 77080 DXA BONE DENSITY AXIAL: CPT | Mod: 26,,, | Performed by: INTERNAL MEDICINE

## 2021-04-23 PROCEDURE — 77080 DEXA BONE DENSITY SPINE HIP: ICD-10-PCS | Mod: 26,,, | Performed by: INTERNAL MEDICINE

## 2021-05-03 ENCOUNTER — LAB VISIT (OUTPATIENT)
Dept: LAB | Facility: HOSPITAL | Age: 46
End: 2021-05-03
Attending: INTERNAL MEDICINE
Payer: COMMERCIAL

## 2021-05-03 ENCOUNTER — OFFICE VISIT (OUTPATIENT)
Dept: RHEUMATOLOGY | Facility: CLINIC | Age: 46
End: 2021-05-03
Payer: COMMERCIAL

## 2021-05-03 VITALS
HEIGHT: 65 IN | BODY MASS INDEX: 47.28 KG/M2 | SYSTOLIC BLOOD PRESSURE: 130 MMHG | WEIGHT: 283.75 LBS | DIASTOLIC BLOOD PRESSURE: 83 MMHG | HEART RATE: 68 BPM

## 2021-05-03 DIAGNOSIS — D84.9 IMMUNOSUPPRESSION: ICD-10-CM

## 2021-05-03 DIAGNOSIS — L65.9 ALOPECIA: ICD-10-CM

## 2021-05-03 DIAGNOSIS — M10.9 GOUT, ARTHRITIS: ICD-10-CM

## 2021-05-03 DIAGNOSIS — M32.9 SYSTEMIC LUPUS ERYTHEMATOSUS, UNSPECIFIED SLE TYPE, UNSPECIFIED ORGAN INVOLVEMENT STATUS: ICD-10-CM

## 2021-05-03 DIAGNOSIS — M32.9 SYSTEMIC LUPUS ERYTHEMATOSUS, UNSPECIFIED SLE TYPE, UNSPECIFIED ORGAN INVOLVEMENT STATUS: Primary | ICD-10-CM

## 2021-05-03 DIAGNOSIS — I27.20 PULMONARY HYPERTENSION: ICD-10-CM

## 2021-05-03 LAB
ALBUMIN SERPL BCP-MCNC: 3.8 G/DL (ref 3.5–5.2)
ALP SERPL-CCNC: 72 U/L (ref 55–135)
ALT SERPL W/O P-5'-P-CCNC: 53 U/L (ref 10–44)
ANION GAP SERPL CALC-SCNC: 7 MMOL/L (ref 8–16)
AST SERPL-CCNC: 38 U/L (ref 10–40)
BASOPHILS # BLD AUTO: 0.02 K/UL (ref 0–0.2)
BASOPHILS NFR BLD: 0.3 % (ref 0–1.9)
BILIRUB SERPL-MCNC: 0.5 MG/DL (ref 0.1–1)
BUN SERPL-MCNC: 18 MG/DL (ref 6–20)
C3 SERPL-MCNC: 144 MG/DL (ref 50–180)
C4 SERPL-MCNC: 42 MG/DL (ref 11–44)
CALCIUM SERPL-MCNC: 9.2 MG/DL (ref 8.7–10.5)
CHLORIDE SERPL-SCNC: 102 MMOL/L (ref 95–110)
CO2 SERPL-SCNC: 29 MMOL/L (ref 23–29)
CREAT SERPL-MCNC: 1.1 MG/DL (ref 0.5–1.4)
CRP SERPL-MCNC: 3.7 MG/L (ref 0–8.2)
DIFFERENTIAL METHOD: ABNORMAL
EOSINOPHIL # BLD AUTO: 0.2 K/UL (ref 0–0.5)
EOSINOPHIL NFR BLD: 2.6 % (ref 0–8)
ERYTHROCYTE [DISTWIDTH] IN BLOOD BY AUTOMATED COUNT: 13.1 % (ref 11.5–14.5)
ERYTHROCYTE [SEDIMENTATION RATE] IN BLOOD BY WESTERGREN METHOD: 25 MM/HR (ref 0–36)
EST. GFR  (AFRICAN AMERICAN): >60 ML/MIN/1.73 M^2
EST. GFR  (NON AFRICAN AMERICAN): >60 ML/MIN/1.73 M^2
GLUCOSE SERPL-MCNC: 69 MG/DL (ref 70–110)
HCT VFR BLD AUTO: 39.9 % (ref 37–48.5)
HGB BLD-MCNC: 12.3 G/DL (ref 12–16)
IMM GRANULOCYTES # BLD AUTO: 0.02 K/UL (ref 0–0.04)
IMM GRANULOCYTES NFR BLD AUTO: 0.3 % (ref 0–0.5)
LYMPHOCYTES # BLD AUTO: 2.1 K/UL (ref 1–4.8)
LYMPHOCYTES NFR BLD: 36.4 % (ref 18–48)
MCH RBC QN AUTO: 27.7 PG (ref 27–31)
MCHC RBC AUTO-ENTMCNC: 30.8 G/DL (ref 32–36)
MCV RBC AUTO: 90 FL (ref 82–98)
MONOCYTES # BLD AUTO: 0.6 K/UL (ref 0.3–1)
MONOCYTES NFR BLD: 10.6 % (ref 4–15)
NEUTROPHILS # BLD AUTO: 2.9 K/UL (ref 1.8–7.7)
NEUTROPHILS NFR BLD: 49.8 % (ref 38–73)
NRBC BLD-RTO: 0 /100 WBC
PLATELET # BLD AUTO: 232 K/UL (ref 150–450)
PMV BLD AUTO: 10.5 FL (ref 9.2–12.9)
POTASSIUM SERPL-SCNC: 3.2 MMOL/L (ref 3.5–5.1)
PROT SERPL-MCNC: 7.6 G/DL (ref 6–8.4)
RBC # BLD AUTO: 4.44 M/UL (ref 4–5.4)
SODIUM SERPL-SCNC: 138 MMOL/L (ref 136–145)
URATE SERPL-MCNC: 8.9 MG/DL (ref 2.4–5.7)
WBC # BLD AUTO: 5.77 K/UL (ref 3.9–12.7)

## 2021-05-03 PROCEDURE — 3008F PR BODY MASS INDEX (BMI) DOCUMENTED: ICD-10-PCS | Mod: CPTII,S$GLB,, | Performed by: INTERNAL MEDICINE

## 2021-05-03 PROCEDURE — 1126F PR PAIN SEVERITY QUANTIFIED, NO PAIN PRESENT: ICD-10-PCS | Mod: S$GLB,,, | Performed by: INTERNAL MEDICINE

## 2021-05-03 PROCEDURE — 85652 RBC SED RATE AUTOMATED: CPT | Performed by: INTERNAL MEDICINE

## 2021-05-03 PROCEDURE — 1126F AMNT PAIN NOTED NONE PRSNT: CPT | Mod: S$GLB,,, | Performed by: INTERNAL MEDICINE

## 2021-05-03 PROCEDURE — 99999 PR PBB SHADOW E&M-EST. PATIENT-LVL III: ICD-10-PCS | Mod: PBBFAC,,, | Performed by: INTERNAL MEDICINE

## 2021-05-03 PROCEDURE — 99214 PR OFFICE/OUTPT VISIT, EST, LEVL IV, 30-39 MIN: ICD-10-PCS | Mod: S$GLB,,, | Performed by: INTERNAL MEDICINE

## 2021-05-03 PROCEDURE — 36415 COLL VENOUS BLD VENIPUNCTURE: CPT | Performed by: INTERNAL MEDICINE

## 2021-05-03 PROCEDURE — 3008F BODY MASS INDEX DOCD: CPT | Mod: CPTII,S$GLB,, | Performed by: INTERNAL MEDICINE

## 2021-05-03 PROCEDURE — 80053 COMPREHEN METABOLIC PANEL: CPT | Performed by: INTERNAL MEDICINE

## 2021-05-03 PROCEDURE — 99214 OFFICE O/P EST MOD 30 MIN: CPT | Mod: S$GLB,,, | Performed by: INTERNAL MEDICINE

## 2021-05-03 PROCEDURE — 86225 DNA ANTIBODY NATIVE: CPT | Performed by: INTERNAL MEDICINE

## 2021-05-03 PROCEDURE — 86140 C-REACTIVE PROTEIN: CPT | Performed by: INTERNAL MEDICINE

## 2021-05-03 PROCEDURE — 86160 COMPLEMENT ANTIGEN: CPT | Performed by: INTERNAL MEDICINE

## 2021-05-03 PROCEDURE — 84550 ASSAY OF BLOOD/URIC ACID: CPT | Performed by: INTERNAL MEDICINE

## 2021-05-03 PROCEDURE — 86160 COMPLEMENT ANTIGEN: CPT | Mod: 59 | Performed by: INTERNAL MEDICINE

## 2021-05-03 PROCEDURE — 85025 COMPLETE CBC W/AUTO DIFF WBC: CPT | Performed by: INTERNAL MEDICINE

## 2021-05-03 PROCEDURE — 99999 PR PBB SHADOW E&M-EST. PATIENT-LVL III: CPT | Mod: PBBFAC,,, | Performed by: INTERNAL MEDICINE

## 2021-05-04 ENCOUNTER — PATIENT MESSAGE (OUTPATIENT)
Dept: RHEUMATOLOGY | Facility: CLINIC | Age: 46
End: 2021-05-04

## 2021-05-04 ENCOUNTER — PATIENT MESSAGE (OUTPATIENT)
Dept: INTERNAL MEDICINE | Facility: CLINIC | Age: 46
End: 2021-05-04

## 2021-05-04 LAB — DSDNA AB SER-ACNC: NORMAL [IU]/ML

## 2021-05-05 ENCOUNTER — PATIENT MESSAGE (OUTPATIENT)
Dept: INTERNAL MEDICINE | Facility: CLINIC | Age: 46
End: 2021-05-05

## 2021-05-05 DIAGNOSIS — R82.90 ABNORMAL URINALYSIS: Primary | ICD-10-CM

## 2021-05-08 ENCOUNTER — LAB VISIT (OUTPATIENT)
Dept: LAB | Facility: HOSPITAL | Age: 46
End: 2021-05-08
Attending: INTERNAL MEDICINE
Payer: COMMERCIAL

## 2021-05-08 DIAGNOSIS — R82.90 ABNORMAL URINALYSIS: ICD-10-CM

## 2021-05-08 PROCEDURE — 87086 URINE CULTURE/COLONY COUNT: CPT | Performed by: INTERNAL MEDICINE

## 2021-05-09 LAB — BACTERIA UR CULT: NO GROWTH

## 2021-05-11 ENCOUNTER — PATIENT MESSAGE (OUTPATIENT)
Dept: INTERNAL MEDICINE | Facility: CLINIC | Age: 46
End: 2021-05-11

## 2021-05-24 ENCOUNTER — OFFICE VISIT (OUTPATIENT)
Dept: CARDIOLOGY | Facility: CLINIC | Age: 46
End: 2021-05-24
Payer: COMMERCIAL

## 2021-05-24 VITALS
HEIGHT: 65 IN | HEART RATE: 60 BPM | SYSTOLIC BLOOD PRESSURE: 132 MMHG | BODY MASS INDEX: 46.17 KG/M2 | DIASTOLIC BLOOD PRESSURE: 82 MMHG | OXYGEN SATURATION: 97 % | WEIGHT: 277.13 LBS

## 2021-05-24 DIAGNOSIS — E66.01 MORBID OBESITY WITH BMI OF 45.0-49.9, ADULT: ICD-10-CM

## 2021-05-24 DIAGNOSIS — I10 ESSENTIAL HYPERTENSION: ICD-10-CM

## 2021-05-24 DIAGNOSIS — I87.2 VENOUS INSUFFICIENCY OF BOTH LOWER EXTREMITIES: Primary | ICD-10-CM

## 2021-05-24 DIAGNOSIS — I89.0 LYMPHEDEMA OF BOTH LOWER EXTREMITIES: ICD-10-CM

## 2021-05-24 PROCEDURE — 3008F BODY MASS INDEX DOCD: CPT | Mod: CPTII,S$GLB,, | Performed by: INTERNAL MEDICINE

## 2021-05-24 PROCEDURE — 1126F PR PAIN SEVERITY QUANTIFIED, NO PAIN PRESENT: ICD-10-PCS | Mod: S$GLB,,, | Performed by: INTERNAL MEDICINE

## 2021-05-24 PROCEDURE — 99999 PR PBB SHADOW E&M-EST. PATIENT-LVL IV: CPT | Mod: PBBFAC,,, | Performed by: INTERNAL MEDICINE

## 2021-05-24 PROCEDURE — 99215 OFFICE O/P EST HI 40 MIN: CPT | Mod: S$GLB,,, | Performed by: INTERNAL MEDICINE

## 2021-05-24 PROCEDURE — 3008F PR BODY MASS INDEX (BMI) DOCUMENTED: ICD-10-PCS | Mod: CPTII,S$GLB,, | Performed by: INTERNAL MEDICINE

## 2021-05-24 PROCEDURE — 99215 PR OFFICE/OUTPT VISIT, EST, LEVL V, 40-54 MIN: ICD-10-PCS | Mod: S$GLB,,, | Performed by: INTERNAL MEDICINE

## 2021-05-24 PROCEDURE — 1126F AMNT PAIN NOTED NONE PRSNT: CPT | Mod: S$GLB,,, | Performed by: INTERNAL MEDICINE

## 2021-05-24 PROCEDURE — 99999 PR PBB SHADOW E&M-EST. PATIENT-LVL IV: ICD-10-PCS | Mod: PBBFAC,,, | Performed by: INTERNAL MEDICINE

## 2021-06-07 DIAGNOSIS — R06.02 SOB (SHORTNESS OF BREATH): ICD-10-CM

## 2021-06-07 DIAGNOSIS — E66.01 CLASS 3 SEVERE OBESITY DUE TO EXCESS CALORIES WITHOUT SERIOUS COMORBIDITY WITH BODY MASS INDEX (BMI) OF 45.0 TO 49.9 IN ADULT: ICD-10-CM

## 2021-06-07 DIAGNOSIS — M32.9 SYSTEMIC LUPUS ERYTHEMATOSUS, UNSPECIFIED SLE TYPE, UNSPECIFIED ORGAN INVOLVEMENT STATUS: ICD-10-CM

## 2021-06-07 RX ORDER — HYDROXYCHLOROQUINE SULFATE 200 MG/1
200 TABLET, FILM COATED ORAL 2 TIMES DAILY
Qty: 60 TABLET | Refills: 5 | Status: SHIPPED | OUTPATIENT
Start: 2021-06-07 | End: 2021-12-03

## 2021-07-21 ENCOUNTER — PATIENT MESSAGE (OUTPATIENT)
Dept: INTERNAL MEDICINE | Facility: CLINIC | Age: 46
End: 2021-07-21

## 2021-07-22 ENCOUNTER — TELEPHONE (OUTPATIENT)
Dept: INTERNAL MEDICINE | Facility: CLINIC | Age: 46
End: 2021-07-22

## 2021-07-22 ENCOUNTER — PATIENT MESSAGE (OUTPATIENT)
Dept: INTERNAL MEDICINE | Facility: CLINIC | Age: 46
End: 2021-07-22

## 2021-07-22 RX ORDER — CIPROFLOXACIN 500 MG/1
500 TABLET ORAL 2 TIMES DAILY
COMMUNITY
Start: 2021-07-05 | End: 2021-10-29 | Stop reason: ALTCHOICE

## 2021-07-22 RX ORDER — DOXYCYCLINE HYCLATE 100 MG
100 TABLET ORAL DAILY
COMMUNITY
Start: 2021-06-18 | End: 2021-10-29 | Stop reason: ALTCHOICE

## 2021-07-22 RX ORDER — CIPROFLOXACIN 750 MG/1
750 TABLET, FILM COATED ORAL 2 TIMES DAILY
COMMUNITY
Start: 2021-07-12 | End: 2021-10-29 | Stop reason: ALTCHOICE

## 2021-07-30 ENCOUNTER — PATIENT MESSAGE (OUTPATIENT)
Dept: INTERNAL MEDICINE | Facility: CLINIC | Age: 46
End: 2021-07-30

## 2021-08-07 ENCOUNTER — LAB VISIT (OUTPATIENT)
Dept: FAMILY MEDICINE | Facility: CLINIC | Age: 46
End: 2021-08-07
Payer: COMMERCIAL

## 2021-08-07 DIAGNOSIS — R05.9 COUGH: ICD-10-CM

## 2021-08-07 PROCEDURE — U0003 INFECTIOUS AGENT DETECTION BY NUCLEIC ACID (DNA OR RNA); SEVERE ACUTE RESPIRATORY SYNDROME CORONAVIRUS 2 (SARS-COV-2) (CORONAVIRUS DISEASE [COVID-19]), AMPLIFIED PROBE TECHNIQUE, MAKING USE OF HIGH THROUGHPUT TECHNOLOGIES AS DESCRIBED BY CMS-2020-01-R: HCPCS | Performed by: INTERNAL MEDICINE

## 2021-08-07 PROCEDURE — U0005 INFEC AGEN DETEC AMPLI PROBE: HCPCS | Performed by: INTERNAL MEDICINE

## 2021-08-09 LAB
SARS-COV-2 RNA RESP QL NAA+PROBE: NOT DETECTED
SARS-COV-2- CYCLE NUMBER: -1

## 2021-09-20 ENCOUNTER — TELEPHONE (OUTPATIENT)
Dept: RHEUMATOLOGY | Facility: CLINIC | Age: 46
End: 2021-09-20

## 2021-09-27 ENCOUNTER — PATIENT OUTREACH (OUTPATIENT)
Dept: ADMINISTRATIVE | Facility: OTHER | Age: 46
End: 2021-09-27

## 2021-09-28 ENCOUNTER — OFFICE VISIT (OUTPATIENT)
Dept: RHEUMATOLOGY | Facility: CLINIC | Age: 46
End: 2021-09-28
Payer: COMMERCIAL

## 2021-09-28 ENCOUNTER — LAB VISIT (OUTPATIENT)
Dept: LAB | Facility: HOSPITAL | Age: 46
End: 2021-09-28
Attending: INTERNAL MEDICINE
Payer: COMMERCIAL

## 2021-09-28 VITALS
BODY MASS INDEX: 46.17 KG/M2 | WEIGHT: 277.13 LBS | SYSTOLIC BLOOD PRESSURE: 151 MMHG | HEIGHT: 65 IN | DIASTOLIC BLOOD PRESSURE: 85 MMHG | HEART RATE: 60 BPM

## 2021-09-28 DIAGNOSIS — M10.9 GOUT, ARTHRITIS: ICD-10-CM

## 2021-09-28 DIAGNOSIS — M32.9 SYSTEMIC LUPUS ERYTHEMATOSUS, UNSPECIFIED SLE TYPE, UNSPECIFIED ORGAN INVOLVEMENT STATUS: ICD-10-CM

## 2021-09-28 DIAGNOSIS — D84.9 IMMUNOSUPPRESSION: ICD-10-CM

## 2021-09-28 DIAGNOSIS — M32.9 SYSTEMIC LUPUS ERYTHEMATOSUS, UNSPECIFIED SLE TYPE, UNSPECIFIED ORGAN INVOLVEMENT STATUS: Primary | ICD-10-CM

## 2021-09-28 LAB
ALBUMIN SERPL BCP-MCNC: 4 G/DL (ref 3.5–5.2)
ALP SERPL-CCNC: 74 U/L (ref 55–135)
ALT SERPL W/O P-5'-P-CCNC: 51 U/L (ref 10–44)
ANION GAP SERPL CALC-SCNC: 13 MMOL/L (ref 8–16)
AST SERPL-CCNC: 34 U/L (ref 10–40)
BASOPHILS # BLD AUTO: 0.04 K/UL (ref 0–0.2)
BASOPHILS NFR BLD: 0.8 % (ref 0–1.9)
BILIRUB SERPL-MCNC: 0.5 MG/DL (ref 0.1–1)
BUN SERPL-MCNC: 14 MG/DL (ref 6–20)
C3 SERPL-MCNC: 145 MG/DL (ref 50–180)
C4 SERPL-MCNC: 46 MG/DL (ref 11–44)
CALCIUM SERPL-MCNC: 9.3 MG/DL (ref 8.7–10.5)
CHLORIDE SERPL-SCNC: 102 MMOL/L (ref 95–110)
CO2 SERPL-SCNC: 27 MMOL/L (ref 23–29)
CREAT SERPL-MCNC: 1 MG/DL (ref 0.5–1.4)
CRP SERPL-MCNC: 5.6 MG/L (ref 0–8.2)
DIFFERENTIAL METHOD: ABNORMAL
EOSINOPHIL # BLD AUTO: 0.1 K/UL (ref 0–0.5)
EOSINOPHIL NFR BLD: 2.3 % (ref 0–8)
ERYTHROCYTE [DISTWIDTH] IN BLOOD BY AUTOMATED COUNT: 13.1 % (ref 11.5–14.5)
ERYTHROCYTE [SEDIMENTATION RATE] IN BLOOD BY WESTERGREN METHOD: 14 MM/HR (ref 0–36)
EST. GFR  (AFRICAN AMERICAN): >60 ML/MIN/1.73 M^2
EST. GFR  (NON AFRICAN AMERICAN): >60 ML/MIN/1.73 M^2
GLUCOSE SERPL-MCNC: 81 MG/DL (ref 70–110)
HCT VFR BLD AUTO: 39.1 % (ref 37–48.5)
HGB BLD-MCNC: 12.2 G/DL (ref 12–16)
IMM GRANULOCYTES # BLD AUTO: 0.01 K/UL (ref 0–0.04)
IMM GRANULOCYTES NFR BLD AUTO: 0.2 % (ref 0–0.5)
LYMPHOCYTES # BLD AUTO: 1.7 K/UL (ref 1–4.8)
LYMPHOCYTES NFR BLD: 31.9 % (ref 18–48)
MCH RBC QN AUTO: 27.6 PG (ref 27–31)
MCHC RBC AUTO-ENTMCNC: 31.2 G/DL (ref 32–36)
MCV RBC AUTO: 89 FL (ref 82–98)
MONOCYTES # BLD AUTO: 0.4 K/UL (ref 0.3–1)
MONOCYTES NFR BLD: 8.2 % (ref 4–15)
NEUTROPHILS # BLD AUTO: 3 K/UL (ref 1.8–7.7)
NEUTROPHILS NFR BLD: 56.6 % (ref 38–73)
NRBC BLD-RTO: 0 /100 WBC
PLATELET # BLD AUTO: 219 K/UL (ref 150–450)
PMV BLD AUTO: 11.2 FL (ref 9.2–12.9)
POTASSIUM SERPL-SCNC: 3.5 MMOL/L (ref 3.5–5.1)
PROT SERPL-MCNC: 7.5 G/DL (ref 6–8.4)
RBC # BLD AUTO: 4.42 M/UL (ref 4–5.4)
SODIUM SERPL-SCNC: 142 MMOL/L (ref 136–145)
URATE SERPL-MCNC: 8.8 MG/DL (ref 2.4–5.7)
WBC # BLD AUTO: 5.26 K/UL (ref 3.9–12.7)

## 2021-09-28 PROCEDURE — 86225 DNA ANTIBODY NATIVE: CPT | Performed by: INTERNAL MEDICINE

## 2021-09-28 PROCEDURE — 86140 C-REACTIVE PROTEIN: CPT | Performed by: INTERNAL MEDICINE

## 2021-09-28 PROCEDURE — 1160F PR REVIEW ALL MEDS BY PRESCRIBER/CLIN PHARMACIST DOCUMENTED: ICD-10-PCS | Mod: CPTII,S$GLB,, | Performed by: INTERNAL MEDICINE

## 2021-09-28 PROCEDURE — 3077F PR MOST RECENT SYSTOLIC BLOOD PRESSURE >= 140 MM HG: ICD-10-PCS | Mod: CPTII,S$GLB,, | Performed by: INTERNAL MEDICINE

## 2021-09-28 PROCEDURE — 3008F PR BODY MASS INDEX (BMI) DOCUMENTED: ICD-10-PCS | Mod: CPTII,S$GLB,, | Performed by: INTERNAL MEDICINE

## 2021-09-28 PROCEDURE — 85025 COMPLETE CBC W/AUTO DIFF WBC: CPT | Performed by: INTERNAL MEDICINE

## 2021-09-28 PROCEDURE — 86160 COMPLEMENT ANTIGEN: CPT | Performed by: INTERNAL MEDICINE

## 2021-09-28 PROCEDURE — 80053 COMPREHEN METABOLIC PANEL: CPT | Performed by: INTERNAL MEDICINE

## 2021-09-28 PROCEDURE — 99999 PR PBB SHADOW E&M-EST. PATIENT-LVL III: ICD-10-PCS | Mod: PBBFAC,,, | Performed by: INTERNAL MEDICINE

## 2021-09-28 PROCEDURE — 3008F BODY MASS INDEX DOCD: CPT | Mod: CPTII,S$GLB,, | Performed by: INTERNAL MEDICINE

## 2021-09-28 PROCEDURE — 3077F SYST BP >= 140 MM HG: CPT | Mod: CPTII,S$GLB,, | Performed by: INTERNAL MEDICINE

## 2021-09-28 PROCEDURE — 84550 ASSAY OF BLOOD/URIC ACID: CPT | Performed by: INTERNAL MEDICINE

## 2021-09-28 PROCEDURE — 1159F MED LIST DOCD IN RCRD: CPT | Mod: CPTII,S$GLB,, | Performed by: INTERNAL MEDICINE

## 2021-09-28 PROCEDURE — 86160 COMPLEMENT ANTIGEN: CPT | Mod: 59 | Performed by: INTERNAL MEDICINE

## 2021-09-28 PROCEDURE — 99214 PR OFFICE/OUTPT VISIT, EST, LEVL IV, 30-39 MIN: ICD-10-PCS | Mod: S$GLB,,, | Performed by: INTERNAL MEDICINE

## 2021-09-28 PROCEDURE — 1159F PR MEDICATION LIST DOCUMENTED IN MEDICAL RECORD: ICD-10-PCS | Mod: CPTII,S$GLB,, | Performed by: INTERNAL MEDICINE

## 2021-09-28 PROCEDURE — 3079F PR MOST RECENT DIASTOLIC BLOOD PRESSURE 80-89 MM HG: ICD-10-PCS | Mod: CPTII,S$GLB,, | Performed by: INTERNAL MEDICINE

## 2021-09-28 PROCEDURE — 36415 COLL VENOUS BLD VENIPUNCTURE: CPT | Performed by: INTERNAL MEDICINE

## 2021-09-28 PROCEDURE — 3079F DIAST BP 80-89 MM HG: CPT | Mod: CPTII,S$GLB,, | Performed by: INTERNAL MEDICINE

## 2021-09-28 PROCEDURE — 99214 OFFICE O/P EST MOD 30 MIN: CPT | Mod: S$GLB,,, | Performed by: INTERNAL MEDICINE

## 2021-09-28 PROCEDURE — 99999 PR PBB SHADOW E&M-EST. PATIENT-LVL III: CPT | Mod: PBBFAC,,, | Performed by: INTERNAL MEDICINE

## 2021-09-28 PROCEDURE — 1160F RVW MEDS BY RX/DR IN RCRD: CPT | Mod: CPTII,S$GLB,, | Performed by: INTERNAL MEDICINE

## 2021-09-28 PROCEDURE — 85652 RBC SED RATE AUTOMATED: CPT | Performed by: INTERNAL MEDICINE

## 2021-09-29 ENCOUNTER — PATIENT MESSAGE (OUTPATIENT)
Dept: RHEUMATOLOGY | Facility: CLINIC | Age: 46
End: 2021-09-29

## 2021-09-29 LAB — DSDNA AB SER-ACNC: NORMAL [IU]/ML

## 2021-09-30 ENCOUNTER — TELEPHONE (OUTPATIENT)
Dept: RHEUMATOLOGY | Facility: CLINIC | Age: 46
End: 2021-09-30

## 2021-09-30 ENCOUNTER — PATIENT MESSAGE (OUTPATIENT)
Dept: RHEUMATOLOGY | Facility: CLINIC | Age: 46
End: 2021-09-30

## 2021-09-30 DIAGNOSIS — M32.9 SYSTEMIC LUPUS ERYTHEMATOSUS, UNSPECIFIED SLE TYPE, UNSPECIFIED ORGAN INVOLVEMENT STATUS: Primary | ICD-10-CM

## 2021-09-30 DIAGNOSIS — R53.83 FATIGUE, UNSPECIFIED TYPE: ICD-10-CM

## 2021-09-30 DIAGNOSIS — M10.9 GOUT, ARTHRITIS: ICD-10-CM

## 2021-09-30 DIAGNOSIS — D84.9 IMMUNOSUPPRESSION: ICD-10-CM

## 2021-10-01 ENCOUNTER — TELEPHONE (OUTPATIENT)
Dept: SURGERY | Facility: CLINIC | Age: 46
End: 2021-10-01

## 2021-10-03 DIAGNOSIS — Z98.84 S/P GASTRIC BYPASS: ICD-10-CM

## 2021-10-03 DIAGNOSIS — I10 ESSENTIAL HYPERTENSION: ICD-10-CM

## 2021-10-03 DIAGNOSIS — Z12.31 ENCOUNTER FOR SCREENING MAMMOGRAM FOR HIGH-RISK PATIENT: ICD-10-CM

## 2021-10-03 DIAGNOSIS — Z00.00 ANNUAL PHYSICAL EXAM: Primary | ICD-10-CM

## 2021-10-03 RX ORDER — HYDROCHLOROTHIAZIDE 25 MG/1
TABLET ORAL
Qty: 30 TABLET | Refills: 0 | Status: SHIPPED | OUTPATIENT
Start: 2021-10-03 | End: 2021-11-01

## 2021-10-06 ENCOUNTER — OFFICE VISIT (OUTPATIENT)
Dept: SURGERY | Facility: CLINIC | Age: 46
End: 2021-10-06
Payer: COMMERCIAL

## 2021-10-06 VITALS
DIASTOLIC BLOOD PRESSURE: 84 MMHG | BODY MASS INDEX: 44.87 KG/M2 | SYSTOLIC BLOOD PRESSURE: 131 MMHG | WEIGHT: 269.31 LBS | HEIGHT: 65 IN | HEART RATE: 82 BPM

## 2021-10-06 DIAGNOSIS — L72.3 SEBACEOUS CYST: Primary | ICD-10-CM

## 2021-10-06 PROCEDURE — 1159F MED LIST DOCD IN RCRD: CPT | Mod: CPTII,S$GLB,, | Performed by: STUDENT IN AN ORGANIZED HEALTH CARE EDUCATION/TRAINING PROGRAM

## 2021-10-06 PROCEDURE — 1159F PR MEDICATION LIST DOCUMENTED IN MEDICAL RECORD: ICD-10-PCS | Mod: CPTII,S$GLB,, | Performed by: STUDENT IN AN ORGANIZED HEALTH CARE EDUCATION/TRAINING PROGRAM

## 2021-10-06 PROCEDURE — 11402 EXC TR-EXT B9+MARG 1.1-2 CM: CPT | Mod: S$GLB,,, | Performed by: STUDENT IN AN ORGANIZED HEALTH CARE EDUCATION/TRAINING PROGRAM

## 2021-10-06 PROCEDURE — 3075F SYST BP GE 130 - 139MM HG: CPT | Mod: CPTII,S$GLB,, | Performed by: STUDENT IN AN ORGANIZED HEALTH CARE EDUCATION/TRAINING PROGRAM

## 2021-10-06 PROCEDURE — 99999 PR PBB SHADOW E&M-EST. PATIENT-LVL III: ICD-10-PCS | Mod: PBBFAC,,, | Performed by: STUDENT IN AN ORGANIZED HEALTH CARE EDUCATION/TRAINING PROGRAM

## 2021-10-06 PROCEDURE — 1160F PR REVIEW ALL MEDS BY PRESCRIBER/CLIN PHARMACIST DOCUMENTED: ICD-10-PCS | Mod: CPTII,S$GLB,, | Performed by: STUDENT IN AN ORGANIZED HEALTH CARE EDUCATION/TRAINING PROGRAM

## 2021-10-06 PROCEDURE — 1160F RVW MEDS BY RX/DR IN RCRD: CPT | Mod: CPTII,S$GLB,, | Performed by: STUDENT IN AN ORGANIZED HEALTH CARE EDUCATION/TRAINING PROGRAM

## 2021-10-06 PROCEDURE — 3079F PR MOST RECENT DIASTOLIC BLOOD PRESSURE 80-89 MM HG: ICD-10-PCS | Mod: CPTII,S$GLB,, | Performed by: STUDENT IN AN ORGANIZED HEALTH CARE EDUCATION/TRAINING PROGRAM

## 2021-10-06 PROCEDURE — 3008F PR BODY MASS INDEX (BMI) DOCUMENTED: ICD-10-PCS | Mod: CPTII,S$GLB,, | Performed by: STUDENT IN AN ORGANIZED HEALTH CARE EDUCATION/TRAINING PROGRAM

## 2021-10-06 PROCEDURE — 3075F PR MOST RECENT SYSTOLIC BLOOD PRESS GE 130-139MM HG: ICD-10-PCS | Mod: CPTII,S$GLB,, | Performed by: STUDENT IN AN ORGANIZED HEALTH CARE EDUCATION/TRAINING PROGRAM

## 2021-10-06 PROCEDURE — 99203 OFFICE O/P NEW LOW 30 MIN: CPT | Mod: 25,S$GLB,, | Performed by: STUDENT IN AN ORGANIZED HEALTH CARE EDUCATION/TRAINING PROGRAM

## 2021-10-06 PROCEDURE — 99203 PR OFFICE/OUTPT VISIT, NEW, LEVL III, 30-44 MIN: ICD-10-PCS | Mod: 25,S$GLB,, | Performed by: STUDENT IN AN ORGANIZED HEALTH CARE EDUCATION/TRAINING PROGRAM

## 2021-10-06 PROCEDURE — 3008F BODY MASS INDEX DOCD: CPT | Mod: CPTII,S$GLB,, | Performed by: STUDENT IN AN ORGANIZED HEALTH CARE EDUCATION/TRAINING PROGRAM

## 2021-10-06 PROCEDURE — 99999 PR PBB SHADOW E&M-EST. PATIENT-LVL III: CPT | Mod: PBBFAC,,, | Performed by: STUDENT IN AN ORGANIZED HEALTH CARE EDUCATION/TRAINING PROGRAM

## 2021-10-06 PROCEDURE — 3079F DIAST BP 80-89 MM HG: CPT | Mod: CPTII,S$GLB,, | Performed by: STUDENT IN AN ORGANIZED HEALTH CARE EDUCATION/TRAINING PROGRAM

## 2021-10-06 PROCEDURE — 11402 PR EXC SKIN BENIG 1.1-2 CM TRUNK,ARM,LEG: ICD-10-PCS | Mod: S$GLB,,, | Performed by: STUDENT IN AN ORGANIZED HEALTH CARE EDUCATION/TRAINING PROGRAM

## 2021-10-07 ENCOUNTER — PATIENT MESSAGE (OUTPATIENT)
Dept: SURGERY | Facility: CLINIC | Age: 46
End: 2021-10-07

## 2021-10-16 ENCOUNTER — HOSPITAL ENCOUNTER (OUTPATIENT)
Dept: RADIOLOGY | Facility: HOSPITAL | Age: 46
Discharge: HOME | End: 2021-10-16
Attending: INTERNAL MEDICINE
Payer: COMMERCIAL

## 2021-10-16 VITALS — BODY MASS INDEX: 44.76 KG/M2 | WEIGHT: 269 LBS

## 2021-10-16 DIAGNOSIS — Z12.31 ENCOUNTER FOR SCREENING MAMMOGRAM FOR HIGH-RISK PATIENT: ICD-10-CM

## 2021-10-16 PROCEDURE — 77067 MAMMO DIGITAL SCREENING BILAT WITH TOMO: ICD-10-PCS | Mod: 26,,, | Performed by: RADIOLOGY

## 2021-10-16 PROCEDURE — 77067 SCR MAMMO BI INCL CAD: CPT | Mod: TC

## 2021-10-16 PROCEDURE — 77063 BREAST TOMOSYNTHESIS BI: CPT | Mod: 26,,, | Performed by: RADIOLOGY

## 2021-10-16 PROCEDURE — 77067 SCR MAMMO BI INCL CAD: CPT | Mod: 26,,, | Performed by: RADIOLOGY

## 2021-10-16 PROCEDURE — 77063 MAMMO DIGITAL SCREENING BILAT WITH TOMO: ICD-10-PCS | Mod: 26,,, | Performed by: RADIOLOGY

## 2021-10-22 ENCOUNTER — OFFICE VISIT (OUTPATIENT)
Dept: SURGERY | Facility: CLINIC | Age: 46
End: 2021-10-22
Payer: COMMERCIAL

## 2021-10-22 VITALS
HEIGHT: 65 IN | SYSTOLIC BLOOD PRESSURE: 140 MMHG | BODY MASS INDEX: 45.45 KG/M2 | DIASTOLIC BLOOD PRESSURE: 85 MMHG | HEART RATE: 63 BPM | WEIGHT: 272.81 LBS

## 2021-10-22 DIAGNOSIS — L72.3 SEBACEOUS CYST: Primary | ICD-10-CM

## 2021-10-22 PROCEDURE — 99999 PR PBB SHADOW E&M-EST. PATIENT-LVL III: ICD-10-PCS | Mod: PBBFAC,,, | Performed by: STUDENT IN AN ORGANIZED HEALTH CARE EDUCATION/TRAINING PROGRAM

## 2021-10-22 PROCEDURE — 1159F MED LIST DOCD IN RCRD: CPT | Mod: CPTII,S$GLB,, | Performed by: STUDENT IN AN ORGANIZED HEALTH CARE EDUCATION/TRAINING PROGRAM

## 2021-10-22 PROCEDURE — 1160F RVW MEDS BY RX/DR IN RCRD: CPT | Mod: CPTII,S$GLB,, | Performed by: STUDENT IN AN ORGANIZED HEALTH CARE EDUCATION/TRAINING PROGRAM

## 2021-10-22 PROCEDURE — 3008F PR BODY MASS INDEX (BMI) DOCUMENTED: ICD-10-PCS | Mod: CPTII,S$GLB,, | Performed by: STUDENT IN AN ORGANIZED HEALTH CARE EDUCATION/TRAINING PROGRAM

## 2021-10-22 PROCEDURE — 99999 PR PBB SHADOW E&M-EST. PATIENT-LVL III: CPT | Mod: PBBFAC,,, | Performed by: STUDENT IN AN ORGANIZED HEALTH CARE EDUCATION/TRAINING PROGRAM

## 2021-10-22 PROCEDURE — 3077F PR MOST RECENT SYSTOLIC BLOOD PRESSURE >= 140 MM HG: ICD-10-PCS | Mod: CPTII,S$GLB,, | Performed by: STUDENT IN AN ORGANIZED HEALTH CARE EDUCATION/TRAINING PROGRAM

## 2021-10-22 PROCEDURE — 3008F BODY MASS INDEX DOCD: CPT | Mod: CPTII,S$GLB,, | Performed by: STUDENT IN AN ORGANIZED HEALTH CARE EDUCATION/TRAINING PROGRAM

## 2021-10-22 PROCEDURE — 3077F SYST BP >= 140 MM HG: CPT | Mod: CPTII,S$GLB,, | Performed by: STUDENT IN AN ORGANIZED HEALTH CARE EDUCATION/TRAINING PROGRAM

## 2021-10-22 PROCEDURE — 3079F PR MOST RECENT DIASTOLIC BLOOD PRESSURE 80-89 MM HG: ICD-10-PCS | Mod: CPTII,S$GLB,, | Performed by: STUDENT IN AN ORGANIZED HEALTH CARE EDUCATION/TRAINING PROGRAM

## 2021-10-22 PROCEDURE — 1159F PR MEDICATION LIST DOCUMENTED IN MEDICAL RECORD: ICD-10-PCS | Mod: CPTII,S$GLB,, | Performed by: STUDENT IN AN ORGANIZED HEALTH CARE EDUCATION/TRAINING PROGRAM

## 2021-10-22 PROCEDURE — 1160F PR REVIEW ALL MEDS BY PRESCRIBER/CLIN PHARMACIST DOCUMENTED: ICD-10-PCS | Mod: CPTII,S$GLB,, | Performed by: STUDENT IN AN ORGANIZED HEALTH CARE EDUCATION/TRAINING PROGRAM

## 2021-10-22 PROCEDURE — 99024 PR POST-OP FOLLOW-UP VISIT: ICD-10-PCS | Mod: S$GLB,,, | Performed by: STUDENT IN AN ORGANIZED HEALTH CARE EDUCATION/TRAINING PROGRAM

## 2021-10-22 PROCEDURE — 99024 POSTOP FOLLOW-UP VISIT: CPT | Mod: S$GLB,,, | Performed by: STUDENT IN AN ORGANIZED HEALTH CARE EDUCATION/TRAINING PROGRAM

## 2021-10-22 PROCEDURE — 3079F DIAST BP 80-89 MM HG: CPT | Mod: CPTII,S$GLB,, | Performed by: STUDENT IN AN ORGANIZED HEALTH CARE EDUCATION/TRAINING PROGRAM

## 2021-10-29 ENCOUNTER — OFFICE VISIT (OUTPATIENT)
Dept: INTERNAL MEDICINE | Facility: CLINIC | Age: 46
End: 2021-10-29
Payer: COMMERCIAL

## 2021-10-29 VITALS
HEART RATE: 70 BPM | BODY MASS INDEX: 45.4 KG/M2 | OXYGEN SATURATION: 97 % | RESPIRATION RATE: 18 BRPM | SYSTOLIC BLOOD PRESSURE: 126 MMHG | HEIGHT: 65 IN | TEMPERATURE: 98 F | DIASTOLIC BLOOD PRESSURE: 76 MMHG | WEIGHT: 272.5 LBS

## 2021-10-29 DIAGNOSIS — E66.01 MORBID OBESITY WITH BMI OF 45.0-49.9, ADULT: ICD-10-CM

## 2021-10-29 DIAGNOSIS — I10 ESSENTIAL HYPERTENSION: ICD-10-CM

## 2021-10-29 DIAGNOSIS — M32.9 SYSTEMIC LUPUS ERYTHEMATOSUS, UNSPECIFIED SLE TYPE, UNSPECIFIED ORGAN INVOLVEMENT STATUS: ICD-10-CM

## 2021-10-29 DIAGNOSIS — G47.33 OBSTRUCTIVE SLEEP APNEA: ICD-10-CM

## 2021-10-29 DIAGNOSIS — E04.2 MULTIPLE THYROID NODULES: ICD-10-CM

## 2021-10-29 DIAGNOSIS — Z00.00 ANNUAL PHYSICAL EXAM: Primary | ICD-10-CM

## 2021-10-29 DIAGNOSIS — I89.0 LYMPHEDEMA OF BOTH LOWER EXTREMITIES: ICD-10-CM

## 2021-10-29 DIAGNOSIS — Z98.84 S/P GASTRIC BYPASS: ICD-10-CM

## 2021-10-29 PROCEDURE — 3074F SYST BP LT 130 MM HG: CPT | Mod: CPTII,S$GLB,, | Performed by: INTERNAL MEDICINE

## 2021-10-29 PROCEDURE — 99999 PR PBB SHADOW E&M-EST. PATIENT-LVL IV: ICD-10-PCS | Mod: PBBFAC,,, | Performed by: INTERNAL MEDICINE

## 2021-10-29 PROCEDURE — 3008F BODY MASS INDEX DOCD: CPT | Mod: CPTII,S$GLB,, | Performed by: INTERNAL MEDICINE

## 2021-10-29 PROCEDURE — 99396 PR PREVENTIVE VISIT,EST,40-64: ICD-10-PCS | Mod: S$GLB,,, | Performed by: INTERNAL MEDICINE

## 2021-10-29 PROCEDURE — 3078F PR MOST RECENT DIASTOLIC BLOOD PRESSURE < 80 MM HG: ICD-10-PCS | Mod: CPTII,S$GLB,, | Performed by: INTERNAL MEDICINE

## 2021-10-29 PROCEDURE — 3074F PR MOST RECENT SYSTOLIC BLOOD PRESSURE < 130 MM HG: ICD-10-PCS | Mod: CPTII,S$GLB,, | Performed by: INTERNAL MEDICINE

## 2021-10-29 PROCEDURE — 99396 PREV VISIT EST AGE 40-64: CPT | Mod: S$GLB,,, | Performed by: INTERNAL MEDICINE

## 2021-10-29 PROCEDURE — 1160F PR REVIEW ALL MEDS BY PRESCRIBER/CLIN PHARMACIST DOCUMENTED: ICD-10-PCS | Mod: CPTII,S$GLB,, | Performed by: INTERNAL MEDICINE

## 2021-10-29 PROCEDURE — 1159F PR MEDICATION LIST DOCUMENTED IN MEDICAL RECORD: ICD-10-PCS | Mod: CPTII,S$GLB,, | Performed by: INTERNAL MEDICINE

## 2021-10-29 PROCEDURE — 99999 PR PBB SHADOW E&M-EST. PATIENT-LVL IV: CPT | Mod: PBBFAC,,, | Performed by: INTERNAL MEDICINE

## 2021-10-29 PROCEDURE — 3078F DIAST BP <80 MM HG: CPT | Mod: CPTII,S$GLB,, | Performed by: INTERNAL MEDICINE

## 2021-10-29 PROCEDURE — 1160F RVW MEDS BY RX/DR IN RCRD: CPT | Mod: CPTII,S$GLB,, | Performed by: INTERNAL MEDICINE

## 2021-10-29 PROCEDURE — 1159F MED LIST DOCD IN RCRD: CPT | Mod: CPTII,S$GLB,, | Performed by: INTERNAL MEDICINE

## 2021-10-29 PROCEDURE — 3008F PR BODY MASS INDEX (BMI) DOCUMENTED: ICD-10-PCS | Mod: CPTII,S$GLB,, | Performed by: INTERNAL MEDICINE

## 2021-10-30 ENCOUNTER — IMMUNIZATION (OUTPATIENT)
Dept: INTERNAL MEDICINE | Facility: CLINIC | Age: 46
End: 2021-10-30
Payer: COMMERCIAL

## 2021-10-30 DIAGNOSIS — Z23 NEED FOR VACCINATION: Primary | ICD-10-CM

## 2021-10-30 PROCEDURE — 0003A COVID-19, MRNA, LNP-S, PF, 30 MCG/0.3 ML DOSE VACCINE: CPT | Mod: CV19,PBBFAC | Performed by: INTERNAL MEDICINE

## 2021-10-30 PROCEDURE — 91300 COVID-19, MRNA, LNP-S, PF, 30 MCG/0.3 ML DOSE VACCINE: CPT | Mod: PBBFAC | Performed by: INTERNAL MEDICINE

## 2021-11-01 ENCOUNTER — PATIENT MESSAGE (OUTPATIENT)
Dept: INTERNAL MEDICINE | Facility: CLINIC | Age: 46
End: 2021-11-01
Payer: COMMERCIAL

## 2021-11-01 ENCOUNTER — TELEPHONE (OUTPATIENT)
Dept: INTERNAL MEDICINE | Facility: CLINIC | Age: 46
End: 2021-11-01
Payer: COMMERCIAL

## 2021-11-01 DIAGNOSIS — Z00.00 ANNUAL PHYSICAL EXAM: Primary | ICD-10-CM

## 2021-11-01 DIAGNOSIS — Z98.84 S/P GASTRIC BYPASS: ICD-10-CM

## 2021-11-05 ENCOUNTER — LAB VISIT (OUTPATIENT)
Dept: LAB | Facility: HOSPITAL | Age: 46
End: 2021-11-05
Attending: INTERNAL MEDICINE
Payer: COMMERCIAL

## 2021-11-05 DIAGNOSIS — Z00.00 ANNUAL PHYSICAL EXAM: ICD-10-CM

## 2021-11-05 LAB
BACTERIA #/AREA URNS AUTO: ABNORMAL /HPF
BILIRUB UR QL STRIP: NEGATIVE
CLARITY UR REFRACT.AUTO: ABNORMAL
COLOR UR AUTO: YELLOW
GLUCOSE UR QL STRIP: NEGATIVE
HGB UR QL STRIP: ABNORMAL
KETONES UR QL STRIP: NEGATIVE
LEUKOCYTE ESTERASE UR QL STRIP: ABNORMAL
MICROSCOPIC COMMENT: ABNORMAL
NITRITE UR QL STRIP: NEGATIVE
PH UR STRIP: 5 [PH] (ref 5–8)
PROT UR QL STRIP: ABNORMAL
RBC #/AREA URNS AUTO: 2 /HPF (ref 0–4)
SP GR UR STRIP: 1.03 (ref 1–1.03)
URN SPEC COLLECT METH UR: ABNORMAL
UROBILINOGEN UR STRIP-ACNC: NEGATIVE EU/DL
WBC #/AREA URNS AUTO: 2 /HPF (ref 0–5)

## 2021-11-05 PROCEDURE — 81000 URINALYSIS NONAUTO W/SCOPE: CPT | Mod: PO | Performed by: INTERNAL MEDICINE

## 2021-11-07 ENCOUNTER — PATIENT MESSAGE (OUTPATIENT)
Dept: INTERNAL MEDICINE | Facility: CLINIC | Age: 46
End: 2021-11-07
Payer: COMMERCIAL

## 2021-11-07 DIAGNOSIS — R74.01 ELEVATED ALT MEASUREMENT: Primary | ICD-10-CM

## 2021-11-08 ENCOUNTER — TELEPHONE (OUTPATIENT)
Dept: INTERNAL MEDICINE | Facility: CLINIC | Age: 46
End: 2021-11-08
Payer: COMMERCIAL

## 2021-11-22 ENCOUNTER — PATIENT MESSAGE (OUTPATIENT)
Dept: RHEUMATOLOGY | Facility: CLINIC | Age: 46
End: 2021-11-22
Payer: COMMERCIAL

## 2022-01-07 ENCOUNTER — PATIENT MESSAGE (OUTPATIENT)
Dept: INTERNAL MEDICINE | Facility: CLINIC | Age: 47
End: 2022-01-07
Payer: COMMERCIAL

## 2022-01-07 DIAGNOSIS — N63.10 LUMP OF RIGHT BREAST: Primary | ICD-10-CM

## 2022-01-09 ENCOUNTER — PATIENT MESSAGE (OUTPATIENT)
Dept: RHEUMATOLOGY | Facility: CLINIC | Age: 47
End: 2022-01-09
Payer: COMMERCIAL

## 2022-01-10 ENCOUNTER — TELEPHONE (OUTPATIENT)
Dept: INTERNAL MEDICINE | Facility: CLINIC | Age: 47
End: 2022-01-10
Payer: COMMERCIAL

## 2022-01-10 ENCOUNTER — PATIENT MESSAGE (OUTPATIENT)
Dept: INTERNAL MEDICINE | Facility: CLINIC | Age: 47
End: 2022-01-10
Payer: COMMERCIAL

## 2022-01-10 NOTE — TELEPHONE ENCOUNTER
----- Message from Alena Gonzalez sent at 1/10/2022  8:33 AM CST -----  Contact: self [phone]  Patient is calling back, regarding her portal message she sent on Friday, please reply back thru the portal.    Thank you

## 2022-01-11 ENCOUNTER — TELEPHONE (OUTPATIENT)
Dept: INTERNAL MEDICINE | Facility: CLINIC | Age: 47
End: 2022-01-11
Payer: COMMERCIAL

## 2022-01-11 DIAGNOSIS — N63.10 LUMP OF RIGHT BREAST: Primary | ICD-10-CM

## 2022-01-11 NOTE — TELEPHONE ENCOUNTER
----- Message from Gracia Stacy sent at 1/10/2022  3:32 PM CST -----  Contact: [phone] Nitin  Patient would like to get mammo order and breast ultrasound faxed to [facility] the  fax number is  [phone] Attn: Mao

## 2022-01-11 NOTE — TELEPHONE ENCOUNTER
Ordered external MMG and US of breast. She needs to cancel the imaging that was already scheduled at [facility].   Dermal Autograft Text: The defect edges were debeveled with a #15 scalpel blade.  Given the location of the defect, shape of the defect and the proximity to free margins a dermal autograft was deemed most appropriate.  Using a sterile surgical marker, the primary defect shape was transferred to the donor site. The area thus outlined was incised deep to adipose tissue with a #15 scalpel blade.  The harvested graft was then trimmed of adipose and epidermal tissue until only dermis was left.  The skin graft was then placed in the primary defect and oriented appropriately.

## 2022-01-13 ENCOUNTER — TELEPHONE (OUTPATIENT)
Dept: INTERNAL MEDICINE | Facility: CLINIC | Age: 47
End: 2022-01-13
Payer: COMMERCIAL

## 2022-01-13 ENCOUNTER — PATIENT MESSAGE (OUTPATIENT)
Dept: INTERNAL MEDICINE | Facility: CLINIC | Age: 47
End: 2022-01-13
Payer: COMMERCIAL

## 2022-01-13 DIAGNOSIS — R92.8 ABNORMAL FINDING ON BREAST IMAGING: Primary | ICD-10-CM

## 2022-01-13 NOTE — TELEPHONE ENCOUNTER
Rosalie from Ultrasound dept at [facility] called.    The radiologist would like to speak to you regarding the findings of pt's Breast US.  Recommending a biopsy.    Please call [phone]

## 2022-01-25 ENCOUNTER — TELEPHONE (OUTPATIENT)
Dept: INTERNAL MEDICINE | Facility: CLINIC | Age: 47
End: 2022-01-25
Payer: COMMERCIAL

## 2022-01-31 ENCOUNTER — PATIENT MESSAGE (OUTPATIENT)
Dept: INTERNAL MEDICINE | Facility: CLINIC | Age: 47
End: 2022-01-31
Payer: COMMERCIAL

## 2022-02-01 NOTE — TELEPHONE ENCOUNTER
Spoke with patient. Pathology shows fat necrosis in the breast. Reassured her that this is a benign finding.

## 2022-02-07 ENCOUNTER — PATIENT OUTREACH (OUTPATIENT)
Dept: ADMINISTRATIVE | Facility: OTHER | Age: 47
End: 2022-02-07
Payer: COMMERCIAL

## 2022-02-07 DIAGNOSIS — Z12.11 ENCOUNTER FOR FIT (FECAL IMMUNOCHEMICAL TEST) SCREENING: Primary | ICD-10-CM

## 2022-02-07 NOTE — PROGRESS NOTES
Health Maintenance Due   Topic Date Due    Colorectal Cancer Screening  Never done     Updates were requested from care everywhere.  Chart was reviewed for overdue Proactive [facility] Encounters (JAC) topics (CRS, Breast Cancer Screening, Eye exam)  Health Maintenance has been updated.  LINKS immunization registry triggered.  Immunizations were reconciled.  Fit kit ordered.

## 2022-02-07 NOTE — PROGRESS NOTES
Health Maintenance Due   Topic Date Due    Colorectal Cancer Screening  Never done     Updates were requested from care everywhere.  Chart was reviewed for overdue Proactive [facility] Encounters (JAC) topics (CRS, Breast Cancer Screening, Eye exam)  Health Maintenance has been updated.  LINKS immunization registry triggered.  Immunizations were reconciled.

## 2022-02-08 ENCOUNTER — OFFICE VISIT (OUTPATIENT)
Dept: RHEUMATOLOGY | Facility: CLINIC | Age: 47
End: 2022-02-08
Payer: COMMERCIAL

## 2022-02-08 DIAGNOSIS — M32.9 SYSTEMIC LUPUS ERYTHEMATOSUS, UNSPECIFIED SLE TYPE, UNSPECIFIED ORGAN INVOLVEMENT STATUS: Primary | ICD-10-CM

## 2022-02-08 DIAGNOSIS — L65.9 ALOPECIA: ICD-10-CM

## 2022-02-08 DIAGNOSIS — E66.01 CLASS 3 SEVERE OBESITY DUE TO EXCESS CALORIES WITHOUT SERIOUS COMORBIDITY WITH BODY MASS INDEX (BMI) OF 45.0 TO 49.9 IN ADULT: ICD-10-CM

## 2022-02-08 DIAGNOSIS — I27.20 PULMONARY HYPERTENSION: ICD-10-CM

## 2022-02-08 DIAGNOSIS — D84.9 IMMUNOSUPPRESSION: ICD-10-CM

## 2022-02-08 DIAGNOSIS — M10.9 GOUT, ARTHRITIS: ICD-10-CM

## 2022-02-08 DIAGNOSIS — R53.83 FATIGUE, UNSPECIFIED TYPE: ICD-10-CM

## 2022-02-08 PROCEDURE — 99214 OFFICE O/P EST MOD 30 MIN: CPT | Mod: 95,,, | Performed by: INTERNAL MEDICINE

## 2022-02-08 PROCEDURE — 99214 PR OFFICE/OUTPT VISIT, EST, LEVL IV, 30-39 MIN: ICD-10-PCS | Mod: 95,,, | Performed by: INTERNAL MEDICINE

## 2022-02-08 PROCEDURE — 1160F RVW MEDS BY RX/DR IN RCRD: CPT | Mod: CPTII,95,, | Performed by: INTERNAL MEDICINE

## 2022-02-08 PROCEDURE — 1160F PR REVIEW ALL MEDS BY PRESCRIBER/CLIN PHARMACIST DOCUMENTED: ICD-10-PCS | Mod: CPTII,95,, | Performed by: INTERNAL MEDICINE

## 2022-02-08 PROCEDURE — 1159F MED LIST DOCD IN RCRD: CPT | Mod: CPTII,95,, | Performed by: INTERNAL MEDICINE

## 2022-02-08 PROCEDURE — 1159F PR MEDICATION LIST DOCUMENTED IN MEDICAL RECORD: ICD-10-PCS | Mod: CPTII,95,, | Performed by: INTERNAL MEDICINE

## 2022-02-08 NOTE — PROGRESS NOTES
Rapid3 Question Responses and Scores 2/4/2022   MDHAQ Score 0   Psychologic Score 0   Pain Score 0.5   When you awakened in the morning OVER THE LAST WEEK, did you feel stiff? No   Fatigue Score 0   Global Health Score 0.5   RAPID3 Score 0.33     Answers for HPI/ROS submitted by the patient on 2/4/2022  fever: No  eye redness: No  mouth sores: No  headaches: No  shortness of breath: No  chest pain: No  trouble swallowing: No  diarrhea: No  constipation: No  unexpected weight change: No  genital sore: No  dysuria: No  During the last 3 days, have you had a skin rash?: No  Bruises or bleeds easily: No  cough: No

## 2022-02-08 NOTE — PROGRESS NOTES
Subjective:       Patient ID: Nitin Mcconnell is a 46 y.o. female.    Chief Complaint: Lupus and gout    HPI:  Nitin Mcconnell is a 46 y.o. female diagnosed at age 31 with lupus.  Started as upper respiratory symptoms that did not respond to antibiotics.  She had fever and low BP as well as SOB.  She was intubated and on ventilator for a week.  She was at [facility].  Dr. Martin diagnosed lupus.  Hospitalized 3/8/07 to 3/30/07.  She had elevated pressure in eyes, kidney involvement.  She was discharged with a PICC line for antibiotics.  She was found to be allergic to heparin due to thrombocytopenia that developed after flushing PICC line with heparin.  She had hair loss.  Had enlarged lymph node under arm in past and biopsy was normal.  Was treated with Plaquenil and steroids.  Only took steroid for 1 year.       Compliant with Plaquenil.   Had breast mass found to be benign.   No lupus issues (rashes, ulcers in mouth or nose). Still getting injections in scalp by dermatologist.   Plans to go back to gym.     She denies any flares since last visit.   Being evaluated for sleep apnea.  No gout attacks.   Diagnosed with lymph edema and therapy helped.  She is compliant with compression.         Lupus Review of Systems  Alopecia: yes  Photosensitivity: no   Raynaud's: no  Oral or nasal ulcers: occasional oral ulcers  Rashes:  Intermittent malar rash; occasional rash in crease of arm and neck  No pleurisy or pericarditis.  No seizures, psychosis, or stroke.  No venous or arterial clots.  History of being on coumadin for 3 months as a precaution.   Pregnancy hx (if applicable): no miscarriages (never pregnant)        Review of Systems   Constitutional: Negative.  Negative for fever and unexpected weight change.   HENT: Negative.  Negative for mouth sores and trouble swallowing.    Eyes: Negative for redness.   Respiratory: Negative for cough and shortness of breath.    Cardiovascular: Positive for leg  swelling (lymph edema). Negative for chest pain.   Gastrointestinal: Negative for constipation and diarrhea.   Endocrine: Negative.    Genitourinary: Negative.  Negative for dysuria and genital sores.   Musculoskeletal: Negative.    Skin: Negative for rash.        Alopecia     Allergic/Immunologic: Negative.    Neurological: Negative.  Negative for headaches.   Hematological: Negative.  Does not bruise/bleed easily.   Psychiatric/Behavioral: Negative.          Objective:   LMP 07/13/2013      Physical Exam   Constitutional: She is oriented to person, place, and time.   HENT:   Head: Normocephalic and atraumatic.   Eyes: Conjunctivae are normal.   Musculoskeletal:         General: No tenderness or deformity.   Neurological: She is alert and oriented to person, place, and time.   Psychiatric: Mood and affect normal.            LABS    Component      Latest Ref Rng & Units 11/5/2021   WBC      3.90 - 12.70 K/uL 5.05   RBC      4.00 - 5.40 M/uL 4.34   Hemoglobin      12.0 - 16.0 g/dL 12.0   Hematocrit      37.0 - 48.5 % 39.0   MCV      82 - 98 fL 90   MCH      27.0 - 31.0 pg 27.6   MCHC      32.0 - 36.0 g/dL 30.8 (L)   RDW      11.5 - 14.5 % 12.3   Platelets      150 - 450 K/uL 231   MPV      9.2 - 12.9 fL 10.9   Immature Granulocytes      0.0 - 0.5 % 0.2   Gran # (ANC)      1.8 - 7.7 K/uL 2.8   Immature Grans (Abs)      0.00 - 0.04 K/uL 0.01   Lymph #      1.0 - 4.8 K/uL 1.7   Mono #      0.3 - 1.0 K/uL 0.4   Eos #      0.0 - 0.5 K/uL 0.1   Baso #      0.00 - 0.20 K/uL 0.05   nRBC      0 /100 WBC 0   Gran %      38.0 - 73.0 % 56.2   Lymph %      18.0 - 48.0 % 33.3   Mono %      4.0 - 15.0 % 7.5   Eosinophil %      0.0 - 8.0 % 1.8   Basophil %      0.0 - 1.9 % 1.0   Differential Method       Automated   Sodium      136 - 145 mmol/L 143   Potassium      3.5 - 5.1 mmol/L 3.5   Chloride      95 - 110 mmol/L 102   CO2      23 - 29 mmol/L 32 (H)   Glucose      70 - 110 mg/dL 92   BUN      7 - 17 mg/dL 18 (H)   Creatinine       0.50 - 1.40 mg/dL 1.07   Calcium      8.7 - 10.5 mg/dL 8.8   PROTEIN TOTAL      6.0 - 8.4 g/dL 7.4   Albumin      3.5 - 5.2 g/dL 4.2   BILIRUBIN TOTAL      0.1 - 1.0 mg/dL 0.6   Alkaline Phosphatase      38 - 126 U/L 65   AST      15 - 46 U/L 35   ALT      10 - 44 U/L 46 (H)   Anion Gap      8 - 16 mmol/L 9   eGFR if African American      >60 mL/min/1.73 m:2 >60.0   eGFR if non African American      >60 mL/min/1.73 m:2 >60.0   Specimen UA       Urine, Clean Catch   Color, UA      Yellow, Straw, Annabelle Yellow   Appearance, UA      Clear Cloudy (A)   pH, UA      5.0 - 8.0 5.0   Specific Gravity, UA      1.005 - 1.030 1.030   Protein, UA      Negative Trace (A)   Glucose, UA      Negative Negative   Ketones, UA      Negative Negative   Bilirubin (UA)      Negative Negative   Occult Blood UA      Negative Trace (A)   NITRITE UA      Negative Negative   UROBILINOGEN UA      <2.0 EU/dL Negative   Leukocytes, UA      Negative 2+ (A)   Cholesterol      120 - 199 mg/dL 178   Triglycerides      30 - 150 mg/dL 99   HDL      40 - 75 mg/dL 48   LDL Cholesterol External      63.0 - 159.0 mg/dL 110.2   HDL/Cholesterol Ratio      20.0 - 50.0 % 27.0   Total Cholesterol/HDL Ratio      2.0 - 5.0 3.7   Non-HDL Cholesterol      mg/dL 130   Iron      30 - 160 ug/dL 80   Transferrin      200 - 375 mg/dL 225   TIBC      250 - 450 ug/dL 333   Saturated Iron      20 - 50 % 24   RBC, UA      0 - 4 /hpf 2   WBC, UA      0 - 5 /hpf 2   Bacteria, UA      None-Occ /hpf Many (A)   Microscopic Comment       SEE COMMENT   Hemoglobin A1C External      4.0 - 5.6 % 4.9   Estimated Avg Glucose      68 - 131 mg/dL 94   TSH      0.400 - 4.000 uIU/mL 1.420   Ferritin      20.0 - 300.0 ng/mL 328 (H)   Folate      4.0 - 24.0 ng/mL 12.4   Vitamin B-12      210 - 950 pg/mL >2000 (H)   Vit D, 25-Hydroxy      30 - 96 ng/mL 43   Thiamine      38 - 122 ug/L 48       DEXA 4/23/2021 Normal   Assessment:       1.  SLE.  Stable.  She feels well.    2.   Immunosuppression.  Eye exam for Plaquenil will be November.    3.  Elevated pressure in eyes  4.  Obesity.  Gastric bypass 2009 lost 100 pounds but gained most of it back.  Patient considering revision.  5.  Thyroid nodules.  Being monitored.  Aspiration was normal  6.  Mother with RA  7.  Ankle swelling bilateral.  Grandmother and mother had CHF    8.  Alopecia.  Family history of hair loss. Continuing with derm treatment hair vitamin, daily hail oil and hair foam.   She believes biopsy result showed alopecia.   9.  Pulmonary HTN.  Echo in 3/2021 normal PA pressure  10.  SOB intermittent  11.  Lymph edema.  Tried therapy which helped so now doing at home.  12.  Gout left 1st toe.  X-ray with possible erosion at first MTP reviewed by me. No gout attacks    Plan:       1.  Labs  2.  Followed for pulmonary HTN by cardiologist Dr. Escobar  3.  Follow with current dermatology to evaluate   4.  Patient due for Plaquenil eye exam 10/2021.  5.  Patient had flu vaccination and COVID vaccine booster  6.  Follow info from wellness dietician through insurance who gave info on gout  7.  Medrol pack prn for gout attacks  8.  Patient to follow diet from [facility].  Patient to encouraged to reconsider Weight Watchers/Noom. Plans to ride bike.  9.  Continue management lymph edema at home        RTO 4 months/prn;     The patient location is: [place]  The chief complaint leading to consultation is: SLE and gout    Visit type: TELE AUDIOVISUAL:59571    Face to Face time with patient: 20 minutes  25 minutes of total time spent on the encounter, which includes face to face time and non-face to face time preparing to see the patient (eg, review of tests), Obtaining and/or reviewing separately obtained history, Documenting clinical information in the electronic or other health record, Independently interpreting results (not separately reported) and communicating results to the patient/family/caregiver, or Care coordination (not  separately reported).         Each patient to whom he or she provides medical services by telemedicine is:  (1) informed of the relationship between the physician and patient and the respective role of any other health care provider with respect to management of the patient; and (2) notified that he or she may decline to receive medical services by telemedicine and may withdraw from such care at any time.    Notes: see above

## 2022-02-09 ENCOUNTER — LAB VISIT (OUTPATIENT)
Dept: LAB | Facility: HOSPITAL | Age: 47
End: 2022-02-09
Attending: INTERNAL MEDICINE
Payer: COMMERCIAL

## 2022-02-09 DIAGNOSIS — I27.20 PULMONARY HYPERTENSION: ICD-10-CM

## 2022-02-09 DIAGNOSIS — M10.9 GOUT, ARTHRITIS: ICD-10-CM

## 2022-02-09 DIAGNOSIS — L65.9 ALOPECIA: ICD-10-CM

## 2022-02-09 DIAGNOSIS — E66.01 CLASS 3 SEVERE OBESITY DUE TO EXCESS CALORIES WITHOUT SERIOUS COMORBIDITY WITH BODY MASS INDEX (BMI) OF 45.0 TO 49.9 IN ADULT: ICD-10-CM

## 2022-02-09 DIAGNOSIS — R53.83 FATIGUE, UNSPECIFIED TYPE: ICD-10-CM

## 2022-02-09 DIAGNOSIS — D84.9 IMMUNOSUPPRESSION: ICD-10-CM

## 2022-02-09 DIAGNOSIS — M32.9 SYSTEMIC LUPUS ERYTHEMATOSUS, UNSPECIFIED SLE TYPE, UNSPECIFIED ORGAN INVOLVEMENT STATUS: ICD-10-CM

## 2022-02-09 LAB
ALBUMIN SERPL BCP-MCNC: 4.4 G/DL (ref 3.5–5.2)
ALP SERPL-CCNC: 65 U/L (ref 38–126)
ALT SERPL W/O P-5'-P-CCNC: 49 U/L (ref 10–44)
ANION GAP SERPL CALC-SCNC: 10 MMOL/L (ref 8–16)
AST SERPL-CCNC: 38 U/L (ref 15–46)
BASOPHILS # BLD AUTO: 0.04 K/UL (ref 0–0.2)
BASOPHILS NFR BLD: 0.9 % (ref 0–1.9)
BILIRUB SERPL-MCNC: 0.6 MG/DL (ref 0.1–1)
C3 SERPL-MCNC: 144 MG/DL (ref 50–180)
C4 SERPL-MCNC: 44 MG/DL (ref 11–44)
CALCIUM SERPL-MCNC: 9.1 MG/DL (ref 8.7–10.5)
CHLORIDE SERPL-SCNC: 102 MMOL/L (ref 95–110)
CK SERPL-CCNC: 158 U/L (ref 55–170)
CO2 SERPL-SCNC: 30 MMOL/L (ref 23–29)
CREAT SERPL-MCNC: 1.06 MG/DL (ref 0.5–1.4)
CRP SERPL-MCNC: 0.31 MG/DL (ref 0–1)
DIFFERENTIAL METHOD: ABNORMAL
EOSINOPHIL # BLD AUTO: 0.1 K/UL (ref 0–0.5)
EOSINOPHIL NFR BLD: 2.1 % (ref 0–8)
ERYTHROCYTE [DISTWIDTH] IN BLOOD BY AUTOMATED COUNT: 12.5 % (ref 11.5–14.5)
ERYTHROCYTE [SEDIMENTATION RATE] IN BLOOD BY WESTERGREN METHOD: 27 MM/HR (ref 0–20)
EST. GFR  (AFRICAN AMERICAN): >60 ML/MIN/1.73 M^2
EST. GFR  (NON AFRICAN AMERICAN): >60 ML/MIN/1.73 M^2
GLUCOSE SERPL-MCNC: 95 MG/DL (ref 70–110)
HCT VFR BLD AUTO: 40.1 % (ref 37–48.5)
HGB BLD-MCNC: 12.3 G/DL (ref 12–16)
IMM GRANULOCYTES # BLD AUTO: 0 K/UL (ref 0–0.04)
IMM GRANULOCYTES NFR BLD AUTO: 0 % (ref 0–0.5)
LYMPHOCYTES # BLD AUTO: 1.4 K/UL (ref 1–4.8)
LYMPHOCYTES NFR BLD: 32 % (ref 18–48)
MCH RBC QN AUTO: 27.5 PG (ref 27–31)
MCHC RBC AUTO-ENTMCNC: 30.7 G/DL (ref 32–36)
MCV RBC AUTO: 90 FL (ref 82–98)
MONOCYTES # BLD AUTO: 0.4 K/UL (ref 0.3–1)
MONOCYTES NFR BLD: 10.2 % (ref 4–15)
NEUTROPHILS # BLD AUTO: 2.3 K/UL (ref 1.8–7.7)
NEUTROPHILS NFR BLD: 54.8 % (ref 38–73)
NRBC BLD-RTO: 0 /100 WBC
PLATELET # BLD AUTO: 216 K/UL (ref 150–450)
PMV BLD AUTO: 11 FL (ref 9.2–12.9)
POTASSIUM SERPL-SCNC: 4.3 MMOL/L (ref 3.5–5.1)
PROT SERPL-MCNC: 7.7 G/DL (ref 6–8.4)
RBC # BLD AUTO: 4.48 M/UL (ref 4–5.4)
SODIUM SERPL-SCNC: 142 MMOL/L (ref 136–145)
URATE SERPL-MCNC: 9.8 MG/DL (ref 2.4–5.7)
UUN UR-MCNC: 17 MG/DL (ref 7–17)
WBC # BLD AUTO: 4.22 K/UL (ref 3.9–12.7)

## 2022-02-09 PROCEDURE — 36415 COLL VENOUS BLD VENIPUNCTURE: CPT | Mod: PO | Performed by: INTERNAL MEDICINE

## 2022-02-09 PROCEDURE — 86140 C-REACTIVE PROTEIN: CPT | Mod: PO | Performed by: INTERNAL MEDICINE

## 2022-02-09 PROCEDURE — 86225 DNA ANTIBODY NATIVE: CPT | Mod: PO | Performed by: INTERNAL MEDICINE

## 2022-02-09 PROCEDURE — 84550 ASSAY OF BLOOD/URIC ACID: CPT | Performed by: INTERNAL MEDICINE

## 2022-02-09 PROCEDURE — 85652 RBC SED RATE AUTOMATED: CPT | Performed by: INTERNAL MEDICINE

## 2022-02-09 PROCEDURE — 82550 ASSAY OF CK (CPK): CPT | Mod: PO | Performed by: INTERNAL MEDICINE

## 2022-02-09 PROCEDURE — 80053 COMPREHEN METABOLIC PANEL: CPT | Mod: PO | Performed by: INTERNAL MEDICINE

## 2022-02-09 PROCEDURE — 86160 COMPLEMENT ANTIGEN: CPT | Mod: 59,PO | Performed by: INTERNAL MEDICINE

## 2022-02-09 PROCEDURE — 86160 COMPLEMENT ANTIGEN: CPT | Mod: PO | Performed by: INTERNAL MEDICINE

## 2022-02-09 PROCEDURE — 85025 COMPLETE CBC W/AUTO DIFF WBC: CPT | Mod: PO | Performed by: INTERNAL MEDICINE

## 2022-02-10 ENCOUNTER — PATIENT MESSAGE (OUTPATIENT)
Dept: RHEUMATOLOGY | Facility: CLINIC | Age: 47
End: 2022-02-10
Payer: COMMERCIAL

## 2022-02-10 LAB — DSDNA AB SER-ACNC: NORMAL [IU]/ML

## 2022-02-23 DIAGNOSIS — D84.9 IMMUNOSUPPRESSED STATUS: ICD-10-CM

## 2022-03-21 ENCOUNTER — PATIENT MESSAGE (OUTPATIENT)
Dept: ADMINISTRATIVE | Facility: HOSPITAL | Age: 47
End: 2022-03-21
Payer: COMMERCIAL

## 2022-03-21 DIAGNOSIS — Z12.11 SCREENING FOR COLON CANCER: ICD-10-CM

## 2022-04-15 LAB — HEMOCCULT STL QL IA: POSITIVE

## 2022-04-18 DIAGNOSIS — R19.5 POSITIVE COLORECTAL CANCER SCREENING USING DNA-BASED STOOL TEST: Primary | ICD-10-CM

## 2022-04-19 ENCOUNTER — TELEPHONE (OUTPATIENT)
Dept: INTERNAL MEDICINE | Facility: CLINIC | Age: 47
End: 2022-04-19
Payer: COMMERCIAL

## 2022-04-19 NOTE — TELEPHONE ENCOUNTER
----- Message from Chasity Dixon MD sent at 4/18/2022 11:31 PM CDT -----  The stool test that is used to screen for colon cancer is positive. You will need to schedule a colonoscopy.     My nurse will contact you and let you know how to schedule the colonoscopy.

## 2022-04-22 ENCOUNTER — TELEPHONE (OUTPATIENT)
Dept: INTERNAL MEDICINE | Facility: CLINIC | Age: 47
End: 2022-04-22
Payer: COMMERCIAL

## 2022-04-25 ENCOUNTER — PATIENT MESSAGE (OUTPATIENT)
Dept: ENDOSCOPY | Facility: HOSPITAL | Age: 47
End: 2022-04-25
Payer: COMMERCIAL

## 2022-05-17 ENCOUNTER — ANESTHESIA (OUTPATIENT)
Dept: ENDOSCOPY | Facility: HOSPITAL | Age: 47
End: 2022-05-17
Payer: COMMERCIAL

## 2022-05-17 ENCOUNTER — HOSPITAL ENCOUNTER (OUTPATIENT)
Facility: HOSPITAL | Age: 47
Discharge: HOME | End: 2022-05-17
Attending: INTERNAL MEDICINE | Admitting: INTERNAL MEDICINE
Payer: COMMERCIAL

## 2022-05-17 ENCOUNTER — ANESTHESIA EVENT (OUTPATIENT)
Dept: ENDOSCOPY | Facility: HOSPITAL | Age: 47
End: 2022-05-17
Payer: COMMERCIAL

## 2022-05-17 VITALS
BODY MASS INDEX: 45.32 KG/M2 | TEMPERATURE: 98 F | HEART RATE: 58 BPM | SYSTOLIC BLOOD PRESSURE: 131 MMHG | OXYGEN SATURATION: 100 % | HEIGHT: 65 IN | RESPIRATION RATE: 18 BRPM | DIASTOLIC BLOOD PRESSURE: 67 MMHG | WEIGHT: 272 LBS

## 2022-05-17 DIAGNOSIS — R19.5 POSITIVE FIT (FECAL IMMUNOCHEMICAL TEST): ICD-10-CM

## 2022-05-17 DIAGNOSIS — R19.5 POSITIVE COLORECTAL CANCER SCREENING USING DNA-BASED STOOL TEST: Primary | ICD-10-CM

## 2022-05-17 PROCEDURE — 45381 PR COLONOSCPY,FLEX,W/DIR SUBMUC INJECT: ICD-10-PCS | Mod: ,,, | Performed by: INTERNAL MEDICINE

## 2022-05-17 PROCEDURE — 27201028 HC NEEDLE, SCLERO: Performed by: INTERNAL MEDICINE

## 2022-05-17 PROCEDURE — 25000003 PHARM REV CODE 250: Performed by: INTERNAL MEDICINE

## 2022-05-17 PROCEDURE — 45381 COLONOSCOPY SUBMUCOUS NJX: CPT | Mod: ,,, | Performed by: INTERNAL MEDICINE

## 2022-05-17 PROCEDURE — 25000003 PHARM REV CODE 250: Performed by: NURSE ANESTHETIST, CERTIFIED REGISTERED

## 2022-05-17 PROCEDURE — E9220 PRA ENDO ANESTHESIA: HCPCS | Mod: ,,, | Performed by: NURSE ANESTHETIST, CERTIFIED REGISTERED

## 2022-05-17 PROCEDURE — 63600175 PHARM REV CODE 636 W HCPCS: Performed by: NURSE ANESTHETIST, CERTIFIED REGISTERED

## 2022-05-17 PROCEDURE — 37000008 HC ANESTHESIA 1ST 15 MINUTES: Performed by: INTERNAL MEDICINE

## 2022-05-17 PROCEDURE — 37000009 HC ANESTHESIA EA ADD 15 MINS: Performed by: INTERNAL MEDICINE

## 2022-05-17 PROCEDURE — E9220 PRA ENDO ANESTHESIA: ICD-10-PCS | Mod: ,,, | Performed by: NURSE ANESTHETIST, CERTIFIED REGISTERED

## 2022-05-17 PROCEDURE — 45381 COLONOSCOPY SUBMUCOUS NJX: CPT | Performed by: INTERNAL MEDICINE

## 2022-05-17 RX ORDER — SODIUM CHLORIDE 9 MG/ML
INJECTION, SOLUTION INTRAVENOUS CONTINUOUS
Status: DISCONTINUED | OUTPATIENT
Start: 2022-05-17 | End: 2022-05-17 | Stop reason: HOSPADM

## 2022-05-17 RX ORDER — LIDOCAINE HCL/PF 100 MG/5ML
SYRINGE (ML) INTRAVENOUS
Status: DISCONTINUED | OUTPATIENT
Start: 2022-05-17 | End: 2022-05-17

## 2022-05-17 RX ORDER — PROPOFOL 10 MG/ML
VIAL (ML) INTRAVENOUS
Status: DISCONTINUED | OUTPATIENT
Start: 2022-05-17 | End: 2022-05-17

## 2022-05-17 RX ORDER — PROPOFOL 10 MG/ML
VIAL (ML) INTRAVENOUS CONTINUOUS PRN
Status: DISCONTINUED | OUTPATIENT
Start: 2022-05-17 | End: 2022-05-17

## 2022-05-17 RX ADMIN — PROPOFOL 175 MCG/KG/MIN: 10 INJECTION, EMULSION INTRAVENOUS at 11:05

## 2022-05-17 RX ADMIN — Medication 100 MG: at 11:05

## 2022-05-17 RX ADMIN — PROPOFOL 90 MG: 10 INJECTION, EMULSION INTRAVENOUS at 11:05

## 2022-05-17 RX ADMIN — SODIUM CHLORIDE: 0.9 INJECTION, SOLUTION INTRAVENOUS at 10:05

## 2022-05-17 RX ADMIN — SODIUM CHLORIDE: 9 INJECTION, SOLUTION INTRAVENOUS at 11:05

## 2022-05-17 NOTE — PROVATION PATIENT INSTRUCTIONS
Discharge Summary/Instructions after an Endoscopic Procedure  Patient Name: Nitin Mcconnell  Patient MRN: [id number]  Patient YOB: 1975  Tuesday, May 17, 2022  Lilibeth Merrill MD  Dear patient,  As a result of recent federal legislation (The Federal Cures Act), you may   receive lab or pathology results from your procedure in your [facility]   account before your physician is able to contact you. Your physician or   their representative will relay the results to you with their   recommendations at their soonest availability.  Thank you,  RESTRICTIONS:  During your procedure today, you received medications for sedation.  These   medications may affect your judgment, balance and coordination.  Therefore,   for 24 hours, you have the following restrictions:   - DO NOT drive a car, operate machinery, make legal/financial decisions,   sign important papers or drink alcohol.    ACTIVITY:  Today: no heavy lifting, straining or running due to procedural   sedation/anesthesia.  The following day: return to full activity including work.  DIET:  Eat and drink normally unless instructed otherwise.     TREATMENT FOR COMMON SIDE EFFECTS:  - Mild abdominal pain, nausea, belching, bloating or excessive gas:  rest,   eat lightly and use a heating pad.  - Sore Throat: treat with throat lozenges and/or gargle with warm salt   water.  - Because air was used during the procedure, expelling large amounts of air   from your rectum or belching is normal.  - If a bowel prep was taken, you may not have a bowel movement for 1-3 days.    This is normal.  SYMPTOMS TO WATCH FOR AND REPORT TO YOUR PHYSICIAN:  1. Abdominal pain or bloating, other than gas cramps.  2. Chest pain.  3. Back pain.  4. Signs of infection such as: chills or fever occurring within 24 hours   after the procedure.  5. Rectal bleeding, which would show as bright red, maroon, or black stools.   (A tablespoon of blood from the rectum is not serious, especially if    hemorrhoids are present.)  6. Vomiting.  7. Weakness or dizziness.  GO DIRECTLY TO THE NEAREST EMERGENCY ROOM IF YOU HAVE ANY OF THE FOLLOWING:      Difficulty breathing              Chills and/or fever over 101 F   Persistent vomiting and/or vomiting blood   Severe abdominal pain   Severe chest pain   Black, tarry stools   Bleeding- more than one tablespoon   Any other symptom or condition that you feel may need urgent attention  Your doctor recommends these additional instructions:  If any biopsies were taken, your doctors clinic will contact you in 1 to 2   weeks with any results.  - Discharge patient to home (with escort).   - Resume previous diet.   - Continue present medications.   - Repeat colonoscopy in 10 years for screening purposes.   - Perform a lower endoscopic ultrasound (LEUS) at appointment to be   scheduled.   - Return to referring physician as previously scheduled.   - Patient has a contact number available for emergencies.  The signs and   symptoms of potential delayed complications were discussed with the   patient.  Return to normal activities tomorrow.  Written discharge   instructions were provided to the patient.  For questions, problems or results please call your physician - Lilibteh Merrill MD at Work:  [phone].  [facility], EMERGENCY ROOM PHONE NUMBER: [phone]  IF A COMPLICATION OR EMERGENCY SITUATION ARISES AND YOU ARE UNABLE TO REACH   YOUR PHYSICIAN - GO DIRECTLY TO THE EMERGENCY ROOM.  Lilibeth Merrill MD  5/17/2022 11:52:30 AM  This report has been verified and signed electronically.  Dear patient,  As a result of recent federal legislation (The Federal Cures Act), you may   receive lab or pathology results from your procedure in your [facility]   account before your physician is able to contact you. Your physician or   their representative will relay the results to you with their   recommendations at their soonest availability.  Thank you,  PROVATION

## 2022-05-17 NOTE — ANESTHESIA POSTPROCEDURE EVALUATION
Anesthesia Post Evaluation    Patient: Nitin Mcconnell    Procedure(s) Performed: Procedure(s) (LRB):  COLONOSCOPY (N/A)    Final Anesthesia Type: general      Patient location during evaluation: PACU  Patient participation: Yes- Able to Participate  Level of consciousness: awake and alert  Post-procedure vital signs: reviewed and stable  Pain management: adequate  Airway patency: patent    PONV status at discharge: No PONV  Anesthetic complications: no      Cardiovascular status: blood pressure returned to baseline  Respiratory status: unassisted  Hydration status: euvolemic  Follow-up not needed.          Vitals Value Taken Time   /67 05/17/22 1214   Temp 36.8 °C (98.2 °F) 05/17/22 1153   Pulse 58 05/17/22 1214   Resp 18 05/17/22 1214   SpO2 100 % 05/17/22 1214         Event Time   Out of Recovery 12:31:04         Pain/Bismark Score: Bismark Score: 10 (5/17/2022 12:14 PM)

## 2022-05-17 NOTE — ANESTHESIA PREPROCEDURE EVALUATION
05/17/2022  Pre-operative evaluation for Procedure(s) (LRB):  COLONOSCOPY (N/A)    Nitin Mcconnell is a 46 y.o. female     Patient Active Problem List   Diagnosis    Other specified hypertrophic and atrophic condition of skin    Ptosis of breast    Systemic lupus erythematosus    Unspecified hypertrophic and atrophic condition of skin    Multiple thyroid nodules    Essential hypertension    Alopecia    Bilateral leg edema    Pulmonary hypertension    Immunosuppression    Class 3 severe obesity due to excess calories with serious comorbidity in adult    Chronic pulmonary heart disease    Morbid obesity with BMI of 45.0-49.9, adult    S/P gastric bypass    Obstructive sleep apnea    Venous insufficiency of both lower extremities    Lymphedema of both lower extremities    Lower extremity weakness    Swelling of lower limb    Chest pain    Elevated ALT measurement    Positive colorectal cancer screening using DNA-based stool test       Review of patient's allergies indicates:   Allergen Reactions    Heparin analogues      Other reaction(s): low platelets    Shellfish containing products Hives     Crawfish, no reaction to IV iodine in the past.       No current facility-administered medications on file prior to encounter.     Current Outpatient Medications on File Prior to Encounter   Medication Sig Dispense Refill    CALCIUM CARB/VITAMIN D3/VIT K1 (VIACTIV ORAL) Take by mouth once daily.       cyanocobalamin, vitamin B-12, (VITAMIN B-12) 2,500 mcg Subl Place 1 tablet under the tongue once daily.      desoximetasone (TOPICORT) 0.25 % cream APPLY TO SCALP NIGHTLY FOR 1 WEEK THEN APPLY WEEKLY FOR MAINTENANCE      ergocalciferol, vitamin D2, (VITAMIN D2 ORAL) Take 5,000 Int'l Units by mouth once daily.      hydroCHLOROthiazide (HYDRODIURIL) 25 MG tablet TAKE 1 TABLET(25 MG) BY MOUTH  EVERY DAY 90 tablet 3    hydrOXYchloroQUINE (PLAQUENIL) 200 mg tablet TAKE 1 TABLET(200 MG) BY MOUTH TWICE DAILY 60 tablet 5    multivitamin-Ca-iron-minerals 27-0.4 mg Tab Take 1 tablet by mouth Daily.         Past Surgical History:   Procedure Laterality Date    BREAST BIOPSY Right 2008    rt axilla    HYSTERECTOMY      @38yrs of age    LIPECTOMY      LYMPH NODE BIOPSY      right arm    OOPHORECTOMY      @38yrs of age    panniculectomy      JIAN-EN-Y PROCEDURE  08/27/2009    RNY bypass           CBC: No results for input(s): WBC, RBC, HGB, HCT, PLT, MCV, MCH, MCHC in the last 72 hours.    CMP: No results for input(s): NA, K, CL, CO2, BUN, CREATININE, GLU, MG, PHOS, CALCIUM, ALBUMIN, PROT, ALKPHOS, ALT, AST, BILITOT in the last 72 hours.    INR  No results for input(s): PT, INR, PROTIME, APTT in the last 72 hours.        Diagnostic Studies:      EKG:  Sinus bradycardia   Otherwise normal ECG   When compared with ECG of 18-JUL-2013 12:14,   No significant change was found     2D Echo:  Stress Echo 3/10/21  · The left ventricle is normal in size with normal systolic function. The estimated ejection fraction is 60%  · Normal right ventricular size with normal right ventricular systolic function.  · Normal left ventricular diastolic function.  · Normal PA systolic pressure estimate (under 25mm Hg)  · Low central venous pressure.  · The test was stopped because the patient experienced fatigue.  · There were no arrhythmias during stress.  · The patient's exercise capacity was below average and a hypertensive response was seen.  · The ECG portion of this study is negative for myocardial ischemia.  · The stress echo portion of this study is negative for myocardial ischemia.  · Overall, this study is negative for myocardial ischemia.          Pre-op Assessment    I have reviewed the Patient Summary Reports.     I have reviewed the Nursing Notes.       Review of Systems  Cardiovascular:   Hypertension    Pulmonary:    Sleep Apnea    Renal/:  Renal/ Normal     Hepatic/GI:   S/p gastric bypass   Musculoskeletal:  Musculoskeletal Normal    Neurological:  Neurology Normal    Endocrine:  Morbid Obesity / BMI > 40  Psych:  Psychiatric Normal           Physical Exam  General: Well nourished, Cooperative and Alert    Airway:  Mallampati: II   Mouth Opening: Normal  Neck ROM: Normal ROM    Dental:  Intact        Anesthesia Plan  Type of Anesthesia, risks & benefits discussed:    Anesthesia Type: Gen Natural Airway  Intra-op Monitoring Plan: Standard ASA Monitors  Informed Consent: Informed consent signed with the Patient and all parties understand the risks and agree with anesthesia plan.  All questions answered.   ASA Score: 3  Day of Surgery Review of History & Physical: H&P Update referred to the surgeon/provider.    Ready For Surgery From Anesthesia Perspective.     .

## 2022-05-17 NOTE — TRANSFER OF CARE
"Anesthesia Transfer of Care Note    Patient: Nitin Mcconnell    Procedure(s) Performed: Procedure(s) (LRB):  COLONOSCOPY (N/A)    Patient location: GI    Anesthesia Type: general    Transport from OR: Transported from OR on room air with adequate spontaneous ventilation    Post pain: adequate analgesia    Post assessment: no apparent anesthetic complications    Post vital signs: stable    Level of consciousness: sedated and responds to stimulation    Nausea/Vomiting: no nausea/vomiting    Complications: none    Transfer of care protocol was followed      Last vitals:   Visit Vitals  BP (!) 163/83   Pulse 83   Temp 37 °C (98.6 °F)   Resp 14   Ht 5' 5" (1.651 m)   Wt 123.4 kg (272 lb)   LMP 07/13/2013   SpO2 99%   Breastfeeding No   BMI 45.26 kg/m²     "

## 2022-05-17 NOTE — H&P
Short Stay Endoscopy History and Physical    PCP - Chasity Dixon MD    Procedure - Colonoscopy  ASA - per anesthesia  Mallampati - per anesthesia  History of Anesthesia problems - no  Family history Anesthesia problems - no   Plan of anesthesia - General    HPI:  This is a 46 y.o. 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    Surgical History:  has a past surgical history that includes Lymph node biopsy; panniculectomy; Lipectomy; Hysterectomy; Oophorectomy; Helder-en-Y procedure (08/27/2009); and Breast biopsy (Right, 2008).    Family History: family history includes Diabetes in her maternal grandfather and paternal grandmother; Heart attack in her maternal grandmother; Heart failure in her maternal grandmother and mother; Kidney disease in her maternal grandfather; Rheum arthritis in her mother; Thyroid disease in her maternal aunt and maternal grandmother.. Otherwise no colon cancer, inflammatory bowel disease, or GI malignancies.    Social History:  reports that she has never smoked. She has never used smokeless tobacco. She reports current alcohol use. She reports that she does not use drugs.    Review of patient's allergies indicates:   Allergen Reactions    Heparin analogues      Other reaction(s): low platelets    Shellfish containing products Hives     Crawfish, no reaction to IV iodine in the past.       Medications:   Medications Prior to Admission   Medication Sig Dispense Refill Last Dose    CALCIUM CARB/VITAMIN D3/VIT K1 (VIACTIV ORAL) Take by mouth once daily.    Past Week at Unknown time    cyanocobalamin, vitamin B-12, (VITAMIN  B-12) 2,500 mcg Subl Place 1 tablet under the tongue once daily.   Past Week at Unknown time    ergocalciferol, vitamin D2, (VITAMIN D2 ORAL) Take 5,000 Int'l Units by mouth once daily.   Past Week at Unknown time    hydroCHLOROthiazide (HYDRODIURIL) 25 MG tablet TAKE 1 TABLET(25 MG) BY MOUTH EVERY DAY 90 tablet 3 5/17/2022 at Unknown time    hydrOXYchloroQUINE (PLAQUENIL) 200 mg tablet TAKE 1 TABLET(200 MG) BY MOUTH TWICE DAILY 60 tablet 5 5/17/2022 at Unknown time    multivitamin-Ca-iron-minerals 27-0.4 mg Tab Take 1 tablet by mouth Daily.   Past Week at Unknown time    desoximetasone (TOPICORT) 0.25 % cream APPLY TO SCALP NIGHTLY FOR 1 WEEK THEN APPLY WEEKLY FOR MAINTENANCE   More than a month at Unknown time         Physical Exam:    Vital Signs:   Vitals:    05/17/22 1017   BP: (!) 163/83   Pulse: 83   Resp: 14   Temp: 98.6 °F (37 °C)       Gen: NAD, lying comfortably  HENT: NCAT, oropharynx clear  Eyes: anicteric sclerae, EOMI grossly  Neck: supple, no visible masses/goiter  Cardiac: RRR  Lungs: non-labored breathing  Abd: soft, NT/ND, normoactive BS  Ext: no LE edema, warm, well perfused  Skin: skin intact on exposed body parts, no visible rashes, lesions  Neuro: A&Ox4, neuro exam grossly intact, moves all extremities  Psych: appropriate mood, affect        Labs:  Lab Results   Component Value Date    WBC 4.22 02/09/2022    HGB 12.3 02/09/2022    HCT 40.1 02/09/2022     02/09/2022    CHOL 178 11/05/2021    TRIG 99 11/05/2021    HDL 48 11/05/2021    ALT 49 (H) 02/09/2022    AST 38 02/09/2022     02/09/2022    K 4.3 02/09/2022     02/09/2022    CREATININE 1.06 02/09/2022    BUN 17 02/09/2022    CO2 30 (H) 02/09/2022    TSH 1.420 11/05/2021    INR 1.0 07/18/2013    HGBA1C 4.9 11/05/2021       Plan:  Colonoscopy for positive FIT testing    I have explained the risks and benefits of endoscopy procedures to the patient including but not limited to bleeding, perforation, infection, and  death.  The patient was asked if they understand and allowed to ask any further questions to their satisfaction.    Harsha Moyer MD

## 2022-05-18 ENCOUNTER — TELEPHONE (OUTPATIENT)
Dept: ENDOSCOPY | Facility: HOSPITAL | Age: 47
End: 2022-05-18
Payer: COMMERCIAL

## 2022-05-18 DIAGNOSIS — K63.9 SUBMUCOSAL COLONIC LESION: Primary | ICD-10-CM

## 2022-05-18 NOTE — TELEPHONE ENCOUNTER
----- Message from Enrique Beach MD sent at 5/18/2022  7:46 AM CDT -----  Need EUS for sigmoid colon nodule. Forward view EUS. Main. 45 minutes.  Thanks,  Enrique Beach MD  ----- Message -----  From: Ashlyn Grover MA  Sent: 5/17/2022  12:27 PM CDT  To: Enrique Beach MD    Please review  ----- Message -----  From: Harsha Moyer MD  Sent: 5/17/2022  11:52 AM CDT  To: Ashlyn Grover MA, Lilibeth Merrill MD    This patient had a submucosal nodule in the ascending colon.  Can we get them setup for EUS/AES review    ThanksHarsha

## 2022-05-19 ENCOUNTER — TELEPHONE (OUTPATIENT)
Dept: ENDOSCOPY | Facility: HOSPITAL | Age: 47
End: 2022-05-19
Payer: COMMERCIAL

## 2022-05-19 NOTE — TELEPHONE ENCOUNTER
Received request to schedule patient for EUS. Spoke with Patient. Reviewed medical history and medications. Scheduled 6/7/22 at 10:15 am.Instructed on procedure and miralax prep. Patient verbalized understanding of instructions. Mailed copy of instructions to address in chart.

## 2022-05-27 ENCOUNTER — PATIENT MESSAGE (OUTPATIENT)
Dept: RHEUMATOLOGY | Facility: CLINIC | Age: 47
End: 2022-05-27
Payer: COMMERCIAL

## 2022-05-27 DIAGNOSIS — R06.02 SOB (SHORTNESS OF BREATH): ICD-10-CM

## 2022-05-27 DIAGNOSIS — M32.9 SYSTEMIC LUPUS ERYTHEMATOSUS, UNSPECIFIED SLE TYPE, UNSPECIFIED ORGAN INVOLVEMENT STATUS: ICD-10-CM

## 2022-05-27 DIAGNOSIS — E66.01 CLASS 3 SEVERE OBESITY DUE TO EXCESS CALORIES WITHOUT SERIOUS COMORBIDITY WITH BODY MASS INDEX (BMI) OF 45.0 TO 49.9 IN ADULT: ICD-10-CM

## 2022-05-30 RX ORDER — HYDROXYCHLOROQUINE SULFATE 200 MG/1
TABLET, FILM COATED ORAL
Qty: 60 TABLET | Refills: 5 | Status: SHIPPED | OUTPATIENT
Start: 2022-05-30 | End: 2023-06-02 | Stop reason: SDUPTHER

## 2022-06-03 ENCOUNTER — LAB VISIT (OUTPATIENT)
Dept: LAB | Facility: HOSPITAL | Age: 47
End: 2022-06-03
Attending: INTERNAL MEDICINE
Payer: COMMERCIAL

## 2022-06-03 DIAGNOSIS — E66.01 CLASS 3 SEVERE OBESITY DUE TO EXCESS CALORIES WITHOUT SERIOUS COMORBIDITY WITH BODY MASS INDEX (BMI) OF 45.0 TO 49.9 IN ADULT: ICD-10-CM

## 2022-06-03 DIAGNOSIS — L65.9 ALOPECIA: ICD-10-CM

## 2022-06-03 DIAGNOSIS — I27.20 PULMONARY HYPERTENSION: ICD-10-CM

## 2022-06-03 DIAGNOSIS — M32.9 SYSTEMIC LUPUS ERYTHEMATOSUS, UNSPECIFIED SLE TYPE, UNSPECIFIED ORGAN INVOLVEMENT STATUS: ICD-10-CM

## 2022-06-03 DIAGNOSIS — M10.9 GOUT, ARTHRITIS: ICD-10-CM

## 2022-06-03 DIAGNOSIS — R53.83 FATIGUE, UNSPECIFIED TYPE: ICD-10-CM

## 2022-06-03 DIAGNOSIS — D84.9 IMMUNOSUPPRESSION: ICD-10-CM

## 2022-06-03 LAB
ALBUMIN SERPL BCP-MCNC: 4.3 G/DL (ref 3.5–5.2)
ALP SERPL-CCNC: 65 U/L (ref 38–126)
ALT SERPL W/O P-5'-P-CCNC: 46 U/L (ref 10–44)
ANION GAP SERPL CALC-SCNC: 12 MMOL/L (ref 8–16)
AST SERPL-CCNC: 36 U/L (ref 15–46)
BASOPHILS # BLD AUTO: 0.02 K/UL (ref 0–0.2)
BASOPHILS NFR BLD: 0.4 % (ref 0–1.9)
BILIRUB SERPL-MCNC: 0.6 MG/DL (ref 0.1–1)
C3 SERPL-MCNC: 148 MG/DL (ref 50–180)
C4 SERPL-MCNC: 40 MG/DL (ref 11–44)
CALCIUM SERPL-MCNC: 9 MG/DL (ref 8.7–10.5)
CHLORIDE SERPL-SCNC: 103 MMOL/L (ref 95–110)
CK SERPL-CCNC: 163 U/L (ref 55–170)
CO2 SERPL-SCNC: 28 MMOL/L (ref 23–29)
CREAT SERPL-MCNC: 1 MG/DL (ref 0.5–1.4)
CRP SERPL-MCNC: 0.51 MG/DL (ref 0–1)
DIFFERENTIAL METHOD: ABNORMAL
EOSINOPHIL # BLD AUTO: 0.1 K/UL (ref 0–0.5)
EOSINOPHIL NFR BLD: 1 % (ref 0–8)
ERYTHROCYTE [DISTWIDTH] IN BLOOD BY AUTOMATED COUNT: 12.7 % (ref 11.5–14.5)
ERYTHROCYTE [SEDIMENTATION RATE] IN BLOOD BY WESTERGREN METHOD: 26 MM/HR (ref 0–20)
EST. GFR  (AFRICAN AMERICAN): >60 ML/MIN/1.73 M^2
EST. GFR  (NON AFRICAN AMERICAN): >60 ML/MIN/1.73 M^2
GLUCOSE SERPL-MCNC: 91 MG/DL (ref 70–110)
HCT VFR BLD AUTO: 38.8 % (ref 37–48.5)
HGB BLD-MCNC: 12.1 G/DL (ref 12–16)
IMM GRANULOCYTES # BLD AUTO: 0 K/UL (ref 0–0.04)
IMM GRANULOCYTES NFR BLD AUTO: 0 % (ref 0–0.5)
LYMPHOCYTES # BLD AUTO: 1.5 K/UL (ref 1–4.8)
LYMPHOCYTES NFR BLD: 29.4 % (ref 18–48)
MCH RBC QN AUTO: 27.8 PG (ref 27–31)
MCHC RBC AUTO-ENTMCNC: 31.2 G/DL (ref 32–36)
MCV RBC AUTO: 89 FL (ref 82–98)
MONOCYTES # BLD AUTO: 0.4 K/UL (ref 0.3–1)
MONOCYTES NFR BLD: 8.9 % (ref 4–15)
NEUTROPHILS # BLD AUTO: 3 K/UL (ref 1.8–7.7)
NEUTROPHILS NFR BLD: 60.3 % (ref 38–73)
NRBC BLD-RTO: 0 /100 WBC
PLATELET # BLD AUTO: 229 K/UL (ref 150–450)
PMV BLD AUTO: 11.2 FL (ref 9.2–12.9)
POTASSIUM SERPL-SCNC: 4 MMOL/L (ref 3.5–5.1)
PROT SERPL-MCNC: 6.9 G/DL (ref 6–8.4)
RBC # BLD AUTO: 4.36 M/UL (ref 4–5.4)
SODIUM SERPL-SCNC: 143 MMOL/L (ref 136–145)
URATE SERPL-MCNC: 10 MG/DL (ref 2.4–5.7)
UUN UR-MCNC: 16 MG/DL (ref 7–17)
WBC # BLD AUTO: 4.93 K/UL (ref 3.9–12.7)

## 2022-06-03 PROCEDURE — 36415 COLL VENOUS BLD VENIPUNCTURE: CPT | Mod: PO | Performed by: INTERNAL MEDICINE

## 2022-06-03 PROCEDURE — 86160 COMPLEMENT ANTIGEN: CPT | Mod: PO | Performed by: INTERNAL MEDICINE

## 2022-06-03 PROCEDURE — 86140 C-REACTIVE PROTEIN: CPT | Mod: PO | Performed by: INTERNAL MEDICINE

## 2022-06-03 PROCEDURE — 85025 COMPLETE CBC W/AUTO DIFF WBC: CPT | Mod: PO | Performed by: INTERNAL MEDICINE

## 2022-06-03 PROCEDURE — 86160 COMPLEMENT ANTIGEN: CPT | Mod: 59,PO | Performed by: INTERNAL MEDICINE

## 2022-06-03 PROCEDURE — 84550 ASSAY OF BLOOD/URIC ACID: CPT | Performed by: INTERNAL MEDICINE

## 2022-06-03 PROCEDURE — 80053 COMPREHEN METABOLIC PANEL: CPT | Mod: PO | Performed by: INTERNAL MEDICINE

## 2022-06-03 PROCEDURE — 86225 DNA ANTIBODY NATIVE: CPT | Mod: PO | Performed by: INTERNAL MEDICINE

## 2022-06-03 PROCEDURE — 85652 RBC SED RATE AUTOMATED: CPT | Performed by: INTERNAL MEDICINE

## 2022-06-03 PROCEDURE — 82550 ASSAY OF CK (CPK): CPT | Mod: PO | Performed by: INTERNAL MEDICINE

## 2022-06-06 LAB — DSDNA AB SER-ACNC: NORMAL [IU]/ML

## 2022-06-07 ENCOUNTER — ANESTHESIA (OUTPATIENT)
Dept: ENDOSCOPY | Facility: HOSPITAL | Age: 47
End: 2022-06-07
Payer: COMMERCIAL

## 2022-06-07 ENCOUNTER — HOSPITAL ENCOUNTER (OUTPATIENT)
Facility: HOSPITAL | Age: 47
Discharge: HOME | End: 2022-06-07
Attending: INTERNAL MEDICINE | Admitting: INTERNAL MEDICINE
Payer: COMMERCIAL

## 2022-06-07 ENCOUNTER — ANESTHESIA EVENT (OUTPATIENT)
Dept: ENDOSCOPY | Facility: HOSPITAL | Age: 47
End: 2022-06-07
Payer: COMMERCIAL

## 2022-06-07 VITALS
SYSTOLIC BLOOD PRESSURE: 121 MMHG | DIASTOLIC BLOOD PRESSURE: 73 MMHG | OXYGEN SATURATION: 99 % | WEIGHT: 276 LBS | HEIGHT: 65 IN | TEMPERATURE: 98 F | HEART RATE: 61 BPM | RESPIRATION RATE: 18 BRPM | BODY MASS INDEX: 45.98 KG/M2

## 2022-06-07 DIAGNOSIS — K63.9 NODULE OF COLON: Primary | ICD-10-CM

## 2022-06-07 PROCEDURE — D9220A PRA ANESTHESIA: Mod: ANES,,, | Performed by: ANESTHESIOLOGY

## 2022-06-07 PROCEDURE — 45341 SIGMOIDOSCOPY W/ULTRASOUND: CPT | Performed by: INTERNAL MEDICINE

## 2022-06-07 PROCEDURE — 45341 PR SIGMOIDOSCOPY W/ENDOSCOPIC US EXAM: ICD-10-PCS | Mod: ,,, | Performed by: INTERNAL MEDICINE

## 2022-06-07 PROCEDURE — 37000008 HC ANESTHESIA 1ST 15 MINUTES: Performed by: INTERNAL MEDICINE

## 2022-06-07 PROCEDURE — 25000003 PHARM REV CODE 250: Performed by: NURSE ANESTHETIST, CERTIFIED REGISTERED

## 2022-06-07 PROCEDURE — D9220A PRA ANESTHESIA: ICD-10-PCS | Mod: ANES,,, | Performed by: ANESTHESIOLOGY

## 2022-06-07 PROCEDURE — D9220A PRA ANESTHESIA: Mod: CRNA,,, | Performed by: NURSE ANESTHETIST, CERTIFIED REGISTERED

## 2022-06-07 PROCEDURE — 63600175 PHARM REV CODE 636 W HCPCS: Performed by: NURSE ANESTHETIST, CERTIFIED REGISTERED

## 2022-06-07 PROCEDURE — 45341 SIGMOIDOSCOPY W/ULTRASOUND: CPT | Mod: ,,, | Performed by: INTERNAL MEDICINE

## 2022-06-07 PROCEDURE — 37000009 HC ANESTHESIA EA ADD 15 MINS: Performed by: INTERNAL MEDICINE

## 2022-06-07 PROCEDURE — D9220A PRA ANESTHESIA: ICD-10-PCS | Mod: CRNA,,, | Performed by: NURSE ANESTHETIST, CERTIFIED REGISTERED

## 2022-06-07 RX ORDER — SODIUM CHLORIDE 9 MG/ML
INJECTION, SOLUTION INTRAVENOUS CONTINUOUS
Status: DISCONTINUED | OUTPATIENT
Start: 2022-06-07 | End: 2022-06-07 | Stop reason: HOSPADM

## 2022-06-07 RX ORDER — PROPOFOL 10 MG/ML
VIAL (ML) INTRAVENOUS CONTINUOUS PRN
Status: DISCONTINUED | OUTPATIENT
Start: 2022-06-07 | End: 2022-06-07

## 2022-06-07 RX ORDER — PHENYLEPHRINE HYDROCHLORIDE 10 MG/ML
INJECTION INTRAVENOUS
Status: DISCONTINUED | OUTPATIENT
Start: 2022-06-07 | End: 2022-06-07

## 2022-06-07 RX ORDER — SODIUM CHLORIDE 0.9 % (FLUSH) 0.9 %
10 SYRINGE (ML) INJECTION
Status: DISCONTINUED | OUTPATIENT
Start: 2022-06-07 | End: 2022-06-07 | Stop reason: HOSPADM

## 2022-06-07 RX ORDER — PROPOFOL 10 MG/ML
VIAL (ML) INTRAVENOUS
Status: DISCONTINUED | OUTPATIENT
Start: 2022-06-07 | End: 2022-06-07

## 2022-06-07 RX ORDER — LIDOCAINE HCL/PF 100 MG/5ML
SYRINGE (ML) INTRAVENOUS
Status: DISCONTINUED | OUTPATIENT
Start: 2022-06-07 | End: 2022-06-07

## 2022-06-07 RX ADMIN — Medication 100 MG: at 10:06

## 2022-06-07 RX ADMIN — PROPOFOL 90 MG: 10 INJECTION, EMULSION INTRAVENOUS at 10:06

## 2022-06-07 RX ADMIN — SODIUM CHLORIDE: 0.9 INJECTION, SOLUTION INTRAVENOUS at 10:06

## 2022-06-07 RX ADMIN — PROPOFOL 175 MCG/KG/MIN: 10 INJECTION, EMULSION INTRAVENOUS at 10:06

## 2022-06-07 RX ADMIN — PHENYLEPHRINE HYDROCHLORIDE 200 MCG: 10 INJECTION INTRAVENOUS at 10:06

## 2022-06-07 NOTE — H&P
History & Physical - Short Stay  Gastroenterology      SUBJECTIVE:     Procedure: EUS    Chief Complaint/Indication for Procedure: Sigmoid colon nodule    History of Present Illness:  Patient is a 46 y.o. female presents with sigmoid colon nodule on recent colonoscopy here for evaluation.    PTA Medications   Medication Sig    CALCIUM CARB/VITAMIN D3/VIT K1 (VIACTIV ORAL) Take by mouth once daily.     cyanocobalamin, vitamin B-12, (VITAMIN B-12) 2,500 mcg Subl Place 1 tablet under the tongue once daily.    desoximetasone (TOPICORT) 0.25 % cream APPLY TO SCALP NIGHTLY FOR 1 WEEK THEN APPLY WEEKLY FOR MAINTENANCE    ergocalciferol, vitamin D2, (VITAMIN D2 ORAL) Take 5,000 Int'l Units by mouth once daily.    hydroCHLOROthiazide (HYDRODIURIL) 25 MG tablet TAKE 1 TABLET(25 MG) BY MOUTH EVERY DAY    hydrOXYchloroQUINE (PLAQUENIL) 200 mg tablet TAKE 1 TABLET(200 MG) BY MOUTH TWICE DAILY    multivitamin-Ca-iron-minerals 27-0.4 mg Tab Take 1 tablet by mouth Daily.       Review of patient's allergies indicates:   Allergen Reactions    Heparin analogues      Other reaction(s): low platelets    Shellfish containing products Hives     Crawfish, no reaction to IV iodine in the past.        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, benign     Lupus mild     Past Surgical History:   Procedure Laterality Date    BREAST BIOPSY Right 2008    rt axilla    COLONOSCOPY N/A 5/17/2022    Procedure: COLONOSCOPY;  Surgeon: Lilibeth Merrill MD;  Location: [facility] ([address] [place] [address]);  Service: Endoscopy;  Laterality: N/A;  miralax prep  4/25 fully vaccinated; instructions to portal-st  5/13-changed from Jose to Lammi    HYSTERECTOMY      @38yrs of age    LIPECTOMY      LYMPH NODE BIOPSY      right arm    OOPHORECTOMY      @38yrs of age    panniculectomy      JIAN-EN-Y  PROCEDURE  08/27/2009    RNY bypass     Family History   Problem Relation Age of Onset    Heart failure Mother     Rheum arthritis Mother     Heart failure Maternal Grandmother     Heart attack Maternal Grandmother     Thyroid disease Maternal Grandmother     Diabetes Maternal Grandfather     Kidney disease Maternal Grandfather     Diabetes Paternal Grandmother     Thyroid disease Maternal Aunt     Lupus Neg Hx      Social History     Tobacco Use    Smoking status: Never Smoker    Smokeless tobacco: Never Used   Substance Use Topics    Alcohol use: Yes     Comment: socially    Drug use: No       Review of Systems:  Constitutional: no fever or chills  Respiratory: no cough or shortness of breath  Cardiovascular: no chest pain or palpitations    OBJECTIVE:     Vital Signs (Most Recent)       Physical Exam:  General: well developed, well nourished  Lungs:  normal respiratory effort  Heart: regular rate, S1, S2 normal    Laboratory  CBC:   Recent Labs   Lab 06/03/22  0710   WBC 4.93   RBC 4.36   HGB 12.1   HCT 38.8      MCV 89   MCH 27.8   MCHC 31.2*     CMP:   Recent Labs   Lab 06/03/22  0710   GLU 91   CALCIUM 9.0   ALBUMIN 4.3   PROT 6.9      K 4.0   CO2 28      BUN 16   CREATININE 1.00   ALKPHOS 65   ALT 46*   AST 36   BILITOT 0.6     Coagulation: No results for input(s): LABPROT, INR, APTT in the last 168 hours.      Diagnostic Results:        ASSESSMENT/PLAN:     Sigmoid colon nodule    Plan: EUS    Anesthesia Plan: MAC    ASA Grade: ASA 2 - Patient with mild systemic disease with no functional limitations     The impression and plan was discussed in detail with the patient. All questions have been answered and the patient voices understanding of our plan at this point. The risk of the procedure was discussed in detail which includes but not limited to bleeding, infection, perforation in some cases requiring surgery with its spectrum of complications.

## 2022-06-07 NOTE — ANESTHESIA PREPROCEDURE EVALUATION
06/07/2022  Pre-operative evaluation for Procedure(s) (LRB):  ZAHIRA Mcconnell is a 46 y.o. female     Patient Active Problem List   Diagnosis    Other specified hypertrophic and atrophic condition of skin    Ptosis of breast    Systemic lupus erythematosus    Unspecified hypertrophic and atrophic condition of skin    Multiple thyroid nodules    Essential hypertension    Alopecia    Bilateral leg edema    Pulmonary hypertension    Immunosuppression    Class 3 severe obesity due to excess calories with serious comorbidity in adult    Chronic pulmonary heart disease    Morbid obesity with BMI of 45.0-49.9, adult    S/P gastric bypass    Obstructive sleep apnea    Venous insufficiency of both lower extremities    Lymphedema of both lower extremities    Lower extremity weakness    Swelling of lower limb    Chest pain    Elevated ALT measurement    Positive colorectal cancer screening using DNA-based stool test       Review of patient's allergies indicates:   Allergen Reactions    Heparin analogues      Other reaction(s): low platelets    Shellfish containing products Hives     Crawfish, no reaction to IV iodine in the past.       Current Facility-Administered Medications on File Prior to Visit   Medication Dose Route Frequency Provider Last Rate Last Admin    0.9%  NaCl infusion   Intravenous Continuous Enrique Beach MD        sodium chloride 0.9% flush 10 mL  10 mL Intravenous PRN Enrique Beach MD         Current Outpatient Medications on File Prior to Visit   Medication Sig Dispense Refill    CALCIUM CARB/VITAMIN D3/VIT K1 (VIACTIV ORAL) Take by mouth once daily.       cyanocobalamin, vitamin B-12, (VITAMIN B-12) 2,500 mcg Subl Place 1 tablet under the tongue once daily.      desoximetasone (TOPICORT) 0.25 % cream APPLY TO SCALP NIGHTLY FOR 1 WEEK THEN APPLY  WEEKLY FOR MAINTENANCE      ergocalciferol, vitamin D2, (VITAMIN D2 ORAL) Take 5,000 Int'l Units by mouth once daily.      hydroCHLOROthiazide (HYDRODIURIL) 25 MG tablet TAKE 1 TABLET(25 MG) BY MOUTH EVERY DAY 90 tablet 3    hydrOXYchloroQUINE (PLAQUENIL) 200 mg tablet TAKE 1 TABLET(200 MG) BY MOUTH TWICE DAILY 60 tablet 5    multivitamin-Ca-iron-minerals 27-0.4 mg Tab Take 1 tablet by mouth Daily.         Past Surgical History:   Procedure Laterality Date    BREAST BIOPSY Right 2008    rt axilla    COLONOSCOPY N/A 05/17/2022    Procedure: COLONOSCOPY;  Surgeon: Lilibeth Merrill MD;  Location: [facility] ([address] [place] [address]);  Service: Endoscopy;  Laterality: N/A;  miralax prep  4/25 fully vaccinated; instructions to portal-st  5/13-changed from Jose to Lammi    HYSTERECTOMY      @38yrs of age    LIPECTOMY      LYMPH NODE BIOPSY      right arm    OOPHORECTOMY      @38yrs of age    panniculectomy      JIAN-EN-Y PROCEDURE  08/27/2009    RNY bypass             Diagnostic Studies:      EKG:  Sinus bradycardia   Otherwise normal ECG   When compared with ECG of 18-JUL-2013 12:14,   No significant change was found     2D Echo:  Stress Echo 3/10/21  · The left ventricle is normal in size with normal systolic function. The estimated ejection fraction is 60%  · Normal right ventricular size with normal right ventricular systolic function.  · Normal left ventricular diastolic function.  · Normal PA systolic pressure estimate (under 25mm Hg)  · Low central venous pressure.  · The test was stopped because the patient experienced fatigue.  · There were no arrhythmias during stress.  · The patient's exercise capacity was below average and a hypertensive response was seen.  · The ECG portion of this study is negative for myocardial ischemia.  · The stress echo portion of this study is negative for myocardial ischemia.  · Overall, this study is negative for myocardial ischemia.          Pre-op Assessment    I have reviewed the  Patient Summary Reports.     I have reviewed the Nursing Notes. I have reviewed the NPO Status.   I have reviewed the Medications.     Review of Systems  Anesthesia Hx:  No problems with previous Anesthesia  History of prior surgery of interest to airway management or planning:  Denies Personal Hx of Anesthesia complications.   Social:  Non-Smoker    Hematology/Oncology:  Hematology Normal   Oncology Normal     EENT/Dental:EENT/Dental Normal   Cardiovascular:   Hypertension  Denies Angina.  Denies LAZAR. ECG has been reviewed.    Pulmonary:   Sleep Apnea    Renal/:  Renal/ Normal     Hepatic/GI:   S/p gastric bypass   Musculoskeletal:  Musculoskeletal Normal    Neurological:  Neurology Normal    Endocrine:  Morbid Obesity / BMI > 40  Dermatological:  Skin Normal    Psych:  Psychiatric Normal           Physical Exam  General: Well nourished, Cooperative and Alert    Airway:  Mallampati: II   Mouth Opening: Normal  TM Distance: Normal  Tongue: Normal  Neck ROM: Normal ROM    Dental:  Intact    Chest/Lungs:  Clear to auscultation, Normal Respiratory Rate    Heart:  Rate: Normal  Rhythm: Regular Rhythm  Sounds: Normal        Anesthesia Plan  Type of Anesthesia, risks & benefits discussed:    Anesthesia Type: Gen Natural Airway  Intra-op Monitoring Plan: Standard ASA Monitors  Post Op Pain Control Plan: multimodal analgesia  Induction:  IV  Informed Consent: Informed consent signed with the Patient and all parties understand the risks and agree with anesthesia plan.  All questions answered.   ASA Score: 3  Day of Surgery Review of History & Physical: H&P Update referred to the surgeon/provider.    Ready For Surgery From Anesthesia Perspective.     .

## 2022-06-07 NOTE — ANESTHESIA POSTPROCEDURE EVALUATION
Anesthesia Post Evaluation    Patient: Nitin Mcconnell    Procedure(s) Performed: Procedure(s) (LRB):  ULTRASOUND, LOWER GI TRACT, ENDOSCOPIC (N/A)    Final Anesthesia Type: general      Patient location during evaluation: PACU  Patient participation: Yes- Able to Participate  Level of consciousness: awake and alert and oriented  Post-procedure vital signs: reviewed and stable  Pain management: adequate  Airway patency: patent    PONV status at discharge: No PONV  Anesthetic complications: no      Cardiovascular status: blood pressure returned to baseline and hemodynamically stable  Respiratory status: unassisted, spontaneous ventilation and room air  Hydration status: euvolemic  Follow-up not needed.          Vitals Value Taken Time   /73 06/07/22 1145   Temp 36.7 °C (98.1 °F) 06/07/22 1145   Pulse 61 06/07/22 1145   Resp 18 06/07/22 1145   SpO2 99 % 06/07/22 1145         No case tracking events are documented in the log.      Pain/Bismark Score: Bismark Score: 10 (6/7/2022 11:45 AM)

## 2022-06-07 NOTE — BRIEF OP NOTE
Discharge Summary/Instructions after an Endoscopic Procedure    Patient Name: Nitin Mcconnell  Patient MRN: [id number]  Patient YOB: 1975 Tuesday, June 7, 2022  Enrique Beach MD    Dear patient,  As a result of recent federal legislation (The Federal Cures Act), you may receive lab or pathology results from your procedure in your [facility] account before your physician is able to contact you. Your physician or their representative will relay the results to you with their recommendations at their soonest availability.  Thank you,    RESTRICTIONS:  During your procedure today, you received medications for sedation.  These medications may affect your judgment, balance and coordination.  Therefore, for 24 hours, you have the following restrictions:     - DO NOT drive a car, operate machinery, make legal/financial decisions, sign important papers or drink alcohol.      ACTIVITY:  Today: no heavy lifting, straining or running due to procedural sedation/anesthesia.  The following day: return to full activity including work.    DIET:  Eat and drink normally unless instructed otherwise.     TREATMENT FOR COMMON SIDE EFFECTS:  - Mild abdominal pain, nausea, belching, bloating or excessive gas:  rest, eat lightly and use a heating pad.  - Sore Throat: treat with throat lozenges and/or gargle with warm salt water.  - Because air was used during the procedure, expelling large amounts of air from your rectum or belching is normal.  - If a bowel prep was taken, you may not have a bowel movement for 1-3 days.  This is normal.      SYMPTOMS TO WATCH FOR AND REPORT TO YOUR PHYSICIAN:  1. Abdominal pain or bloating, other than gas cramps.  2. Chest pain.  3. Back pain.  4. Signs of infection such as: chills or fever occurring within 24 hours after the procedure.  5. Rectal bleeding, which would show as bright red, maroon, or black stools. (A tablespoon of blood from the rectum is not serious, especially if hemorrhoids  are present.)  6. Vomiting.  7. Weakness or dizziness.      GO DIRECTLY TO THE NEAREST EMERGENCY ROOM IF YOU HAVE ANY OF THE FOLLOWING:     Difficulty breathing              Chills and/or fever over 101 F   Persistent vomiting and/or vomiting blood   Severe abdominal pain   Severe chest pain   Black, tarry stools   Bleeding- more than one tablespoon   Any other symptom or condition that you feel may need urgent attention    Your doctor recommends these additional instructions:  If any biopsies were taken, your doctors clinic will contact you in 1 to 2 weeks with any results.    - Discharge patient to home (ambulatory).   - Observe clinical course. No further intervention recommended.    For questions, problems or results please call your physician - Enrique Beach MD at Work:  [phone].    [facility], EMERGENCY ROOM PHONE NUMBER: [phone]    IF A COMPLICATION OR EMERGENCY SITUATION ARISES AND YOU ARE UNABLE TO REACH YOUR PHYSICIAN - GO DIRECTLY TO THE EMERGENCY ROOM.

## 2022-06-07 NOTE — TRANSFER OF CARE
"Anesthesia Transfer of Care Note    Patient: Nitin Mcconnell    Procedure(s) Performed: Procedure(s) (LRB):  ULTRASOUND, LOWER GI TRACT, ENDOSCOPIC (N/A)    Patient location: [facility]    Anesthesia Type: MAC    Transport from OR: Transported from OR on room air with adequate spontaneous ventilation    Post pain: adequate analgesia    Post assessment: no apparent anesthetic complications    Post vital signs: stable    Level of consciousness: awake    Nausea/Vomiting: no nausea/vomiting    Complications: none    Transfer of care protocol was followed      Last vitals:   Visit Vitals  /66 (BP Location: Left arm, Patient Position: Lying)   Pulse 68   Temp 36.6 °C (97.9 °F) (Temporal)   Resp 18   Ht 5' 5" (1.651 m)   Wt 125.2 kg (276 lb)   LMP 07/13/2013   SpO2 97%   Breastfeeding No   BMI 45.93 kg/m²     "

## 2022-06-07 NOTE — [employer] PATIENT INSTRUCTIONS
Discharge Summary/Instructions after an Endoscopic Procedure  Patient Name: Nitin Mcconnell  Patient MRN: [id number]  Patient YOB: 1975 Tuesday, June 7, 2022  Enrique Beach MD  Dear patient,  As a result of recent federal legislation (The Federal Cures Act), you may   receive lab or pathology results from your procedure in your [facility]   account before your physician is able to contact you. Your physician or   their representative will relay the results to you with their   recommendations at their soonest availability.  Thank you,  RESTRICTIONS:  During your procedure today, you received medications for sedation.  These   medications may affect your judgment, balance and coordination.  Therefore,   for 24 hours, you have the following restrictions:   - DO NOT drive a car, operate machinery, make legal/financial decisions,   sign important papers or drink alcohol.    ACTIVITY:  Today: no heavy lifting, straining or running due to procedural   sedation/anesthesia.  The following day: return to full activity including work.  DIET:  Eat and drink normally unless instructed otherwise.     TREATMENT FOR COMMON SIDE EFFECTS:  - Mild abdominal pain, nausea, belching, bloating or excessive gas:  rest,   eat lightly and use a heating pad.  - Sore Throat: treat with throat lozenges and/or gargle with warm salt   water.  - Because air was used during the procedure, expelling large amounts of air   from your rectum or belching is normal.  - If a bowel prep was taken, you may not have a bowel movement for 1-3 days.    This is normal.  SYMPTOMS TO WATCH FOR AND REPORT TO YOUR PHYSICIAN:  1. Abdominal pain or bloating, other than gas cramps.  2. Chest pain.  3. Back pain.  4. Signs of infection such as: chills or fever occurring within 24 hours   after the procedure.  5. Rectal bleeding, which would show as bright red, maroon, or black stools.   (A tablespoon of blood from the rectum is not serious, especially if    hemorrhoids are present.)  6. Vomiting.  7. Weakness or dizziness.  GO DIRECTLY TO THE NEAREST EMERGENCY ROOM IF YOU HAVE ANY OF THE FOLLOWING:      Difficulty breathing              Chills and/or fever over 101 F   Persistent vomiting and/or vomiting blood   Severe abdominal pain   Severe chest pain   Black, tarry stools   Bleeding- more than one tablespoon   Any other symptom or condition that you feel may need urgent attention  Your doctor recommends these additional instructions:  If any biopsies were taken, your doctors clinic will contact you in 1 to 2   weeks with any results.  - Discharge patient to home (ambulatory).   - Observe clinical course. No further intervention recommended.  For questions, problems or results please call your physician - Enrique Beach MD at Work:  [phone].  [facility], EMERGENCY ROOM PHONE NUMBER: [phone]  IF A COMPLICATION OR EMERGENCY SITUATION ARISES AND YOU ARE UNABLE TO REACH   YOUR PHYSICIAN - GO DIRECTLY TO THE EMERGENCY ROOM.  Enrique Beach MD  6/7/2022 11:12:36 AM  This report has been verified and signed electronically.  Dear patient,  As a result of recent federal legislation (The Federal Cures Act), you may   receive lab or pathology results from your procedure in your [facility]   account before your physician is able to contact you. Your physician or   their representative will relay the results to you with their   recommendations at their soonest availability.  Thank you,  [employer]

## 2022-06-09 ENCOUNTER — PATIENT MESSAGE (OUTPATIENT)
Dept: RHEUMATOLOGY | Facility: CLINIC | Age: 47
End: 2022-06-09
Payer: COMMERCIAL

## 2022-06-09 ENCOUNTER — TELEPHONE (OUTPATIENT)
Dept: RHEUMATOLOGY | Facility: CLINIC | Age: 47
End: 2022-06-09
Payer: COMMERCIAL

## 2022-06-09 NOTE — TELEPHONE ENCOUNTER
Left a message for the patient letting her know that her appointment was scheduled in error that I had her scheduled for 6/23/2022at 9 am. If she can't come in clinic she can do a virtual

## 2022-06-17 ENCOUNTER — PATIENT MESSAGE (OUTPATIENT)
Dept: RHEUMATOLOGY | Facility: CLINIC | Age: 47
End: 2022-06-17
Payer: COMMERCIAL

## 2022-06-23 ENCOUNTER — OFFICE VISIT (OUTPATIENT)
Dept: RHEUMATOLOGY | Facility: CLINIC | Age: 47
End: 2022-06-23
Payer: COMMERCIAL

## 2022-06-23 DIAGNOSIS — M10.9 GOUT, ARTHRITIS: Primary | ICD-10-CM

## 2022-06-23 PROCEDURE — 99214 PR OFFICE/OUTPT VISIT, EST, LEVL IV, 30-39 MIN: ICD-10-PCS | Mod: 95,,, | Performed by: INTERNAL MEDICINE

## 2022-06-23 PROCEDURE — 1160F RVW MEDS BY RX/DR IN RCRD: CPT | Mod: CPTII,95,, | Performed by: INTERNAL MEDICINE

## 2022-06-23 PROCEDURE — 1159F PR MEDICATION LIST DOCUMENTED IN MEDICAL RECORD: ICD-10-PCS | Mod: CPTII,95,, | Performed by: INTERNAL MEDICINE

## 2022-06-23 PROCEDURE — 1160F PR REVIEW ALL MEDS BY PRESCRIBER/CLIN PHARMACIST DOCUMENTED: ICD-10-PCS | Mod: CPTII,95,, | Performed by: INTERNAL MEDICINE

## 2022-06-23 PROCEDURE — 99214 OFFICE O/P EST MOD 30 MIN: CPT | Mod: 95,,, | Performed by: INTERNAL MEDICINE

## 2022-06-23 PROCEDURE — 1159F MED LIST DOCD IN RCRD: CPT | Mod: CPTII,95,, | Performed by: INTERNAL MEDICINE

## 2022-06-23 RX ORDER — METHYLPREDNISOLONE 4 MG/1
TABLET ORAL
Qty: 21 TABLET | Refills: 0 | Status: SHIPPED | OUTPATIENT
Start: 2022-06-23 | End: 2022-09-13 | Stop reason: ALTCHOICE

## 2022-06-23 ASSESSMENT — SYSTEMIC LUPUS ERYTHEMATOSUS DISEASE ACTIVITY INDEX (SLEDAI): TOTAL_SCORE: 4

## 2022-06-23 NOTE — PROGRESS NOTES
Rapid3 Question Responses and Scores 6/17/2022   MDHAQ Score 0   Psychologic Score 0   Pain Score 0   When you awakened in the morning OVER THE LAST WEEK, did you feel stiff? No   Fatigue Score 0   Global Health Score 0.5   RAPID3 Score 0.17     Answers for HPI/ROS submitted by the patient on 6/17/2022  fever: No  eye redness: No  mouth sores: No  headaches: No  shortness of breath: No  chest pain: No  trouble swallowing: No  diarrhea: No  constipation: No  unexpected weight change: No  genital sore: No  dysuria: No  During the last 3 days, have you had a skin rash?: No  Bruises or bleeds easily: No  cough: No

## 2022-06-23 NOTE — PROGRESS NOTES
Subjective:       Patient ID: Nitin Mcconnell is a 47 y.o. female.    Chief Complaint: Lupus and gout    HPI:  Nitin Mcconnell is a 47 y.o. female diagnosed at age 31 with lupus.  Started as upper respiratory symptoms that did not respond to antibiotics.  She had fever and low BP as well as SOB.  She was intubated and on ventilator for a week.  She was at [facility].  Dr. Martin diagnosed lupus.  Hospitalized 3/8/07 to 3/30/07.  She had elevated pressure in eyes, kidney involvement.  She was discharged with a PICC line for antibiotics.  She was found to be allergic to heparin due to thrombocytopenia that developed after flushing PICC line with heparin.  She had hair loss.  Had enlarged lymph node under arm in past and biopsy was normal.  Was treated with Plaquenil and steroids.  Only took steroid for 1 year.       Compliant with Plaquenil.  Back in the gym since birthday.    Currently has ulcer in mouth.  No rashes. Still getting injections in scalp by dermatologist and will see her in August and will see another  ().     She denies any flares since last visit.   Diagnosed with sleep apnea and using CPAP which has helped fatigue and lightheadedness.  No gout attacks.   Diagnosed with lymph edema and therapy helped.  She is not compliant with compression.    Had abnormal stool test then had colonoscopy showed a nodule that they ultrasound after.  Ultrasound did not show any changes.  Will need repeat in 10 years.        Lupus Review of Systems  Alopecia: yes  Photosensitivity: no   Raynaud's: no  Oral or nasal ulcers: occasional oral ulcers  Rashes:  Intermittent malar rash; occasional rash in crease of arm and neck  No pleurisy or pericarditis.  No seizures, psychosis, or stroke.  No venous or arterial clots.  History of being on coumadin for 3 months as a precaution.   Pregnancy hx (if applicable): no miscarriages (never pregnant)        Review of Systems   Constitutional:  Negative.  Negative for fever and unexpected weight change.   HENT: Negative.  Negative for mouth sores and trouble swallowing.    Eyes: Negative for redness.   Respiratory: Negative for cough and shortness of breath.    Cardiovascular: Positive for leg swelling (lymph edema). Negative for chest pain.   Gastrointestinal: Negative for constipation and diarrhea.   Endocrine: Negative.    Genitourinary: Negative.  Negative for dysuria and genital sores.   Musculoskeletal: Negative.    Skin: Negative for rash.        Alopecia     Allergic/Immunologic: Negative.    Neurological: Negative.  Negative for headaches.   Hematological: Negative.  Does not bruise/bleed easily.   Psychiatric/Behavioral: Negative.          Objective:   LMP 07/13/2013      Physical Exam   Constitutional: She is oriented to person, place, and time.   HENT:   Head: Normocephalic and atraumatic.   Eyes: Conjunctivae are normal.   Musculoskeletal:         General: No tenderness or deformity.   Neurological: She is alert and oriented to person, place, and time.   Psychiatric: Mood and affect normal.            LABS    Component      Latest Ref Rng & Units 6/3/2022   WBC      3.90 - 12.70 K/uL 4.93   RBC      4.00 - 5.40 M/uL 4.36   Hemoglobin      12.0 - 16.0 g/dL 12.1   Hematocrit      37.0 - 48.5 % 38.8   MCV      82 - 98 fL 89   MCH      27.0 - 31.0 pg 27.8   MCHC      32.0 - 36.0 g/dL 31.2 (L)   RDW      11.5 - 14.5 % 12.7   Platelets      150 - 450 K/uL 229   MPV      9.2 - 12.9 fL 11.2   Immature Granulocytes      0.0 - 0.5 % 0.0   Gran # (ANC)      1.8 - 7.7 K/uL 3.0   Immature Grans (Abs)      0.00 - 0.04 K/uL 0.00   Lymph #      1.0 - 4.8 K/uL 1.5   Mono #      0.3 - 1.0 K/uL 0.4   Eos #      0.0 - 0.5 K/uL 0.1   Baso #      0.00 - 0.20 K/uL 0.02   nRBC      0 /100 WBC 0   Gran %      38.0 - 73.0 % 60.3   Lymph %      18.0 - 48.0 % 29.4   Mono %      4.0 - 15.0 % 8.9   Eosinophil %      0.0 - 8.0 % 1.0   Basophil %      0.0 - 1.9 % 0.4    Differential Method       Automated   Sodium      136 - 145 mmol/L 143   Potassium      3.5 - 5.1 mmol/L 4.0   Chloride      95 - 110 mmol/L 103   CO2      23 - 29 mmol/L 28   Glucose      70 - 110 mg/dL 91   BUN      7 - 17 mg/dL 16   Creatinine      0.50 - 1.40 mg/dL 1.00   Calcium      8.7 - 10.5 mg/dL 9.0   PROTEIN TOTAL      6.0 - 8.4 g/dL 6.9   Albumin      3.5 - 5.2 g/dL 4.3   BILIRUBIN TOTAL      0.1 - 1.0 mg/dL 0.6   Alkaline Phosphatase      38 - 126 U/L 65   AST      15 - 46 U/L 36   ALT      10 - 44 U/L 46 (H)   Anion Gap      8 - 16 mmol/L 12   eGFR if African American      >60 mL/min/1.73 m:2 >60.0   eGFR if non African American      >60 mL/min/1.73 m:2 >60.0   Specimen UA       Urine, Clean Catch   Color, UA      Yellow, Straw, Annabelle Annabelle   Appearance, UA      Clear Hazy (A)   pH, UA      5.0 - 8.0 5.0   Specific Gravity, UA      1.005 - 1.030 1.025   Protein, UA      Negative Trace (A)   Glucose, UA      Negative Negative   Ketones, UA      Negative Negative   Bilirubin (UA)      Negative Negative   Occult Blood UA      Negative Negative   NITRITE UA      Negative Negative   UROBILINOGEN UA      <2.0 EU/dL Negative   Leukocytes, UA      Negative 1+ (A)   Protein, Urine Random      0 - 15 mg/dL 11   Creatinine, Urine      15.0 - 325.0 mg/dL 297.7   Prot/Creat Ratio, Urine      0.00 - 0.20 0.04   WBC, UA      0 - 5 /hpf 10 (H)   Microscopic Comment       SEE COMMENT   Complement (C-3)      50 - 180 mg/dL 148   Complement (C-4)      11 - 44 mg/dL 40   CPK      55 - 170 U/L 163   ds DNA Ab      Negative 1:10 Negative 1:10   Uric Acid      2.4 - 5.7 mg/dL 10.0 (H)   CRP      0.00 - 1.00 mg/dL 0.51   Sed Rate      0 - 20 mm/Hr 26 (H)       DEXA 4/23/2021 Normal   Assessment:       1.  SLE.  Stable.  She feels well but has oral ulcer today.  2.  Immunosuppression.  Eye exam for Plaquenil will be November.    3.  Elevated pressure in eyes  4.  Obesity.  Gastric bypass 2009 lost 100 pounds but gained most  of it back.  Patient considering revision.    5.  Thyroid nodules.  Being monitored.  Aspiration was normal  6.  Mother with RA  7.  Ankle swelling bilateral.  Grandmother and mother had CHF    8.  Alopecia.  Family history of hair loss. Continuing with derm treatment hair vitamin, daily hail oil and hair foam.   She believes biopsy result showed alopecia.   9.  Pulmonary HTN.  Echo in 3/2021 normal PA pressure  10.  SOB intermittent  11.  Lymph edema.  Tried therapy which helped so now doing at home.  12.  Gout left 1st toe.  X-ray with possible erosion at first MTP reviewed by me. No gout attacks    Plan:       1.  Labs  2.  Followed for pulmonary HTN by cardiologist Dr. Escobar  3.  Follow with current dermatology to evaluate   4.  Patient due for Plaquenil eye exam 10/2021.  5.  Patient had flu vaccination and COVID vaccine booster  6.  Follow info from wellness dietician through insurance who gave info on gout  7.  Medrol pack prn for gout attacks  8.  Patient to follow diet from [facility].  Patient to encouraged to reconsider Weight Watchers/Noom. Plans to ride bike.  9.  Resume use of compression stocking and exercises for lymph edema at home        RTO 4 months/prn;     The patient location is: [place]  The chief complaint leading to consultation is: SLE and gout    Visit type: TELE AUDIOVISUAL:38674    Face to Face time with patient: 20 minutes  25 minutes of total time spent on the encounter, which includes face to face time and non-face to face time preparing to see the patient (eg, review of tests), Obtaining and/or reviewing separately obtained history, Documenting clinical information in the electronic or other health record, Independently interpreting results (not separately reported) and communicating results to the patient/family/caregiver, or Care coordination (not separately reported).         Each patient to whom he or she provides medical services by telemedicine is:  (1) informed of  the relationship between the physician and patient and the respective role of any other health care provider with respect to management of the patient; and (2) notified that he or she may decline to receive medical services by telemedicine and may withdraw from such care at any time.    Notes: see above

## 2022-06-24 ENCOUNTER — PATIENT MESSAGE (OUTPATIENT)
Dept: INTERNAL MEDICINE | Facility: CLINIC | Age: 47
End: 2022-06-24
Payer: COMMERCIAL

## 2022-06-30 ENCOUNTER — PATIENT MESSAGE (OUTPATIENT)
Dept: INTERNAL MEDICINE | Facility: CLINIC | Age: 47
End: 2022-06-30
Payer: COMMERCIAL

## 2022-06-30 DIAGNOSIS — E04.2 MULTIPLE THYROID NODULES: Primary | ICD-10-CM

## 2022-07-06 ENCOUNTER — HOSPITAL ENCOUNTER (OUTPATIENT)
Dept: RADIOLOGY | Facility: HOSPITAL | Age: 47
Discharge: HOME | End: 2022-07-06
Attending: INTERNAL MEDICINE
Payer: COMMERCIAL

## 2022-07-06 DIAGNOSIS — E04.2 MULTIPLE THYROID NODULES: ICD-10-CM

## 2022-07-06 PROCEDURE — 76536 US EXAM OF HEAD AND NECK: CPT | Mod: TC,PO

## 2022-07-21 ENCOUNTER — PATIENT MESSAGE (OUTPATIENT)
Dept: RHEUMATOLOGY | Facility: CLINIC | Age: 47
End: 2022-07-21
Payer: COMMERCIAL

## 2022-08-19 ENCOUNTER — OFFICE VISIT (OUTPATIENT)
Dept: INTERNAL MEDICINE | Facility: CLINIC | Age: 47
End: 2022-08-19
Payer: COMMERCIAL

## 2022-08-19 VITALS
TEMPERATURE: 99 F | BODY MASS INDEX: 45.98 KG/M2 | SYSTOLIC BLOOD PRESSURE: 116 MMHG | RESPIRATION RATE: 18 BRPM | HEART RATE: 70 BPM | DIASTOLIC BLOOD PRESSURE: 86 MMHG | OXYGEN SATURATION: 99 % | WEIGHT: 276 LBS | HEIGHT: 65 IN

## 2022-08-19 DIAGNOSIS — I89.0 LYMPHEDEMA OF BOTH LOWER EXTREMITIES: ICD-10-CM

## 2022-08-19 DIAGNOSIS — Z00.00 ANNUAL PHYSICAL EXAM: Primary | ICD-10-CM

## 2022-08-19 DIAGNOSIS — I10 ESSENTIAL HYPERTENSION: Primary | ICD-10-CM

## 2022-08-19 DIAGNOSIS — Z98.84 S/P GASTRIC BYPASS: ICD-10-CM

## 2022-08-19 DIAGNOSIS — E04.2 MULTIPLE THYROID NODULES: ICD-10-CM

## 2022-08-19 DIAGNOSIS — Z12.31 ENCOUNTER FOR SCREENING MAMMOGRAM FOR HIGH-RISK PATIENT: ICD-10-CM

## 2022-08-19 PROCEDURE — 3008F PR BODY MASS INDEX (BMI) DOCUMENTED: ICD-10-PCS | Mod: CPTII,S$GLB,, | Performed by: INTERNAL MEDICINE

## 2022-08-19 PROCEDURE — 99213 OFFICE O/P EST LOW 20 MIN: CPT | Mod: S$GLB,,, | Performed by: INTERNAL MEDICINE

## 2022-08-19 PROCEDURE — 3079F PR MOST RECENT DIASTOLIC BLOOD PRESSURE 80-89 MM HG: ICD-10-PCS | Mod: CPTII,S$GLB,, | Performed by: INTERNAL MEDICINE

## 2022-08-19 PROCEDURE — 1159F PR MEDICATION LIST DOCUMENTED IN MEDICAL RECORD: ICD-10-PCS | Mod: CPTII,S$GLB,, | Performed by: INTERNAL MEDICINE

## 2022-08-19 PROCEDURE — 3008F BODY MASS INDEX DOCD: CPT | Mod: CPTII,S$GLB,, | Performed by: INTERNAL MEDICINE

## 2022-08-19 PROCEDURE — 3074F SYST BP LT 130 MM HG: CPT | Mod: CPTII,S$GLB,, | Performed by: INTERNAL MEDICINE

## 2022-08-19 PROCEDURE — 99213 PR OFFICE/OUTPT VISIT, EST, LEVL III, 20-29 MIN: ICD-10-PCS | Mod: S$GLB,,, | Performed by: INTERNAL MEDICINE

## 2022-08-19 PROCEDURE — 3074F PR MOST RECENT SYSTOLIC BLOOD PRESSURE < 130 MM HG: ICD-10-PCS | Mod: CPTII,S$GLB,, | Performed by: INTERNAL MEDICINE

## 2022-08-19 PROCEDURE — 1159F MED LIST DOCD IN RCRD: CPT | Mod: CPTII,S$GLB,, | Performed by: INTERNAL MEDICINE

## 2022-08-19 PROCEDURE — 1160F RVW MEDS BY RX/DR IN RCRD: CPT | Mod: CPTII,S$GLB,, | Performed by: INTERNAL MEDICINE

## 2022-08-19 PROCEDURE — 99999 PR PBB SHADOW E&M-EST. PATIENT-LVL IV: ICD-10-PCS | Mod: PBBFAC,,, | Performed by: INTERNAL MEDICINE

## 2022-08-19 PROCEDURE — 3079F DIAST BP 80-89 MM HG: CPT | Mod: CPTII,S$GLB,, | Performed by: INTERNAL MEDICINE

## 2022-08-19 PROCEDURE — 99999 PR PBB SHADOW E&M-EST. PATIENT-LVL IV: CPT | Mod: PBBFAC,,, | Performed by: INTERNAL MEDICINE

## 2022-08-19 PROCEDURE — 1160F PR REVIEW ALL MEDS BY PRESCRIBER/CLIN PHARMACIST DOCUMENTED: ICD-10-PCS | Mod: CPTII,S$GLB,, | Performed by: INTERNAL MEDICINE

## 2022-08-19 NOTE — PROGRESS NOTES
Subjective:     Nitin Mcconnell is a 47 y.o. female who presents for   Chief Complaint   Patient presents with    Thyroid Problem    Hypertension       HPI      Hypertension: The patient has been taking medications as instructed, no medication side effects noted, no chest pain on exertion, and no dyspnea on exertion.     BP Readings from Last 3 Encounters:   08/19/22 116/86   06/07/22 121/73   05/17/22 131/67     She requests compression stockings for leg swelling.    Reviewed recent thyroid US results.      Review of Systems   Constitutional:  Negative for activity change, chills, fever and unexpected weight change.   HENT:  Negative for congestion, hearing loss, rhinorrhea and trouble swallowing.    Eyes:  Negative for discharge and visual disturbance.   Respiratory:  Negative for chest tightness and wheezing.    Cardiovascular:  Positive for leg swelling. Negative for chest pain and palpitations.   Gastrointestinal:  Negative for abdominal pain, blood in stool, constipation, diarrhea and vomiting.   Endocrine: Negative for polydipsia and polyuria.   Genitourinary:  Negative for difficulty urinating, dysuria, hematuria and menstrual problem.   Musculoskeletal:  Negative for arthralgias, joint swelling and neck pain.   Neurological:  Negative for dizziness, weakness and headaches.   Psychiatric/Behavioral:  Negative for confusion and dysphoric mood.         Objective:     Physical Exam  Vitals reviewed.   Constitutional:       General: She is awake. She is not in acute distress.     Appearance: Normal appearance. She is well-developed and well-groomed.   HENT:      Head: Normocephalic and atraumatic.      Right Ear: Hearing and external ear normal.      Left Ear: Hearing and external ear normal.      Nose: Nose normal.   Eyes:      General: Lids are normal. Vision grossly intact.   Neck:      Thyroid: No thyroid mass or thyromegaly.   Cardiovascular:      Rate and Rhythm: Normal rate and regular rhythm.       Heart sounds: Normal heart sounds. No murmur heard.  Pulmonary:      Effort: Pulmonary effort is normal.      Breath sounds: Normal breath sounds. No decreased breath sounds or wheezing.   Abdominal:      General: Bowel sounds are normal. There is no distension.   Musculoskeletal:         General: Normal range of motion.      Cervical back: Normal range of motion. No rigidity. No pain with movement. Normal range of motion.      Right lower le+ Edema present.      Left lower le+ Edema present.   Skin:     General: Skin is warm and dry.      Findings: No lesion or rash.   Neurological:      Mental Status: She is alert and oriented to person, place, and time.   Psychiatric:         Attention and Perception: Attention normal.         Mood and Affect: Mood normal.         Behavior: Behavior is cooperative.         Answers for HPI/ROS submitted by the patient on 2022  activity change: No  unexpected weight change: No  neck pain: No  hearing loss: No  rhinorrhea: No  trouble swallowing: No  eye discharge: No  visual disturbance: No  chest tightness: No  wheezing: No  chest pain: No  palpitations: No  blood in stool: No  constipation: No  vomiting: No  diarrhea: No  polydipsia: No  polyuria: No  difficulty urinating: No  hematuria: No  menstrual problem: No  dysuria: No  joint swelling: No  arthralgias: No  headaches: No  weakness: No  confusion: No  dysphoric mood: No        Assessment:      1. Essential hypertension    2. Multiple thyroid nodules    3. Lymphedema of both lower extremities    4. Encounter for screening mammogram for high-risk patient           Plan:     1. Essential hypertension  - controlled, continue HCTZ    2. Multiple thyroid nodules  - recent thyroid US showed no significant changes    3. Lymphedema of both lower extremities  - COMPRESSION STOCKINGS    4. Encounter for screening mammogram for high-risk patient  - Mammo Digital Screening Bilat w/ [facility]; Future    RTC in 2-3 months for  annual exam or sooner if needed    __________________________    Chasity Dixon MD, PharmD  [facility]- [facility]  American Board of Obesity Medicine diplomate  Office [phone]

## 2022-09-02 ENCOUNTER — LAB VISIT (OUTPATIENT)
Dept: LAB | Facility: HOSPITAL | Age: 47
End: 2022-09-02
Attending: INTERNAL MEDICINE
Payer: COMMERCIAL

## 2022-09-02 DIAGNOSIS — E66.01 CLASS 3 SEVERE OBESITY DUE TO EXCESS CALORIES WITHOUT SERIOUS COMORBIDITY WITH BODY MASS INDEX (BMI) OF 45.0 TO 49.9 IN ADULT: ICD-10-CM

## 2022-09-02 DIAGNOSIS — M10.9 GOUT, ARTHRITIS: ICD-10-CM

## 2022-09-02 DIAGNOSIS — I27.20 PULMONARY HYPERTENSION: ICD-10-CM

## 2022-09-02 DIAGNOSIS — L65.9 ALOPECIA: ICD-10-CM

## 2022-09-02 DIAGNOSIS — R53.83 FATIGUE, UNSPECIFIED TYPE: ICD-10-CM

## 2022-09-02 DIAGNOSIS — M32.9 SYSTEMIC LUPUS ERYTHEMATOSUS, UNSPECIFIED SLE TYPE, UNSPECIFIED ORGAN INVOLVEMENT STATUS: ICD-10-CM

## 2022-09-02 DIAGNOSIS — D84.9 IMMUNOSUPPRESSION: ICD-10-CM

## 2022-09-02 LAB
ALBUMIN SERPL BCP-MCNC: 4.3 G/DL (ref 3.5–5.2)
ALP SERPL-CCNC: 67 U/L (ref 38–126)
ALT SERPL W/O P-5'-P-CCNC: 60 U/L (ref 10–44)
ANION GAP SERPL CALC-SCNC: 8 MMOL/L (ref 8–16)
AST SERPL-CCNC: 48 U/L (ref 15–46)
BASOPHILS # BLD AUTO: 0.05 K/UL (ref 0–0.2)
BASOPHILS NFR BLD: 1 % (ref 0–1.9)
BILIRUB SERPL-MCNC: 0.4 MG/DL (ref 0.1–1)
CALCIUM SERPL-MCNC: 8.8 MG/DL (ref 8.7–10.5)
CHLORIDE SERPL-SCNC: 102 MMOL/L (ref 95–110)
CK SERPL-CCNC: 235 U/L (ref 55–170)
CO2 SERPL-SCNC: 31 MMOL/L (ref 23–29)
CREAT SERPL-MCNC: 0.95 MG/DL (ref 0.5–1.4)
CRP SERPL-MCNC: 0.24 MG/DL (ref 0–1)
DIFFERENTIAL METHOD: ABNORMAL
EOSINOPHIL # BLD AUTO: 0.1 K/UL (ref 0–0.5)
EOSINOPHIL NFR BLD: 2.3 % (ref 0–8)
ERYTHROCYTE [DISTWIDTH] IN BLOOD BY AUTOMATED COUNT: 12.7 % (ref 11.5–14.5)
ERYTHROCYTE [SEDIMENTATION RATE] IN BLOOD BY WESTERGREN METHOD: 19 MM/HR (ref 0–20)
EST. GFR  (NO RACE VARIABLE): >60 ML/MIN/1.73 M^2
GLUCOSE SERPL-MCNC: 82 MG/DL (ref 70–110)
HCT VFR BLD AUTO: 38.5 % (ref 37–48.5)
HGB BLD-MCNC: 11.9 G/DL (ref 12–16)
IMM GRANULOCYTES # BLD AUTO: 0.01 K/UL (ref 0–0.04)
IMM GRANULOCYTES NFR BLD AUTO: 0.2 % (ref 0–0.5)
LYMPHOCYTES # BLD AUTO: 1.8 K/UL (ref 1–4.8)
LYMPHOCYTES NFR BLD: 37.7 % (ref 18–48)
MCH RBC QN AUTO: 27.9 PG (ref 27–31)
MCHC RBC AUTO-ENTMCNC: 30.9 G/DL (ref 32–36)
MCV RBC AUTO: 90 FL (ref 82–98)
MONOCYTES # BLD AUTO: 0.5 K/UL (ref 0.3–1)
MONOCYTES NFR BLD: 9.9 % (ref 4–15)
NEUTROPHILS # BLD AUTO: 2.4 K/UL (ref 1.8–7.7)
NEUTROPHILS NFR BLD: 48.9 % (ref 38–73)
NRBC BLD-RTO: 0 /100 WBC
PLATELET # BLD AUTO: 228 K/UL (ref 150–450)
PMV BLD AUTO: 11.5 FL (ref 9.2–12.9)
POTASSIUM SERPL-SCNC: 3.8 MMOL/L (ref 3.5–5.1)
PROT SERPL-MCNC: 7.3 G/DL (ref 6–8.4)
RBC # BLD AUTO: 4.27 M/UL (ref 4–5.4)
SODIUM SERPL-SCNC: 141 MMOL/L (ref 136–145)
URATE SERPL-MCNC: 8.5 MG/DL (ref 2.4–5.7)
UUN UR-MCNC: 14 MG/DL (ref 7–17)
WBC # BLD AUTO: 4.85 K/UL (ref 3.9–12.7)

## 2022-09-02 PROCEDURE — 84550 ASSAY OF BLOOD/URIC ACID: CPT | Performed by: INTERNAL MEDICINE

## 2022-09-02 PROCEDURE — 86160 COMPLEMENT ANTIGEN: CPT | Mod: 59,PO | Performed by: INTERNAL MEDICINE

## 2022-09-02 PROCEDURE — 82550 ASSAY OF CK (CPK): CPT | Mod: PO | Performed by: INTERNAL MEDICINE

## 2022-09-02 PROCEDURE — 80053 COMPREHEN METABOLIC PANEL: CPT | Mod: PO | Performed by: INTERNAL MEDICINE

## 2022-09-02 PROCEDURE — 86225 DNA ANTIBODY NATIVE: CPT | Mod: PO | Performed by: INTERNAL MEDICINE

## 2022-09-02 PROCEDURE — 86140 C-REACTIVE PROTEIN: CPT | Mod: PO | Performed by: INTERNAL MEDICINE

## 2022-09-02 PROCEDURE — 86160 COMPLEMENT ANTIGEN: CPT | Mod: PO | Performed by: INTERNAL MEDICINE

## 2022-09-02 PROCEDURE — 85025 COMPLETE CBC W/AUTO DIFF WBC: CPT | Mod: PO | Performed by: INTERNAL MEDICINE

## 2022-09-02 PROCEDURE — 85652 RBC SED RATE AUTOMATED: CPT | Performed by: INTERNAL MEDICINE

## 2022-09-02 PROCEDURE — 36415 COLL VENOUS BLD VENIPUNCTURE: CPT | Mod: PO | Performed by: INTERNAL MEDICINE

## 2022-09-03 LAB
C3 SERPL-MCNC: 139 MG/DL (ref 50–180)
C4 SERPL-MCNC: 37 MG/DL (ref 11–44)

## 2022-09-06 ENCOUNTER — PATIENT MESSAGE (OUTPATIENT)
Dept: RHEUMATOLOGY | Facility: CLINIC | Age: 47
End: 2022-09-06
Payer: COMMERCIAL

## 2022-09-06 LAB — DSDNA AB SER-ACNC: NORMAL [IU]/ML

## 2022-09-07 ENCOUNTER — PATIENT MESSAGE (OUTPATIENT)
Dept: RHEUMATOLOGY | Facility: CLINIC | Age: 47
End: 2022-09-07
Payer: COMMERCIAL

## 2022-09-07 DIAGNOSIS — R74.01 ELEVATED LIVER TRANSAMINASE LEVEL: ICD-10-CM

## 2022-09-07 DIAGNOSIS — R53.83 FATIGUE, UNSPECIFIED TYPE: Primary | ICD-10-CM

## 2022-09-07 DIAGNOSIS — R74.8 ELEVATED CPK: ICD-10-CM

## 2022-09-09 ENCOUNTER — PATIENT MESSAGE (OUTPATIENT)
Dept: RHEUMATOLOGY | Facility: CLINIC | Age: 47
End: 2022-09-09
Payer: COMMERCIAL

## 2022-09-12 ENCOUNTER — LAB VISIT (OUTPATIENT)
Dept: LAB | Facility: HOSPITAL | Age: 47
End: 2022-09-12
Attending: INTERNAL MEDICINE
Payer: COMMERCIAL

## 2022-09-12 DIAGNOSIS — R53.83 FATIGUE, UNSPECIFIED TYPE: ICD-10-CM

## 2022-09-12 DIAGNOSIS — R74.8 ELEVATED CPK: ICD-10-CM

## 2022-09-12 DIAGNOSIS — R74.01 ELEVATED LIVER TRANSAMINASE LEVEL: ICD-10-CM

## 2022-09-12 LAB
ALBUMIN SERPL BCP-MCNC: 4.1 G/DL (ref 3.5–5.2)
ALP SERPL-CCNC: 56 U/L (ref 38–126)
ALT SERPL W/O P-5'-P-CCNC: 41 U/L (ref 10–44)
AST SERPL-CCNC: 39 U/L (ref 15–46)
BILIRUB SERPL-MCNC: 0.4 MG/DL (ref 0.1–1)
CK SERPL-CCNC: 199 U/L (ref 55–170)
PROT SERPL-MCNC: 6.8 G/DL (ref 6–8.4)

## 2022-09-12 PROCEDURE — 80076 HEPATIC FUNCTION PANEL: CPT | Mod: PO | Performed by: INTERNAL MEDICINE

## 2022-09-12 PROCEDURE — 82550 ASSAY OF CK (CPK): CPT | Mod: PO | Performed by: INTERNAL MEDICINE

## 2022-09-12 PROCEDURE — 82085 ASSAY OF ALDOLASE: CPT | Mod: PO | Performed by: INTERNAL MEDICINE

## 2022-09-12 PROCEDURE — 36415 COLL VENOUS BLD VENIPUNCTURE: CPT | Mod: PO | Performed by: INTERNAL MEDICINE

## 2022-09-13 ENCOUNTER — PATIENT MESSAGE (OUTPATIENT)
Dept: RHEUMATOLOGY | Facility: CLINIC | Age: 47
End: 2022-09-13
Payer: COMMERCIAL

## 2022-09-14 ENCOUNTER — PATIENT MESSAGE (OUTPATIENT)
Dept: RHEUMATOLOGY | Facility: CLINIC | Age: 47
End: 2022-09-14
Payer: COMMERCIAL

## 2022-09-14 LAB — ALDOLASE SERPL-CCNC: 3.7 U/L (ref 1.2–7.6)

## 2022-10-18 ENCOUNTER — IMMUNIZATION (OUTPATIENT)
Dept: INTERNAL MEDICINE | Facility: CLINIC | Age: 47
End: 2022-10-18
Payer: COMMERCIAL

## 2022-10-18 ENCOUNTER — HOSPITAL ENCOUNTER (OUTPATIENT)
Dept: RADIOLOGY | Facility: HOSPITAL | Age: 47
Discharge: HOME | End: 2022-10-18
Attending: INTERNAL MEDICINE
Payer: COMMERCIAL

## 2022-10-18 DIAGNOSIS — Z23 NEED FOR VACCINATION: Primary | ICD-10-CM

## 2022-10-18 DIAGNOSIS — Z12.31 ENCOUNTER FOR SCREENING MAMMOGRAM FOR HIGH-RISK PATIENT: ICD-10-CM

## 2022-10-18 PROCEDURE — 90471 IMMUNIZATION ADMIN: CPT | Mod: S$GLB,,, | Performed by: INTERNAL MEDICINE

## 2022-10-18 PROCEDURE — 90686 IIV4 VACC NO PRSV 0.5 ML IM: CPT | Mod: S$GLB,,, | Performed by: INTERNAL MEDICINE

## 2022-10-18 PROCEDURE — 77063 BREAST TOMOSYNTHESIS BI: CPT | Mod: TC,PO

## 2022-10-18 PROCEDURE — 91312 COVID-19, MRNA, LNP-S, BIVALENT BOOSTER, PF, 30 MCG/0.3 ML DOSE: CPT | Mod: S$GLB,,, | Performed by: INTERNAL MEDICINE

## 2022-10-18 PROCEDURE — 91312 COVID-19, MRNA, LNP-S, BIVALENT BOOSTER, PF, 30 MCG/0.3 ML DOSE: ICD-10-PCS | Mod: S$GLB,,, | Performed by: INTERNAL MEDICINE

## 2022-10-18 PROCEDURE — 90471 FLU VACCINE (QUAD) GREATER THAN OR EQUAL TO 3YO PRESERVATIVE FREE IM: ICD-10-PCS | Mod: S$GLB,,, | Performed by: INTERNAL MEDICINE

## 2022-10-18 PROCEDURE — 0124A COVID-19, MRNA, LNP-S, BIVALENT BOOSTER, PF, 30 MCG/0.3 ML DOSE: CPT | Mod: CV19,PBBFAC | Performed by: INTERNAL MEDICINE

## 2022-10-18 PROCEDURE — 90686 FLU VACCINE (QUAD) GREATER THAN OR EQUAL TO 3YO PRESERVATIVE FREE IM: ICD-10-PCS | Mod: S$GLB,,, | Performed by: INTERNAL MEDICINE

## 2022-12-02 ENCOUNTER — PATIENT MESSAGE (OUTPATIENT)
Dept: RHEUMATOLOGY | Facility: CLINIC | Age: 47
End: 2022-12-02
Payer: COMMERCIAL

## 2022-12-29 ENCOUNTER — OFFICE VISIT (OUTPATIENT)
Dept: RHEUMATOLOGY | Facility: CLINIC | Age: 47
End: 2022-12-29
Payer: COMMERCIAL

## 2022-12-29 DIAGNOSIS — L65.9 ALOPECIA: ICD-10-CM

## 2022-12-29 DIAGNOSIS — M10.9 GOUT, ARTHRITIS: Primary | ICD-10-CM

## 2022-12-29 DIAGNOSIS — M32.9 SYSTEMIC LUPUS ERYTHEMATOSUS, UNSPECIFIED SLE TYPE, UNSPECIFIED ORGAN INVOLVEMENT STATUS: ICD-10-CM

## 2022-12-29 DIAGNOSIS — D84.9 IMMUNOSUPPRESSION: ICD-10-CM

## 2022-12-29 PROCEDURE — 1160F RVW MEDS BY RX/DR IN RCRD: CPT | Mod: CPTII,95,, | Performed by: INTERNAL MEDICINE

## 2022-12-29 PROCEDURE — 99214 PR OFFICE/OUTPT VISIT, EST, LEVL IV, 30-39 MIN: ICD-10-PCS | Mod: 95,,, | Performed by: INTERNAL MEDICINE

## 2022-12-29 PROCEDURE — 1160F PR REVIEW ALL MEDS BY PRESCRIBER/CLIN PHARMACIST DOCUMENTED: ICD-10-PCS | Mod: CPTII,95,, | Performed by: INTERNAL MEDICINE

## 2022-12-29 PROCEDURE — 1159F MED LIST DOCD IN RCRD: CPT | Mod: CPTII,95,, | Performed by: INTERNAL MEDICINE

## 2022-12-29 PROCEDURE — 1159F PR MEDICATION LIST DOCUMENTED IN MEDICAL RECORD: ICD-10-PCS | Mod: CPTII,95,, | Performed by: INTERNAL MEDICINE

## 2022-12-29 PROCEDURE — 99214 OFFICE O/P EST MOD 30 MIN: CPT | Mod: 95,,, | Performed by: INTERNAL MEDICINE

## 2022-12-29 RX ORDER — METHYLPREDNISOLONE 4 MG/1
TABLET ORAL
Qty: 21 TABLET | Refills: 0 | Status: SHIPPED | OUTPATIENT
Start: 2022-12-29 | End: 2023-01-25 | Stop reason: SDUPTHER

## 2022-12-29 NOTE — PROGRESS NOTES
Subjective:       Patient ID: Nitin Mcconnell is a 47 y.o. female.    Chief Complaint: Lupus and gout    HPI:  Nitin Mcconnell is a 47 y.o. female diagnosed at age 31 with lupus.  Started as upper respiratory symptoms that did not respond to antibiotics.  She had fever and low BP as well as SOB.  She was intubated and on ventilator for a week.  She was at [facility].  Dr. Martin diagnosed lupus.  Hospitalized 3/8/07 to 3/30/07.  She had elevated pressure in eyes, kidney involvement.  She was discharged with a PICC line for antibiotics.  She was found to be allergic to heparin due to thrombocytopenia that developed after flushing PICC line with heparin.  She had hair loss.  Had enlarged lymph node under arm in past and biopsy was normal.  Was treated with Plaquenil and steroids.  Only took steroid for 1 year.       Compliant with Plaquenil.  Did plant based for October.  She lost 4 pounds and felt better.   She was exercising but stopped.    No ulcers in mouth and noses.  No rashes. Still getting treatments  ().     She denies any flares since last visit.   Diagnosed with sleep apnea and using CPAP which has helped fatigue and lightheadedness.  But not compliant with CPAP daily.   No gout attacks.   Diagnosed with lymph edema and therapy helped.  She is not compliant with compression.    Hair improving since going natural, not wearing wigs and treatments.       Lupus Review of Systems  Alopecia: yes  Photosensitivity: no   Raynaud's: no  Oral or nasal ulcers: occasional oral ulcers  Rashes:  Intermittent malar rash; occasional rash in crease of arm and neck  No pleurisy or pericarditis.  No seizures, psychosis, or stroke.  No venous or arterial clots.  History of being on coumadin for 3 months as a precaution.   Pregnancy hx (if applicable): no miscarriages (never pregnant)        Review of Systems   Constitutional: Negative.  Negative for fever and unexpected weight change.    HENT: Negative.  Negative for mouth sores and trouble swallowing.    Eyes:  Negative for redness.   Respiratory:  Negative for cough and shortness of breath.    Cardiovascular:  Positive for leg swelling (lymph edema). Negative for chest pain.   Gastrointestinal:  Negative for constipation and diarrhea.   Endocrine: Negative.    Genitourinary: Negative.  Negative for dysuria and genital sores.   Musculoskeletal: Negative.    Skin:  Negative for rash.             Allergic/Immunologic: Negative.    Neurological: Negative.  Negative for headaches.   Hematological: Negative.  Does not bruise/bleed easily.   Psychiatric/Behavioral: Negative.         Objective:   LMP 07/13/2013      Physical Exam   Constitutional: She is oriented to person, place, and time.   HENT:   Head: Normocephalic and atraumatic.   Eyes: Conjunctivae are normal.   Musculoskeletal:         General: No tenderness or deformity.   Neurological: She is alert and oriented to person, place, and time.   Psychiatric: Mood and affect normal.          LABS  Component      Latest Ref Rng & Units 6/3/2022   WBC      3.90 - 12.70 K/uL 4.93   RBC      4.00 - 5.40 M/uL 4.36   Hemoglobin      12.0 - 16.0 g/dL 12.1   Hematocrit      37.0 - 48.5 % 38.8   MCV      82 - 98 fL 89   MCH      27.0 - 31.0 pg 27.8   MCHC      32.0 - 36.0 g/dL 31.2 (L)   RDW      11.5 - 14.5 % 12.7   Platelets      150 - 450 K/uL 229   MPV      9.2 - 12.9 fL 11.2   Immature Granulocytes      0.0 - 0.5 % 0.0   Gran # (ANC)      1.8 - 7.7 K/uL 3.0   Immature Grans (Abs)      0.00 - 0.04 K/uL 0.00   Lymph #      1.0 - 4.8 K/uL 1.5   Mono #      0.3 - 1.0 K/uL 0.4   Eos #      0.0 - 0.5 K/uL 0.1   Baso #      0.00 - 0.20 K/uL 0.02   nRBC      0 /100 WBC 0   Gran %      38.0 - 73.0 % 60.3   Lymph %      18.0 - 48.0 % 29.4   Mono %      4.0 - 15.0 % 8.9   Eosinophil %      0.0 - 8.0 % 1.0   Basophil %      0.0 - 1.9 % 0.4   Differential Method       Automated   Sodium      136 - 145 mmol/L 143    Potassium      3.5 - 5.1 mmol/L 4.0   Chloride      95 - 110 mmol/L 103   CO2      23 - 29 mmol/L 28   Glucose      70 - 110 mg/dL 91   BUN      7 - 17 mg/dL 16   Creatinine      0.50 - 1.40 mg/dL 1.00   Calcium      8.7 - 10.5 mg/dL 9.0   PROTEIN TOTAL      6.0 - 8.4 g/dL 6.9   Albumin      3.5 - 5.2 g/dL 4.3   BILIRUBIN TOTAL      0.1 - 1.0 mg/dL 0.6   Alkaline Phosphatase      38 - 126 U/L 65   AST      15 - 46 U/L 36   ALT      10 - 44 U/L 46 (H)   Anion Gap      8 - 16 mmol/L 12   eGFR if African American      >60 mL/min/1.73 m:2 >60.0   eGFR if non African American      >60 mL/min/1.73 m:2 >60.0   Specimen UA       Urine, Clean Catch   Color, UA      Yellow, Straw, Annabelle Annabelle   Appearance, UA      Clear Hazy (A)   pH, UA      5.0 - 8.0 5.0   Specific Gravity, UA      1.005 - 1.030 1.025   Protein, UA      Negative Trace (A)   Glucose, UA      Negative Negative   Ketones, UA      Negative Negative   Bilirubin (UA)      Negative Negative   Occult Blood UA      Negative Negative   NITRITE UA      Negative Negative   UROBILINOGEN UA      <2.0 EU/dL Negative   Leukocytes, UA      Negative 1+ (A)   Protein, Urine Random      0 - 15 mg/dL 11   Creatinine, Urine      15.0 - 325.0 mg/dL 297.7   Prot/Creat Ratio, Urine      0.00 - 0.20 0.04   WBC, UA      0 - 5 /hpf 10 (H)   Microscopic Comment       SEE COMMENT   Complement (C-3)      50 - 180 mg/dL 148   Complement (C-4)      11 - 44 mg/dL 40   CPK      55 - 170 U/L 163   ds DNA Ab      Negative 1:10 Negative 1:10   Uric Acid      2.4 - 5.7 mg/dL 10.0 (H)   CRP      0.00 - 1.00 mg/dL 0.51   Sed Rate      0 - 20 mm/Hr 26 (H)         DEXA 4/23/2021 Normal   Assessment:       1.  SLE.  Stable.  She feels well today.  2.  Immunosuppression.  Eye exam for Plaquenil done 12/2022.    3.  Elevated pressure in eyes  4.  Obesity.  Gastric bypass 2009 lost 100 pounds but gained most of it back.  Patient considering revision.    5.  Thyroid nodules.  Being monitored.   Aspiration was normal  6.  Mother with RA  7.  Ankle swelling bilateral.  Grandmother and mother had CHF    8.  Alopecia.  Family history of hair loss. Continuing with derm treatment hair vitamin, daily hail oil and hair foam.   She believes biopsy result showed alopecia.   9.  Pulmonary HTN.  Echo in 3/2021 normal PA pressure  10.  SOB intermittent  11.  Lymph edema.  Tried therapy which helped so now doing at home.  12.  Gout left 1st toe.  X-ray with possible erosion at first MTP reviewed by me. No gout attacks    Plan:       1.  Labs  2.  Followed for pulmonary HTN by cardiologist Dr. Escobar  3.  Follow with current dermatology to evaluate   4.  Patient due for Plaquenil eye exam 12/12/2022.  5.  Patient had flu vaccination and COVID vaccine booster  6.  Follow info from wellness dietician through insurance who gave info on gout  7.  Medrol pack prn for gout attacks  8.  Patient to follow diet from [facility].  Patient to encouraged to reconsider Weight Watchers/Noom. Plans to ride bike.  9.  Stressed compliance with CPAP        RTO 6 months/prn;     The patient location is: [place]  The chief complaint leading to consultation is: SLE and gout    Visit type: TELE AUDIOVISUAL:71085    Face to Face time with patient: 20 minutes  25 minutes of total time spent on the encounter, which includes face to face time and non-face to face time preparing to see the patient (eg, review of tests), Obtaining and/or reviewing separately obtained history, Documenting clinical information in the electronic or other health record, Independently interpreting results (not separately reported) and communicating results to the patient/family/caregiver, or Care coordination (not separately reported).         Each patient to whom he or she provides medical services by telemedicine is:  (1) informed of the relationship between the physician and patient and the respective role of any other health care provider with respect to  management of the patient; and (2) notified that he or she may decline to receive medical services by telemedicine and may withdraw from such care at any time.    Notes: see above

## 2023-01-23 ENCOUNTER — PATIENT MESSAGE (OUTPATIENT)
Dept: RHEUMATOLOGY | Facility: CLINIC | Age: 48
End: 2023-01-23
Payer: COMMERCIAL

## 2023-01-23 DIAGNOSIS — M10.9 GOUT, ARTHRITIS: ICD-10-CM

## 2023-01-25 RX ORDER — METHYLPREDNISOLONE 4 MG/1
TABLET ORAL
Qty: 21 TABLET | Refills: 0 | Status: SHIPPED | OUTPATIENT
Start: 2023-01-25 | End: 2023-10-20

## 2023-01-27 ENCOUNTER — LAB VISIT (OUTPATIENT)
Dept: LAB | Facility: HOSPITAL | Age: 48
End: 2023-01-27
Attending: INTERNAL MEDICINE
Payer: COMMERCIAL

## 2023-01-27 DIAGNOSIS — I27.20 PULMONARY HYPERTENSION: ICD-10-CM

## 2023-01-27 DIAGNOSIS — M10.9 GOUT, ARTHRITIS: ICD-10-CM

## 2023-01-27 DIAGNOSIS — D84.9 IMMUNOSUPPRESSION: ICD-10-CM

## 2023-01-27 DIAGNOSIS — L65.9 ALOPECIA: ICD-10-CM

## 2023-01-27 DIAGNOSIS — R53.83 FATIGUE, UNSPECIFIED TYPE: ICD-10-CM

## 2023-01-27 DIAGNOSIS — M32.9 SYSTEMIC LUPUS ERYTHEMATOSUS, UNSPECIFIED SLE TYPE, UNSPECIFIED ORGAN INVOLVEMENT STATUS: ICD-10-CM

## 2023-01-27 DIAGNOSIS — E66.01 CLASS 3 SEVERE OBESITY DUE TO EXCESS CALORIES WITHOUT SERIOUS COMORBIDITY WITH BODY MASS INDEX (BMI) OF 45.0 TO 49.9 IN ADULT: ICD-10-CM

## 2023-01-27 LAB
ALBUMIN SERPL BCP-MCNC: 4.3 G/DL (ref 3.5–5.2)
ALP SERPL-CCNC: 67 U/L (ref 38–126)
ALT SERPL W/O P-5'-P-CCNC: 47 U/L (ref 10–44)
ANION GAP SERPL CALC-SCNC: 4 MMOL/L (ref 8–16)
AST SERPL-CCNC: 32 U/L (ref 15–46)
BASOPHILS # BLD AUTO: 0.04 K/UL (ref 0–0.2)
BASOPHILS NFR BLD: 0.7 % (ref 0–1.9)
BILIRUB SERPL-MCNC: 0.6 MG/DL (ref 0.1–1)
C3 SERPL-MCNC: 137 MG/DL (ref 50–180)
C4 SERPL-MCNC: 43 MG/DL (ref 11–44)
CALCIUM SERPL-MCNC: 8.9 MG/DL (ref 8.7–10.5)
CHLORIDE SERPL-SCNC: 105 MMOL/L (ref 95–110)
CK SERPL-CCNC: 91 U/L (ref 55–170)
CO2 SERPL-SCNC: 32 MMOL/L (ref 23–29)
CREAT SERPL-MCNC: 1.06 MG/DL (ref 0.5–1.4)
CRP SERPL-MCNC: 1.07 MG/DL (ref 0–1)
DIFFERENTIAL METHOD: ABNORMAL
EOSINOPHIL # BLD AUTO: 0.1 K/UL (ref 0–0.5)
EOSINOPHIL NFR BLD: 2.3 % (ref 0–8)
ERYTHROCYTE [DISTWIDTH] IN BLOOD BY AUTOMATED COUNT: 12.2 % (ref 11.5–14.5)
ERYTHROCYTE [SEDIMENTATION RATE] IN BLOOD BY PHOTOMETRIC METHOD: 10 MM/HR (ref 0–36)
EST. GFR  (NO RACE VARIABLE): >60 ML/MIN/1.73 M^2
GLUCOSE SERPL-MCNC: 93 MG/DL (ref 70–110)
HCT VFR BLD AUTO: 38.9 % (ref 37–48.5)
HGB BLD-MCNC: 12.2 G/DL (ref 12–16)
IMM GRANULOCYTES # BLD AUTO: 0.01 K/UL (ref 0–0.04)
IMM GRANULOCYTES NFR BLD AUTO: 0.2 % (ref 0–0.5)
LYMPHOCYTES # BLD AUTO: 1.8 K/UL (ref 1–4.8)
LYMPHOCYTES NFR BLD: 32 % (ref 18–48)
MCH RBC QN AUTO: 28.4 PG (ref 27–31)
MCHC RBC AUTO-ENTMCNC: 31.4 G/DL (ref 32–36)
MCV RBC AUTO: 91 FL (ref 82–98)
MONOCYTES # BLD AUTO: 0.5 K/UL (ref 0.3–1)
MONOCYTES NFR BLD: 8.1 % (ref 4–15)
NEUTROPHILS # BLD AUTO: 3.2 K/UL (ref 1.8–7.7)
NEUTROPHILS NFR BLD: 56.7 % (ref 38–73)
NRBC BLD-RTO: 0 /100 WBC
PLATELET # BLD AUTO: 222 K/UL (ref 150–450)
PMV BLD AUTO: 10.8 FL (ref 9.2–12.9)
POTASSIUM SERPL-SCNC: 4.4 MMOL/L (ref 3.5–5.1)
PROT SERPL-MCNC: 7.3 G/DL (ref 6–8.4)
RBC # BLD AUTO: 4.3 M/UL (ref 4–5.4)
SODIUM SERPL-SCNC: 141 MMOL/L (ref 136–145)
URATE SERPL-MCNC: 8.4 MG/DL (ref 2.4–5.7)
UUN UR-MCNC: 12 MG/DL (ref 7–17)
WBC # BLD AUTO: 5.65 K/UL (ref 3.9–12.7)

## 2023-01-27 PROCEDURE — 80053 COMPREHEN METABOLIC PANEL: CPT | Mod: PO | Performed by: INTERNAL MEDICINE

## 2023-01-27 PROCEDURE — 82550 ASSAY OF CK (CPK): CPT | Mod: PO | Performed by: INTERNAL MEDICINE

## 2023-01-27 PROCEDURE — 86160 COMPLEMENT ANTIGEN: CPT | Mod: PO | Performed by: INTERNAL MEDICINE

## 2023-01-27 PROCEDURE — 84550 ASSAY OF BLOOD/URIC ACID: CPT | Performed by: INTERNAL MEDICINE

## 2023-01-27 PROCEDURE — 36415 COLL VENOUS BLD VENIPUNCTURE: CPT | Mod: PO | Performed by: INTERNAL MEDICINE

## 2023-01-27 PROCEDURE — 86160 COMPLEMENT ANTIGEN: CPT | Mod: 59,PO | Performed by: INTERNAL MEDICINE

## 2023-01-27 PROCEDURE — 85652 RBC SED RATE AUTOMATED: CPT | Mod: PO | Performed by: INTERNAL MEDICINE

## 2023-01-27 PROCEDURE — 86225 DNA ANTIBODY NATIVE: CPT | Mod: PO | Performed by: INTERNAL MEDICINE

## 2023-01-27 PROCEDURE — 85025 COMPLETE CBC W/AUTO DIFF WBC: CPT | Mod: PO | Performed by: INTERNAL MEDICINE

## 2023-01-27 PROCEDURE — 86140 C-REACTIVE PROTEIN: CPT | Mod: PO | Performed by: INTERNAL MEDICINE

## 2023-01-30 LAB — DSDNA AB SER-ACNC: NORMAL [IU]/ML

## 2023-02-06 ENCOUNTER — PATIENT MESSAGE (OUTPATIENT)
Dept: RHEUMATOLOGY | Facility: CLINIC | Age: 48
End: 2023-02-06
Payer: COMMERCIAL

## 2023-02-08 ENCOUNTER — PATIENT MESSAGE (OUTPATIENT)
Dept: RHEUMATOLOGY | Facility: CLINIC | Age: 48
End: 2023-02-08
Payer: COMMERCIAL

## 2023-02-08 DIAGNOSIS — M10.9 GOUT, ARTHRITIS: Primary | ICD-10-CM

## 2023-02-10 ENCOUNTER — PATIENT MESSAGE (OUTPATIENT)
Dept: RHEUMATOLOGY | Facility: CLINIC | Age: 48
End: 2023-02-10
Payer: COMMERCIAL

## 2023-02-10 RX ORDER — COLCHICINE 0.6 MG/1
0.6 TABLET ORAL DAILY
Qty: 30 TABLET | Refills: 1 | Status: SHIPPED | OUTPATIENT
Start: 2023-02-10 | End: 2023-04-17 | Stop reason: SDUPTHER

## 2023-02-10 RX ORDER — ALLOPURINOL 100 MG/1
100 TABLET ORAL DAILY
Qty: 30 TABLET | Refills: 1 | Status: SHIPPED | OUTPATIENT
Start: 2023-02-10 | End: 2023-03-17 | Stop reason: SDUPTHER

## 2023-02-16 ENCOUNTER — PATIENT MESSAGE (OUTPATIENT)
Dept: RHEUMATOLOGY | Facility: CLINIC | Age: 48
End: 2023-02-16
Payer: COMMERCIAL

## 2023-03-16 ENCOUNTER — LAB VISIT (OUTPATIENT)
Dept: LAB | Facility: HOSPITAL | Age: 48
End: 2023-03-16
Attending: INTERNAL MEDICINE
Payer: COMMERCIAL

## 2023-03-16 DIAGNOSIS — D84.9 IMMUNOSUPPRESSION: ICD-10-CM

## 2023-03-16 DIAGNOSIS — M10.9 GOUT, ARTHRITIS: ICD-10-CM

## 2023-03-16 DIAGNOSIS — I27.20 PULMONARY HYPERTENSION: ICD-10-CM

## 2023-03-16 DIAGNOSIS — E66.01 CLASS 3 SEVERE OBESITY DUE TO EXCESS CALORIES WITHOUT SERIOUS COMORBIDITY WITH BODY MASS INDEX (BMI) OF 45.0 TO 49.9 IN ADULT: ICD-10-CM

## 2023-03-16 DIAGNOSIS — M32.9 SYSTEMIC LUPUS ERYTHEMATOSUS, UNSPECIFIED SLE TYPE, UNSPECIFIED ORGAN INVOLVEMENT STATUS: ICD-10-CM

## 2023-03-16 DIAGNOSIS — L65.9 ALOPECIA: ICD-10-CM

## 2023-03-16 DIAGNOSIS — R53.83 FATIGUE, UNSPECIFIED TYPE: ICD-10-CM

## 2023-03-16 LAB
ALBUMIN SERPL BCP-MCNC: 4.6 G/DL (ref 3.5–5.2)
ALP SERPL-CCNC: 61 U/L (ref 38–126)
ALT SERPL W/O P-5'-P-CCNC: 53 U/L (ref 10–44)
ANION GAP SERPL CALC-SCNC: 6 MMOL/L (ref 8–16)
AST SERPL-CCNC: 45 U/L (ref 15–46)
BASOPHILS # BLD AUTO: 0.04 K/UL (ref 0–0.2)
BASOPHILS NFR BLD: 0.9 % (ref 0–1.9)
BILIRUB SERPL-MCNC: 0.6 MG/DL (ref 0.1–1)
C3 SERPL-MCNC: 134 MG/DL (ref 50–180)
C4 SERPL-MCNC: 38 MG/DL (ref 11–44)
CALCIUM SERPL-MCNC: 8.9 MG/DL (ref 8.7–10.5)
CHLORIDE SERPL-SCNC: 102 MMOL/L (ref 95–110)
CK SERPL-CCNC: 197 U/L (ref 55–170)
CO2 SERPL-SCNC: 31 MMOL/L (ref 23–29)
CREAT SERPL-MCNC: 1.01 MG/DL (ref 0.5–1.4)
CRP SERPL-MCNC: 0.18 MG/DL (ref 0–1)
DIFFERENTIAL METHOD: ABNORMAL
EOSINOPHIL # BLD AUTO: 0.2 K/UL (ref 0–0.5)
EOSINOPHIL NFR BLD: 3.4 % (ref 0–8)
ERYTHROCYTE [DISTWIDTH] IN BLOOD BY AUTOMATED COUNT: 12.4 % (ref 11.5–14.5)
ERYTHROCYTE [SEDIMENTATION RATE] IN BLOOD BY PHOTOMETRIC METHOD: 10 MM/HR (ref 0–36)
EST. GFR  (NO RACE VARIABLE): >60 ML/MIN/1.73 M^2
GLUCOSE SERPL-MCNC: 95 MG/DL (ref 70–110)
HCT VFR BLD AUTO: 38 % (ref 37–48.5)
HGB BLD-MCNC: 12 G/DL (ref 12–16)
IMM GRANULOCYTES # BLD AUTO: 0 K/UL (ref 0–0.04)
IMM GRANULOCYTES NFR BLD AUTO: 0 % (ref 0–0.5)
LYMPHOCYTES # BLD AUTO: 1.7 K/UL (ref 1–4.8)
LYMPHOCYTES NFR BLD: 39 % (ref 18–48)
MCH RBC QN AUTO: 28 PG (ref 27–31)
MCHC RBC AUTO-ENTMCNC: 31.6 G/DL (ref 32–36)
MCV RBC AUTO: 89 FL (ref 82–98)
MONOCYTES # BLD AUTO: 0.4 K/UL (ref 0.3–1)
MONOCYTES NFR BLD: 8.9 % (ref 4–15)
NEUTROPHILS # BLD AUTO: 2.1 K/UL (ref 1.8–7.7)
NEUTROPHILS NFR BLD: 47.8 % (ref 38–73)
NRBC BLD-RTO: 0 /100 WBC
PLATELET # BLD AUTO: 214 K/UL (ref 150–450)
PMV BLD AUTO: 10.7 FL (ref 9.2–12.9)
POTASSIUM SERPL-SCNC: 3.8 MMOL/L (ref 3.5–5.1)
PROT SERPL-MCNC: 7.4 G/DL (ref 6–8.4)
RBC # BLD AUTO: 4.29 M/UL (ref 4–5.4)
SODIUM SERPL-SCNC: 139 MMOL/L (ref 136–145)
URATE SERPL-MCNC: 7.1 MG/DL (ref 2.4–5.7)
UUN UR-MCNC: 16 MG/DL (ref 7–17)
WBC # BLD AUTO: 4.39 K/UL (ref 3.9–12.7)

## 2023-03-16 PROCEDURE — 82550 ASSAY OF CK (CPK): CPT | Mod: PO | Performed by: INTERNAL MEDICINE

## 2023-03-16 PROCEDURE — 86160 COMPLEMENT ANTIGEN: CPT | Mod: 59,PO | Performed by: INTERNAL MEDICINE

## 2023-03-16 PROCEDURE — 85025 COMPLETE CBC W/AUTO DIFF WBC: CPT | Mod: PO | Performed by: INTERNAL MEDICINE

## 2023-03-16 PROCEDURE — 85652 RBC SED RATE AUTOMATED: CPT | Mod: PO | Performed by: INTERNAL MEDICINE

## 2023-03-16 PROCEDURE — 86140 C-REACTIVE PROTEIN: CPT | Mod: PO | Performed by: INTERNAL MEDICINE

## 2023-03-16 PROCEDURE — 86160 COMPLEMENT ANTIGEN: CPT | Mod: PO | Performed by: INTERNAL MEDICINE

## 2023-03-16 PROCEDURE — 80053 COMPREHEN METABOLIC PANEL: CPT | Mod: PO | Performed by: INTERNAL MEDICINE

## 2023-03-16 PROCEDURE — 86225 DNA ANTIBODY NATIVE: CPT | Mod: PO | Performed by: INTERNAL MEDICINE

## 2023-03-16 PROCEDURE — 84550 ASSAY OF BLOOD/URIC ACID: CPT | Performed by: INTERNAL MEDICINE

## 2023-03-16 PROCEDURE — 36415 COLL VENOUS BLD VENIPUNCTURE: CPT | Mod: PO | Performed by: INTERNAL MEDICINE

## 2023-03-17 ENCOUNTER — PATIENT MESSAGE (OUTPATIENT)
Dept: RHEUMATOLOGY | Facility: CLINIC | Age: 48
End: 2023-03-17
Payer: COMMERCIAL

## 2023-03-17 DIAGNOSIS — M10.9 GOUT, ARTHRITIS: ICD-10-CM

## 2023-03-17 LAB — DSDNA AB SER-ACNC: NORMAL [IU]/ML

## 2023-03-17 RX ORDER — ALLOPURINOL 100 MG/1
200 TABLET ORAL DAILY
Qty: 60 TABLET | Refills: 1 | Status: SHIPPED | OUTPATIENT
Start: 2023-03-17 | End: 2023-03-31 | Stop reason: SDUPTHER

## 2023-03-23 ENCOUNTER — OFFICE VISIT (OUTPATIENT)
Dept: OTOLARYNGOLOGY | Facility: CLINIC | Age: 48
End: 2023-03-23
Payer: COMMERCIAL

## 2023-03-23 ENCOUNTER — LAB VISIT (OUTPATIENT)
Dept: LAB | Facility: HOSPITAL | Age: 48
End: 2023-03-23
Attending: OTOLARYNGOLOGY
Payer: COMMERCIAL

## 2023-03-23 VITALS
BODY MASS INDEX: 45.52 KG/M2 | WEIGHT: 273.25 LBS | HEIGHT: 65 IN | DIASTOLIC BLOOD PRESSURE: 85 MMHG | SYSTOLIC BLOOD PRESSURE: 126 MMHG | HEART RATE: 72 BPM

## 2023-03-23 DIAGNOSIS — J30.9 ALLERGIC RHINITIS, UNSPECIFIED SEASONALITY, UNSPECIFIED TRIGGER: Primary | ICD-10-CM

## 2023-03-23 DIAGNOSIS — H60.63 CHRONIC OTITIS EXTERNA OF BOTH EARS, UNSPECIFIED TYPE: ICD-10-CM

## 2023-03-23 DIAGNOSIS — R09.81 NASAL CONGESTION: ICD-10-CM

## 2023-03-23 DIAGNOSIS — J30.9 ALLERGIC RHINITIS, UNSPECIFIED SEASONALITY, UNSPECIFIED TRIGGER: ICD-10-CM

## 2023-03-23 DIAGNOSIS — J34.3 HYPERTROPHY OF BOTH INFERIOR NASAL TURBINATES: ICD-10-CM

## 2023-03-23 DIAGNOSIS — H61.22 IMPACTED CERUMEN OF LEFT EAR: ICD-10-CM

## 2023-03-23 PROCEDURE — 99204 PR OFFICE/OUTPT VISIT, NEW, LEVL IV, 45-59 MIN: ICD-10-PCS | Mod: 25,S$GLB,, | Performed by: OTOLARYNGOLOGY

## 2023-03-23 PROCEDURE — 1159F MED LIST DOCD IN RCRD: CPT | Mod: CPTII,S$GLB,, | Performed by: OTOLARYNGOLOGY

## 2023-03-23 PROCEDURE — 3079F DIAST BP 80-89 MM HG: CPT | Mod: CPTII,S$GLB,, | Performed by: OTOLARYNGOLOGY

## 2023-03-23 PROCEDURE — 3079F PR MOST RECENT DIASTOLIC BLOOD PRESSURE 80-89 MM HG: ICD-10-PCS | Mod: CPTII,S$GLB,, | Performed by: OTOLARYNGOLOGY

## 2023-03-23 PROCEDURE — 3008F PR BODY MASS INDEX (BMI) DOCUMENTED: ICD-10-PCS | Mod: CPTII,S$GLB,, | Performed by: OTOLARYNGOLOGY

## 2023-03-23 PROCEDURE — 99999 PR PBB SHADOW E&M-EST. PATIENT-LVL IV: CPT | Mod: PBBFAC,,, | Performed by: OTOLARYNGOLOGY

## 2023-03-23 PROCEDURE — 99204 OFFICE O/P NEW MOD 45 MIN: CPT | Mod: 25,S$GLB,, | Performed by: OTOLARYNGOLOGY

## 2023-03-23 PROCEDURE — 3008F BODY MASS INDEX DOCD: CPT | Mod: CPTII,S$GLB,, | Performed by: OTOLARYNGOLOGY

## 2023-03-23 PROCEDURE — 1159F PR MEDICATION LIST DOCUMENTED IN MEDICAL RECORD: ICD-10-PCS | Mod: CPTII,S$GLB,, | Performed by: OTOLARYNGOLOGY

## 2023-03-23 PROCEDURE — 99999 PR PBB SHADOW E&M-EST. PATIENT-LVL IV: ICD-10-PCS | Mod: PBBFAC,,, | Performed by: OTOLARYNGOLOGY

## 2023-03-23 PROCEDURE — 86003 ALLG SPEC IGE CRUDE XTRC EA: CPT | Mod: 59 | Performed by: OTOLARYNGOLOGY

## 2023-03-23 PROCEDURE — 3074F SYST BP LT 130 MM HG: CPT | Mod: CPTII,S$GLB,, | Performed by: OTOLARYNGOLOGY

## 2023-03-23 PROCEDURE — 86003 ALLG SPEC IGE CRUDE XTRC EA: CPT | Performed by: OTOLARYNGOLOGY

## 2023-03-23 PROCEDURE — 69210 REMOVE IMPACTED EAR WAX UNI: CPT | Mod: S$GLB,,, | Performed by: OTOLARYNGOLOGY

## 2023-03-23 PROCEDURE — 69210 EAR CERUMEN REMOVAL: ICD-10-PCS | Mod: S$GLB,,, | Performed by: OTOLARYNGOLOGY

## 2023-03-23 PROCEDURE — 3074F PR MOST RECENT SYSTOLIC BLOOD PRESSURE < 130 MM HG: ICD-10-PCS | Mod: CPTII,S$GLB,, | Performed by: OTOLARYNGOLOGY

## 2023-03-23 PROCEDURE — 36415 COLL VENOUS BLD VENIPUNCTURE: CPT | Performed by: OTOLARYNGOLOGY

## 2023-03-23 RX ORDER — TRIAMCINOLONE ACETONIDE 55 UG/1
2 SPRAY, METERED NASAL DAILY
Qty: 17 G | Refills: 5 | Status: SHIPPED | OUTPATIENT
Start: 2023-03-23 | End: 2023-07-13

## 2023-03-23 RX ORDER — MONTELUKAST SODIUM 10 MG/1
10 TABLET ORAL NIGHTLY
Qty: 30 TABLET | Refills: 12 | Status: SHIPPED | OUTPATIENT
Start: 2023-03-23 | End: 2023-04-22

## 2023-03-23 NOTE — PROGRESS NOTES
Chief Complaint   Patient presents with    Sinus Problem        HPI:       The pt is a 47 y.o. female with nasal congestion of many years duration. The congestion is reported to be moderate. Associated signs and symptoms are clear runny nose, post nasal drip, sneezing, itchy eyes, facial pain, and headache. The patient ( patient representative) denies purulent runny nose, facial pain, cough, and recurrent sinus infections.    There is a history of snoring. There is a sleep disturbance . The following sleep abnormalities are noted: apnea, frequent awakening, and patient already had diagnosis of RAQUEL and sleep apnea.    The patient does not have asthma.  The patient does not have eczema.  The patient has been diagnosed with allergic rhinitis.     The patient has been treated with: OTC antihistamines.    The response to the above noted treatment is described as: minimal improvement. The patient has SLE and is on plaquenil therapy.    No pets, +carpet in bedrooms, no HVAC/moisture/construction, occ 2nd hand smoking.    She also reports a history of the left ear feeling slightly plugged and a sensation of ETD when she belches.  She does use qtips, and is not sure if she has cerumen impaction.  She does not report hearing loss.                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, benign     Lupus mild     Social History     Socioeconomic History    Marital status: Single   Tobacco Use    Smoking status: Never    Smokeless tobacco: Never   Substance and Sexual Activity    Alcohol use: Yes     Comment: socially    Drug use: No    Sexual activity: Never   Social History Narrative    Works for [employer]     Social Determinants of Health     Financial Resource Strain: Low Risk     Difficulty of Paying Living Expenses: Not hard at all   Food Insecurity: No Food  Insecurity    Worried About Running Out of Food in the Last Year: Never true    Ran Out of Food in the Last Year: Never true   Transportation Needs: No Transportation Needs    Lack of Transportation (Medical): No    Lack of Transportation (Non-Medical): No   Physical Activity: Unknown    Days of Exercise per Week: Patient refused    Minutes of Exercise per Session: 30 min   Stress: No Stress Concern Present    Feeling of Stress : Not at all   Social Connections: Unknown    Frequency of Communication with Friends and Family: Twice a week    Frequency of Social Gatherings with Friends and Family: Once a week    Active Member of Clubs or Organizations: No    Attends Club or Organization Meetings: Patient refused    Marital Status: Never    Housing Stability: Low Risk     Unable to Pay for Housing in the Last Year: No    Number of Places Lived in the Last Year: 1    Unstable Housing in the Last Year: No     Past Surgical History:   Procedure Laterality Date    BREAST BIOPSY Right 2008    rt axilla    COLONOSCOPY N/A 05/17/2022    Procedure: COLONOSCOPY;  Surgeon: Lilibeth Merrill MD;  Location: [facility] (4TH FLR);  Service: Endoscopy;  Laterality: N/A;  miralax prep  4/25 fully vaccinated; instructions to portal-st  5/13-changed from Jose to Lammi    ENDOSCOPIC ULTRASOUND OF LOWER GASTROINTESTINAL TRACT N/A 6/7/2022    Procedure: ULTRASOUND, LOWER GI TRACT, ENDOSCOPIC;  Surgeon: Enrique Beach MD;  Location: [facility] (2ND FLR);  Service: Endoscopy;  Laterality: N/A;  MD Ashlyn Roblero MA  Need EUS for sigmoid colon nodule. Forward view EUS. Main. 45 minutes.   Thanks,   Enrique Beach MD   5/19:fully vaccinated. instructions mailed-SC    HYSTERECTOMY      @38yrs of age    LIPECTOMY      LYMPH NODE BIOPSY      right arm    OOPHORECTOMY      @38yrs of age    panniculectomy      JIAN-EN-Y PROCEDURE  08/27/2009    RNY bypass     Family History   Problem Relation Age of Onset    Heart  failure Mother     Rheum arthritis Mother     Heart failure Maternal Grandmother     Heart attack Maternal Grandmother     Thyroid disease Maternal Grandmother     Diabetes Maternal Grandfather     Kidney disease Maternal Grandfather     Diabetes Paternal Grandmother     Thyroid disease Maternal Aunt     Lupus Neg Hx            Review of Systems  General: negative for chills, fever or weight loss  Psychological: negative for mood changes or depression  Ophthalmic: negative for blurry vision, photophobia or eye pain  ENT: see HPI  Respiratory: no cough, shortness of breath, or wheezing  Cardiovascular: no chest pain or dyspnea on exertion  Gastrointestinal: no abdominal pain, change in bowel habits, or black/ bloody stools  Musculoskeletal: negative for gait disturbance or muscular weakness  Neurological: no syncope or seizures; no ataxia  Dermatological: negative for pruritis,  rash and jaundice  Hematologic/lymphatic: no easy bruising, no new adenopathy      Physical Exam:    Vitals:    03/23/23 1436   BP: 126/85   Pulse: 72         Constitutional:   She is oriented to person, place, and time. Vital signs are normal. She appears well-developed and well-nourished. She appears alert. She is cooperative. Normal speech.      Head:  Normocephalic and atraumatic. Salivary glands normal.  Facial strength is normal.      Ears:  Hearing normal to normal and whispered voice; external ear normal without scars, lesions, or masses; ear canal, tympanic membrane, and middle ear normal..   Right Ear: Tympanic membrane is not scarred, not perforated and not erythematous. No middle ear effusion.   Left Ear: Tympanic membrane is not scarred, not perforated and not erythematous.  No middle ear effusion.   Ears:    Dry skin and skin irritation bilateral ear canals, consistent with chronic otitis externa.      Nose:  Mucosal edema and rhinorrhea present. No septal deviation or polyps. Turbinates abnormal and turbinate hypertrophy (3+,  boggy, congested mucosa).  Right sinus exhibits no maxillary sinus tenderness and no frontal sinus tenderness. Left sinus exhibits no maxillary sinus tenderness and no frontal sinus tenderness.     Mouth/Throat  Oropharynx clear and moist without lesions or asymmetry, lips, teeth, and gums normal and oropharynx normal. No mucous membrane lesions. No oropharyngeal exudate, posterior oropharyngeal edema or posterior oropharyngeal erythema. Tonsillar erythema, tonsillar exudate.  Mirror exam not performed due to patient tolerance.  Mirror exam not performed due to patient tolerance.      Neck:  Neck normal without thyromegaly masses, asymmetry, normal tracheal structure, crepitus, and tenderness, thyroid normal, trachea normal, phonation normal, full range of motion with neck supple and no adenopathy. No JVD present. Carotid bruit is not present. No thyroid mass and no thyromegaly present.     She has no cervical adenopathy.     Cardiovascular:    Normal rate, regular rhythm and rate and rhythm, heart sounds, and pulses normal.              Pulmonary/Chest:   Effort and breath sounds normal.     Psychiatric:   She has a normal mood and affect. Her speech is normal and behavior is normal.     Neurological:   She is alert and oriented to person, place, and time. She has neurological normal, alert and oriented. No cranial nerve deficit.     Skin:   No abrasions, lacerations, lesions, or rashes.       Ear Cerumen Removal    Date/Time: 3/23/2023 2:40 PM  Performed by: Yamileth March MD  Authorized by: Yamileth March MD     Consent Done?:  Yes (Verbal)    Local anesthetic:  None  Medication Used:  Debrox  Location details:  Left ear  Procedure type: curette    Procedure type comment:  Suction  Cerumen  Removal Results:  Cerumen completely removed  Patient tolerance:  Patient tolerated the procedure well with no immediate complications     Dry skin and skin irritation bilateral ear canals, consistent with chronic otitis  externa.        Assessment:    ICD-10-CM ICD-9-CM    1. Allergic rhinitis, unspecified seasonality, unspecified trigger  J30.9 477.9 montelukast (SINGULAIR) 10 mg tablet      triamcinolone (NASACORT) 55 mcg nasal inhaler      Aspergillus fumagatus IgE      Bermuda grass IgE      Cat epithelium IgE      Cladosporium IgE      Cockroach, American IgE      [place], bald IgE      D. farinae IgE      D. pteronyssinus IgE      Dog dander IgE      Plantain, English IgE      Tutu grass IgE      Marsh elder, rough IgE      Mugwort IgE      Nettle IgE      Oak, white IgE      Penicillium IgE      Ragweed, short, common IgE      Mikcey IgE      Allergen, Cocklebur      Allergen, Elm Granite Quarry      Allergen, Meadow Grass ([place])      Allergen, Mucor Racemosus      Allergen, Pecan Hickory IgE      Allergen, White Oumar      Allergen-Alternaria Alternata      Allergen-Granite Quarry      Allergen-Common Pigweed      Allergen-Silver Birch      Feather Panel #2      RAST Allergen for Eastern Costilla      RAST Allergen Maple ([place])      RAST Allergen [place]      RAST Allergen, Lamb's Quarters      RAST Allergen, Sheep [place])      2. Impacted cerumen of left ear  H61.22 380.4       3. Chronic otitis externa of both ears, unspecified type  H60.63 380.23       4. Hypertrophy of both inferior nasal turbinates  J34.3 478.0         The primary encounter diagnosis was Allergic rhinitis, unspecified seasonality, unspecified trigger. Diagnoses of Impacted cerumen of left ear, Chronic otitis externa of both ears, unspecified type, and Hypertrophy of both inferior nasal turbinates were also pertinent to this visit.      Plan:    Patient was given a prescription for nasal steroid and Singulair to use daily.  RAST testing was sent to assess for allergic sensitivities.  We discussed that referral to Allergy for further workup and immunotherapy treatment options may be warranted depending on results.    Patient was given  instructions on prevention of cerumen impaction and moisturizing the ear canal.  She was told to discontinue Q-tip use.    Patient will follow-up in a few months to assess response to therapy and will let me know if she is having any worsening of her issues.  We will plan for scope exam and further workup at next visit if needed.      Yamileth March MD

## 2023-03-23 NOTE — PATIENT INSTRUCTIONS
I would recommend the use of a wax softening drop, either over the counter Debrox, baby oil, or mineral oil, on a weekly basis.  I also instructed the patient to avoid Qtips.     The patient was given a prescription for a nasal steroid and/or nasal antihistamine nasal spray and we discussed in detail the proper mechanism of use directing the spray away from the nasal septum.  Rx given for singulair daily.   In addition, we also discussed that it will take 3-4 weeks of daily use to achieve maximal effectiveness.  The patient will please call in 3-4 weeks with their progress.   RAST allergy testing ordered.     How do you use a Nasal Corticosteroid Spray?    Make sure you understand your dosing instructions. Spray only the number of prescribed sprays in each nostril. Read the package instructions before using your spray the first time.    Most corticosteroid sprays suggest the following steps:    Wash your hands well.    Gently blow your nose to clear the passageway.    Shake the container several times.    Tilt your head slightly downward.  Use the opposite hand from the nostril you will be spraying to hold the spray bottle.    Block one nostril with your finger.  Insert the nasal applicator into the other nostril.    Aim the spray toward the outer wall of the nostril.  Inhale slowly through the nose and press the .    Breathe out and repeat to apply the prescribed number of sprays.  Repeat these steps for the other nostril.     Avoid sneezing or blowing your nose right after spraying.

## 2023-03-27 LAB
AMER SYCAMORE IGE QN: <0.35 KU/L
FEATHER PANEL #2: <0.35 KU/L

## 2023-03-28 ENCOUNTER — PATIENT MESSAGE (OUTPATIENT)
Dept: OTOLARYNGOLOGY | Facility: CLINIC | Age: 48
End: 2023-03-28
Payer: COMMERCIAL

## 2023-03-28 DIAGNOSIS — J30.9 ALLERGIC RHINITIS, UNSPECIFIED SEASONALITY, UNSPECIFIED TRIGGER: Primary | ICD-10-CM

## 2023-03-28 LAB
A ALTERNATA IGE QN: 1.1 KU/L
A FUMIGATUS IGE QN: 0.11 KU/L
ALLERGEN BOXELDER MAPLE TREE IGE: <0.1 KU/L
ALLERGEN MAPLE (BOX ELDER) CLASS: NORMAL
ALLERGEN MULBERRY CLASS: NORMAL
ALLERGEN MULBERRY TREE IGE: <0.1 KU/L
ALLERGEN PIGWEED IGE: <0.1 KU/L
ALLERGEN WHITE ASH TREE IGE: <0.1 KU/L
BALD CYPRESS IGE QN: <0.1 KU/L
BERMUDA GRASS IGE QN: <0.1 KU/L
C HERBARUM IGE QN: <0.1 KU/L
CAT DANDER IGE QN: <0.1 KU/L
CEDAR IGE QN: <0.1 KU/L
COCKLEBUR IGE QN: 0.16 KU/L
COMMON PIGWEED CLASS: NORMAL
COMMON RAGWEED IGE QN: <0.1 KU/L
D FARINAE IGE QN: 0.33 KU/L
D PTERONYSS IGE QN: 0.39 KU/L
DEPRECATED A ALTERNATA IGE RAST QL: ABNORMAL
DEPRECATED A FUMIGATUS IGE RAST QL: ABNORMAL
DEPRECATED BALD CYPRESS IGE RAST QL: NORMAL
DEPRECATED BERMUDA GRASS IGE RAST QL: NORMAL
DEPRECATED C HERBARUM IGE RAST QL: NORMAL
DEPRECATED CAT DANDER IGE RAST QL: NORMAL
DEPRECATED CEDAR IGE RAST QL: NORMAL
DEPRECATED COCKLEBUR IGE RAST QL: ABNORMAL
DEPRECATED COMMON RAGWEED IGE RAST QL: NORMAL
DEPRECATED D FARINAE IGE RAST QL: ABNORMAL
DEPRECATED D PTERONYSS IGE RAST QL: ABNORMAL
DEPRECATED DOG DANDER IGE RAST QL: NORMAL
DEPRECATED ELDER IGE RAST QL: ABNORMAL
DEPRECATED ENGL PLANTAIN IGE RAST QL: NORMAL
DEPRECATED GOOSEFOOT IGE RAST QL: NORMAL
DEPRECATED JOHNSON GRASS IGE RAST QL: NORMAL
DEPRECATED KENT BLUE GRASS IGE RAST QL: NORMAL
DEPRECATED M RACEMOSUS IGE RAST QL: NORMAL
DEPRECATED MUGWORT IGE RAST QL: NORMAL
DEPRECATED NETTLE IGE RAST QL: NORMAL
DEPRECATED P NOTATUM IGE RAST QL: NORMAL
DEPRECATED PECAN/HICK TREE IGE RAST QL: NORMAL
DEPRECATED ROACH IGE RAST QL: ABNORMAL
DEPRECATED SHEEP SORREL IGE RAST QL: NORMAL
DEPRECATED SILVER BIRCH IGE RAST QL: NORMAL
DEPRECATED TIMOTHY IGE RAST QL: NORMAL
DEPRECATED WHITE OAK IGE RAST QL: NORMAL
DOG DANDER IGE QN: <0.1 KU/L
ELDER IGE QN: 0.25 KU/L
ELM CEDAR CLASS: NORMAL
ELM CEDAR, IGE: <0.1 KU/L
ENGL PLANTAIN IGE QN: <0.1 KU/L
GOOSEFOOT IGE QN: <0.1 KU/L
JOHNSON GRASS IGE QN: <0.1 KU/L
KENT BLUE GRASS IGE QN: <0.1 KU/L
M RACEMOSUS IGE QN: <0.1 KU/L
MUGWORT IGE QN: <0.1 KU/L
NETTLE IGE QN: 0.1 KU/L
P NOTATUM IGE QN: <0.1 KU/L
PECAN/HICK TREE IGE QN: <0.1 KU/L
ROACH IGE QN: 0.23 KU/L
SHEEP SORREL IGE QN: <0.1 KU/L
SILVER BIRCH IGE QN: <0.1 KU/L
TIMOTHY IGE QN: <0.1 KU/L
WHITE ASH CLASS: NORMAL
WHITE OAK IGE QN: <0.1 KU/L

## 2023-03-28 NOTE — PROGRESS NOTES
Labs reviewed and allergies noted on RAST testing for weeds, certain molds/fungus, dust mites, cockroach.  I will place referral to Allergy immunology for possible immunotherapies.    Continue current therapies recommended our visit, and will follow-up as planned.    Yamileth March MD  [facility]

## 2023-03-30 ENCOUNTER — PATIENT MESSAGE (OUTPATIENT)
Dept: RHEUMATOLOGY | Facility: CLINIC | Age: 48
End: 2023-03-30
Payer: COMMERCIAL

## 2023-03-30 DIAGNOSIS — M10.9 GOUT, ARTHRITIS: ICD-10-CM

## 2023-03-30 NOTE — TELEPHONE ENCOUNTER
Yes, that is fine.    Probably the reason its entry to that specific provider is because I specified that I wanted the possibility of immunotherapies for your allergies.  You can still certainly see the local person, and then if they concur about immunotherapy that can be arranged locally.    Yamileth March MD  [facility]

## 2023-03-31 RX ORDER — ALLOPURINOL 100 MG/1
200 TABLET ORAL DAILY
Qty: 60 TABLET | Refills: 1 | Status: SHIPPED | OUTPATIENT
Start: 2023-03-31 | End: 2023-08-03 | Stop reason: SDUPTHER

## 2023-04-16 ENCOUNTER — PATIENT MESSAGE (OUTPATIENT)
Dept: RHEUMATOLOGY | Facility: CLINIC | Age: 48
End: 2023-04-16
Payer: COMMERCIAL

## 2023-04-16 DIAGNOSIS — M10.9 GOUT, ARTHRITIS: ICD-10-CM

## 2023-04-17 RX ORDER — COLCHICINE 0.6 MG/1
0.6 TABLET ORAL DAILY
Qty: 30 TABLET | Refills: 1 | Status: SHIPPED | OUTPATIENT
Start: 2023-04-17 | End: 2023-06-16 | Stop reason: SDUPTHER

## 2023-04-21 ENCOUNTER — LAB VISIT (OUTPATIENT)
Dept: LAB | Facility: HOSPITAL | Age: 48
End: 2023-04-21
Attending: INTERNAL MEDICINE
Payer: COMMERCIAL

## 2023-04-21 DIAGNOSIS — M32.9 SYSTEMIC LUPUS ERYTHEMATOSUS, UNSPECIFIED SLE TYPE, UNSPECIFIED ORGAN INVOLVEMENT STATUS: ICD-10-CM

## 2023-04-21 DIAGNOSIS — D84.9 IMMUNOSUPPRESSION: ICD-10-CM

## 2023-04-21 DIAGNOSIS — I27.20 PULMONARY HYPERTENSION: ICD-10-CM

## 2023-04-21 DIAGNOSIS — R53.83 FATIGUE, UNSPECIFIED TYPE: ICD-10-CM

## 2023-04-21 DIAGNOSIS — M10.9 GOUT, ARTHRITIS: ICD-10-CM

## 2023-04-21 DIAGNOSIS — E66.01 CLASS 3 SEVERE OBESITY DUE TO EXCESS CALORIES WITHOUT SERIOUS COMORBIDITY WITH BODY MASS INDEX (BMI) OF 45.0 TO 49.9 IN ADULT: ICD-10-CM

## 2023-04-21 DIAGNOSIS — L65.9 ALOPECIA: ICD-10-CM

## 2023-04-21 LAB
ALBUMIN SERPL BCP-MCNC: 4.3 G/DL (ref 3.5–5.2)
ALP SERPL-CCNC: 61 U/L (ref 38–126)
ALT SERPL W/O P-5'-P-CCNC: 53 U/L (ref 10–44)
ANION GAP SERPL CALC-SCNC: 9 MMOL/L (ref 8–16)
AST SERPL-CCNC: 48 U/L (ref 15–46)
BASOPHILS # BLD AUTO: 0.04 K/UL (ref 0–0.2)
BASOPHILS NFR BLD: 0.9 % (ref 0–1.9)
BILIRUB SERPL-MCNC: 0.5 MG/DL (ref 0.1–1)
C3 SERPL-MCNC: 139 MG/DL (ref 50–180)
C4 SERPL-MCNC: 42 MG/DL (ref 11–44)
CALCIUM SERPL-MCNC: 9.1 MG/DL (ref 8.7–10.5)
CHLORIDE SERPL-SCNC: 106 MMOL/L (ref 95–110)
CK SERPL-CCNC: 169 U/L (ref 55–170)
CO2 SERPL-SCNC: 27 MMOL/L (ref 23–29)
CREAT SERPL-MCNC: 1.02 MG/DL (ref 0.5–1.4)
CRP SERPL-MCNC: 0.25 MG/DL (ref 0–1)
DIFFERENTIAL METHOD: ABNORMAL
EOSINOPHIL # BLD AUTO: 0.1 K/UL (ref 0–0.5)
EOSINOPHIL NFR BLD: 1.6 % (ref 0–8)
ERYTHROCYTE [DISTWIDTH] IN BLOOD BY AUTOMATED COUNT: 12.5 % (ref 11.5–14.5)
ERYTHROCYTE [SEDIMENTATION RATE] IN BLOOD BY PHOTOMETRIC METHOD: 9 MM/HR (ref 0–36)
EST. GFR  (NO RACE VARIABLE): >60 ML/MIN/1.73 M^2
GLUCOSE SERPL-MCNC: 90 MG/DL (ref 70–110)
HCT VFR BLD AUTO: 38.9 % (ref 37–48.5)
HGB BLD-MCNC: 12.2 G/DL (ref 12–16)
IMM GRANULOCYTES # BLD AUTO: 0.02 K/UL (ref 0–0.04)
IMM GRANULOCYTES NFR BLD AUTO: 0.5 % (ref 0–0.5)
LYMPHOCYTES # BLD AUTO: 1.5 K/UL (ref 1–4.8)
LYMPHOCYTES NFR BLD: 33.6 % (ref 18–48)
MCH RBC QN AUTO: 28 PG (ref 27–31)
MCHC RBC AUTO-ENTMCNC: 31.4 G/DL (ref 32–36)
MCV RBC AUTO: 89 FL (ref 82–98)
MONOCYTES # BLD AUTO: 0.4 K/UL (ref 0.3–1)
MONOCYTES NFR BLD: 7.9 % (ref 4–15)
NEUTROPHILS # BLD AUTO: 2.5 K/UL (ref 1.8–7.7)
NEUTROPHILS NFR BLD: 55.5 % (ref 38–73)
NRBC BLD-RTO: 0 /100 WBC
PLATELET # BLD AUTO: 219 K/UL (ref 150–450)
PMV BLD AUTO: 10.7 FL (ref 9.2–12.9)
POTASSIUM SERPL-SCNC: 3.9 MMOL/L (ref 3.5–5.1)
PROT SERPL-MCNC: 7.4 G/DL (ref 6–8.4)
RBC # BLD AUTO: 4.35 M/UL (ref 4–5.4)
SODIUM SERPL-SCNC: 142 MMOL/L (ref 136–145)
URATE SERPL-MCNC: 5.7 MG/DL (ref 2.4–5.7)
UUN UR-MCNC: 15 MG/DL (ref 7–17)
WBC # BLD AUTO: 4.41 K/UL (ref 3.9–12.7)

## 2023-04-21 PROCEDURE — 80053 COMPREHEN METABOLIC PANEL: CPT | Mod: PO | Performed by: INTERNAL MEDICINE

## 2023-04-21 PROCEDURE — 82550 ASSAY OF CK (CPK): CPT | Mod: PO | Performed by: INTERNAL MEDICINE

## 2023-04-21 PROCEDURE — 86160 COMPLEMENT ANTIGEN: CPT | Mod: PO | Performed by: INTERNAL MEDICINE

## 2023-04-21 PROCEDURE — 86140 C-REACTIVE PROTEIN: CPT | Mod: PO | Performed by: INTERNAL MEDICINE

## 2023-04-21 PROCEDURE — 86225 DNA ANTIBODY NATIVE: CPT | Mod: PO | Performed by: INTERNAL MEDICINE

## 2023-04-21 PROCEDURE — 85652 RBC SED RATE AUTOMATED: CPT | Mod: PO | Performed by: INTERNAL MEDICINE

## 2023-04-21 PROCEDURE — 36415 COLL VENOUS BLD VENIPUNCTURE: CPT | Mod: PO | Performed by: INTERNAL MEDICINE

## 2023-04-21 PROCEDURE — 86160 COMPLEMENT ANTIGEN: CPT | Mod: 59,PO | Performed by: INTERNAL MEDICINE

## 2023-04-21 PROCEDURE — 84550 ASSAY OF BLOOD/URIC ACID: CPT | Performed by: INTERNAL MEDICINE

## 2023-04-21 PROCEDURE — 85025 COMPLETE CBC W/AUTO DIFF WBC: CPT | Mod: PO | Performed by: INTERNAL MEDICINE

## 2023-04-24 LAB — DSDNA AB SER-ACNC: NORMAL [IU]/ML

## 2023-04-28 ENCOUNTER — PATIENT MESSAGE (OUTPATIENT)
Dept: RHEUMATOLOGY | Facility: CLINIC | Age: 48
End: 2023-04-28
Payer: COMMERCIAL

## 2023-05-25 ENCOUNTER — OFFICE VISIT (OUTPATIENT)
Dept: ALLERGY | Facility: CLINIC | Age: 48
End: 2023-05-25
Payer: COMMERCIAL

## 2023-05-25 VITALS
SYSTOLIC BLOOD PRESSURE: 139 MMHG | HEART RATE: 67 BPM | OXYGEN SATURATION: 100 % | DIASTOLIC BLOOD PRESSURE: 74 MMHG | BODY MASS INDEX: 45.2 KG/M2 | WEIGHT: 271.63 LBS

## 2023-05-25 DIAGNOSIS — J30.9 CHRONIC ALLERGIC RHINITIS: Primary | ICD-10-CM

## 2023-05-25 DIAGNOSIS — J30.89 ALLERGIC RHINITIS DUE TO HOUSE DUST MITE: ICD-10-CM

## 2023-05-25 DIAGNOSIS — M32.9 SYSTEMIC LUPUS ERYTHEMATOSUS, UNSPECIFIED SLE TYPE, UNSPECIFIED ORGAN INVOLVEMENT STATUS: ICD-10-CM

## 2023-05-25 DIAGNOSIS — J31.0 CHRONIC RHINITIS: ICD-10-CM

## 2023-05-25 DIAGNOSIS — J30.89 ALLERGIC RHINITIS DUE TO MOLD: ICD-10-CM

## 2023-05-25 DIAGNOSIS — I10 ESSENTIAL HYPERTENSION: ICD-10-CM

## 2023-05-25 DIAGNOSIS — I27.20 PULMONARY HYPERTENSION: ICD-10-CM

## 2023-05-25 PROCEDURE — 1159F MED LIST DOCD IN RCRD: CPT | Mod: CPTII,S$GLB,, | Performed by: STUDENT IN AN ORGANIZED HEALTH CARE EDUCATION/TRAINING PROGRAM

## 2023-05-25 PROCEDURE — 1160F RVW MEDS BY RX/DR IN RCRD: CPT | Mod: CPTII,S$GLB,, | Performed by: STUDENT IN AN ORGANIZED HEALTH CARE EDUCATION/TRAINING PROGRAM

## 2023-05-25 PROCEDURE — 3078F DIAST BP <80 MM HG: CPT | Mod: CPTII,S$GLB,, | Performed by: STUDENT IN AN ORGANIZED HEALTH CARE EDUCATION/TRAINING PROGRAM

## 2023-05-25 PROCEDURE — 99204 PR OFFICE/OUTPT VISIT, NEW, LEVL IV, 45-59 MIN: ICD-10-PCS | Mod: 25,S$GLB,, | Performed by: STUDENT IN AN ORGANIZED HEALTH CARE EDUCATION/TRAINING PROGRAM

## 2023-05-25 PROCEDURE — 95004 PERQ TESTS W/ALRGNC XTRCS: CPT | Mod: S$GLB,,, | Performed by: STUDENT IN AN ORGANIZED HEALTH CARE EDUCATION/TRAINING PROGRAM

## 2023-05-25 PROCEDURE — 3075F SYST BP GE 130 - 139MM HG: CPT | Mod: CPTII,S$GLB,, | Performed by: STUDENT IN AN ORGANIZED HEALTH CARE EDUCATION/TRAINING PROGRAM

## 2023-05-25 PROCEDURE — 1159F PR MEDICATION LIST DOCUMENTED IN MEDICAL RECORD: ICD-10-PCS | Mod: CPTII,S$GLB,, | Performed by: STUDENT IN AN ORGANIZED HEALTH CARE EDUCATION/TRAINING PROGRAM

## 2023-05-25 PROCEDURE — 99999 PR PBB SHADOW E&M-EST. PATIENT-LVL IV: ICD-10-PCS | Mod: PBBFAC,,, | Performed by: STUDENT IN AN ORGANIZED HEALTH CARE EDUCATION/TRAINING PROGRAM

## 2023-05-25 PROCEDURE — 95004 PR ALLERGY SKIN TESTS,ALLERGENS: ICD-10-PCS | Mod: S$GLB,,, | Performed by: STUDENT IN AN ORGANIZED HEALTH CARE EDUCATION/TRAINING PROGRAM

## 2023-05-25 PROCEDURE — 3008F BODY MASS INDEX DOCD: CPT | Mod: CPTII,S$GLB,, | Performed by: STUDENT IN AN ORGANIZED HEALTH CARE EDUCATION/TRAINING PROGRAM

## 2023-05-25 PROCEDURE — 99999 PR PBB SHADOW E&M-EST. PATIENT-LVL IV: CPT | Mod: PBBFAC,,, | Performed by: STUDENT IN AN ORGANIZED HEALTH CARE EDUCATION/TRAINING PROGRAM

## 2023-05-25 PROCEDURE — 99204 OFFICE O/P NEW MOD 45 MIN: CPT | Mod: 25,S$GLB,, | Performed by: STUDENT IN AN ORGANIZED HEALTH CARE EDUCATION/TRAINING PROGRAM

## 2023-05-25 PROCEDURE — 3008F PR BODY MASS INDEX (BMI) DOCUMENTED: ICD-10-PCS | Mod: CPTII,S$GLB,, | Performed by: STUDENT IN AN ORGANIZED HEALTH CARE EDUCATION/TRAINING PROGRAM

## 2023-05-25 PROCEDURE — 3078F PR MOST RECENT DIASTOLIC BLOOD PRESSURE < 80 MM HG: ICD-10-PCS | Mod: CPTII,S$GLB,, | Performed by: STUDENT IN AN ORGANIZED HEALTH CARE EDUCATION/TRAINING PROGRAM

## 2023-05-25 PROCEDURE — 1160F PR REVIEW ALL MEDS BY PRESCRIBER/CLIN PHARMACIST DOCUMENTED: ICD-10-PCS | Mod: CPTII,S$GLB,, | Performed by: STUDENT IN AN ORGANIZED HEALTH CARE EDUCATION/TRAINING PROGRAM

## 2023-05-25 PROCEDURE — 3075F PR MOST RECENT SYSTOLIC BLOOD PRESS GE 130-139MM HG: ICD-10-PCS | Mod: CPTII,S$GLB,, | Performed by: STUDENT IN AN ORGANIZED HEALTH CARE EDUCATION/TRAINING PROGRAM

## 2023-05-25 RX ORDER — MONTELUKAST SODIUM 10 MG/1
10 TABLET ORAL
COMMUNITY
Start: 2023-04-26 | End: 2023-07-13

## 2023-05-25 NOTE — PROGRESS NOTES
Allergy Clinic Note  [facility]    This note was created by combination of typed  and M-Modal dictation. Transcription errors may be present.  If there are any questions, please contact me.    HISTORY      Patient ID: Nitin Mcconnell is a 47 y.o. female.    Chief Complaint: Allergies and Immunotherapy      Referring Provider: Yamileth March MD       History of Present Illness       Nitin Mcconnell is a 47 y.o. female referred from ENT for continuing evaluation of chronic rhinitis.  She is here alone, and she is a good historian.    Related medications and other interventions  Nasacort 2 sprays daily  Singulair  (Plaquenil)    05/25/2023:  At initial visit, client reported a long history of chronic rhinitis manifest by nasal congestion, runny nose, sneezing, red/runny eyes, and sinus pressure.  She was recently seen in ENT where she is treated with Nasacort and Singulair with significant benefit.  She is happy with her current level of control.  Her ENT ordered immuno caps which showed a class 2 reaction to Alternaria, class 1 reaction to dust mite and a handful of class 0/1 results show a few pollens.  Patient is here asking if she would benefit from immunotherapy.      MEDICAL HISTORY      Significant past medical history: SLE, HTN  Active Problem List reviewed  ENT surgery:  none    Significant family history:  No allergies.  No asthma  Exposures:  No pets.  No mold.  No smoke.  Smoking Hx:  Client  reports that she has never smoked. She has been exposed to tobacco smoke. She has never used smokeless tobacco.    Meds: MAR reviewed    Asthma: No  Eczema:  No  Rhinitis:  Yes.  See HPI  Venom allergy: No  Latex allergy:  No    Patient Active Problem List   Diagnosis    Other specified hypertrophic and atrophic condition of skin    Ptosis of breast    Systemic lupus erythematosus    Unspecified hypertrophic and atrophic condition of skin    Multiple thyroid nodules    Essential  hypertension    Alopecia    Bilateral leg edema    Pulmonary hypertension    Immunosuppression    Class 3 severe obesity due to excess calories with serious comorbidity in adult    Chronic pulmonary heart disease    Morbid obesity with BMI of 45.0-49.9, adult    S/P gastric bypass    Obstructive sleep apnea    Venous insufficiency of both lower extremities    Lymphedema of both lower extremities    Lower extremity weakness    Swelling of lower limb    Chest pain    Elevated ALT measurement    Positive colorectal cancer screening using DNA-based stool test     Medication List with Changes/Refills   Current Medications    ALLOPURINOL (ZYLOPRIM) 100 MG TABLET    Take 2 tablets (200 mg total) by mouth once daily.       Start Date: 3/31/2023 End Date: --    CALCIUM CARB/VITAMIN D3/VIT K1 (VIACTIV ORAL)    Take by mouth once daily.        Start Date: --        End Date: --    COLCHICINE (COLCRYS) 0.6 MG TABLET    Take 1 tablet (0.6 mg total) by mouth once daily.       Start Date: 4/17/2023 End Date: 5/25/2023    CYANOCOBALAMIN, VITAMIN B-12, (VITAMIN B-12) 2,500 MCG SUBL    Place 1 tablet under the tongue once daily.       Start Date: --        End Date: --    DESOXIMETASONE (TOPICORT) 0.25 % CREAM    APPLY TO SCALP NIGHTLY FOR 1 WEEK THEN APPLY WEEKLY FOR MAINTENANCE       Start Date: 10/14/2020End Date: --    ERGOCALCIFEROL, VITAMIN D2, (VITAMIN D2 ORAL)    Take 5,000 Int'l Units by mouth once daily.       Start Date: --        End Date: --    HYDROCHLOROTHIAZIDE (HYDRODIURIL) 25 MG TABLET    TAKE 1 TABLET(25 MG) BY MOUTH EVERY DAY       Start Date: 10/31/2022End Date: --    HYDROXYCHLOROQUINE (PLAQUENIL) 200 MG TABLET    TAKE 1 TABLET(200 MG) BY MOUTH TWICE DAILY       Start Date: 5/30/2022 End Date: --    METHYLPREDNISOLONE (MEDROL DOSEPACK) 4 MG TABLET    use as directed       Start Date: 1/25/2023 End Date: --    MONTELUKAST (SINGULAIR) 10 MG TABLET    Take 10 mg by mouth.       Start Date: 4/26/2023 End Date: --     MULTIVITAMIN-CA-IRON-MINERALS 27-0.4 MG TAB    Take 1 tablet by mouth Daily.       Start Date: 4/11/2012 End Date: --    TRIAMCINOLONE (NASACORT) 55 MCG NASAL INHALER    2 sprays by Nasal route once daily.       Start Date: 3/23/2023 End Date: --           REVIEW OF SYSTEMS      CONST: no F/C/NS, no unintentional weight changes  NEURO:  no tremor, no weakness  EYES: no discharge, no pruritus, + erythema  EARS: no hearing loss, no sensation of fullness  NOSE: + congestion, + rhinorrhea, no sneezing  PULM:  no SOB, no wheezing, no cough  CV: no CP, no palpitations, no leg swelling  GI:  no abdominal pain, no blood in stool  :  no dysuria, no hematuria  DERM: no rashes, no skin breaks           PHYSICAL EXAM      /74 (BP Location: Left arm, Patient Position: Sitting, BP Method: Large (Automatic))   Pulse 67   Wt 123.2 kg (271 lb 9.7 oz)   LMP 07/13/2013   SpO2 100%   BMI 45.20 kg/m²   GEN: Awake and alert, no distress  DERM:  No flushing, no rashes  EYE:  No occular discharge, no redness  HEENT: No nasal discharge, no hoarseness, TMs are clear bilaterally.  Nares are pink with no significant turbinate swelling.  Oropharynx is benign without exudate.  Tongue is not coated.  There is mild cobblestoning of posterior tongue   NECK:  High anterior cervical adenopathy  PULM: Normal work of breathing, no cough, CTA   COR:  RRR,   NEURO:  No focal deficit, speech fluent and logical  PSYCH: appropriate affect, normal behavior            MEDICAL DECISION-MAKING           Data reviewed        Allergy Testing      Selected aeroallergen skin testing by the modified prick method (05/25/2023) was positive (grade 3) to dust mites with appropriate positive and negative controls.    Extensive immuno caps positive to Alternaria (class 2), 2023  Class II Alternaria  Class I dust mite (Dp)  All other aeroallergens less than 0.35 KU/L.  Ragweed 0.16; marsh elder 0.25;  df      Lab results      Eosinophil count 200, 2023  No  total IgE available      Imaging and other diagnostics            Medical records review   At initial visit reviewed ENT note of 03/23/2023.  Client presented complaining chronic nasal congestion along with runny nose, itchy eyes, and face pain.  Physical exam was notable for turbinate hypertrophy.  Immunocap ordered.  She prescribed Nasacort and Singulair with plans for follow-up in a few months  Diagnoses:     Nitin Mcconnell is a 47 y.o. female. with  1. Chronic allergic rhinitis    2. Allergic rhinitis due to house dust mite    3. Allergic rhinitis due to mold    4. Essential hypertension    5. Pulmonary hypertension    6. Systemic lupus erythematosus, unspecified SLE type, unspecified organ involvement status        Assessment / Plan / Orders   Ms. Mcconnell is presenting with chronic allergic rhinitis to dust mite and the outdoor mold Alternaria.  Her dust immuno caps were borderline but her skin testing today was strongly positive to dust mite.  Discussed allergic treatment option,:  Environmental, pharmacologic, and immune therapies.  Discussed both subcutaneous and sublingual immunotherapy briefly.  For now patient will to dust avoidance measures and continue current medications.  She is a good candidate for dust mite immunotherapy should she tire of taking daily medicines.      Chronic allergic rhinitis due to dust mite and mold  Allergic rhinitis due to house dust mite  Allergic rhinitis due to mold       Dust avoidance measures with attention to the bedroom       Continue Nasacort and Singulair       Follow-up at ENT as planned    Comorbidities  Hypertension --Avoid oral decongestants  Pulmonary hypertension   Systemic lupus    Patient Instructions and follow up     Patient Instructions   Allergic to outdoor mold (Alternaria) and dust mites        Treatment    Recommend these dust avoidance measures:  Dust proof cover on your pillow*  No decorative pillows or stuffed animals on the bed at night.  No  feathers or down on the bed  Wash bedding in hot water  Take a pillow cover (or your pillow) for vacations  Consider dust proof cover on mattress and other pillows      *The best place to obtain dust proof covers is on-line at [facility] ([employer]).        Continue Nasacort and Singulair      Return as needed    Follow up If needed.        Josette Perea MD  Allergy, Asthma & Immunology      I spent a total of 50 minutes on the day of the visit.  Not including time for skin test preparation, reading, and interpretation.  This includes face to face time and non-face to face time preparing to see the patient (eg, review of tests), obtaining and/or reviewing separately obtained history, documenting clinical information in the electronic or other health record, independently interpreting results and communicating results to the patient/family/caregiver, or care coordinator.

## 2023-05-25 NOTE — PATIENT INSTRUCTIONS
Allergic to outdoor mold (Alternaria) and dust mites        Treatment    Recommend these dust avoidance measures:  Dust proof cover on your pillow*  No decorative pillows or stuffed animals on the bed at night.  No feathers or down on the bed  Wash bedding in hot water  Take a pillow cover (or your pillow) for vacations  Consider dust proof cover on mattress and other pillows      *The best place to obtain dust proof covers is on-line at [facility] ([employer]        Continue Nasacort and Singulair      Return as needed

## 2023-05-25 NOTE — LETTER
May 25, 2023        Yamileth March MD  [address]  [address]  [facility] [address]             [facility] - Allergy/Immunology  [address]  [place] [address]  Phone: [phone]  Fax: [phone]   Patient: Nitin Mcconnell   MR Number: [id number]   YOB: 1975   Date of Visit: 5/25/2023     Dear Dr. March,     Thank you for referring Ms. Mcconnell for continuing care of chronic rhinitis.  Although her Immunocap were borderline, follow-up skin testing to dust mites was strongly positive.  Today we discussed dust avoidance measures.  Currently she is doing very well on the Nasacort and Singulair you prescribed.  If in the future she is looking for an alternative to daily medications, she is a very good candidate for immunotherapy.    Thank you for the opportunity to have participated in the care of 1 of your patients.    Sincerely,      Josette Perea MD              Chasity Dixon MD    [facility]

## 2023-06-02 DIAGNOSIS — E66.01 CLASS 3 SEVERE OBESITY DUE TO EXCESS CALORIES WITHOUT SERIOUS COMORBIDITY WITH BODY MASS INDEX (BMI) OF 45.0 TO 49.9 IN ADULT: ICD-10-CM

## 2023-06-02 DIAGNOSIS — R06.02 SOB (SHORTNESS OF BREATH): ICD-10-CM

## 2023-06-02 DIAGNOSIS — M32.9 SYSTEMIC LUPUS ERYTHEMATOSUS, UNSPECIFIED SLE TYPE, UNSPECIFIED ORGAN INVOLVEMENT STATUS: ICD-10-CM

## 2023-06-03 RX ORDER — HYDROXYCHLOROQUINE SULFATE 200 MG/1
TABLET, FILM COATED ORAL
Qty: 60 TABLET | Refills: 0 | Status: SHIPPED | OUTPATIENT
Start: 2023-06-03 | End: 2023-07-25 | Stop reason: SDUPTHER

## 2023-06-16 ENCOUNTER — PATIENT MESSAGE (OUTPATIENT)
Dept: RHEUMATOLOGY | Facility: CLINIC | Age: 48
End: 2023-06-16
Payer: COMMERCIAL

## 2023-06-16 DIAGNOSIS — M10.9 GOUT, ARTHRITIS: ICD-10-CM

## 2023-06-16 RX ORDER — COLCHICINE 0.6 MG/1
0.6 TABLET ORAL DAILY
Qty: 30 TABLET | Refills: 3 | Status: SHIPPED | OUTPATIENT
Start: 2023-06-16 | End: 2023-10-18

## 2023-06-16 RX ORDER — COLCHICINE 0.6 MG/1
0.6 TABLET ORAL DAILY
Qty: 30 TABLET | Refills: 1 | Status: CANCELLED | OUTPATIENT
Start: 2023-06-16 | End: 2023-07-16

## 2023-07-13 ENCOUNTER — OFFICE VISIT (OUTPATIENT)
Dept: OTOLARYNGOLOGY | Facility: CLINIC | Age: 48
End: 2023-07-13
Payer: COMMERCIAL

## 2023-07-13 VITALS
DIASTOLIC BLOOD PRESSURE: 87 MMHG | WEIGHT: 273.06 LBS | BODY MASS INDEX: 45.44 KG/M2 | SYSTOLIC BLOOD PRESSURE: 125 MMHG | HEART RATE: 71 BPM

## 2023-07-13 DIAGNOSIS — J30.89 NON-SEASONAL ALLERGIC RHINITIS DUE TO OTHER ALLERGIC TRIGGER: ICD-10-CM

## 2023-07-13 DIAGNOSIS — J30.89 NON-SEASONAL ALLERGIC RHINITIS DUE TO FUNGAL SPORES: Primary | Chronic | ICD-10-CM

## 2023-07-13 DIAGNOSIS — J34.3 HYPERTROPHY OF BOTH INFERIOR NASAL TURBINATES: Chronic | ICD-10-CM

## 2023-07-13 DIAGNOSIS — J30.1 SEASONAL ALLERGIC RHINITIS DUE TO POLLEN: ICD-10-CM

## 2023-07-13 PROCEDURE — 3074F PR MOST RECENT SYSTOLIC BLOOD PRESSURE < 130 MM HG: ICD-10-PCS | Mod: CPTII,S$GLB,, | Performed by: OTOLARYNGOLOGY

## 2023-07-13 PROCEDURE — 3079F DIAST BP 80-89 MM HG: CPT | Mod: CPTII,S$GLB,, | Performed by: OTOLARYNGOLOGY

## 2023-07-13 PROCEDURE — 3008F PR BODY MASS INDEX (BMI) DOCUMENTED: ICD-10-PCS | Mod: CPTII,S$GLB,, | Performed by: OTOLARYNGOLOGY

## 2023-07-13 PROCEDURE — 99999 PR PBB SHADOW E&M-EST. PATIENT-LVL III: ICD-10-PCS | Mod: PBBFAC,,, | Performed by: OTOLARYNGOLOGY

## 2023-07-13 PROCEDURE — 3074F SYST BP LT 130 MM HG: CPT | Mod: CPTII,S$GLB,, | Performed by: OTOLARYNGOLOGY

## 2023-07-13 PROCEDURE — 1159F MED LIST DOCD IN RCRD: CPT | Mod: CPTII,S$GLB,, | Performed by: OTOLARYNGOLOGY

## 2023-07-13 PROCEDURE — 99999 PR PBB SHADOW E&M-EST. PATIENT-LVL III: CPT | Mod: PBBFAC,,, | Performed by: OTOLARYNGOLOGY

## 2023-07-13 PROCEDURE — 99214 OFFICE O/P EST MOD 30 MIN: CPT | Mod: S$GLB,,, | Performed by: OTOLARYNGOLOGY

## 2023-07-13 PROCEDURE — 1159F PR MEDICATION LIST DOCUMENTED IN MEDICAL RECORD: ICD-10-PCS | Mod: CPTII,S$GLB,, | Performed by: OTOLARYNGOLOGY

## 2023-07-13 PROCEDURE — 3008F BODY MASS INDEX DOCD: CPT | Mod: CPTII,S$GLB,, | Performed by: OTOLARYNGOLOGY

## 2023-07-13 PROCEDURE — 99214 PR OFFICE/OUTPT VISIT, EST, LEVL IV, 30-39 MIN: ICD-10-PCS | Mod: S$GLB,,, | Performed by: OTOLARYNGOLOGY

## 2023-07-13 PROCEDURE — 3079F PR MOST RECENT DIASTOLIC BLOOD PRESSURE 80-89 MM HG: ICD-10-PCS | Mod: CPTII,S$GLB,, | Performed by: OTOLARYNGOLOGY

## 2023-07-13 RX ORDER — TRIAMCINOLONE ACETONIDE 55 UG/1
2 SPRAY, METERED NASAL DAILY
Qty: 17 G | Refills: 3 | Status: SHIPPED | OUTPATIENT
Start: 2023-07-13 | End: 2023-10-11

## 2023-07-13 RX ORDER — MONTELUKAST SODIUM 10 MG/1
10 TABLET ORAL DAILY
Qty: 90 TABLET | Refills: 3 | Status: SHIPPED | OUTPATIENT
Start: 2023-07-13

## 2023-07-13 NOTE — PROGRESS NOTES
Chief Complaint   Patient presents with    Follow-up     Allergies       HPI:  Patient is a 48 y.o. female who has previously seen me for cerumen impaction, allergic rhinitis.  She had RAST testing performed showing sensitivities to certain weeds, molds, dust mites, and cockroach.  Referral to Allergy/immunology placed.  Patient was prescribed Singulair and nasal steroid for daily use.    Since the last visit, the patient reports she is been doing very well.  She has had no significant sinus or nasal symptoms since maintaining on the Nasacort and Singulair.  She saw Allergy immunology who did not recommend making any changes to her medications as of now, since they were working well.  They did discuss medication measures for her allergic sensitivities.    Active Ambulatory Problems     Diagnosis Date Noted    Other specified hypertrophic and atrophic condition of skin 01/17/2012    Ptosis of breast 01/17/2012    Systemic lupus erythematosus 04/10/2012    Unspecified hypertrophic and atrophic condition of skin 01/17/2012    Multiple thyroid nodules 03/18/2018    Essential hypertension 07/09/2018    Alopecia 02/25/2019    Bilateral leg edema 02/25/2019    Pulmonary hypertension 02/28/2019    Immunosuppression 04/24/2019    Class 3 severe obesity due to excess calories with serious comorbidity in adult 04/24/2019    Chronic pulmonary heart disease     Morbid obesity with BMI of 45.0-49.9, adult 09/09/2019    S/P gastric bypass 09/14/2019    Obstructive sleep apnea     Venous insufficiency of both lower extremities 02/17/2020    Lymphedema of both lower extremities 02/17/2020    Lower extremity weakness 03/10/2020    Swelling of lower limb 03/10/2020    Chest pain 02/17/2021    Elevated ALT measurement 11/07/2021    Positive colorectal cancer screening using DNA-based stool test 04/18/2022     Resolved Ambulatory Problems     Diagnosis Date Noted    Localized adiposity 04/16/2012    Other specified disorders of adrenal  glands 04/10/2012    S/P hysterectomy 07/23/2013    Vitamin D deficiency 03/22/2018    Lower extremity edema 03/10/2020     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, benign     Lupus mild       Review of Systems  General: negative for chills, fever or weight loss  Psychological: negative for mood changes or depression  Ophthalmic: negative for blurry vision, photophobia or eye pain  ENT: see HPI  Respiratory: no cough, shortness of breath, or wheezing  Cardiovascular: no chest pain or dyspnea on exertion  Gastrointestinal: no abdominal pain, change in bowel habits, or black/ bloody stools  Musculoskeletal: negative for gait disturbance or muscular weakness  Neurological: no syncope or seizures; no ataxia  Dermatological: negative for pruritis, rash and jaundice  Hematologic/lymphatic: no easy bruising, no new adenopathy    Physical Exam     Vitals:    07/13/23 1410   BP: 125/87   Pulse: 71         Constitutional:   She is oriented to person, place, and time. Vital signs are normal. She appears well-developed and well-nourished. She appears alert. She is cooperative. Normal speech.      Head:  Normocephalic and atraumatic. Salivary glands normal.  Facial strength is normal.      Ears:  Hearing normal to normal and whispered voice; external ear normal without scars, lesions, or masses; ear canal, tympanic membrane, and middle ear normal., right ear hearing normal to normal and whispered voice; external ear normal without scars, lesions, or masses; ear canal, tympanic membrane, and middle ear normal. and left ear hearing normal to normal and whispered voice; external ear normal without scars, lesions, or masses; ear canal, tympanic membrane, and middle ear normal..   Right Ear: Tympanic membrane is not erythematous, not retracted and not bulging. No middle ear effusion.    Left Ear: Tympanic membrane is not erythematous, not retracted and not bulging.  No middle ear effusion.     Nose:  Mucosal edema present. No rhinorrhea, septal deviation or polyps. Turbinate hypertrophy (3+, improved edema).  Turbinates normal.  Right sinus exhibits no maxillary sinus tenderness and no frontal sinus tenderness. Left sinus exhibits no maxillary sinus tenderness and no frontal sinus tenderness.     Mouth/Throat  Oropharynx clear and moist without lesions or asymmetry, lips, teeth, and gums normal and oropharynx normal. No mucous membrane lesions. No oropharyngeal exudate, posterior oropharyngeal edema or posterior oropharyngeal erythema. Mirror exam not performed due to patient tolerance.  Mirror exam not performed due to patient tolerance.      Neck:  Neck normal without thyromegaly masses, asymmetry, normal tracheal structure, crepitus, and tenderness, thyroid normal, trachea normal, phonation normal, full range of motion with neck supple and no adenopathy. No JVD present. Carotid bruit is not present. No thyroid mass and no thyromegaly present.     She has no cervical adenopathy.     Cardiovascular:    Normal rate, regular rhythm and rate and rhythm, heart sounds, and pulses normal.              Pulmonary/Chest:   Effort and breath sounds normal.     Psychiatric:   She has a normal mood and affect. Her speech is normal and behavior is normal.     Neurological:   She is alert and oriented to person, place, and time. She has neurological normal, alert and oriented. No cranial nerve deficit.     Skin:   No abrasions, lacerations, lesions, or rashes.       RAST testing and sensitivities reviewed with patient.    Assessment/Plan:      ICD-10-CM ICD-9-CM    1. Non-seasonal allergic rhinitis due to fungal spores  J30.89 477.8 triamcinolone (NASACORT) 55 mcg nasal inhaler      montelukast (SINGULAIR) 10 mg tablet      2. Hypertrophy of both inferior nasal turbinates  J34.3 478.0       3. Non-seasonal  allergic rhinitis due to other allergic trigger  J30.89 477.8       4. Seasonal allergic rhinitis due to pollen  J30.1 477.0             Continue Singulair and Nasacort daily.  We discussed the addition of OTC antihistamines if she has breakthrough symptoms.    She will follow-up in 4-6 months for routine exam and refill of any medications.  Ninety day supply of medications given as prescription today.    Yamileth March MD  [facility]

## 2023-07-24 ENCOUNTER — PATIENT MESSAGE (OUTPATIENT)
Dept: RHEUMATOLOGY | Facility: CLINIC | Age: 48
End: 2023-07-24
Payer: COMMERCIAL

## 2023-07-25 DIAGNOSIS — E66.01 CLASS 3 SEVERE OBESITY DUE TO EXCESS CALORIES WITHOUT SERIOUS COMORBIDITY WITH BODY MASS INDEX (BMI) OF 45.0 TO 49.9 IN ADULT: ICD-10-CM

## 2023-07-25 DIAGNOSIS — M32.9 SYSTEMIC LUPUS ERYTHEMATOSUS, UNSPECIFIED SLE TYPE, UNSPECIFIED ORGAN INVOLVEMENT STATUS: ICD-10-CM

## 2023-07-25 DIAGNOSIS — R06.02 SOB (SHORTNESS OF BREATH): ICD-10-CM

## 2023-07-25 RX ORDER — HYDROXYCHLOROQUINE SULFATE 200 MG/1
TABLET, FILM COATED ORAL
Qty: 60 TABLET | Refills: 5 | Status: SHIPPED | OUTPATIENT
Start: 2023-07-25 | End: 2024-01-22

## 2023-08-03 ENCOUNTER — PATIENT MESSAGE (OUTPATIENT)
Dept: RHEUMATOLOGY | Facility: CLINIC | Age: 48
End: 2023-08-03
Payer: COMMERCIAL

## 2023-08-03 DIAGNOSIS — M10.9 GOUT, ARTHRITIS: ICD-10-CM

## 2023-08-03 RX ORDER — ALLOPURINOL 100 MG/1
200 TABLET ORAL DAILY
Qty: 60 TABLET | Refills: 2 | Status: SHIPPED | OUTPATIENT
Start: 2023-08-03 | End: 2023-10-19

## 2023-08-14 ENCOUNTER — PATIENT MESSAGE (OUTPATIENT)
Dept: ADMINISTRATIVE | Facility: HOSPITAL | Age: 48
End: 2023-08-14
Payer: COMMERCIAL

## 2023-08-16 ENCOUNTER — PATIENT MESSAGE (OUTPATIENT)
Dept: ADMINISTRATIVE | Facility: OTHER | Age: 48
End: 2023-08-16
Payer: COMMERCIAL

## 2023-08-17 ENCOUNTER — OFFICE VISIT (OUTPATIENT)
Dept: RHEUMATOLOGY | Facility: CLINIC | Age: 48
End: 2023-08-17
Payer: COMMERCIAL

## 2023-08-17 ENCOUNTER — PATIENT MESSAGE (OUTPATIENT)
Dept: RHEUMATOLOGY | Facility: CLINIC | Age: 48
End: 2023-08-17

## 2023-08-17 VITALS
HEIGHT: 65 IN | BODY MASS INDEX: 45.55 KG/M2 | SYSTOLIC BLOOD PRESSURE: 125 MMHG | DIASTOLIC BLOOD PRESSURE: 74 MMHG | HEART RATE: 61 BPM | WEIGHT: 273.38 LBS

## 2023-08-17 DIAGNOSIS — M10.9 GOUT, ARTHRITIS: ICD-10-CM

## 2023-08-17 DIAGNOSIS — M32.9 SYSTEMIC LUPUS ERYTHEMATOSUS, UNSPECIFIED SLE TYPE, UNSPECIFIED ORGAN INVOLVEMENT STATUS: Primary | ICD-10-CM

## 2023-08-17 DIAGNOSIS — D84.9 IMMUNOSUPPRESSION: ICD-10-CM

## 2023-08-17 PROCEDURE — 3008F BODY MASS INDEX DOCD: CPT | Mod: CPTII,S$GLB,, | Performed by: INTERNAL MEDICINE

## 2023-08-17 PROCEDURE — 99214 PR OFFICE/OUTPT VISIT, EST, LEVL IV, 30-39 MIN: ICD-10-PCS | Mod: S$GLB,,, | Performed by: INTERNAL MEDICINE

## 2023-08-17 PROCEDURE — 3078F PR MOST RECENT DIASTOLIC BLOOD PRESSURE < 80 MM HG: ICD-10-PCS | Mod: CPTII,S$GLB,, | Performed by: INTERNAL MEDICINE

## 2023-08-17 PROCEDURE — 3074F PR MOST RECENT SYSTOLIC BLOOD PRESSURE < 130 MM HG: ICD-10-PCS | Mod: CPTII,S$GLB,, | Performed by: INTERNAL MEDICINE

## 2023-08-17 PROCEDURE — 1159F PR MEDICATION LIST DOCUMENTED IN MEDICAL RECORD: ICD-10-PCS | Mod: CPTII,S$GLB,, | Performed by: INTERNAL MEDICINE

## 2023-08-17 PROCEDURE — 1160F RVW MEDS BY RX/DR IN RCRD: CPT | Mod: CPTII,S$GLB,, | Performed by: INTERNAL MEDICINE

## 2023-08-17 PROCEDURE — 3074F SYST BP LT 130 MM HG: CPT | Mod: CPTII,S$GLB,, | Performed by: INTERNAL MEDICINE

## 2023-08-17 PROCEDURE — 3008F PR BODY MASS INDEX (BMI) DOCUMENTED: ICD-10-PCS | Mod: CPTII,S$GLB,, | Performed by: INTERNAL MEDICINE

## 2023-08-17 PROCEDURE — 99214 OFFICE O/P EST MOD 30 MIN: CPT | Mod: S$GLB,,, | Performed by: INTERNAL MEDICINE

## 2023-08-17 PROCEDURE — 1159F MED LIST DOCD IN RCRD: CPT | Mod: CPTII,S$GLB,, | Performed by: INTERNAL MEDICINE

## 2023-08-17 PROCEDURE — 1160F PR REVIEW ALL MEDS BY PRESCRIBER/CLIN PHARMACIST DOCUMENTED: ICD-10-PCS | Mod: CPTII,S$GLB,, | Performed by: INTERNAL MEDICINE

## 2023-08-17 PROCEDURE — 99999 PR PBB SHADOW E&M-EST. PATIENT-LVL III: CPT | Mod: PBBFAC,,, | Performed by: INTERNAL MEDICINE

## 2023-08-17 PROCEDURE — 3078F DIAST BP <80 MM HG: CPT | Mod: CPTII,S$GLB,, | Performed by: INTERNAL MEDICINE

## 2023-08-17 PROCEDURE — 99999 PR PBB SHADOW E&M-EST. PATIENT-LVL III: ICD-10-PCS | Mod: PBBFAC,,, | Performed by: INTERNAL MEDICINE

## 2023-08-17 NOTE — PROGRESS NOTES
Subjective:       Patient ID: Nitin Mcconnell is a 48 y.o. female.    Chief Complaint: Lupus and gout    HPI:  Nitin Mcconnell is a 48 y.o. female diagnosed at age 31 with lupus.  Started as upper respiratory symptoms that did not respond to antibiotics.  She had fever and low BP as well as SOB.  She was intubated and on ventilator for a week.  She was at [facility].  Dr. Martin diagnosed lupus.  Hospitalized 3/8/07 to 3/30/07.  She had elevated pressure in eyes, kidney involvement.  She was discharged with a PICC line for antibiotics.  She was found to be allergic to heparin due to thrombocytopenia that developed after flushing PICC line with heparin.  She had hair loss.  Had enlarged lymph node under arm in past and biopsy was normal.  Was treated with Plaquenil and steroids.  Only took steroid for 1 year.       Compliant with Plaquenil.  Stopped plant based.    Trouble squating.  She was exercising but stopped.    No ulcers in mouth and noses.  No rashes. Still getting treatments  ().     She denies any flares since last visit.    Diagnosed with sleep apnea and using CPAP.   No gout attacks.   Diagnosed with lymph edema and therapy helped.  She is not compliant with compression.    Hair improving since going natural, not wearing wigs and treatments.       Lupus Review of Systems  Alopecia: yes  Photosensitivity: no   Raynaud's: no  Oral or nasal ulcers: occasional oral ulcers  Rashes:  Intermittent malar rash; occasional rash in crease of arm and neck  No pleurisy or pericarditis.  No seizures, psychosis, or stroke.  No venous or arterial clots.  History of being on coumadin for 3 months as a precaution.   Pregnancy hx (if applicable): no miscarriages (never pregnant)        Review of Systems   Constitutional: Negative.  Negative for fever and unexpected weight change.   HENT: Negative.  Negative for mouth sores and trouble swallowing.    Eyes:  Negative for redness.  "  Respiratory:  Negative for cough and shortness of breath.    Cardiovascular:  Positive for leg swelling (lymph edema). Negative for chest pain.   Gastrointestinal:  Negative for constipation and diarrhea.   Endocrine: Negative.    Genitourinary: Negative.  Negative for dysuria and genital sores.   Musculoskeletal: Negative.    Skin:  Negative for rash.             Allergic/Immunologic: Negative.    Neurological: Negative.  Negative for headaches.   Hematological: Negative.  Does not bruise/bleed easily.   Psychiatric/Behavioral: Negative.           Objective:   /74   Pulse 61   Ht 5' 5" (1.651 m)   Wt 124 kg (273 lb 5.9 oz)   LMP 07/13/2013   BMI 45.49 kg/m²      Physical Exam   Constitutional: She is oriented to person, place, and time.   HENT:   Head: Normocephalic and atraumatic.   Eyes: Conjunctivae are normal.   Cardiovascular: Normal rate and regular rhythm.   Pulmonary/Chest: Effort normal and breath sounds normal.   Abdominal: Bowel sounds are normal.   Musculoskeletal:         General: No tenderness or deformity.   Neurological: She is alert and oriented to person, place, and time.   Psychiatric: Mood and affect normal.            LABS    Component      Latest Ref Rng & Units 4/21/2023   WBC      3.90 - 12.70 K/uL 4.41   RBC      4.00 - 5.40 M/uL 4.35   Hemoglobin      12.0 - 16.0 g/dL 12.2   Hematocrit      37.0 - 48.5 % 38.9   MCV      82 - 98 fL 89   MCH      27.0 - 31.0 pg 28.0   MCHC      32.0 - 36.0 g/dL 31.4 (L)   RDW      11.5 - 14.5 % 12.5   Platelets      150 - 450 K/uL 219   MPV      9.2 - 12.9 fL 10.7   Immature Granulocytes      0.0 - 0.5 % 0.5   Gran # (ANC)      1.8 - 7.7 K/uL 2.5   Immature Grans (Abs)      0.00 - 0.04 K/uL 0.02   Lymph #      1.0 - 4.8 K/uL 1.5   Mono #      0.3 - 1.0 K/uL 0.4   Eos #      0.0 - 0.5 K/uL 0.1   Baso #      0.00 - 0.20 K/uL 0.04   nRBC      0 /100 WBC 0   Gran %      38.0 - 73.0 % 55.5   Lymph %      18.0 - 48.0 % 33.6   Mono %      4.0 - 15.0 % " 7.9   Eosinophil %      0.0 - 8.0 % 1.6   Basophil %      0.0 - 1.9 % 0.9   Differential Method       Automated   Sodium      136 - 145 mmol/L 142   Potassium      3.5 - 5.1 mmol/L 3.9   Chloride      95 - 110 mmol/L 106   CO2      23 - 29 mmol/L 27   Glucose      70 - 110 mg/dL 90   BUN      7 - 17 mg/dL 15   Creatinine      0.50 - 1.40 mg/dL 1.02   Calcium      8.7 - 10.5 mg/dL 9.1   PROTEIN TOTAL      6.0 - 8.4 g/dL 7.4   Albumin      3.5 - 5.2 g/dL 4.3   BILIRUBIN TOTAL      0.1 - 1.0 mg/dL 0.5   Alkaline Phosphatase      38 - 126 U/L 61   AST      15 - 46 U/L 48 (H)   ALT      10 - 44 U/L 53 (H)   Anion Gap      8 - 16 mmol/L 9   eGFR      >60 mL/min/1.73 m:2 >60.0   Specimen UA       Urine, Clean Catch   Color, UA      Yellow, Straw, Annabelle Yellow   Appearance, UA      Clear Clear   pH, UA      5.0 - 8.0 6.0   Specific Gravity, UA      1.005 - 1.030 1.020   Protein, UA      Negative Negative   Glucose, UA      Negative Negative   Ketones, UA      Negative Negative   Bilirubin (UA)      Negative Negative   Occult Blood UA      Negative Negative   NITRITE UA      Negative Negative   UROBILINOGEN UA      <2.0 EU/dL Negative   Leukocytes, UA      Negative Trace (A)   Protein, Urine Random      0 - 15 mg/dL <7   Creatinine, Urine      15.0 - 325.0 mg/dL 91.0   Prot/Creat Ratio, Urine      0.00 - 0.20 Unable to calculate   WBC, UA      0 - 5 /hpf 12 (H)   Microscopic Comment       SEE COMMENT   Complement (C-3)      50 - 180 mg/dL 139   Complement (C-4)      11 - 44 mg/dL 42   CPK      55 - 170 U/L 169   ds DNA Ab      Negative 1:10 Negative 1:10   Uric Acid      2.4 - 5.7 mg/dL 5.7   CRP      0.00 - 1.00 mg/dL 0.25   Sed Rate      0 - 36 mm/Hr 9           Assessment:       1.  SLE.  Stable.  She feels well today.  2.  Immunosuppression.  Eye exam for Plaquenil done 12/2022.    3.  Elevated pressure in eyes  4.  Obesity.  Gastric bypass 2009 lost 100 pounds but gained most of it back.  Patient considering revision.     5.  Thyroid nodules.  Being monitored.  Aspiration was normal  6.  Mother with RA  7.  Ankle swelling bilateral.  Grandmother and mother had CHF    8.  Alopecia.  Family history of hair loss. Continuing with derm treatment hair vitamin, daily hail oil and hair foam.   She believes biopsy result showed alopecia.   9.  Pulmonary HTN.  Echo in 3/2021 normal PA pressure  10.  SOB intermittent  11.  Lymph edema.  Tried therapy which helped so now doing at home.  12.  Gout left 1st toe.  X-ray with possible erosion at first MTP reviewed by me. No gout attacks    Plan:       1.  Labs.    2.  Followed for pulmonary HTN by cardiologist Dr. Escobar  3.  Follow with current dermatology to evaluate   4.  Patient due for Plaquenil eye exam 12/12/2022.  5.  Patient had flu vaccination and COVID vaccine booster  6.  Follow info from wellness dietician through insurance who gave info on gout  7.  Medrol pack prn for gout attacks  8.  Patient to follow diet from [facility].  Patient to encouraged to reconsider Weight Watchers/Noom. Plans to ride bike.  9.  Stressed compliance with CPAP        RTO 6 months/prn;

## 2023-08-17 NOTE — PROGRESS NOTES
Rapid3 Question Responses and Scores 8/14/2023   MDHAQ Score 0   Psychologic Score 0   Pain Score 0.5   When you awakened in the morning OVER THE LAST WEEK, did you feel stiff? No   Fatigue Score 0   Global Health Score 2   RAPID3 Score 0.83     Answers submitted by the patient for this visit:  Rheumatology Questionnaire (Submitted on 8/14/2023)  fever: No  eye redness: No  mouth sores: Yes  headaches: No  shortness of breath: No  chest pain: No  trouble swallowing: No  diarrhea: No  constipation: No  unexpected weight change: No  genital sore: No  dysuria: No  During the last 3 days, have you had a skin rash?: No  Bruises or bleeds easily: No  cough: No

## 2023-08-18 ENCOUNTER — PATIENT OUTREACH (OUTPATIENT)
Dept: ADMINISTRATIVE | Facility: HOSPITAL | Age: 48
End: 2023-08-18
Payer: COMMERCIAL

## 2023-08-18 ENCOUNTER — LAB VISIT (OUTPATIENT)
Dept: LAB | Facility: HOSPITAL | Age: 48
End: 2023-08-18
Attending: INTERNAL MEDICINE
Payer: COMMERCIAL

## 2023-08-18 DIAGNOSIS — Z12.31 SCREENING MAMMOGRAM FOR BREAST CANCER: Primary | ICD-10-CM

## 2023-08-18 DIAGNOSIS — M32.9 SYSTEMIC LUPUS ERYTHEMATOSUS, UNSPECIFIED SLE TYPE, UNSPECIFIED ORGAN INVOLVEMENT STATUS: ICD-10-CM

## 2023-08-18 DIAGNOSIS — D84.9 IMMUNOSUPPRESSION: ICD-10-CM

## 2023-08-18 DIAGNOSIS — M10.9 GOUT, ARTHRITIS: ICD-10-CM

## 2023-08-18 LAB
ALBUMIN SERPL BCP-MCNC: 4.2 G/DL (ref 3.5–5.2)
ALP SERPL-CCNC: 67 U/L (ref 38–126)
ALT SERPL W/O P-5'-P-CCNC: 59 U/L (ref 10–44)
ANION GAP SERPL CALC-SCNC: 9 MMOL/L (ref 8–16)
AST SERPL-CCNC: 54 U/L (ref 15–46)
BASOPHILS # BLD AUTO: 0.02 K/UL (ref 0–0.2)
BASOPHILS NFR BLD: 0.4 % (ref 0–1.9)
BILIRUB SERPL-MCNC: 0.3 MG/DL (ref 0.1–1)
C3 SERPL-MCNC: 136 MG/DL (ref 50–180)
C4 SERPL-MCNC: 40 MG/DL (ref 11–44)
CALCIUM SERPL-MCNC: 8.7 MG/DL (ref 8.7–10.5)
CHLORIDE SERPL-SCNC: 101 MMOL/L (ref 95–110)
CK SERPL-CCNC: 172 U/L (ref 55–170)
CO2 SERPL-SCNC: 30 MMOL/L (ref 23–29)
CREAT SERPL-MCNC: 0.98 MG/DL (ref 0.5–1.4)
CRP SERPL-MCNC: 0.3 MG/DL (ref 0–1)
DIFFERENTIAL METHOD: ABNORMAL
EOSINOPHIL # BLD AUTO: 0.1 K/UL (ref 0–0.5)
EOSINOPHIL NFR BLD: 2.2 % (ref 0–8)
ERYTHROCYTE [DISTWIDTH] IN BLOOD BY AUTOMATED COUNT: 12.6 % (ref 11.5–14.5)
ERYTHROCYTE [SEDIMENTATION RATE] IN BLOOD BY PHOTOMETRIC METHOD: 8 MM/HR (ref 0–36)
EST. GFR  (NO RACE VARIABLE): >60 ML/MIN/1.73 M^2
GLUCOSE SERPL-MCNC: 77 MG/DL (ref 70–110)
HCT VFR BLD AUTO: 39 % (ref 37–48.5)
HGB BLD-MCNC: 12.4 G/DL (ref 12–16)
IMM GRANULOCYTES # BLD AUTO: 0.01 K/UL (ref 0–0.04)
IMM GRANULOCYTES NFR BLD AUTO: 0.2 % (ref 0–0.5)
LYMPHOCYTES # BLD AUTO: 2.1 K/UL (ref 1–4.8)
LYMPHOCYTES NFR BLD: 37.3 % (ref 18–48)
MCH RBC QN AUTO: 28.4 PG (ref 27–31)
MCHC RBC AUTO-ENTMCNC: 31.8 G/DL (ref 32–36)
MCV RBC AUTO: 89 FL (ref 82–98)
MONOCYTES # BLD AUTO: 0.6 K/UL (ref 0.3–1)
MONOCYTES NFR BLD: 10.3 % (ref 4–15)
NEUTROPHILS # BLD AUTO: 2.8 K/UL (ref 1.8–7.7)
NEUTROPHILS NFR BLD: 49.6 % (ref 38–73)
NRBC BLD-RTO: 0 /100 WBC
PLATELET # BLD AUTO: 219 K/UL (ref 150–450)
PMV BLD AUTO: 10.2 FL (ref 9.2–12.9)
POTASSIUM SERPL-SCNC: 4 MMOL/L (ref 3.5–5.1)
PROT SERPL-MCNC: 7.4 G/DL (ref 6–8.4)
RBC # BLD AUTO: 4.37 M/UL (ref 4–5.4)
SODIUM SERPL-SCNC: 140 MMOL/L (ref 136–145)
URATE SERPL-MCNC: 5.9 MG/DL (ref 2.4–5.7)
UUN UR-MCNC: 17 MG/DL (ref 7–17)
WBC # BLD AUTO: 5.55 K/UL (ref 3.9–12.7)

## 2023-08-18 PROCEDURE — 84550 ASSAY OF BLOOD/URIC ACID: CPT | Performed by: INTERNAL MEDICINE

## 2023-08-18 PROCEDURE — 85025 COMPLETE CBC W/AUTO DIFF WBC: CPT | Mod: PO | Performed by: INTERNAL MEDICINE

## 2023-08-18 PROCEDURE — 86160 COMPLEMENT ANTIGEN: CPT | Mod: PO | Performed by: INTERNAL MEDICINE

## 2023-08-18 PROCEDURE — 85652 RBC SED RATE AUTOMATED: CPT | Mod: PO | Performed by: INTERNAL MEDICINE

## 2023-08-18 PROCEDURE — 86160 COMPLEMENT ANTIGEN: CPT | Mod: 59,PO | Performed by: INTERNAL MEDICINE

## 2023-08-18 PROCEDURE — 36415 COLL VENOUS BLD VENIPUNCTURE: CPT | Mod: PO | Performed by: INTERNAL MEDICINE

## 2023-08-18 PROCEDURE — 82550 ASSAY OF CK (CPK): CPT | Mod: PO | Performed by: INTERNAL MEDICINE

## 2023-08-18 PROCEDURE — 80053 COMPREHEN METABOLIC PANEL: CPT | Mod: PO | Performed by: INTERNAL MEDICINE

## 2023-08-18 PROCEDURE — 86140 C-REACTIVE PROTEIN: CPT | Mod: PO | Performed by: INTERNAL MEDICINE

## 2023-08-18 PROCEDURE — 86225 DNA ANTIBODY NATIVE: CPT | Mod: PO | Performed by: INTERNAL MEDICINE

## 2023-08-21 ENCOUNTER — PATIENT MESSAGE (OUTPATIENT)
Dept: RHEUMATOLOGY | Facility: CLINIC | Age: 48
End: 2023-08-21
Payer: COMMERCIAL

## 2023-08-21 LAB — DSDNA AB SER-ACNC: NORMAL [IU]/ML

## 2023-09-18 ENCOUNTER — LAB VISIT (OUTPATIENT)
Dept: LAB | Facility: HOSPITAL | Age: 48
End: 2023-09-18
Attending: INTERNAL MEDICINE
Payer: COMMERCIAL

## 2023-09-18 DIAGNOSIS — D84.9 IMMUNOSUPPRESSION: ICD-10-CM

## 2023-09-18 DIAGNOSIS — M10.9 GOUT, ARTHRITIS: ICD-10-CM

## 2023-09-18 DIAGNOSIS — M32.9 SYSTEMIC LUPUS ERYTHEMATOSUS, UNSPECIFIED SLE TYPE, UNSPECIFIED ORGAN INVOLVEMENT STATUS: ICD-10-CM

## 2023-09-18 LAB
ALBUMIN SERPL BCP-MCNC: 4.1 G/DL (ref 3.5–5.2)
ALP SERPL-CCNC: 59 U/L (ref 38–126)
ALT SERPL W/O P-5'-P-CCNC: 50 U/L (ref 10–44)
ANION GAP SERPL CALC-SCNC: 5 MMOL/L (ref 8–16)
AST SERPL-CCNC: 46 U/L (ref 15–46)
BASOPHILS # BLD AUTO: 0.03 K/UL (ref 0–0.2)
BASOPHILS NFR BLD: 0.7 % (ref 0–1.9)
BILIRUB SERPL-MCNC: 0.6 MG/DL (ref 0.1–1)
C3 SERPL-MCNC: 128 MG/DL (ref 50–180)
C4 SERPL-MCNC: 38 MG/DL (ref 11–44)
CALCIUM SERPL-MCNC: 9 MG/DL (ref 8.7–10.5)
CHLORIDE SERPL-SCNC: 103 MMOL/L (ref 95–110)
CK SERPL-CCNC: 248 U/L (ref 55–170)
CO2 SERPL-SCNC: 31 MMOL/L (ref 23–29)
CREAT SERPL-MCNC: 1.05 MG/DL (ref 0.5–1.4)
CRP SERPL-MCNC: 0.22 MG/DL (ref 0–1)
DIFFERENTIAL METHOD: ABNORMAL
EOSINOPHIL # BLD AUTO: 0.1 K/UL (ref 0–0.5)
EOSINOPHIL NFR BLD: 2.1 % (ref 0–8)
ERYTHROCYTE [DISTWIDTH] IN BLOOD BY AUTOMATED COUNT: 12.6 % (ref 11.5–14.5)
ERYTHROCYTE [SEDIMENTATION RATE] IN BLOOD BY PHOTOMETRIC METHOD: 5 MM/HR (ref 0–36)
EST. GFR  (NO RACE VARIABLE): >60 ML/MIN/1.73 M^2
GLUCOSE SERPL-MCNC: 97 MG/DL (ref 70–110)
HCT VFR BLD AUTO: 39.1 % (ref 37–48.5)
HGB BLD-MCNC: 12.2 G/DL (ref 12–16)
IMM GRANULOCYTES # BLD AUTO: 0 K/UL (ref 0–0.04)
IMM GRANULOCYTES NFR BLD AUTO: 0 % (ref 0–0.5)
LYMPHOCYTES # BLD AUTO: 1.7 K/UL (ref 1–4.8)
LYMPHOCYTES NFR BLD: 40.5 % (ref 18–48)
MCH RBC QN AUTO: 27.9 PG (ref 27–31)
MCHC RBC AUTO-ENTMCNC: 31.2 G/DL (ref 32–36)
MCV RBC AUTO: 90 FL (ref 82–98)
MONOCYTES # BLD AUTO: 0.4 K/UL (ref 0.3–1)
MONOCYTES NFR BLD: 9.3 % (ref 4–15)
NEUTROPHILS # BLD AUTO: 2 K/UL (ref 1.8–7.7)
NEUTROPHILS NFR BLD: 47.4 % (ref 38–73)
NRBC BLD-RTO: 0 /100 WBC
PLATELET # BLD AUTO: 205 K/UL (ref 150–450)
PMV BLD AUTO: 10.7 FL (ref 9.2–12.9)
POTASSIUM SERPL-SCNC: 3.8 MMOL/L (ref 3.5–5.1)
PROT SERPL-MCNC: 7.1 G/DL (ref 6–8.4)
RBC # BLD AUTO: 4.37 M/UL (ref 4–5.4)
SODIUM SERPL-SCNC: 139 MMOL/L (ref 136–145)
URATE SERPL-MCNC: 5.7 MG/DL (ref 2.4–5.7)
UUN UR-MCNC: 19 MG/DL (ref 7–17)
WBC # BLD AUTO: 4.2 K/UL (ref 3.9–12.7)

## 2023-09-18 PROCEDURE — 85652 RBC SED RATE AUTOMATED: CPT | Mod: PO | Performed by: INTERNAL MEDICINE

## 2023-09-18 PROCEDURE — 86160 COMPLEMENT ANTIGEN: CPT | Mod: PO | Performed by: INTERNAL MEDICINE

## 2023-09-18 PROCEDURE — 36415 COLL VENOUS BLD VENIPUNCTURE: CPT | Mod: PO | Performed by: INTERNAL MEDICINE

## 2023-09-18 PROCEDURE — 82550 ASSAY OF CK (CPK): CPT | Mod: PO | Performed by: INTERNAL MEDICINE

## 2023-09-18 PROCEDURE — 86160 COMPLEMENT ANTIGEN: CPT | Mod: 59,PO | Performed by: INTERNAL MEDICINE

## 2023-09-18 PROCEDURE — 80053 COMPREHEN METABOLIC PANEL: CPT | Mod: PO | Performed by: INTERNAL MEDICINE

## 2023-09-18 PROCEDURE — 84550 ASSAY OF BLOOD/URIC ACID: CPT | Performed by: INTERNAL MEDICINE

## 2023-09-18 PROCEDURE — 86140 C-REACTIVE PROTEIN: CPT | Mod: PO | Performed by: INTERNAL MEDICINE

## 2023-09-18 PROCEDURE — 85025 COMPLETE CBC W/AUTO DIFF WBC: CPT | Mod: PO | Performed by: INTERNAL MEDICINE

## 2023-09-18 PROCEDURE — 86225 DNA ANTIBODY NATIVE: CPT | Mod: PO | Performed by: INTERNAL MEDICINE

## 2023-09-19 LAB — DSDNA AB SER-ACNC: NORMAL [IU]/ML

## 2023-09-20 ENCOUNTER — PATIENT MESSAGE (OUTPATIENT)
Dept: RHEUMATOLOGY | Facility: CLINIC | Age: 48
End: 2023-09-20
Payer: COMMERCIAL

## 2023-09-26 RX ORDER — HYDROCHLOROTHIAZIDE 25 MG/1
25 TABLET ORAL DAILY
Qty: 90 TABLET | Refills: 0 | Status: SHIPPED | OUTPATIENT
Start: 2023-09-26 | End: 2023-10-20 | Stop reason: SDUPTHER

## 2023-09-26 NOTE — TELEPHONE ENCOUNTER
Refill Routing Note   Medication(s) are not appropriate for processing by [facility] for the following reason(s):      Drug-disease interaction    [facility] action(s):  Defer Care Due:  None identified     Medication Therapy Plan: Gout, arthritis    Pharmacist review requested: Yes     Appointments  past 12m or future 3m with PCP    Date Provider   Last Visit   8/19/2022 Chasity Dixon MD   Next Visit   10/20/2023 Chasity Dixon MD   ED visits in past 90 days: 0        Note composed:12:22 PM 09/26/2023

## 2023-09-26 NOTE — TELEPHONE ENCOUNTER
No care due was identified.  [facility] Embedded Care Due Messages. Reference number: [id number].   9/26/2023 11:54:12 AM CDT

## 2023-09-26 NOTE — TELEPHONE ENCOUNTER
Refill Decision Note   Nitin Mcconnell  is requesting a refill authorization.  Brief Assessment and Rationale for Refill:  Approve     Medication Therapy Plan:  Gout, arthritis    Medication Reconciliation Completed: No   Comments:     No Care Gaps recommended.     Note composed:3:30 PM 09/26/2023

## 2023-10-17 DIAGNOSIS — M10.9 GOUT, ARTHRITIS: ICD-10-CM

## 2023-10-18 DIAGNOSIS — M10.9 GOUT, ARTHRITIS: ICD-10-CM

## 2023-10-18 RX ORDER — COLCHICINE 0.6 MG/1
TABLET ORAL
Qty: 30 TABLET | Refills: 3 | Status: SHIPPED | OUTPATIENT
Start: 2023-10-18 | End: 2024-02-15

## 2023-10-19 RX ORDER — ALLOPURINOL 100 MG/1
200 TABLET ORAL
Qty: 60 TABLET | Refills: 2 | Status: SHIPPED | OUTPATIENT
Start: 2023-10-19 | End: 2024-01-22

## 2023-10-20 ENCOUNTER — PATIENT MESSAGE (OUTPATIENT)
Dept: INTERNAL MEDICINE | Facility: CLINIC | Age: 48
End: 2023-10-20

## 2023-10-20 ENCOUNTER — OFFICE VISIT (OUTPATIENT)
Dept: INTERNAL MEDICINE | Facility: CLINIC | Age: 48
End: 2023-10-20
Payer: COMMERCIAL

## 2023-10-20 VITALS
DIASTOLIC BLOOD PRESSURE: 80 MMHG | HEIGHT: 65 IN | SYSTOLIC BLOOD PRESSURE: 134 MMHG | BODY MASS INDEX: 45.18 KG/M2 | HEART RATE: 68 BPM | RESPIRATION RATE: 16 BRPM | TEMPERATURE: 98 F | WEIGHT: 271.19 LBS

## 2023-10-20 DIAGNOSIS — E66.01 MORBID OBESITY WITH BMI OF 45.0-49.9, ADULT: ICD-10-CM

## 2023-10-20 DIAGNOSIS — M1A.9XX0 CHRONIC GOUT WITHOUT TOPHUS, UNSPECIFIED CAUSE, UNSPECIFIED SITE: ICD-10-CM

## 2023-10-20 DIAGNOSIS — R74.01 ELEVATED ALT MEASUREMENT: ICD-10-CM

## 2023-10-20 DIAGNOSIS — Z00.00 ANNUAL PHYSICAL EXAM: Primary | ICD-10-CM

## 2023-10-20 DIAGNOSIS — Z98.84 S/P GASTRIC BYPASS: ICD-10-CM

## 2023-10-20 DIAGNOSIS — G47.33 OBSTRUCTIVE SLEEP APNEA: ICD-10-CM

## 2023-10-20 DIAGNOSIS — I89.0 LYMPHEDEMA OF BOTH LOWER EXTREMITIES: ICD-10-CM

## 2023-10-20 DIAGNOSIS — M32.9 SYSTEMIC LUPUS ERYTHEMATOSUS, UNSPECIFIED SLE TYPE, UNSPECIFIED ORGAN INVOLVEMENT STATUS: ICD-10-CM

## 2023-10-20 DIAGNOSIS — I87.2 VENOUS INSUFFICIENCY OF BOTH LOWER EXTREMITIES: ICD-10-CM

## 2023-10-20 DIAGNOSIS — I10 ESSENTIAL HYPERTENSION: ICD-10-CM

## 2023-10-20 PROCEDURE — 3075F PR MOST RECENT SYSTOLIC BLOOD PRESS GE 130-139MM HG: ICD-10-PCS | Mod: CPTII,S$GLB,, | Performed by: INTERNAL MEDICINE

## 2023-10-20 PROCEDURE — 99396 PREV VISIT EST AGE 40-64: CPT | Mod: 25,S$GLB,, | Performed by: INTERNAL MEDICINE

## 2023-10-20 PROCEDURE — G0008 FLU VACCINE (QUAD) GREATER THAN OR EQUAL TO 3YO PRESERVATIVE FREE IM: ICD-10-PCS | Mod: S$GLB,,, | Performed by: INTERNAL MEDICINE

## 2023-10-20 PROCEDURE — 3008F BODY MASS INDEX DOCD: CPT | Mod: CPTII,S$GLB,, | Performed by: INTERNAL MEDICINE

## 2023-10-20 PROCEDURE — 1160F RVW MEDS BY RX/DR IN RCRD: CPT | Mod: CPTII,S$GLB,, | Performed by: INTERNAL MEDICINE

## 2023-10-20 PROCEDURE — 3008F PR BODY MASS INDEX (BMI) DOCUMENTED: ICD-10-PCS | Mod: CPTII,S$GLB,, | Performed by: INTERNAL MEDICINE

## 2023-10-20 PROCEDURE — 3075F SYST BP GE 130 - 139MM HG: CPT | Mod: CPTII,S$GLB,, | Performed by: INTERNAL MEDICINE

## 2023-10-20 PROCEDURE — G0008 ADMIN INFLUENZA VIRUS VAC: HCPCS | Mod: S$GLB,,, | Performed by: INTERNAL MEDICINE

## 2023-10-20 PROCEDURE — 1159F MED LIST DOCD IN RCRD: CPT | Mod: CPTII,S$GLB,, | Performed by: INTERNAL MEDICINE

## 2023-10-20 PROCEDURE — 99396 PR PREVENTIVE VISIT,EST,40-64: ICD-10-PCS | Mod: 25,S$GLB,, | Performed by: INTERNAL MEDICINE

## 2023-10-20 PROCEDURE — 99999 PR PBB SHADOW E&M-EST. PATIENT-LVL IV: CPT | Mod: PBBFAC,,, | Performed by: INTERNAL MEDICINE

## 2023-10-20 PROCEDURE — 90686 IIV4 VACC NO PRSV 0.5 ML IM: CPT | Mod: S$GLB,,, | Performed by: INTERNAL MEDICINE

## 2023-10-20 PROCEDURE — 90686 FLU VACCINE (QUAD) GREATER THAN OR EQUAL TO 3YO PRESERVATIVE FREE IM: ICD-10-PCS | Mod: S$GLB,,, | Performed by: INTERNAL MEDICINE

## 2023-10-20 PROCEDURE — 3079F PR MOST RECENT DIASTOLIC BLOOD PRESSURE 80-89 MM HG: ICD-10-PCS | Mod: CPTII,S$GLB,, | Performed by: INTERNAL MEDICINE

## 2023-10-20 PROCEDURE — 1159F PR MEDICATION LIST DOCUMENTED IN MEDICAL RECORD: ICD-10-PCS | Mod: CPTII,S$GLB,, | Performed by: INTERNAL MEDICINE

## 2023-10-20 PROCEDURE — 99999 PR PBB SHADOW E&M-EST. PATIENT-LVL IV: ICD-10-PCS | Mod: PBBFAC,,, | Performed by: INTERNAL MEDICINE

## 2023-10-20 PROCEDURE — 3079F DIAST BP 80-89 MM HG: CPT | Mod: CPTII,S$GLB,, | Performed by: INTERNAL MEDICINE

## 2023-10-20 PROCEDURE — 1160F PR REVIEW ALL MEDS BY PRESCRIBER/CLIN PHARMACIST DOCUMENTED: ICD-10-PCS | Mod: CPTII,S$GLB,, | Performed by: INTERNAL MEDICINE

## 2023-10-20 RX ORDER — TRIAMCINOLONE ACETONIDE 55 UG/1
SPRAY, METERED NASAL DAILY
COMMUNITY
Start: 2023-10-18

## 2023-10-20 RX ORDER — HYDROCHLOROTHIAZIDE 25 MG/1
25 TABLET ORAL DAILY
Qty: 90 TABLET | Refills: 3 | Status: SHIPPED | OUTPATIENT
Start: 2023-10-20

## 2023-10-20 NOTE — PROGRESS NOTES
Subjective:     PCP: Chasity Dixon MD    Nitin Mcconnell is a 48 y.o. female who presents for an annual exam.    Medical History:   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, benign     Lupus mild       Family History: family history includes Diabetes in her maternal grandfather and paternal grandmother; Heart attack in her maternal grandmother; Heart failure in her maternal grandmother and mother; Kidney disease in her maternal grandfather; Rheum arthritis in her mother; Thyroid disease in her maternal aunt and maternal grandmother.    Surgical History:   Past Surgical History:   Procedure Laterality Date    BREAST BIOPSY Right 2008    rt axilla    COLONOSCOPY N/A 05/17/2022    Procedure: COLONOSCOPY;  Surgeon: Lilibeth Merrill MD;  Location: [facility] ENDO (4TH FLR);  Service: Endoscopy;  Laterality: N/A;  miralax prep  4/25 fully vaccinated; instructions to portal-st  5/13-changed from Jose to Lammi    ENDOSCOPIC ULTRASOUND OF LOWER GASTROINTESTINAL TRACT N/A 6/7/2022    Procedure: ULTRASOUND, LOWER GI TRACT, ENDOSCOPIC;  Surgeon: Enrique Beach MD;  Location: [facility] (2ND FLR);  Service: Endoscopy;  Laterality: N/A;  MD Ashlyn Roblero MA  Need EUS for sigmoid colon nodule. Forward view EUS. Main. 45 minutes.   Thanks,   Enrique Beach MD   5/19:fully vaccinated. instructions mailed-SC    HYSTERECTOMY      @38yrs of age    LIPECTOMY      LYMPH NODE BIOPSY      right arm    OOPHORECTOMY      @38yrs of age    panniculectomy      JIAN-EN-Y PROCEDURE  08/27/2009    RNY bypass        Social History:  reports that she has never smoked. She has been exposed to tobacco smoke. She has never used smokeless tobacco. She reports current alcohol use. She reports that she does not use drugs.     Allergies:   Review of patient's allergies  indicates:   Allergen Reactions    Heparin analogues      Other reaction(s): low platelets    Shellfish containing products Hives     Crawfish, no reaction to IV iodine in the past.       Medications:   Current Outpatient Medications   Medication Sig    allopurinoL (ZYLOPRIM) 100 MG tablet TAKE 2 TABLETS(200 MG) BY MOUTH EVERY DAY    CALCIUM CARB/VITAMIN D3/VIT K1 (VIACTIV ORAL) Take by mouth once daily.     colchicine, gout, (COLCRYS) 0.6 mg tablet TAKE 1 TABLET(0.6 MG) BY MOUTH EVERY DAY    cyanocobalamin, vitamin B-12, (VITAMIN B-12) 2,500 mcg Subl Place 1 tablet under the tongue once daily.    desoximetasone (TOPICORT) 0.25 % cream APPLY TO SCALP NIGHTLY FOR 1 WEEK THEN APPLY WEEKLY FOR MAINTENANCE    ergocalciferol, vitamin D2, (VITAMIN D2 ORAL) Take 5,000 Int'l Units by mouth once daily.    hydrOXYchloroQUINE (PLAQUENIL) 200 mg tablet TAKE 1 TABLET(200 MG) BY MOUTH TWICE DAILY    montelukast (SINGULAIR) 10 mg tablet Take 1 tablet (10 mg total) by mouth once daily.    multivitamin-Ca-iron-minerals 27-0.4 mg Tab Take 1 tablet by mouth Daily.    triamcinolone (NASACORT) 55 mcg nasal inhaler Daily.    hydroCHLOROthiazide (HYDRODIURIL) 25 MG tablet Take 1 tablet (25 mg total) by mouth once daily.     No current facility-administered medications for this visit.       Health Maintenance:   Health Maintenance Topics with due status: Not Due       Topic Last Completion Date    TETANUS VACCINE 09/09/2019    Pneumococcal Vaccines (Age 0-64) 09/14/2020    DEXA Scan 04/23/2021    Hemoglobin A1c (Diabetic Prevention Screening) 11/05/2021    Lipid Panel 11/05/2021    Colorectal Cancer Screening 05/17/2022     Lab Results   Component Value Date    HEPCAB NON-REACTIVE 02/25/2019     Eye Exam:   December 2023  Dental Exam: every 6 months  OB/GYN: due  S/p hysterectomy  Mammogram: scheduled  Colonoscopy: Last Colonoscopy completed on 5/17/2022, repeat in 10 years  HIV: 2/2019, neg  Hepatitic C  Ab: 2/2019,  neg    Vaccinations:  Immunization History   Administered Date(s) Administered    COVID-19, MRNA, LN-S, PF (Pfizer) (Purple Cap) 03/20/2021, 04/11/2021, 10/30/2021    COVID-19, mRNA, LNP-S, bivalent booster, PF (PFIZER OMICRON) 10/18/2022    Influenza 10/15/2015, 09/28/2016    Influenza - Quadrivalent - PF *Preferred* (6 months and older) 10/24/2013, 10/23/2014, 10/15/2015, 09/28/2016, 10/28/2017, 10/21/2018, 10/05/2019, 10/03/2020, 09/28/2021, 10/18/2022, 10/20/2023    Influenza - Trivalent (ADULT) 10/24/2013, 10/23/2014, 10/28/2017    Influenza Split 10/24/2013, 10/23/2014, 10/28/2017    Pneumococcal Conjugate - 13 Valent 03/09/2020    Pneumococcal Polysaccharide - 23 Valent 09/14/2020    Td - PF (ADULT) 09/09/2019     Influenza: due  Tetanus: 2019  Pneumovax: 2020  Prevnar-13: 2020  Covid vaccine: booster due    Body mass index is 45.12 kg/m².  Wt Readings from Last 3 Encounters:   10/20/23 123 kg (271 lb 2.7 oz)   08/17/23 124 kg (273 lb 5.9 oz)   07/13/23 123.9 kg (273 lb 0.6 oz)       Review of Systems   Constitutional:  Negative for activity change, chills, diaphoresis, fatigue, fever and unexpected weight change.   HENT:  Negative for congestion, dental problem, ear discharge, ear pain, hearing loss, postnasal drip, rhinorrhea, sinus pressure, sore throat and trouble swallowing.    Eyes:  Negative for discharge, redness and visual disturbance.   Respiratory:  Negative for cough, chest tightness, shortness of breath and wheezing.    Cardiovascular:  Negative for chest pain, palpitations and leg swelling.   Gastrointestinal:  Negative for abdominal pain, blood in stool, constipation, diarrhea, nausea and vomiting.   Endocrine: Negative for polydipsia and polyuria.   Genitourinary:  Negative for decreased urine volume, difficulty urinating, dysuria, frequency, hematuria and menstrual problem.   Musculoskeletal:  Positive for arthralgias (intermittent foot pain near 4th tmt joint). Negative for back pain, joint  swelling, myalgias and neck pain.   Skin:  Negative for rash and wound.   Neurological:  Negative for dizziness, weakness, numbness and headaches.   Hematological:  Negative for adenopathy.   Psychiatric/Behavioral:  Negative for confusion, dysphoric mood and sleep disturbance. The patient is not nervous/anxious.         Answers submitted by the patient for this visit:  Review of Systems Questionnaire (Submitted on 10/19/2023)  activity change: No  unexpected weight change: No  neck pain: No  hearing loss: No  rhinorrhea: No  trouble swallowing: No  eye discharge: No  visual disturbance: No  chest tightness: No  wheezing: No  chest pain: No  palpitations: No  blood in stool: No  constipation: No  vomiting: No  diarrhea: No  polydipsia: No  polyuria: No  difficulty urinating: No  hematuria: No  menstrual problem: No  dysuria: No  joint swelling: No  arthralgias: Yes  headaches: No  weakness: No  confusion: No  dysphoric mood: No      Objective:     Physical Exam  Vitals reviewed.   Constitutional:       General: She is awake. She is not in acute distress.     Appearance: Normal appearance. She is well-developed and well-groomed. She is not diaphoretic.   HENT:      Head: Normocephalic and atraumatic.      Right Ear: Hearing, tympanic membrane, ear canal and external ear normal. Tympanic membrane is not erythematous or bulging.      Left Ear: Hearing, tympanic membrane, ear canal and external ear normal. Tympanic membrane is not erythematous or bulging.      Nose: Nose normal. No congestion.      Mouth/Throat:      Mouth: Mucous membranes are moist.      Tongue: No lesions.      Pharynx: Oropharynx is clear. Uvula midline. No oropharyngeal exudate or posterior oropharyngeal erythema.   Eyes:      General: Lids are normal. Vision grossly intact. Gaze aligned appropriately. No scleral icterus.     Conjunctiva/sclera:      Right eye: Right conjunctiva is not injected.      Left eye: Left conjunctiva is not injected.       Pupils: Pupils are equal, round, and reactive to light.   Neck:      Thyroid: No thyroid mass or thyromegaly.   Cardiovascular:      Rate and Rhythm: Normal rate and regular rhythm.      Pulses: Normal pulses.      Heart sounds: Normal heart sounds. No murmur heard.  Pulmonary:      Effort: Pulmonary effort is normal. No respiratory distress.      Breath sounds: Normal breath sounds. No decreased breath sounds or wheezing.   Abdominal:      General: Bowel sounds are normal. There is no distension.      Palpations: Abdomen is soft. Abdomen is not rigid.      Tenderness: There is no abdominal tenderness. There is no guarding or rebound.   Musculoskeletal:         General: Normal range of motion.      Cervical back: Normal range of motion and neck supple.      Right lower leg: No edema.      Left lower leg: No edema.      Left foot: Bony tenderness (to palpation near 4th tarsometatarsal joint) present.   Lymphadenopathy:      Cervical: No cervical adenopathy.      Upper Body:      Right upper body: No supraclavicular adenopathy.      Left upper body: No supraclavicular adenopathy.   Skin:     General: Skin is warm and dry.      Coloration: Skin is not cyanotic.      Findings: No lesion or rash.      Nails: There is no clubbing.   Neurological:      General: No focal deficit present.      Mental Status: She is alert and oriented to person, place, and time.      Sensory: Sensation is intact.      Coordination: Coordination is intact.      Gait: Gait is intact.      Deep Tendon Reflexes: Reflexes are normal and symmetric.   Psychiatric:         Attention and Perception: Attention normal.         Mood and Affect: Mood normal.         Behavior: Behavior is cooperative.            Assessment:        1. Annual physical exam    2. Essential hypertension    3. Systemic lupus erythematosus, unspecified SLE type, unspecified organ involvement status    4. Venous insufficiency of both lower extremities    5. Lymphedema of both  lower extremities    6. Obstructive sleep apnea    7. Chronic gout without tophus, unspecified cause, unspecified site    8. Elevated ALT measurement    9. Morbid obesity with BMI of 45.0-49.9, adult    10. S/P gastric bypass           Plan:     1. Annual physical exam  - reviewed recent cbc, cmp results  - Lipid Panel; Future  - TSH; Future  - Hemoglobin A1C; Future  - Influenza - Quadrivalent (PF)    2. Essential hypertension  - controlled, continue HCTZ  - hydroCHLOROthiazide (HYDRODIURIL) 25 MG tablet; Take 1 tablet (25 mg total) by mouth once daily.  Dispense: 90 tablet; Refill: 3      3. Systemic lupus erythematosus, unspecified SLE type, unspecified organ involvement status  - on HCQ, stable, f/u with Rheumatology    4. Venous insufficiency of both lower extremities  - COMPRESSION STOCKINGS, fax to [employer]    5. Lymphedema of both lower extremities  - elevate legs, continue compression stocking, will monitor    6. Obstructive sleep apnea  - on cpap, continue to monitor    7. Chronic gout  - HCTZ could be related to problems with gout but uric acid now low, on allopurinol, stable  - consider changing HCTZ to an alternative if she has additional gout flares    8. Elevated ALT measurement  - HEPATITIS PANEL, ACUTE; Future  - US Abdomen Limited; Future    9. Morbid obesity with BMI of 45.0-49.9, adult  - increase physical activity, maintain a healthy diet, continue to monitor    10. S/P gastric bypass  - Ferritin; Future  - Folate; Future  - Vitamin B12; Future  - Vitamin D; Future  - Iron and TIBC; Future  - Vitamin B1; Future  - ZINC; Future      RTC in 6 months for follow-up or sooner if needed    __________________________    Chasity Dixon MD, PharmD  [facility]- [facility] Medicine  American Board of Obesity Medicine diplomate  Office [phone]

## 2023-10-20 NOTE — TELEPHONE ENCOUNTER
Still need hepatitis panel?  I don't see order in appt screen to schedule.    Only ultrasound.    Please advise.  Thanks nichole

## 2023-10-24 ENCOUNTER — LAB VISIT (OUTPATIENT)
Dept: LAB | Facility: HOSPITAL | Age: 48
End: 2023-10-24
Attending: FAMILY MEDICINE
Payer: COMMERCIAL

## 2023-10-24 DIAGNOSIS — Z00.00 ANNUAL PHYSICAL EXAM: ICD-10-CM

## 2023-10-24 DIAGNOSIS — R74.01 ELEVATED ALT MEASUREMENT: ICD-10-CM

## 2023-10-24 DIAGNOSIS — Z98.84 S/P GASTRIC BYPASS: ICD-10-CM

## 2023-10-24 LAB
25(OH)D3+25(OH)D2 SERPL-MCNC: 47 NG/ML (ref 30–96)
CHOLEST SERPL-MCNC: 157 MG/DL (ref 120–199)
CHOLEST/HDLC SERPL: 2.9 {RATIO} (ref 2–5)
FERRITIN SERPL-MCNC: 290 NG/ML (ref 20–300)
FOLATE SERPL-MCNC: 12.5 NG/ML (ref 4–24)
HAV IGM SERPL QL IA: NORMAL
HBV CORE IGM SERPL QL IA: NORMAL
HBV SURFACE AG SERPL QL IA: NORMAL
HCV AB SERPL QL IA: NORMAL
HDLC SERPL-MCNC: 55 MG/DL (ref 40–75)
HDLC SERPL: 35 % (ref 20–50)
IRON SERPL-MCNC: 110 UG/DL (ref 30–160)
LDLC SERPL CALC-MCNC: 89 MG/DL (ref 63–159)
NONHDLC SERPL-MCNC: 102 MG/DL
SATURATED IRON: 33 % (ref 20–50)
TOTAL IRON BINDING CAPACITY: 336 UG/DL (ref 250–450)
TRANSFERRIN SERPL-MCNC: 227 MG/DL (ref 200–375)
TRIGL SERPL-MCNC: 65 MG/DL (ref 30–150)
TSH SERPL DL<=0.005 MIU/L-ACNC: 2.44 UIU/ML (ref 0.4–4)
VIT B12 SERPL-MCNC: 1193 PG/ML (ref 210–950)

## 2023-10-24 PROCEDURE — 80061 LIPID PANEL: CPT | Performed by: INTERNAL MEDICINE

## 2023-10-24 PROCEDURE — 82306 VITAMIN D 25 HYDROXY: CPT | Mod: PN | Performed by: INTERNAL MEDICINE

## 2023-10-24 PROCEDURE — 36415 COLL VENOUS BLD VENIPUNCTURE: CPT | Mod: PN | Performed by: INTERNAL MEDICINE

## 2023-10-24 PROCEDURE — 82728 ASSAY OF FERRITIN: CPT | Performed by: INTERNAL MEDICINE

## 2023-10-24 PROCEDURE — 83540 ASSAY OF IRON: CPT | Mod: PN | Performed by: INTERNAL MEDICINE

## 2023-10-24 PROCEDURE — 82607 VITAMIN B-12: CPT | Mod: PN | Performed by: INTERNAL MEDICINE

## 2023-10-24 PROCEDURE — 84630 ASSAY OF ZINC: CPT | Mod: PN | Performed by: INTERNAL MEDICINE

## 2023-10-24 PROCEDURE — 80074 ACUTE HEPATITIS PANEL: CPT | Mod: PN | Performed by: INTERNAL MEDICINE

## 2023-10-24 PROCEDURE — 82746 ASSAY OF FOLIC ACID SERUM: CPT | Mod: PN | Performed by: INTERNAL MEDICINE

## 2023-10-24 PROCEDURE — 84466 ASSAY OF TRANSFERRIN: CPT | Mod: PN | Performed by: INTERNAL MEDICINE

## 2023-10-24 PROCEDURE — 84425 ASSAY OF VITAMIN B-1: CPT | Mod: PN | Performed by: INTERNAL MEDICINE

## 2023-10-24 PROCEDURE — 84443 ASSAY THYROID STIM HORMONE: CPT | Mod: PN | Performed by: INTERNAL MEDICINE

## 2023-10-24 PROCEDURE — 83036 HEMOGLOBIN GLYCOSYLATED A1C: CPT | Performed by: INTERNAL MEDICINE

## 2023-10-25 ENCOUNTER — PATIENT MESSAGE (OUTPATIENT)
Dept: RADIOLOGY | Facility: HOSPITAL | Age: 48
End: 2023-10-25
Payer: COMMERCIAL

## 2023-10-25 ENCOUNTER — PATIENT MESSAGE (OUTPATIENT)
Dept: INTERNAL MEDICINE | Facility: CLINIC | Age: 48
End: 2023-10-25
Payer: COMMERCIAL

## 2023-10-25 LAB
ESTIMATED AVG GLUCOSE: 94 MG/DL (ref 68–131)
HBA1C MFR BLD: 4.9 % (ref 4–5.6)

## 2023-10-27 LAB — ZINC SERPL-MCNC: 102 UG/DL (ref 60–130)

## 2023-10-30 LAB — VIT B1 BLD-MCNC: 55 UG/L (ref 38–122)

## 2023-11-01 ENCOUNTER — HOSPITAL ENCOUNTER (OUTPATIENT)
Dept: RADIOLOGY | Facility: HOSPITAL | Age: 48
Discharge: HOME | End: 2023-11-01
Attending: INTERNAL MEDICINE
Payer: COMMERCIAL

## 2023-11-01 VITALS — WEIGHT: 271 LBS | HEIGHT: 65 IN | BODY MASS INDEX: 45.15 KG/M2

## 2023-11-01 DIAGNOSIS — R74.01 ELEVATED ALT MEASUREMENT: ICD-10-CM

## 2023-11-01 DIAGNOSIS — Z12.31 SCREENING MAMMOGRAM FOR BREAST CANCER: ICD-10-CM

## 2023-11-01 PROCEDURE — 77067 MAMMO DIGITAL SCREENING BILAT WITH TOMO: ICD-10-PCS | Mod: 26,,, | Performed by: RADIOLOGY

## 2023-11-01 PROCEDURE — 77067 SCR MAMMO BI INCL CAD: CPT | Mod: 26,,, | Performed by: RADIOLOGY

## 2023-11-01 PROCEDURE — 76705 ECHO EXAM OF ABDOMEN: CPT | Mod: TC,PN

## 2023-11-01 PROCEDURE — 77063 MAMMO DIGITAL SCREENING BILAT WITH TOMO: ICD-10-PCS | Mod: 26,,, | Performed by: RADIOLOGY

## 2023-11-01 PROCEDURE — 76705 ECHO EXAM OF ABDOMEN: CPT | Mod: 26,,, | Performed by: RADIOLOGY

## 2023-11-01 PROCEDURE — 77063 BREAST TOMOSYNTHESIS BI: CPT | Mod: 26,,, | Performed by: RADIOLOGY

## 2023-11-01 PROCEDURE — 76705 US ABDOMEN LIMITED: ICD-10-PCS | Mod: 26,,, | Performed by: RADIOLOGY

## 2023-11-01 PROCEDURE — 77067 SCR MAMMO BI INCL CAD: CPT | Mod: TC

## 2023-11-13 ENCOUNTER — PATIENT MESSAGE (OUTPATIENT)
Dept: INTERNAL MEDICINE | Facility: CLINIC | Age: 48
End: 2023-11-13
Payer: COMMERCIAL

## 2024-01-21 DIAGNOSIS — M32.9 SYSTEMIC LUPUS ERYTHEMATOSUS, UNSPECIFIED SLE TYPE, UNSPECIFIED ORGAN INVOLVEMENT STATUS: ICD-10-CM

## 2024-01-21 DIAGNOSIS — M10.9 GOUT, ARTHRITIS: ICD-10-CM

## 2024-01-21 DIAGNOSIS — E66.01 CLASS 3 SEVERE OBESITY DUE TO EXCESS CALORIES WITHOUT SERIOUS COMORBIDITY WITH BODY MASS INDEX (BMI) OF 45.0 TO 49.9 IN ADULT: ICD-10-CM

## 2024-01-21 DIAGNOSIS — R06.02 SOB (SHORTNESS OF BREATH): ICD-10-CM

## 2024-01-22 RX ORDER — ALLOPURINOL 100 MG/1
200 TABLET ORAL
Qty: 60 TABLET | Refills: 2 | Status: SHIPPED | OUTPATIENT
Start: 2024-01-22 | End: 2024-03-13

## 2024-01-22 RX ORDER — HYDROXYCHLOROQUINE SULFATE 200 MG/1
TABLET, FILM COATED ORAL
Qty: 60 TABLET | Refills: 5 | Status: SHIPPED | OUTPATIENT
Start: 2024-01-22

## 2024-02-14 DIAGNOSIS — M10.9 GOUT, ARTHRITIS: ICD-10-CM

## 2024-02-15 ENCOUNTER — PATIENT MESSAGE (OUTPATIENT)
Dept: RHEUMATOLOGY | Facility: CLINIC | Age: 49
End: 2024-02-15
Payer: COMMERCIAL

## 2024-02-15 RX ORDER — COLCHICINE 0.6 MG/1
TABLET ORAL
Qty: 30 TABLET | Refills: 3 | Status: SHIPPED | OUTPATIENT
Start: 2024-02-15 | End: 2024-06-11

## 2024-02-19 ENCOUNTER — PATIENT MESSAGE (OUTPATIENT)
Dept: ADMINISTRATIVE | Facility: HOSPITAL | Age: 49
End: 2024-02-19
Payer: COMMERCIAL

## 2024-03-08 ENCOUNTER — PATIENT OUTREACH (OUTPATIENT)
Dept: ADMINISTRATIVE | Facility: HOSPITAL | Age: 49
End: 2024-03-08
Payer: COMMERCIAL

## 2024-03-08 DIAGNOSIS — M81.0 POST-MENOPAUSAL OSTEOPOROSIS: Primary | ICD-10-CM

## 2024-03-11 ENCOUNTER — OFFICE VISIT (OUTPATIENT)
Dept: RHEUMATOLOGY | Facility: CLINIC | Age: 49
End: 2024-03-11
Payer: COMMERCIAL

## 2024-03-11 VITALS
HEART RATE: 70 BPM | WEIGHT: 273 LBS | HEIGHT: 65 IN | SYSTOLIC BLOOD PRESSURE: 138 MMHG | BODY MASS INDEX: 45.48 KG/M2 | DIASTOLIC BLOOD PRESSURE: 88 MMHG

## 2024-03-11 DIAGNOSIS — L65.9 ALOPECIA: ICD-10-CM

## 2024-03-11 DIAGNOSIS — D84.9 IMMUNOSUPPRESSION: ICD-10-CM

## 2024-03-11 DIAGNOSIS — R53.83 FATIGUE, UNSPECIFIED TYPE: ICD-10-CM

## 2024-03-11 DIAGNOSIS — M32.9 SYSTEMIC LUPUS ERYTHEMATOSUS, UNSPECIFIED SLE TYPE, UNSPECIFIED ORGAN INVOLVEMENT STATUS: Primary | ICD-10-CM

## 2024-03-11 DIAGNOSIS — R74.8 ELEVATED CPK: ICD-10-CM

## 2024-03-11 DIAGNOSIS — M10.9 GOUT, ARTHRITIS: ICD-10-CM

## 2024-03-11 PROCEDURE — 1159F MED LIST DOCD IN RCRD: CPT | Mod: CPTII,S$GLB,, | Performed by: INTERNAL MEDICINE

## 2024-03-11 PROCEDURE — 99214 OFFICE O/P EST MOD 30 MIN: CPT | Mod: S$GLB,,, | Performed by: INTERNAL MEDICINE

## 2024-03-11 PROCEDURE — 3075F SYST BP GE 130 - 139MM HG: CPT | Mod: CPTII,S$GLB,, | Performed by: INTERNAL MEDICINE

## 2024-03-11 PROCEDURE — 99999 PR PBB SHADOW E&M-EST. PATIENT-LVL III: CPT | Mod: PBBFAC,,, | Performed by: INTERNAL MEDICINE

## 2024-03-11 PROCEDURE — 3008F BODY MASS INDEX DOCD: CPT | Mod: CPTII,S$GLB,, | Performed by: INTERNAL MEDICINE

## 2024-03-11 PROCEDURE — 3079F DIAST BP 80-89 MM HG: CPT | Mod: CPTII,S$GLB,, | Performed by: INTERNAL MEDICINE

## 2024-03-11 PROCEDURE — 1160F RVW MEDS BY RX/DR IN RCRD: CPT | Mod: CPTII,S$GLB,, | Performed by: INTERNAL MEDICINE

## 2024-03-11 NOTE — PROGRESS NOTES
Subjective:       Patient ID: Nitin Mcconnell is a 48 y.o. female.    Chief Complaint: Lupus and gout    HPI:  Nitin Mcconnell is a 48 y.o. female diagnosed at age 31 with lupus.  Started as upper respiratory symptoms that did not respond to antibiotics.  She had fever and low BP as well as SOB.  She was intubated and on ventilator for a week.  She was at [facility].  Dr. Martin diagnosed lupus.  Hospitalized 3/8/07 to 3/30/07.  She had elevated pressure in eyes, kidney involvement.  She was discharged with a PICC line for antibiotics.  She was found to be allergic to heparin due to thrombocytopenia that developed after flushing PICC line with heparin.  She had hair loss.  Had enlarged lymph node under arm in past and biopsy was normal.  Was treated with Plaquenil and steroids.  Only took steroid for 1 year.       Compliant with Plaquenil.  No ulcers in mouth and noses.  No rashes. Still getting treatments  (); hair is growing back.   Hair improving since going natural, not wearing wigs and treatments.    She denies any flares since last visit.    Diagnosed with sleep apnea and using CPAP.   No gout attacks.   Diagnosed with lymph edema and therapy helped.  She is not using compression but will order more.         Lupus Review of Systems  Alopecia: yes  Photosensitivity: no   Raynaud's: no  Oral or nasal ulcers: occasional oral ulcers  Rashes:  Intermittent malar rash; occasional rash in crease of arm and neck  No pleurisy or pericarditis.  No seizures, psychosis, or stroke.  No venous or arterial clots.  History of being on coumadin for 3 months as a precaution.   Pregnancy hx (if applicable): no miscarriages (never pregnant)        Review of Systems   Constitutional: Negative.  Negative for fever and unexpected weight change.   HENT: Negative.  Negative for mouth sores and trouble swallowing.    Eyes:  Negative for redness.   Respiratory:  Negative for cough and shortness of  "breath.    Cardiovascular:  Positive for leg swelling (lymph edema). Negative for chest pain.   Gastrointestinal:  Negative for constipation and diarrhea.   Endocrine: Negative.    Genitourinary: Negative.  Negative for dysuria and genital sores.   Musculoskeletal: Negative.    Skin:  Negative for rash.             Allergic/Immunologic: Negative.    Neurological: Negative.  Negative for headaches.   Hematological: Negative.  Does not bruise/bleed easily.   Psychiatric/Behavioral: Negative.           Objective:   /88   Pulse 70   Ht 5' 5" (1.651 m)   Wt 123.8 kg (273 lb)   LMP 07/13/2013   BMI 45.43 kg/m²      Physical Exam   Constitutional: She is oriented to person, place, and time.   HENT:   Head: Normocephalic and atraumatic.   Eyes: Conjunctivae are normal.   Cardiovascular: Normal rate and regular rhythm.   Pulmonary/Chest: Effort normal and breath sounds normal.   Abdominal: Bowel sounds are normal.   Musculoskeletal:         General: No tenderness or deformity.   Neurological: She is alert and oriented to person, place, and time.   Psychiatric: Mood and affect normal.            LABS    Component      Latest Ref Rng 9/18/2023 10/24/2023   WBC      3.90 - 12.70 K/uL 4.20     RBC      4.00 - 5.40 M/uL 4.37     Hemoglobin      12.0 - 16.0 g/dL 12.2     Hematocrit      37.0 - 48.5 % 39.1     MCV      82 - 98 fL 90     MCH      27.0 - 31.0 pg 27.9     MCHC      32.0 - 36.0 g/dL 31.2 (L)     RDW      11.5 - 14.5 % 12.6     Platelet Count      150 - 450 K/uL 205     MPV      9.2 - 12.9 fL 10.7     Immature Granulocytes      0.0 - 0.5 % 0.0     Gran # (ANC)      1.8 - 7.7 K/uL 2.0     Immature Grans (Abs)      0.00 - 0.04 K/uL 0.00     Lymph #      1.0 - 4.8 K/uL 1.7     Mono #      0.3 - 1.0 K/uL 0.4     Eos #      0.0 - 0.5 K/uL 0.1     Baso #      0.00 - 0.20 K/uL 0.03     nRBC      0 /100 WBC 0     Gran %      38.0 - 73.0 % 47.4     Lymph %      18.0 - 48.0 % 40.5     Mono %      4.0 - 15.0 % 9.3   "   Eos %      0.0 - 8.0 % 2.1     Basophil %      0.0 - 1.9 % 0.7     Differential Method Automated     Sodium      136 - 145 mmol/L 139     Potassium      3.5 - 5.1 mmol/L 3.8     Chloride      95 - 110 mmol/L 103     CO2      23 - 29 mmol/L 31 (H)     Glucose      70 - 110 mg/dL 97     BUN      7 - 17 mg/dL 19 (H)     Creatinine      0.50 - 1.40 mg/dL 1.05     Calcium      8.7 - 10.5 mg/dL 9.0     PROTEIN TOTAL      6.0 - 8.4 g/dL 7.1     Albumin      3.5 - 5.2 g/dL 4.1     BILIRUBIN TOTAL      0.1 - 1.0 mg/dL 0.6     ALP      38 - 126 U/L 59     AST      15 - 46 U/L 46     ALT      10 - 44 U/L 50 (H)     Anion Gap      8 - 16 mmol/L 5 (L)     eGFR      >60 mL/min/1.73 m^2 >60.0     Specimen UA Urine, Clean Catch     Color, UA      Yellow, Straw, Annabelle  Yellow     Appearance, UA      Clear  Clear     pH, UA      5.0 - 8.0  6.0     Specific Gravity, UA      1.005 - 1.030  1.025     Protein, UA      Negative  Negative     Glucose, UA      Negative  Negative     Ketones, UA      Negative  Negative     Bilirubin (UA)      Negative  Negative     Blood, UA      Negative  Negative     NITRITE UA      Negative  Negative     UROBILINOGEN UA      <2.0 EU/dL Negative     Leukocyte Esterase, UA      Negative  1+ !     Cholesterol Total      120 - 199 mg/dL  157    Triglycerides      30 - 150 mg/dL  65    HDL      40 - 75 mg/dL  55    LDL Cholesterol      63.0 - 159.0 mg/dL  89.0    HDL/Cholesterol Ratio      20.0 - 50.0 %  35.0    Total Cholesterol/HDL Ratio      2.0 - 5.0   2.9    Non-HDL Cholesterol      mg/dL  102    Iron      30 - 160 ug/dL  110    Transferrin      200 - 375 mg/dL  227    TIBC      250 - 450 ug/dL  336    Saturated Iron      20 - 50 %  33    Hepatitis B Surface Ag      Non-reactive   Non-reactive    Hep B C IgM      Non-reactive   Non-reactive    Hep A IgM      Non-reactive   Non-reactive    Hepatitis C Ab      Non-reactive   Non-reactive    Urine Protein      0 - 15 mg/dL <7     Urine Creatinine      15.0  - 325.0 mg/dL 98.1     Prot/Creat Ratio, Urine      0.00 - 0.20  Unable to calculate     WBC, UA      0 - 5 /hpf 10 (H)     Bacteria, UA      None-Occ /hpf Occasional     Microscopic Comment SEE COMMENT     Hemoglobin A1C External      4.0 - 5.6 %  4.9    Estimated Avg Glucose      68 - 131 mg/dL  94    ds DNA Ab      Negative 1:10  Negative 1:10     Complement (C-3)      50 - 180 mg/dL 128     Complement (C-4)      11 - 44 mg/dL 38     CPK      55 - 170 U/L 248 (H)     Sed Rate      0 - 36 mm/Hr 5     CRP      0.00 - 1.00 mg/dL 0.22     Uric Acid      2.4 - 5.7 mg/dL 5.7     TSH      0.400 - 4.000 uIU/mL  2.440    Ferritin      20.0 - 300.0 ng/mL  290    Folate      4.0 - 24.0 ng/mL  12.5    Vitamin B12      210 - 950 pg/mL  1193 (H)    Vitamin D      30 - 96 ng/mL  47    Thiamine      38 - 122 ug/L  55    Zinc, Serum-ALT      60 - 130 ug/dL  102       Legend:  (L) Low  (H) High  ! Abnormal        Assessment:       1.  SLE.  Stable.  She feels well today.  2.  Immunosuppression.  Eye exam for Plaquenil done 12/2022.    3.  Elevated pressure in eyes  4.  Obesity.  Gastric bypass 2009 lost 100 pounds but gained most of it back.  Patient considering revision.    5.  Thyroid nodules.  Being monitored.  Aspiration was normal  6.  Mother with RA  7.  Ankle swelling bilateral.  Grandmother and mother had CHF    8.  Alopecia.  Family history of hair loss. Continuing with derm treatment hair vitamin, daily hail oil and hair foam.   She believes biopsy result showed alopecia.   9.  Pulmonary HTN.  Echo in 3/2021 normal PA pressure  10.  SOB intermittent  11.  Lymph edema.  Tried therapy which helped so now doing at home.  12.  Gout left 1st toe.  X-ray with possible erosion at first MTP reviewed by me. No gout attacks    Plan:       1.  Labs.    2.  Followed for pulmonary HTN by cardiologist Dr. Escobar  3.  Follow with current dermatology to evaluate   4.  Patient due for Plaquenil eye exam 12/12/2032.  5.  Patient had flu  vaccination and COVID vaccine booster  6.  Follow info from wellness dietician through insurance who gave info on gout  7.  Medrol pack prn for gout attacks  8.  Patient to follow diet from [facility].  Patient to encouraged to reconsider Weight [employer]. Plans to ride bike.  9.  Stressed compliance with CPAP        RTO 6 months/prn;

## 2024-03-11 NOTE — PROGRESS NOTES
3/4/2024    11:40 AM   Rapid3 Question Responses and Scores   MDHAQ Score 0   Psychologic Score 0   Pain Score 0.5   When you awakened in the morning OVER THE LAST WEEK, did you feel stiff? No   Fatigue Score 0.5   Global Health Score 1.5   RAPID3 Score 0.67     Answers submitted by the patient for this visit:  Rheumatology Questionnaire (Submitted on 3/4/2024)  fever: No  eye redness: No  mouth sores: No  headaches: No  shortness of breath: No  chest pain: No  trouble swallowing: No  diarrhea: No  constipation: No  unexpected weight change: No  genital sore: No  dysuria: No  During the last 3 days, have you had a skin rash?: No  Bruises or bleeds easily: No  cough: No

## 2024-03-12 ENCOUNTER — LAB VISIT (OUTPATIENT)
Dept: LAB | Facility: HOSPITAL | Age: 49
End: 2024-03-12
Attending: INTERNAL MEDICINE
Payer: COMMERCIAL

## 2024-03-12 ENCOUNTER — PATIENT MESSAGE (OUTPATIENT)
Dept: RHEUMATOLOGY | Facility: CLINIC | Age: 49
End: 2024-03-12
Payer: COMMERCIAL

## 2024-03-12 DIAGNOSIS — D84.9 IMMUNOSUPPRESSION: ICD-10-CM

## 2024-03-12 DIAGNOSIS — M10.9 GOUT, ARTHRITIS: ICD-10-CM

## 2024-03-12 DIAGNOSIS — M32.9 SYSTEMIC LUPUS ERYTHEMATOSUS, UNSPECIFIED SLE TYPE, UNSPECIFIED ORGAN INVOLVEMENT STATUS: ICD-10-CM

## 2024-03-12 LAB
ALBUMIN SERPL BCP-MCNC: 4 G/DL (ref 3.5–5.2)
ALP SERPL-CCNC: 62 U/L (ref 38–126)
ALT SERPL W/O P-5'-P-CCNC: 49 U/L (ref 10–44)
ANION GAP SERPL CALC-SCNC: 8 MMOL/L (ref 8–16)
AST SERPL-CCNC: 40 U/L (ref 15–46)
BASOPHILS # BLD AUTO: 0.04 K/UL (ref 0–0.2)
BASOPHILS NFR BLD: 0.9 % (ref 0–1.9)
BILIRUB SERPL-MCNC: 0.8 MG/DL (ref 0.1–1)
C3 SERPL-MCNC: 130 MG/DL (ref 50–180)
C4 SERPL-MCNC: 42 MG/DL (ref 11–44)
CALCIUM SERPL-MCNC: 9.2 MG/DL (ref 8.7–10.5)
CHLORIDE SERPL-SCNC: 103 MMOL/L (ref 95–110)
CK SERPL-CCNC: 249 U/L (ref 55–170)
CO2 SERPL-SCNC: 30 MMOL/L (ref 23–29)
CREAT SERPL-MCNC: 1.02 MG/DL (ref 0.5–1.4)
CRP SERPL-MCNC: 0.27 MG/DL (ref 0–1)
DIFFERENTIAL METHOD BLD: ABNORMAL
EOSINOPHIL # BLD AUTO: 0.1 K/UL (ref 0–0.5)
EOSINOPHIL NFR BLD: 1.4 % (ref 0–8)
ERYTHROCYTE [DISTWIDTH] IN BLOOD BY AUTOMATED COUNT: 12.5 % (ref 11.5–14.5)
ERYTHROCYTE [SEDIMENTATION RATE] IN BLOOD BY PHOTOMETRIC METHOD: 13 MM/HR (ref 0–36)
EST. GFR  (NO RACE VARIABLE): >60 ML/MIN/1.73 M^2
GLUCOSE SERPL-MCNC: 91 MG/DL (ref 70–110)
HCT VFR BLD AUTO: 39.9 % (ref 37–48.5)
HGB BLD-MCNC: 12.6 G/DL (ref 12–16)
IMM GRANULOCYTES # BLD AUTO: 0 K/UL (ref 0–0.04)
IMM GRANULOCYTES NFR BLD AUTO: 0 % (ref 0–0.5)
LYMPHOCYTES # BLD AUTO: 1.6 K/UL (ref 1–4.8)
LYMPHOCYTES NFR BLD: 37 % (ref 18–48)
MCH RBC QN AUTO: 28.1 PG (ref 27–31)
MCHC RBC AUTO-ENTMCNC: 31.6 G/DL (ref 32–36)
MCV RBC AUTO: 89 FL (ref 82–98)
MONOCYTES # BLD AUTO: 0.5 K/UL (ref 0.3–1)
MONOCYTES NFR BLD: 10.8 % (ref 4–15)
NEUTROPHILS # BLD AUTO: 2.1 K/UL (ref 1.8–7.7)
NEUTROPHILS NFR BLD: 49.9 % (ref 38–73)
NRBC BLD-RTO: 0 /100 WBC
PLATELET # BLD AUTO: 220 K/UL (ref 150–450)
PMV BLD AUTO: 10.8 FL (ref 9.2–12.9)
POTASSIUM SERPL-SCNC: 3.5 MMOL/L (ref 3.5–5.1)
PROT SERPL-MCNC: 7.3 G/DL (ref 6–8.4)
RBC # BLD AUTO: 4.49 M/UL (ref 4–5.4)
SODIUM SERPL-SCNC: 141 MMOL/L (ref 136–145)
URATE SERPL-MCNC: 5.6 MG/DL (ref 2.4–5.7)
UUN UR-MCNC: 16 MG/DL (ref 7–17)
WBC # BLD AUTO: 4.27 K/UL (ref 3.9–12.7)

## 2024-03-12 PROCEDURE — 85652 RBC SED RATE AUTOMATED: CPT | Mod: PN | Performed by: INTERNAL MEDICINE

## 2024-03-12 PROCEDURE — 86225 DNA ANTIBODY NATIVE: CPT | Mod: PN | Performed by: INTERNAL MEDICINE

## 2024-03-12 PROCEDURE — 36415 COLL VENOUS BLD VENIPUNCTURE: CPT | Mod: PN | Performed by: INTERNAL MEDICINE

## 2024-03-12 PROCEDURE — 80053 COMPREHEN METABOLIC PANEL: CPT | Mod: PN | Performed by: INTERNAL MEDICINE

## 2024-03-12 PROCEDURE — 86160 COMPLEMENT ANTIGEN: CPT | Mod: 59,PN | Performed by: INTERNAL MEDICINE

## 2024-03-12 PROCEDURE — 86160 COMPLEMENT ANTIGEN: CPT | Mod: PN | Performed by: INTERNAL MEDICINE

## 2024-03-12 PROCEDURE — 84550 ASSAY OF BLOOD/URIC ACID: CPT | Performed by: INTERNAL MEDICINE

## 2024-03-12 PROCEDURE — 82550 ASSAY OF CK (CPK): CPT | Mod: PN | Performed by: INTERNAL MEDICINE

## 2024-03-12 PROCEDURE — 86140 C-REACTIVE PROTEIN: CPT | Mod: PN | Performed by: INTERNAL MEDICINE

## 2024-03-12 PROCEDURE — 85025 COMPLETE CBC W/AUTO DIFF WBC: CPT | Mod: PN | Performed by: INTERNAL MEDICINE

## 2024-03-13 DIAGNOSIS — M10.9 GOUT, ARTHRITIS: ICD-10-CM

## 2024-03-13 LAB — DSDNA AB SER-ACNC: NORMAL [IU]/ML

## 2024-03-13 RX ORDER — ALLOPURINOL 100 MG/1
200 TABLET ORAL
Qty: 180 TABLET | Refills: 1 | Status: SHIPPED | OUTPATIENT
Start: 2024-03-13

## 2024-04-15 DIAGNOSIS — J30.89 NON-SEASONAL ALLERGIC RHINITIS DUE TO FUNGAL SPORES: Chronic | ICD-10-CM

## 2024-04-15 RX ORDER — MONTELUKAST SODIUM 10 MG/1
10 TABLET ORAL NIGHTLY
Qty: 30 TABLET | Refills: 1 | Status: SHIPPED | OUTPATIENT
Start: 2024-04-15

## 2024-04-18 ENCOUNTER — APPOINTMENT (OUTPATIENT)
Dept: RADIOLOGY | Facility: CLINIC | Age: 49
End: 2024-04-18
Attending: INTERNAL MEDICINE
Payer: COMMERCIAL

## 2024-04-18 DIAGNOSIS — M81.0 POST-MENOPAUSAL OSTEOPOROSIS: ICD-10-CM

## 2024-04-18 PROCEDURE — 77080 DXA BONE DENSITY AXIAL: CPT | Mod: TC,PO

## 2024-04-18 PROCEDURE — 77080 DXA BONE DENSITY AXIAL: CPT | Mod: 26,ICN,, | Performed by: INTERNAL MEDICINE

## 2024-06-10 DIAGNOSIS — M10.9 GOUT, ARTHRITIS: ICD-10-CM

## 2024-06-11 RX ORDER — COLCHICINE 0.6 MG/1
TABLET ORAL
Qty: 30 TABLET | Refills: 3 | Status: SHIPPED | OUTPATIENT
Start: 2024-06-11

## 2024-06-20 ENCOUNTER — PATIENT MESSAGE (OUTPATIENT)
Dept: OTOLARYNGOLOGY | Facility: CLINIC | Age: 49
End: 2024-06-20
Payer: COMMERCIAL

## 2024-07-22 ENCOUNTER — OFFICE VISIT (OUTPATIENT)
Dept: OTOLARYNGOLOGY | Facility: CLINIC | Age: 49
End: 2024-07-22
Payer: COMMERCIAL

## 2024-07-22 VITALS
HEART RATE: 72 BPM | BODY MASS INDEX: 46.39 KG/M2 | SYSTOLIC BLOOD PRESSURE: 131 MMHG | DIASTOLIC BLOOD PRESSURE: 80 MMHG | WEIGHT: 278.75 LBS

## 2024-07-22 DIAGNOSIS — J34.3 HYPERTROPHY OF BOTH INFERIOR NASAL TURBINATES: Chronic | ICD-10-CM

## 2024-07-22 DIAGNOSIS — H93.11 TINNITUS OF RIGHT EAR: Chronic | ICD-10-CM

## 2024-07-22 DIAGNOSIS — J30.89 NON-SEASONAL ALLERGIC RHINITIS DUE TO OTHER ALLERGIC TRIGGER: Chronic | ICD-10-CM

## 2024-07-22 DIAGNOSIS — J30.89 NON-SEASONAL ALLERGIC RHINITIS DUE TO FUNGAL SPORES: Primary | Chronic | ICD-10-CM

## 2024-07-22 DIAGNOSIS — J30.1 SEASONAL ALLERGIC RHINITIS DUE TO POLLEN: Chronic | ICD-10-CM

## 2024-07-22 PROCEDURE — 1159F MED LIST DOCD IN RCRD: CPT | Mod: CPTII,S$GLB,, | Performed by: OTOLARYNGOLOGY

## 2024-07-22 PROCEDURE — 3079F DIAST BP 80-89 MM HG: CPT | Mod: CPTII,S$GLB,, | Performed by: OTOLARYNGOLOGY

## 2024-07-22 PROCEDURE — 99999 PR PBB SHADOW E&M-EST. PATIENT-LVL III: CPT | Mod: PBBFAC,,, | Performed by: OTOLARYNGOLOGY

## 2024-07-22 PROCEDURE — 3075F SYST BP GE 130 - 139MM HG: CPT | Mod: CPTII,S$GLB,, | Performed by: OTOLARYNGOLOGY

## 2024-07-22 PROCEDURE — 1160F RVW MEDS BY RX/DR IN RCRD: CPT | Mod: CPTII,S$GLB,, | Performed by: OTOLARYNGOLOGY

## 2024-07-22 PROCEDURE — 99214 OFFICE O/P EST MOD 30 MIN: CPT | Mod: S$GLB,,, | Performed by: OTOLARYNGOLOGY

## 2024-07-22 PROCEDURE — 3008F BODY MASS INDEX DOCD: CPT | Mod: CPTII,S$GLB,, | Performed by: OTOLARYNGOLOGY

## 2024-07-22 RX ORDER — MONTELUKAST SODIUM 10 MG/1
10 TABLET ORAL NIGHTLY
Qty: 90 TABLET | Refills: 3 | Status: SHIPPED | OUTPATIENT
Start: 2024-07-22 | End: 2025-07-22

## 2024-07-22 NOTE — PROGRESS NOTES
Chief Complaint   Patient presents with    Follow-up     No issues with allergies patient stated that things have been perfect and also needs a refill on medications     Ear Problem     Right ear making a sound that doesn't exist no pain, no drainage       HPI:  Patient is a 49 y.o. female who has previously seen me for cerumen impaction, allergic rhinitis.  She had RAST testing performed showing sensitivities to certain weeds, molds, dust mites, and cockroach.  Referral to Allergy/immunology placed.  Patient was prescribed Singulair and nasal steroid for daily use.    Since the last visit, the patient reports she is been doing very well.  She has had no significant sinus or nasal symptoms since maintaining on the Nasacort and Singulair.  She saw Allergy immunology who did not recommend making any changes to her medications as of now, since they were working well.  They did discuss medication measures for her allergic sensitivities.    Interval HPI 07/22/2024 :    Patient reports her symptoms are well controlled on her current medications.  She is using her Nasacort and Singulair daily.  She still gets some minor flare up seasonally, but overall has been well-controlled.    Patient reports a very slight amount of tinnitus that she has noticed in the right ear over the last few months.  It is only evident at night or when it is very quiet.  She has not had recent audiogram and does not report any actual decrease of the hearing.      Active Ambulatory Problems     Diagnosis Date Noted    Other specified hypertrophic and atrophic condition of skin 01/17/2012    Ptosis of breast 01/17/2012    Systemic lupus erythematosus 04/10/2012    Unspecified hypertrophic and atrophic condition of skin 01/17/2012    Multiple thyroid nodules 03/18/2018    Essential hypertension 07/09/2018    Alopecia 02/25/2019    Bilateral leg edema 02/25/2019    Pulmonary hypertension 02/28/2019    Immunosuppression 04/24/2019    Class 3 severe  obesity due to excess calories with serious comorbidity in adult 04/24/2019    Chronic pulmonary heart disease     Morbid obesity with BMI of 45.0-49.9, adult 09/09/2019    S/P gastric bypass 09/14/2019    Obstructive sleep apnea     Venous insufficiency of both lower extremities 02/17/2020    Lymphedema of both lower extremities 02/17/2020    Lower extremity weakness 03/10/2020    Swelling of lower limb 03/10/2020    Chest pain 02/17/2021    Elevated ALT measurement 11/07/2021    Positive colorectal cancer screening using DNA-based stool test 04/18/2022    Chronic gout without tophus 10/20/2023     Resolved Ambulatory Problems     Diagnosis Date Noted    Localized adiposity 04/16/2012    Other specified disorders of adrenal glands 04/10/2012    S/P hysterectomy 07/23/2013    Vitamin D deficiency 03/22/2018    Lower extremity edema 03/10/2020     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, benign     Lupus mild       Review of Systems  General: negative for chills, fever or weight loss  Psychological: negative for mood changes or depression  Ophthalmic: negative for blurry vision, photophobia or eye pain  ENT: see HPI  Respiratory: no cough, shortness of breath, or wheezing  Cardiovascular: no chest pain or dyspnea on exertion  Gastrointestinal: no abdominal pain, change in bowel habits, or black/ bloody stools  Musculoskeletal: negative for gait disturbance or muscular weakness  Neurological: no syncope or seizures; no ataxia  Dermatological: negative for pruritis, rash and jaundice  Hematologic/lymphatic: no easy bruising, no new adenopathy    Physical Exam     Vitals:    07/22/24 1415   BP: 131/80   Pulse: 72           Constitutional:   She is oriented to person, place, and time. Vital signs are normal. She appears well-developed and well-nourished. She appears  alert. She is cooperative. Normal speech.      Head:  Normocephalic and atraumatic. Salivary glands normal.  Facial strength is normal.      Ears:  Hearing normal to normal and whispered voice; external ear normal without scars, lesions, or masses; ear canal, tympanic membrane, and middle ear normal., right ear hearing normal to normal and whispered voice; external ear normal without scars, lesions, or masses; ear canal, tympanic membrane, and middle ear normal. and left ear hearing normal to normal and whispered voice; external ear normal without scars, lesions, or masses; ear canal, tympanic membrane, and middle ear normal..   Right Ear: Tympanic membrane is not erythematous, not retracted and not bulging. No middle ear effusion.   Left Ear: Tympanic membrane is not erythematous, not retracted and not bulging.  No middle ear effusion.     Nose:  Mucosal edema present. No rhinorrhea, septal deviation or polyps. Turbinate hypertrophy (3+, improved edema).  Turbinates normal.  Right sinus exhibits no maxillary sinus tenderness and no frontal sinus tenderness. Left sinus exhibits no maxillary sinus tenderness and no frontal sinus tenderness.     Mouth/Throat  Oropharynx clear and moist without lesions or asymmetry, lips, teeth, and gums normal and oropharynx normal. No mucous membrane lesions. No oropharyngeal exudate, posterior oropharyngeal edema or posterior oropharyngeal erythema. Mirror exam not performed due to patient tolerance.  Mirror exam not performed due to patient tolerance.      Neck:  Neck normal without thyromegaly masses, asymmetry, normal tracheal structure, crepitus, and tenderness, thyroid normal, trachea normal, phonation normal, full range of motion with neck supple and no adenopathy. No JVD present. Carotid bruit is not present. No thyroid mass and no thyromegaly present.     She has no cervical adenopathy.     Cardiovascular:    Normal rate, regular rhythm and rate and rhythm, heart sounds, and  pulses normal.              Pulmonary/Chest:   Effort and breath sounds normal.     Psychiatric:   She has a normal mood and affect. Her speech is normal and behavior is normal.     Neurological:   She is alert and oriented to person, place, and time. She has neurological normal, alert and oriented. No cranial nerve deficit.     Skin:   No abrasions, lacerations, lesions, or rashes.         Assessment/Plan:      ICD-10-CM ICD-9-CM    1. Non-seasonal allergic rhinitis due to fungal spores  J30.89 477.8 montelukast (SINGULAIR) 10 mg tablet      2. Tinnitus of right ear  H93.11 388.30       3. Seasonal allergic rhinitis due to pollen  J30.1 477.0       4. Non-seasonal allergic rhinitis due to other allergic trigger  J30.89 477.8       5. Hypertrophy of both inferior nasal turbinates  J34.3 478.0               Continue Singulair and Nasacort daily.  We discussed the addition of OTC antihistamines if she has breakthrough symptoms.    Provided refills for her medications for 1 year.      Patient will set up audiogram next available to assess the hearing in light of her tinnitus symptoms.  I will review these results and let her know if any additional plans or workup that is needed.  I provided her with written information about tinnitus and its causes.    Yamileth March MD  [facility]

## 2024-07-22 NOTE — PATIENT INSTRUCTIONS
Continue allergy medications.    Refills given.     Will set up for audiogram to assess tinnitus.      WHAT IS TINNITUS?  The perception of sound heard in one or both sides of the head. Some refer to it as a ringing, noise, buzzing, roaring, clicking, wooshing, crickets, heartbeat, music, or other noises. There are varying levels of severity. The tinnitus may be constant or periodic. Common complaints include difficulty sleeping, struggling to understand other's speech, depression, and problems focusing.  Tinnitus is a symptom, not a disease. It affects 10-15% of adults in the [place].    WHAT CAN YOU DO?  You should seek medical care if you suspect you have tinnitus and tell your physician if your symptoms are affecting your daily life. Tinnitus may cause anxiety, depression, insomnia, negative emotions, and withdrawal. It's okay to seek help as your symptoms may be managed, and common.    HOW IS TINNITUS DIAGNOSED?  A doctor can diagnose tinnitus after a physical examination and interview. The examination may rule out conditions causing the tinnitus such as wax impaction or fluid behind the eardrum. Tinnitus may also be related to hearing loss, especially hearing loss from frequent noise exposure. A hearing test may be performed if you are experiencing tinnitus.    TREATMENT FOR TINNITUS?  Treatment may improve on its own but if it doesn't there are several ways to manage your symptoms although there is no cure. Possible treatment options include amplification (hearing aids), sound therapy (maskers,white noise,etc.), TMJ treatment, cognitive behavioral therapy, relaxation exercises, and managing your medications.  There is not enough evidence to suggest that over the counter products help patients with tinnitus. Acupuncture can neither be recommended or discouraged due to lack of evidence as well.    Source: American Academy of Otolaryngology-Head And Neck Surgery    ORGANIZATIONS AND LINKS FOR TINNITUS AND  HEARING LOSS    American Academy of Audiology      www.audiology.org  American Tinnitus Associate        www.tg.org  American Academy of Otolaryngology, Head & Neck Surgery www.entnet.org  American Auditory Society     www.amauditorysoc.org  Better Hearing [place]      www.betterhearing.org  EarHelp        www.earhelp.co.uk/  Hearing Education and Awareness for [employer] (HEAR)  [email or web address]  Hearing Loss Association of [place]    www.[email or web address]   Hear-It        www.hear-it.org  Healthy Hearing       www.[email or web address]   Sight and Hearing Association     www.sightandhearing.org

## 2024-07-23 ENCOUNTER — PATIENT MESSAGE (OUTPATIENT)
Dept: RHEUMATOLOGY | Facility: CLINIC | Age: 49
End: 2024-07-23
Payer: COMMERCIAL

## 2024-07-23 DIAGNOSIS — E66.01 CLASS 3 SEVERE OBESITY DUE TO EXCESS CALORIES WITHOUT SERIOUS COMORBIDITY WITH BODY MASS INDEX (BMI) OF 45.0 TO 49.9 IN ADULT: ICD-10-CM

## 2024-07-23 DIAGNOSIS — R06.02 SOB (SHORTNESS OF BREATH): ICD-10-CM

## 2024-07-23 DIAGNOSIS — M32.9 SYSTEMIC LUPUS ERYTHEMATOSUS, UNSPECIFIED SLE TYPE, UNSPECIFIED ORGAN INVOLVEMENT STATUS: ICD-10-CM

## 2024-07-23 RX ORDER — HYDROXYCHLOROQUINE SULFATE 200 MG/1
TABLET, FILM COATED ORAL
Qty: 60 TABLET | Refills: 5 | Status: SHIPPED | OUTPATIENT
Start: 2024-07-23

## 2024-07-31 ENCOUNTER — CLINICAL SUPPORT (OUTPATIENT)
Dept: OTOLARYNGOLOGY | Facility: CLINIC | Age: 49
End: 2024-07-31
Payer: COMMERCIAL

## 2024-07-31 DIAGNOSIS — H93.11 TINNITUS OF RIGHT EAR: Primary | ICD-10-CM

## 2024-07-31 PROCEDURE — 99999 PR PBB SHADOW E&M-EST. PATIENT-LVL I: CPT | Mod: PBBFAC,,, | Performed by: PHYSICIAN ASSISTANT

## 2024-07-31 PROCEDURE — 92567 TYMPANOMETRY: CPT | Mod: S$GLB,,, | Performed by: PHYSICIAN ASSISTANT

## 2024-07-31 PROCEDURE — 92552 PURE TONE AUDIOMETRY AIR: CPT | Mod: S$GLB,,, | Performed by: PHYSICIAN ASSISTANT

## 2024-07-31 PROCEDURE — 92556 SPEECH AUDIOMETRY COMPLETE: CPT | Mod: S$GLB,,, | Performed by: PHYSICIAN ASSISTANT

## 2024-07-31 NOTE — Clinical Note
Ms. Mcconnell had her audiogram completed today and her hearing was normal.  She has been c/o tinnitus in her right ear for the past couple of months.  Type A tymps noted bilaterally.

## 2024-07-31 NOTE — PROGRESS NOTES
Nitin Mcconnell, a 49 y.o. female, was seen today in the clinic for an audiologic evaluation.  Ms. Mcconnell reports a slight amount of tinnitus in her Right ear which she hears only at nighttime. This has been present for the past couple of months.  It does not disrupt her sleep.  She denies hearing loss, ear pain or ear drainage.      Audiogram results revealed normal hearing sensitivity bilaterally.  Speech reception thresholds were noted at 5 dB in the right ear and 5 dB in the left ear.  Speech discrimination scores were 100% in the right ear and 100% in the left ear.  Tympanometry revealed Type A in the right ear and Type A in the left ear.     Recommendations:  Otologic evaluation  Annual audiogram  Hearing protection when in noise

## 2024-08-13 DIAGNOSIS — M10.9 GOUT, ARTHRITIS: ICD-10-CM

## 2024-08-13 RX ORDER — ALLOPURINOL 100 MG/1
200 TABLET ORAL DAILY
Qty: 180 TABLET | Refills: 1 | Status: SHIPPED | OUTPATIENT
Start: 2024-08-13

## 2024-08-19 ENCOUNTER — PATIENT MESSAGE (OUTPATIENT)
Dept: ADMINISTRATIVE | Facility: HOSPITAL | Age: 49
End: 2024-08-19
Payer: COMMERCIAL

## 2024-09-18 ENCOUNTER — OFFICE VISIT (OUTPATIENT)
Dept: CARDIOLOGY | Facility: CLINIC | Age: 49
End: 2024-09-18
Payer: COMMERCIAL

## 2024-09-18 VITALS
HEART RATE: 85 BPM | BODY MASS INDEX: 46.24 KG/M2 | DIASTOLIC BLOOD PRESSURE: 92 MMHG | WEIGHT: 277.56 LBS | SYSTOLIC BLOOD PRESSURE: 129 MMHG | HEIGHT: 65 IN

## 2024-09-18 DIAGNOSIS — I87.2 VENOUS INSUFFICIENCY OF BOTH LOWER EXTREMITIES: ICD-10-CM

## 2024-09-18 DIAGNOSIS — I27.20 PULMONARY HYPERTENSION: ICD-10-CM

## 2024-09-18 DIAGNOSIS — Z98.84 S/P GASTRIC BYPASS: ICD-10-CM

## 2024-09-18 DIAGNOSIS — E66.01 MORBID OBESITY WITH BMI OF 45.0-49.9, ADULT: ICD-10-CM

## 2024-09-18 DIAGNOSIS — I89.0 LYMPHEDEMA OF BOTH LOWER EXTREMITIES: ICD-10-CM

## 2024-09-18 DIAGNOSIS — R07.89 OTHER CHEST PAIN: Primary | ICD-10-CM

## 2024-09-18 DIAGNOSIS — G47.33 OBSTRUCTIVE SLEEP APNEA: ICD-10-CM

## 2024-09-18 DIAGNOSIS — R60.0 BILATERAL LEG EDEMA: ICD-10-CM

## 2024-09-18 DIAGNOSIS — I10 ESSENTIAL HYPERTENSION: ICD-10-CM

## 2024-09-18 DIAGNOSIS — I27.9 CHRONIC PULMONARY HEART DISEASE: ICD-10-CM

## 2024-09-18 DIAGNOSIS — M79.89 SWELLING OF LOWER LIMB: ICD-10-CM

## 2024-09-18 PROCEDURE — 99999 PR PBB SHADOW E&M-EST. PATIENT-LVL IV: CPT | Mod: PBBFAC,,, | Performed by: INTERNAL MEDICINE

## 2024-09-18 PROCEDURE — 3074F SYST BP LT 130 MM HG: CPT | Mod: CPTII,S$GLB,, | Performed by: INTERNAL MEDICINE

## 2024-09-18 PROCEDURE — 99204 OFFICE O/P NEW MOD 45 MIN: CPT | Mod: S$GLB,,, | Performed by: INTERNAL MEDICINE

## 2024-09-18 PROCEDURE — 3080F DIAST BP >= 90 MM HG: CPT | Mod: CPTII,S$GLB,, | Performed by: INTERNAL MEDICINE

## 2024-09-18 PROCEDURE — 3008F BODY MASS INDEX DOCD: CPT | Mod: CPTII,S$GLB,, | Performed by: INTERNAL MEDICINE

## 2024-09-18 PROCEDURE — 1159F MED LIST DOCD IN RCRD: CPT | Mod: CPTII,S$GLB,, | Performed by: INTERNAL MEDICINE

## 2024-09-18 NOTE — PATIENT INSTRUCTIONS
Assessment/Plan:  Nitin Mcconnell is a 49 y.o. female with a past medical history of HTN, lupus, morbid obesity s/p gastric bypass surgery in 2009, RAQUEL (on CPAP), who presents for a scheduled appointment.     1. Chest Pain- Pt with risk factors for CAD, including morbid obesity.  Given body habitus, check PET stress test and echo.     2. BLE Edema - Presentation due to combination of lymphedema with BLE venous insufficiency.  Check BLE venous reflux study and RACHELLE study.  Pt presents with findings consistent with moderate lymphedema.  Refer to lymphedema clinic.  Recommend wearing graduated compression hose.  Limit sodium intake to 2000 mg daily.  Limit volume intake to 1.5 L daily.  Elevate legs when resting.    3. Morbid Obesity s/p Gastric Bypass Surgery in 2009- Refer to nutrition for assistance with weight loss.   Encourage moderate/aerobic exercise for minimum 30 minutes for 5 days in a week.     4. ASCVD - Ten year risk score low at 2.6%.  .  Encourage diet, exercise and weight loss.     Will call pt with results of studies  Otherwise, follow up in 5 months

## 2024-09-18 NOTE — PROGRESS NOTES
[facility]    CC: leg swelling    Patient ID: Nitin Mcconnell is a 49 y.o. female with a past medical history of HTN, lupus, morbid obesity s/p gastric bypass surgery in 2009, RAQUEL (on CPAP), who presents for a scheduled appointment.  Pertinent history/events are as follows:     -Pt kindly referred by Dr. Dixon for evaluation of bilateral leg edema/venous reflux.    On her prior visit on 02/17/20, Mr. Mcconnell reports first noticing swelling in both legs approximately 3 years ago.  States she was recently switched from torsemide to bumex a few weeks ago with no significant improvement in leg swelling.  States she also wears compression hose with no significant improvement in leg swelling.  No smoking history.;  BLE Venous Reflux Study on 1/31/2020 revealed BLE venous reflux with no evidence of DVT.  Echo on 2/13/2020 showed normal left ventricular systolic function with EF of 60%; normal right ventricular systolic function; normal LV diastolic function; mild TR/MR; estimated PA systolic pressure is 31 mmHg; normal central venous pressure (3 mmHg).  Exam shows BLE's with non-pitting edema (L>R) consistent with lymphedema.  Trace pitting edema noted  Plan:   BLE Edema- Presentation due to combination of lymphedema with BLE venous insufficiency. Refer to lymphedema clinic.  Pt instructed to elevate legs when resting.  Pt would benefit from significant weight loss.    Morbid Obesity s/p Gastric Bypass Surgery in 2009- Refer to nutrition for assistance with weight loss.     -At follow up clinic visit on 11/2/2020, Mrs. Mcconnell reported continued swelling of her lower extremities. She has not been going to physical therapy due to pandemic.  She denies any claudication or extremity pain at rest, symptoms of CLI or tissue atrophy.   Plan:   BLE Edema- Presentation due to combination of lymphedema with BLE venous insufficiency.  Refer to lymphedema clinic.  Pt instructed to elevate legs when resting.   "Pt would benefit from significant weight loss.    Morbid Obesity s/p Gastric Bypass Surgery in 2009- Refer to nutrition for assistance with weight loss.    Encourage moderate/aerobic exercise for minimum 30 minutes for 5 days in a week.     -At clinic visit on 2/1/2021, Mrs. Mcconnell reports significant improvement in BLE edema since starting theapy with lymphedema clinic in 12/2020.   Plan:   BLE Edema- Presentation due to combination of lymphedema with BLE venous insufficiency.  BLE edema improving.  Continue lymphedema clinic therapy.  Pt to transition to doing therapies at home after discharge from lymphedema clinic.    Morbid Obesity s/p Gastric Bypass Surgery in 2009- Refer to nutrition for assistance with weight loss.   Encourage moderate/aerobic exercise for minimum 30 minutes for 5 days in a week.    - At clinic visit on 02/17/21, Mrs. Mcconnell reported chest pain which started 2 days ago.  Chest pain occurs at rest, described as "pressure", located at mid chest, non-radiating, 4/10 in intensity, lasting 10 seconds.  No associated n/v/d.  Last episode occurred las night.  No smoking history.  No family history of CAD/MI.  EKG today shows sinus bradycardia with no ischemic ST/T wave changes.    Plan:  Chest Pain- Mrs. Mcconnell presents with chest pain as described.  Pt has several risk factors for CAD, including morbid obesity with BMI of 46.  EKG today shows sinus bradycardia with no ischemic ST/T wave changes.   Check troponin now.  Check exercise stress echo.  Pt instructed to call 911 and report immediately to the ED for any concerning symptoms.     BLE Edema- Presentation due to combination of lymphedema with BLE venous insufficiency.  BLE edema improving.  Continue lymphedema clinic therapy.  Pt to transition to doing therapies at home after discharge from lymphedema clinic.    Morbid Obesity s/p Gastric Bypass Surgery in 2009- Refer to nutrition for assistance with weight loss.   Encourage " moderate/aerobic exercise for minimum 30 minutes for 5 days in a week.   ASCVD- Ten year risk score low at 2.7%.  .  Encourage diet, exercise and weight loss.     -At clinic visit on 5/24/2021, Mrs. Mcconnell reported resolution of chest pain.  Patient reports leg swelling which is controlled with compression wraps and graduated compression hose.   Patient reports no claudication, rest pain or tissue loss.  Stress Echo (03/10/21) revealed LVEF of 60%.   Normal RV systolic function with a PA systolic pressure estimate (under 25mm Hg).  This study is negative for myocardial ischemia.  Plan:   Chest Pain - Mrs. Mcconnell chest pain has resolved.  Pt has several risk factors for CAD, including morbid obesity with BMI of 46.  EKG today shows sinus bradycardia with no ischemic ST/T wave changes.   Troponin wnl.  Stress Echo (03/10/21) revealed LVEF of 60%.   Normal RV systolic function with a PA systolic pressure estimate (under 25mm Hg).  This study is negative for myocardial ischemia.   Continue risk factor modification and weight loss.      BLE Edema - Presentation due to combination of lymphedema with BLE venous insufficiency.  BLE edema improving.  Patient transitioned to doing therapies at home after discharge from lymphedema clinic.    Pulmonary HTN - No prior RHC reports/procedures were completed that could confirm the diagnosis.  Most recent stress Echo on 03/10/21 revealed normal PA pressures.   Monitor for now.   Morbid Obesity s/p Gastric Bypass Surgery in 2009- Refer to nutrition for assistance with weight loss.   Encourage moderate/aerobic exercise for minimum 30 minutes for 5 days in a week.   ASCVD - Ten year risk score low at 2.7%.  .  Encourage diet, exercise and weight loss.     HPI  Mrs. Mcconnell reports an episode of chest pain in 8/2024.  No chest pain since that time.  She reports worsening of leg swelling over the past few months.  No claudication symptoms or tissue loss.    Past  Medical History:   Diagnosis Date    Blood transfusion     Breast cyst     Connective tissue disease     Hx of small bowel obstruction 2009    Hypertension     Leydig cell tumor in female, benign     Lupus mild       Past Surgical History:   Procedure Laterality Date    BREAST BIOPSY Right 2008    rt axilla    COLONOSCOPY N/A 05/17/2022    Procedure: COLONOSCOPY;  Surgeon: Lilibeth Merrill MD;  Location: [facility] ENDO (4TH FLR);  Service: Endoscopy;  Laterality: N/A;  miralax prep  4/25 fully vaccinated; instructions to portal-  5/13-changed from Jose to Lammi    ENDOSCOPIC ULTRASOUND OF LOWER GASTROINTESTINAL TRACT N/A 6/7/2022    Procedure: ULTRASOUND, LOWER GI TRACT, ENDOSCOPIC;  Surgeon: Enrique Beach MD;  Location: [facility] ENDO (2ND FLR);  Service: Endoscopy;  Laterality: N/A;  MD Ashlyn Roblero MA  Need EUS for sigmoid colon nodule. Forward view EUS. Main. 45 minutes.   Thanks,   Enrique Beach MD   5/19:fully vaccinated. instructions mailed-SC    HYSTERECTOMY      @38yrs of age    LIPECTOMY      LYMPH NODE BIOPSY      right arm    OOPHORECTOMY      @38yrs of age    panniculectomy      JIAN-EN-Y PROCEDURE  08/27/2009    RNY bypass       Social History     Socioeconomic History    Marital status: Single   Tobacco Use    Smoking status: Never     Passive exposure: Past    Smokeless tobacco: Never   Substance and Sexual Activity    Alcohol use: Yes     Comment: socially    Drug use: No    Sexual activity: Never   Social History Narrative    Works for [employer]     Social Determinants of Health     Financial Resource Strain: Low Risk  (3/4/2024)    Overall Financial Resource Strain (CARDIA)     Difficulty of Paying Living Expenses: Not very hard   Food Insecurity: No Food Insecurity (3/4/2024)    Hunger Vital Sign     Worried About Running Out of Food in the Last Year: Never true     Ran Out of Food in the Last Year: Never true   Transportation Needs: No Transportation Needs  (3/4/2024)    PRAPARE - Transportation     Lack of Transportation (Medical): No     Lack of Transportation (Non-Medical): No   Physical Activity: Inactive (3/4/2024)    Exercise Vital Sign     Days of Exercise per Week: 0 days     Minutes of Exercise per Session: 0 min   Stress: No Stress Concern Present (3/4/2024)    [employer] [place] [employer] - Occupational Stress Questionnaire     Feeling of Stress : Not at all   Housing Stability: Low Risk  (3/4/2024)    Housing Stability Vital Sign     Unable to Pay for Housing in the Last Year: No     Number of Places Lived in the Last Year: 1     Unstable Housing in the Last Year: No       Family History   Problem Relation Name Age of Onset    Heart failure Mother      Rheum arthritis Mother      Heart failure Maternal Grandmother      Heart attack Maternal Grandmother      Thyroid disease Maternal Grandmother      Diabetes Maternal Grandfather      Kidney disease Maternal Grandfather      Diabetes Paternal Grandmother      Thyroid disease Maternal Aunt      Lupus Neg Hx         Review of patient's allergies indicates:   Allergen Reactions    Heparin analogues      Other reaction(s): low platelets    Shellfish containing products Hives     Crawfish, no reaction to IV iodine in the past.       Medication List with Changes/Refills   Current Medications    ALLOPURINOL (ZYLOPRIM) 100 MG TABLET    Take 2 tablets (200 mg total) by mouth once daily.    BIOTIN/SILICON DIOX/L-CYSTEINE (HARRIET MATRIX 5000 ORAL)    Take 1 capsule by mouth every other day.    CALCIUM CARB/VITAMIN D3/VIT K1 (VIACTIV ORAL)    Take by mouth once daily.     COLCHICINE (COLCRYS) 0.6 MG TABLET    TAKE 1 TABLET(0.6 MG) BY MOUTH EVERY DAY    CYANOCOBALAMIN, VITAMIN B-12, (VITAMIN B-12) 2,500 MCG SUBL    Place 1 tablet under the tongue once daily.    DESOXIMETASONE (TOPICORT) 0.25 % CREAM    APPLY TO SCALP NIGHTLY FOR 1 WEEK THEN APPLY WEEKLY FOR MAINTENANCE    ERGOCALCIFEROL, VITAMIN D2, (VITAMIN D2  "ORAL)    Take 5,000 Int'l Units by mouth once daily.    HYDROCHLOROTHIAZIDE (HYDRODIURIL) 25 MG TABLET    Take 1 tablet (25 mg total) by mouth once daily.    HYDROXYCHLOROQUINE (PLAQUENIL) 200 MG TABLET    TAKE 1 TABLET(200 MG) BY MOUTH TWICE DAILY    MONTELUKAST (SINGULAIR) 10 MG TABLET    Take 1 tablet (10 mg total) by mouth every evening.    MULTIVITAMIN-CA-IRON-MINERALS 27-0.4 MG TAB    Take 1 tablet by mouth Daily.    TRIAMCINOLONE (NASACORT) 55 MCG NASAL INHALER    Daily.       Review of Systems  Constitution: Denies chills, fever, and sweats.  HENT: Denies headaches or blurry vision.  Cardiovascular: Denies chest pain or irregular heart beat.  Respiratory: Denies cough or shortness of breath.  Gastrointestinal: Denies abdominal pain, nausea, or vomiting.  Musculoskeletal: Positive for leg swelling.    Neurological: Denies dizziness or focal weakness.  Psychiatric/Behavioral: Normal mental status.  Hematologic/Lymphatic: Denies bleeding problem or easy bruising/bleeding.  Skin: Denies rash or suspicious lesions    Physical Examination  BP (!) 129/92   Pulse 85   Ht 5' 5" (1.651 m)   Wt 125.9 kg (277 lb 9 oz)   LMP 07/13/2013   BMI 46.19 kg/m²     Constitutional: Morbidly obese female, no acute distress, conversant  HEENT: Sclera anicteric, Pupils equal, round and reactive to light, extraocular motions intact, Oropharynx clear  Neck: No JVD, no carotid bruits  Cardiovascular: regular rate and rhythm, no murmur, rubs or gallops, normal S1/S2  Pulmonary: Clear to auscultation bilaterally  Abdominal: Abdomen soft, nontender, nondistended, positive bowel sounds  Extremities: BLE's with non-pitting edema (L>R) consistent with lymphedema  Trace pitting edema noted,   Pulses:  Carotid pulses are 2+ on the right side, and 2+ on the left side.  Radial pulses are 2+ on the right side, and 2+ on the left side.   Femoral pulses are 2+ on the right side, and 2+ on the left side.  Popliteal pulses are 2+ on the right " side, and 2+ on the left side.   Dorsalis pedis pulses are 2+ on the right side, and 2+ on the left side.   Posterior tibial pulses are 2+ on the right side, and 2+ on the left side.    Skin: No ecchymosis, erythema, or ulcers  Psych: Alert and oriented x 3, appropriate affect  Neuro: CNII-XII intact, no focal deficits    Labs:  Most Recent Data  CBC:   Lab Results   Component Value Date    WBC 4.27 03/12/2024    HGB 12.6 03/12/2024    HCT 39.9 03/12/2024     03/12/2024    MCV 89 03/12/2024    RDW 12.5 03/12/2024     BMP:   Lab Results   Component Value Date     03/12/2024    K 3.5 03/12/2024     03/12/2024    CO2 30 (H) 03/12/2024    BUN 16 03/12/2024    CREATININE 1.02 03/12/2024    GLU 91 03/12/2024    CALCIUM 9.2 03/12/2024    MG 1.9 02/13/2020     LFTS;   Lab Results   Component Value Date    PROT 7.3 03/12/2024    ALBUMIN 4.0 03/12/2024    BILITOT 0.8 03/12/2024    AST 40 03/12/2024    ALKPHOS 62 03/12/2024    ALT 49 (H) 03/12/2024     COAGS:   Lab Results   Component Value Date    INR 1.0 07/18/2013     FLP:   Lab Results   Component Value Date    CHOL 157 10/24/2023    HDL 55 10/24/2023    LDLCALC 89.0 10/24/2023    TRIG 65 10/24/2023    CHOLHDL 35.0 10/24/2023     CARDIAC:   Lab Results   Component Value Date    TROPONINI <0.006 02/17/2021    BNP 88 02/13/2020     Stress Echo 03/10/21  The left ventricle is normal in size with normal systolic function. The estimated ejection fraction is 60%  Normal right ventricular size with normal right ventricular systolic function.  Normal left ventricular diastolic function.  Normal PA systolic pressure estimate (under 25mm Hg)  Low central venous pressure.  The test was stopped because the patient experienced fatigue.  There were no arrhythmias during stress.  The patient's exercise capacity was below average and a hypertensive response was seen.  The ECG portion of this study is negative for myocardial ischemia.  The stress echo portion of this  study is negative for myocardial ischemia.  Overall, this study is negative for myocardial ischemia.    BLE Venous Reflux Study 1/31/2020:  No evidence of lower extremity DVT bilaterally.  Minimal right anterior accessory GSV reflux.  Left proximal anterior accessory GSV reflux.  Left proximal GSV reflux.    Echo 2/13/2020:  Normal left ventricular systolic function. The estimated ejection fraction is 60%.  Normal right ventricular systolic function.  Normal LV diastolic function.  Mild tricuspid regurgitation.  Mild mitral regurgitation.  The estimated PA systolic pressure is 31 mmHg.  Normal central venous pressure (3 mmHg).    Assessment/Plan:  Nitin Mcconnell is a 49 y.o. female with a past medical history of HTN, lupus, morbid obesity s/p gastric bypass surgery in 2009, RAQUEL (on CPAP), who presents for a scheduled appointment.     1. Chest Pain- Pt with risk factors for CAD, including morbid obesity.  Given body habitus, check PET stress test and echo.     2. BLE Edema - Presentation due to combination of lymphedema with BLE venous insufficiency.  Check BLE venous reflux study and RACHELLE study.  Pt presents with findings consistent with moderate lymphedema.  Refer to lymphedema clinic.  Recommend wearing graduated compression hose.  Limit sodium intake to 2000 mg daily.  Limit volume intake to 1.5 L daily.  Elevate legs when resting.    3. Morbid Obesity s/p Gastric Bypass Surgery in 2009- Refer to nutrition for assistance with weight loss.   Encourage moderate/aerobic exercise for minimum 30 minutes for 5 days in a week.     4. ASCVD - Ten year risk score low at 2.6%.  .  Encourage diet, exercise and weight loss.     Will call pt with results of studies  Otherwise, follow up in 5 months     Total duration of face to face visit time 30 minutes.  Total time spent counseling greater than fifty percent of total visit time.  Counseling included discussion regarding imaging findings, diagnosis, possibilities,  treatment options, risks and benefits.  The patient had many questions regarding the options and long-term effects.    Kristian Escobar MD, PhD  Interventional Cardiology

## 2024-09-19 ENCOUNTER — PATIENT MESSAGE (OUTPATIENT)
Dept: CARDIOLOGY | Facility: CLINIC | Age: 49
End: 2024-09-19
Payer: COMMERCIAL

## 2024-09-27 ENCOUNTER — PATIENT MESSAGE (OUTPATIENT)
Dept: OTOLARYNGOLOGY | Facility: CLINIC | Age: 49
End: 2024-09-27
Payer: COMMERCIAL

## 2024-09-30 ENCOUNTER — HOSPITAL ENCOUNTER (OUTPATIENT)
Dept: CARDIOLOGY | Facility: HOSPITAL | Age: 49
Discharge: HOME | End: 2024-09-30
Attending: INTERNAL MEDICINE
Payer: COMMERCIAL

## 2024-09-30 VITALS
WEIGHT: 277 LBS | BODY MASS INDEX: 46.15 KG/M2 | HEART RATE: 70 BPM | HEIGHT: 65 IN | SYSTOLIC BLOOD PRESSURE: 120 MMHG | DIASTOLIC BLOOD PRESSURE: 40 MMHG

## 2024-09-30 DIAGNOSIS — R07.89 OTHER CHEST PAIN: ICD-10-CM

## 2024-09-30 LAB
ASCENDING AORTA: 2.79 CM
AV INDEX (PROSTH): 0.74
AV MEAN GRADIENT: 4.1 MMHG
AV PEAK GRADIENT: 7.8 MMHG
AV VALVE AREA BY VELOCITY RATIO: 2.2 CM²
AV VALVE AREA: 2.1 CM²
AV VELOCITY RATIO: 0.79
BSA FOR ECHO PROCEDURE: 2.4 M2
CV ECHO LV RWT: 0.28 CM
DOP CALC AO PEAK VEL: 1.4 M/S
DOP CALC AO VTI: 32.5 CM
DOP CALC LVOT AREA: 2.8 CM2
DOP CALC LVOT DIAMETER: 1.9 CM
DOP CALC LVOT PEAK VEL: 1.1 M/S
DOP CALC LVOT STROKE VOLUME: 68.3 CM3
DOP CALC MV VTI: 29.72 CM
DOP CALCLVOT PEAK VEL VTI: 24.1 CM
E WAVE DECELERATION TIME: 142.94 MSEC
E/A RATIO: 1.17
E/E' RATIO: 6.33 M/S
ECHO LV POSTERIOR WALL: 0.7 CM (ref 0.6–1.1)
FRACTIONAL SHORTENING: 36 % (ref 28–44)
INTERVENTRICULAR SEPTUM: 0.7 CM (ref 0.6–1.1)
LA MAJOR: 6.09 CM
LA MINOR: 5.94 CM
LA WIDTH: 3.8 CM
LEFT ATRIUM SIZE: 3.93 CM
LEFT ATRIUM VOLUME INDEX MOD: 29.5 ML/M2
LEFT ATRIUM VOLUME INDEX: 33.6 ML/M2
LEFT ATRIUM VOLUME MOD: 67 ML
LEFT ATRIUM VOLUME: 76.34 CM3
LEFT INTERNAL DIMENSION IN SYSTOLE: 3.2 CM (ref 2.1–4)
LEFT VENTRICLE DIASTOLIC VOLUME INDEX: 53.12 ML/M2
LEFT VENTRICLE DIASTOLIC VOLUME: 120.58 ML
LEFT VENTRICLE MASS INDEX: 50.5 G/M2
LEFT VENTRICLE SYSTOLIC VOLUME INDEX: 18.6 ML/M2
LEFT VENTRICLE SYSTOLIC VOLUME: 42.18 ML
LEFT VENTRICULAR INTERNAL DIMENSION IN DIASTOLE: 5 CM (ref 3.5–6)
LEFT VENTRICULAR MASS: 114.7 G
LV LATERAL E/E' RATIO: 5.07 M/S
LV SEPTAL E/E' RATIO: 8.44 M/S
MV A" WAVE DURATION": 13.13 MSEC
MV PEAK A VEL: 0.65 M/S
MV PEAK E VEL: 0.76 M/S
MV PEAK GRADIENT: 3 MMHG
MV STENOSIS PRESSURE HALF TIME: 41.45 MS
MV VALVE AREA BY CONTINUITY EQUATION: 2.3 CM2
MV VALVE AREA P 1/2 METHOD: 5.31 CM2
OHS CV RV/LV RATIO: 0.52 CM
PISA TR MAX VEL: 2.56 M/S
PULM VEIN S/D RATIO: 1.48
PV PEAK D VEL: 0.31 M/S
PV PEAK S VEL: 0.46 M/S
RA MAJOR: 4.97 CM
RA PRESSURE ESTIMATED: 3 MMHG
RA WIDTH: 3.65 CM
RIGHT VENTRICLE DIASTOLIC BASEL DIMENSION: 2.6 CM
RV TB RVSP: 6 MMHG
SINUS: 2.77 CM
STJ: 2.43 CM
TDI LATERAL: 0.15 M/S
TDI SEPTAL: 0.09 M/S
TDI: 0.12 M/S
TR MAX PG: 26 MMHG
TRICUSPID ANNULAR PLANE SYSTOLIC EXCURSION: 2.19 CM
TV REST PULMONARY ARTERY PRESSURE: 29 MMHG
Z-SCORE OF LEFT VENTRICULAR DIMENSION IN END DIASTOLE: -5.23
Z-SCORE OF LEFT VENTRICULAR DIMENSION IN END SYSTOLE: -3.69

## 2024-09-30 PROCEDURE — 93306 TTE W/DOPPLER COMPLETE: CPT | Mod: PO

## 2024-09-30 PROCEDURE — 93306 TTE W/DOPPLER COMPLETE: CPT | Mod: 26,,, | Performed by: INTERNAL MEDICINE

## 2024-09-30 NOTE — TELEPHONE ENCOUNTER
I agree with what Dr. Will recommended.  Allergies will occasionally flare, and additional antihistamines such as Benadryl can be used on a limited basis when that occurs.      If symptoms have not improved, she may also add OTC Astepro nasal spray to the daily regimen for additional control.    Yamileth March MD  [facility]    This note was created by combination of typed  and MModal dictation.  Transcription errors may be present.  If there are any questions, please contact me.

## 2024-10-10 ENCOUNTER — HOSPITAL ENCOUNTER (OUTPATIENT)
Dept: CARDIOLOGY | Facility: HOSPITAL | Age: 49
Discharge: HOME | End: 2024-10-10
Attending: INTERNAL MEDICINE
Payer: COMMERCIAL

## 2024-10-10 DIAGNOSIS — M79.89 SWELLING OF LOWER LIMB: ICD-10-CM

## 2024-10-10 PROCEDURE — 93922 UPR/L XTREMITY ART 2 LEVELS: CPT | Mod: 26,,, | Performed by: INTERNAL MEDICINE

## 2024-10-10 PROCEDURE — 93970 EXTREMITY STUDY: CPT | Mod: 26,,, | Performed by: INTERNAL MEDICINE

## 2024-10-10 PROCEDURE — 93922 UPR/L XTREMITY ART 2 LEVELS: CPT | Mod: PO

## 2024-10-10 PROCEDURE — 93970 EXTREMITY STUDY: CPT | Mod: TC,PO

## 2024-10-11 LAB
LEFT ABI: 0.96
LEFT ARM BP: 134 MMHG
LEFT DORSALIS PEDIS: 138 MMHG
LEFT GREAT SAPHENOUS DISTAL THIGH DIA: 0.17 CM
LEFT GREAT SAPHENOUS JUNCTION DIA: 0.43 CM
LEFT GREAT SAPHENOUS KNEE DIA: 0.3 CM
LEFT GREAT SAPHENOUS KNEE REFLUX: 5223 MS
LEFT GREAT SAPHENOUS MIDDLE THIGH DIA: 0.26 CM
LEFT GREAT SAPHENOUS PROXIMAL CALF DIA: 0.13 CM
LEFT GREAT SAPHENOUS PROXIMAL CALF REFLUX: 5537 MS
LEFT POSTERIOR TIBIAL: 116 MMHG
LEFT SMALL SAPHENOUS KNEE DIA: 0.39 CM
LEFT SMALL SAPHENOUS SPJ DIA: 0.15 CM
RIGHT ABI: 1.03
RIGHT ARM BP: 144 MMHG
RIGHT DORSALIS PEDIS: 133 MMHG
RIGHT GREAT SAPHENOUS DISTAL THIGH DIA: 0.26 CM
RIGHT GREAT SAPHENOUS JUNCTION DIA: 0.75 CM
RIGHT GREAT SAPHENOUS KNEE DIA: 0.19 CM
RIGHT GREAT SAPHENOUS KNEE REFLUX: 1204 MS
RIGHT GREAT SAPHENOUS MIDDLE THIGH DIA: 0.27 CM
RIGHT GREAT SAPHENOUS PROXIMAL CALF DIA: 0.26 CM
RIGHT POSTERIOR TIBIAL: 149 MMHG
RIGHT SMALL SAPHENOUS KNEE DIA: 0.53 CM
RIGHT SMALL SAPHENOUS SPJ DIA: 0.23 CM

## 2024-10-14 ENCOUNTER — PATIENT MESSAGE (OUTPATIENT)
Dept: RHEUMATOLOGY | Facility: CLINIC | Age: 49
End: 2024-10-14
Payer: COMMERCIAL

## 2024-10-14 DIAGNOSIS — M10.9 GOUT, ARTHRITIS: ICD-10-CM

## 2024-10-15 RX ORDER — COLCHICINE 0.6 MG/1
0.6 TABLET ORAL DAILY
Qty: 30 TABLET | Refills: 3 | Status: SHIPPED | OUTPATIENT
Start: 2024-10-15

## 2024-10-16 ENCOUNTER — DOCUMENTATION ONLY (OUTPATIENT)
Dept: CARDIOLOGY | Facility: CLINIC | Age: 49
End: 2024-10-16
Payer: COMMERCIAL

## 2024-10-16 DIAGNOSIS — I10 ESSENTIAL HYPERTENSION: ICD-10-CM

## 2024-10-17 ENCOUNTER — TELEPHONE (OUTPATIENT)
Dept: INTERNAL MEDICINE | Facility: CLINIC | Age: 49
End: 2024-10-17
Payer: COMMERCIAL

## 2024-10-17 DIAGNOSIS — Z98.84 S/P GASTRIC BYPASS: ICD-10-CM

## 2024-10-17 DIAGNOSIS — I10 ESSENTIAL HYPERTENSION: ICD-10-CM

## 2024-10-17 DIAGNOSIS — Z00.00 ANNUAL PHYSICAL EXAM: Primary | ICD-10-CM

## 2024-10-17 RX ORDER — HYDROCHLOROTHIAZIDE 25 MG/1
25 TABLET ORAL
Qty: 90 TABLET | Refills: 0 | Status: SHIPPED | OUTPATIENT
Start: 2024-10-17

## 2024-10-17 NOTE — TELEPHONE ENCOUNTER
Refill Routing Note   Medication(s) are not appropriate for processing by [facility] for the following reason(s):        Required vitals abnormal    [facility] action(s):  Defer             Appointments  past 12m or future 3m with PCP    Date Provider   Last Visit   10/20/2023 Chasity Dixon MD   Next Visit   11/12/2024 Chasity Dixon MD   ED visits in past 90 days: 0        Note composed:11:53 PM 10/16/2024

## 2024-10-17 NOTE — TELEPHONE ENCOUNTER
No care due was identified.  [facility] Embedded Care Due Messages. Reference number: [id number].   10/16/2024 9:33:50 PM CDT

## 2024-10-21 ENCOUNTER — CLINICAL SUPPORT (OUTPATIENT)
Dept: REHABILITATION | Facility: HOSPITAL | Age: 49
End: 2024-10-21
Payer: COMMERCIAL

## 2024-10-21 DIAGNOSIS — I87.2 VENOUS INSUFFICIENCY OF BOTH LOWER EXTREMITIES: Primary | ICD-10-CM

## 2024-10-21 DIAGNOSIS — I89.0 LYMPHEDEMA OF BOTH LOWER EXTREMITIES: ICD-10-CM

## 2024-10-21 PROCEDURE — 97162 PT EVAL MOD COMPLEX 30 MIN: CPT

## 2024-10-21 PROCEDURE — 97535 SELF CARE MNGMENT TRAINING: CPT

## 2024-10-25 ENCOUNTER — TELEPHONE (OUTPATIENT)
Dept: CARDIOLOGY | Facility: HOSPITAL | Age: 49
End: 2024-10-25
Payer: COMMERCIAL

## 2024-10-29 ENCOUNTER — HOSPITAL ENCOUNTER (OUTPATIENT)
Dept: CARDIOLOGY | Facility: HOSPITAL | Age: 49
Discharge: HOME | End: 2024-10-29
Attending: INTERNAL MEDICINE
Payer: COMMERCIAL

## 2024-10-29 VITALS
HEART RATE: 63 BPM | DIASTOLIC BLOOD PRESSURE: 93 MMHG | WEIGHT: 277 LBS | SYSTOLIC BLOOD PRESSURE: 135 MMHG | HEIGHT: 65 IN | BODY MASS INDEX: 46.15 KG/M2

## 2024-10-29 DIAGNOSIS — R07.89 OTHER CHEST PAIN: ICD-10-CM

## 2024-10-29 LAB
CFR FLOW - ANTERIOR: 2.55
CFR FLOW - INFERIOR: 2.39
CFR FLOW - LATERAL: 2.44
CFR FLOW - MAX: 3.34
CFR FLOW - MIN: 2.01
CFR FLOW - SEPTAL: 2.67
CFR FLOW - WHOLE HEART: 2.51
CV PHARM DOSE: 0.4 MG
CV STRESS BASE HR: 63 BPM
DIASTOLIC BLOOD PRESSURE: 93 MMHG
EJECTION FRACTION- HIGH: 59 %
END DIASTOLIC INDEX-HIGH: 155 ML/M2
END DIASTOLIC INDEX-LOW: 91 ML/M2
END SYSTOLIC INDEX-HIGH: 78 ML/M2
END SYSTOLIC INDEX-LOW: 40 ML/M2
NUC REST DIASTOLIC VOLUME INDEX: 107
NUC REST EJECTION FRACTION: 66
NUC REST SYSTOLIC VOLUME INDEX: 36
NUC STRESS DIASTOLIC VOLUME INDEX: 108
NUC STRESS EJECTION FRACTION: 75 %
NUC STRESS SYSTOLIC VOLUME INDEX: 27
OHS CV CPX 85 PERCENT MAX PREDICTED HEART RATE MALE: 145
OHS CV CPX MAX PREDICTED HEART RATE: 171
OHS CV CPX PATIENT IS FEMALE: 1
OHS CV CPX PATIENT IS MALE: 0
OHS CV CPX PEAK DIASTOLIC BLOOD PRESSURE: 75 MMHG
OHS CV CPX PEAK HEAR RATE: 92 BPM
OHS CV CPX PEAK RATE PRESSURE PRODUCT: NORMAL
OHS CV CPX PEAK SYSTOLIC BLOOD PRESSURE: 113 MMHG
OHS CV CPX PERCENT MAX PREDICTED HEART RATE ACHIEVED: 56
OHS CV CPX RATE PRESSURE PRODUCT PRESENTING: 8505
OHS CV INITIAL DOSE: 38 MCG/KG/MIN
OHS CV MODERATELY REDUCED FLOW CAPACITY: 0 %
OHS CV NO ISCHEMIA MILDLY REDUCED FLOW CAPACTY: 37 %
OHS CV NO ISCHEMIA MINIMALLY REDUCED FLOW CAPACITY: 57 %
OHS CV NORMAL FLOW CAPACITY COMPARABLE TO HEALTHY YOUNG VOLUNTEERS: 6 %
OHS CV PEAK DOSE: 37.9 MCG/KG/MIN
OHS CV PET ID: 7504
OHS CV SEVERELY REDUCED FLOW CAPACITY: 0 %
OHS CV TOTAL EXAM DLP: 589.1 MGY-CM
REST FLOW - ANTERIOR: 0.52 CC/MIN/G
REST FLOW - INFERIOR: 0.59 CC/MIN/G
REST FLOW - LATERAL: 0.62 CC/MIN/G
REST FLOW - MAX: 0.74 CC/MIN/G
REST FLOW - MIN: 0.32 CC/MIN/G
REST FLOW - SEPTAL: 0.49 CC/MIN/G
REST FLOW - WHOLE HEART: 0.56 CC/MIN/G
RETIRED EF AND QEF - SEE NOTES: 47 %
STRESS FLOW - ANTERIOR: 1.3 CC/MIN/G
STRESS FLOW - INFERIOR: 1.42 CC/MIN/G
STRESS FLOW - LATERAL: 1.51 CC/MIN/G
STRESS FLOW - MAX: 2 CC/MIN/G
STRESS FLOW - MIN: 0.87 CC/MIN/G
STRESS FLOW - SEPTAL: 1.3 CC/MIN/G
STRESS FLOW - WHOLE HEART: 1.38 CC/MIN/G
SYSTOLIC BLOOD PRESSURE: 135 MMHG

## 2024-10-29 PROCEDURE — 93016 CV STRESS TEST SUPVJ ONLY: CPT | Mod: ,,, | Performed by: INTERNAL MEDICINE

## 2024-10-29 PROCEDURE — 63600175 PHARM REV CODE 636 W HCPCS: Performed by: INTERNAL MEDICINE

## 2024-10-29 PROCEDURE — A9555 RB82 RUBIDIUM: HCPCS | Performed by: INTERNAL MEDICINE

## 2024-10-29 PROCEDURE — 78434 AQMBF PET REST & RX STRESS: CPT | Mod: 26,,, | Performed by: INTERNAL MEDICINE

## 2024-10-29 PROCEDURE — 78431 MYOCRD IMG PET RST&STRS CT: CPT | Mod: 26,,, | Performed by: INTERNAL MEDICINE

## 2024-10-29 PROCEDURE — 78434 AQMBF PET REST & RX STRESS: CPT

## 2024-10-29 PROCEDURE — 93018 CV STRESS TEST I&R ONLY: CPT | Mod: ,,, | Performed by: INTERNAL MEDICINE

## 2024-10-29 PROCEDURE — 78431 MYOCRD IMG PET RST&STRS CT: CPT

## 2024-10-29 RX ORDER — REGADENOSON 0.08 MG/ML
0.4 INJECTION, SOLUTION INTRAVENOUS
Status: COMPLETED | OUTPATIENT
Start: 2024-10-29 | End: 2024-10-29

## 2024-10-29 RX ADMIN — RUBIDIUM CHLORIDE RB-82 38.02 MILLICURIE: 150 INJECTION, SOLUTION INTRAVENOUS at 08:10

## 2024-10-29 RX ADMIN — REGADENOSON 0.4 MG: 0.08 INJECTION, SOLUTION INTRAVENOUS at 08:10

## 2024-10-29 RX ADMIN — RUBIDIUM CHLORIDE RB-82 37.9 MILLICURIE: 150 INJECTION, SOLUTION INTRAVENOUS at 08:10

## 2024-10-30 ENCOUNTER — CLINICAL SUPPORT (OUTPATIENT)
Dept: REHABILITATION | Facility: HOSPITAL | Age: 49
End: 2024-10-30
Payer: COMMERCIAL

## 2024-10-30 ENCOUNTER — PATIENT MESSAGE (OUTPATIENT)
Dept: INTERNAL MEDICINE | Facility: CLINIC | Age: 49
End: 2024-10-30
Payer: COMMERCIAL

## 2024-10-30 DIAGNOSIS — I87.2 VENOUS INSUFFICIENCY OF BOTH LOWER EXTREMITIES: Primary | ICD-10-CM

## 2024-10-30 DIAGNOSIS — I89.0 LYMPHEDEMA OF BOTH LOWER EXTREMITIES: ICD-10-CM

## 2024-10-30 PROCEDURE — 97140 MANUAL THERAPY 1/> REGIONS: CPT

## 2024-11-04 ENCOUNTER — CLINICAL SUPPORT (OUTPATIENT)
Dept: REHABILITATION | Facility: HOSPITAL | Age: 49
End: 2024-11-04
Payer: COMMERCIAL

## 2024-11-04 ENCOUNTER — HOSPITAL ENCOUNTER (OUTPATIENT)
Dept: RADIOLOGY | Facility: HOSPITAL | Age: 49
Discharge: HOME | End: 2024-11-04
Attending: INTERNAL MEDICINE
Payer: COMMERCIAL

## 2024-11-04 DIAGNOSIS — I89.0 LYMPHEDEMA OF BOTH LOWER EXTREMITIES: ICD-10-CM

## 2024-11-04 DIAGNOSIS — Z12.31 ENCOUNTER FOR SCREENING MAMMOGRAM FOR BREAST CANCER: ICD-10-CM

## 2024-11-04 DIAGNOSIS — I87.2 VENOUS INSUFFICIENCY OF BOTH LOWER EXTREMITIES: Primary | ICD-10-CM

## 2024-11-04 PROCEDURE — 97016 VASOPNEUMATIC DEVICE THERAPY: CPT

## 2024-11-04 PROCEDURE — 77067 SCR MAMMO BI INCL CAD: CPT | Mod: 26,,, | Performed by: RADIOLOGY

## 2024-11-04 PROCEDURE — 97140 MANUAL THERAPY 1/> REGIONS: CPT

## 2024-11-04 PROCEDURE — 77067 SCR MAMMO BI INCL CAD: CPT | Mod: TC,PN

## 2024-11-04 PROCEDURE — 77063 BREAST TOMOSYNTHESIS BI: CPT | Mod: 26,,, | Performed by: RADIOLOGY

## 2024-11-04 NOTE — PROGRESS NOTES
[facility]  Physical Therapy Treatment Note  Lymphedema    Date: 11/4/2024  Name: Nitin Mcconnell  Clinic Number: [id number]    Therapy Diagnosis:   Encounter Diagnoses   Name Primary?    Venous insufficiency of both lower extremities Yes    Lymphedema of both lower extremities      Physician: Kristian Escobar MD*    Physician Orders: PT Eval and Treat - lymphedema  Medical Diagnosis from Referral: I89.0 (ICD-10-CM) - Lymphedema, not elsewhere classified  Evaluation Date: 10/21/2024  Authorization Period Expiration: 10/17/2025  Plan of Care Expiration: 12/21/2024  Visit # / Visits authorized: 2/ 20     Time In: 5:25pm  Time Out: 6:20pm   Total Billable Time: 55 minutes     Precautions: Standard       SUBJECTIVE     Pt reports: did ok with bandages last time   Nitin reported wearing compression outside of therapy visits: Yes - previous compression   She was compliant with home exercise program given last session.   Response to previous treatment:fine   Functional change: none     Pain: 0/10  Location:       OBJECTIVE     Pt arrived in compression    Compression garment status: 20-30 mm hg knee highs and 20-30 mm hg thigh highs  Compression Rx status: not requested  Pneumatic pump status: not referred    Objective Measures updated at progress report unless specified.    Treatment     Nitin received the treatments listed below:       Manual therapy techniques were applied for 55 minutes, including:    SEQUENTIAL COMPRESSION PUMP: full leg sleeve applied to RLE and LLE  Biotab with default setting with distal pressures starting at 45mmHg entire extremity simultaneous to education.     MULTILAYERED BANDAGING:  issued supplies and bandaged RLE and LLE  base of toes to below knee with cotton stockinette, cellona padding, 2 8 cm, and 1 10 cm Rosidal K bandages to leave intact 24-72 hrs as tolerated, discontinue with any problems, return rolled bandages next session. Wash and wear  "schedules confirmed.        Patient Education and Home Exercises      Education provided:   - remove bandages if painful   - Progress towards goals     Written Home Exercises Provided: yes    "Patient instructed to cont prior HEP"}.  Exercises were reviewed and Nitin was able to demonstrate them prior to the end of the session.  Nitin demonstrated good  understanding of the HEP provided. See EMR under Patient Instructions for exercises provided during therapy sessions.       Assessment     Pt would continue to benefit from skilled PT.     Pt's RLE and LLE were pumped and bandaged with good tolerance. Will continue to progress      Nitin is progressing well towards her goals and there are no updates to goals at this time. Pt prognosis is Good.     Pt will continue to benefit from skilled outpatient physical therapy to address the deficits listed in the problem list on initial evaluation provide pt/family education and to maximize pt's level of independence in the home and community environment.     Pt's spiritual, cultural and educational needs considered and pt agreeable to plan of care and goals.    Anticipated barriers to physical therapy: none     Goals:      Short Term Goals: (6 weeks)  1. Patient will show decreased girth in B LE by up to 1 cm to allow for LE symmetry, shoe and clothing choice, and ability to apply needed compression.  (progressing, not met)   2. Patient will demonstrate 100% knowledge of lymphedema precautions and signs of infection to allow for reduced lymphedema risk, infection risk, and/or exacerbation of condition.  (progressing, not met)  3. Patient or caregiver will perform self-bandaging techniques and/or wearing of compression garments to allow for lymphatic drainage support, skin elasticity, and reduction in shape and size of limb. (progressing, not met)  4. Patient will perform self lymph drainage techniques to areas within reach to enhance lymphatic drainage and skin " condition.  (progressing, not met)  5. Patient will tolerate daily activities with multilayered bandaging to allow for lymphatic and venous support.  (progressing, not met)     Long Term Goals: (12  weeks)  1. Patient will show decreased girth in B LE by up to 2 cm  to allow for LE symmetry, shoe and clothing choice, and ability to apply needed compression daily.  (progressing, not met)  2. Patient will show reduction in density to mild or less with improved contour of limb to allow for cosmesis, LE symmetry, infection risk reduction, and clothing and compression choice.   (progressing, not met)  3. Patient to mitul/doff compression garment with daily compliance to assist in lymphedema management, skin elasticity, and tissue density.  (progressing, not met)  4. Pt to show improved postural awareness and alignment.  (progressing, not met)  5. Pt to be I and compliant with HEP to allow for increased function in affected limb.   (progressing, not met)      PLAN     Updates/Grading for next session: continue tx    Continue PT  2x   weekly for Complete Decongestive Therapy:  Manual lymphatic drainage, Multilayered short stretch bandaging, Pneumatic compression, Therapeutic exercises, Patient education as deemed necessary to achieve stated goals.    April Almodovar, PT, PT, DPT, CLT- DICK

## 2024-11-06 ENCOUNTER — CLINICAL SUPPORT (OUTPATIENT)
Dept: REHABILITATION | Facility: HOSPITAL | Age: 49
End: 2024-11-06
Payer: COMMERCIAL

## 2024-11-06 DIAGNOSIS — I87.2 VENOUS INSUFFICIENCY OF BOTH LOWER EXTREMITIES: Primary | ICD-10-CM

## 2024-11-06 DIAGNOSIS — I89.0 LYMPHEDEMA OF BOTH LOWER EXTREMITIES: ICD-10-CM

## 2024-11-06 PROCEDURE — 97016 VASOPNEUMATIC DEVICE THERAPY: CPT

## 2024-11-06 PROCEDURE — 97140 MANUAL THERAPY 1/> REGIONS: CPT

## 2024-11-06 NOTE — PROGRESS NOTES
[facility]  Physical Therapy Treatment Note/ Progress note  Lymphedema    Date: 11/6/2024  Name: Nitin Mcconnell  Clinic Number: [id number]    Therapy Diagnosis:   Encounter Diagnoses   Name Primary?    Venous insufficiency of both lower extremities Yes    Lymphedema of both lower extremities      Physician: Kritsian Escobar MD*    Physician Orders: PT Eval and Treat - lymphedema  Medical Diagnosis from Referral: I89.0 (ICD-10-CM) - Lymphedema, not elsewhere classified  Evaluation Date: 10/21/2024  Authorization Period Expiration: 10/17/2025  Plan of Care Expiration: 12/21/2024  Visit # / Visits authorized: 3/ 20     Time In: 5:30pm   Time Out: 6:30pm   Total Billable Time: 60 minutes     Precautions: Standard       SUBJECTIVE     Pt reports: has done well so far   Nitin reported wearing compression outside of therapy visits: Yes - previous compression   She was compliant with home exercise program given last session.   Response to previous treatment:fine   Functional change: none     Pain: 0/10  Location:       OBJECTIVE     Pt arrived in compression    Compression garment status: 20-30 mm hg knee highs and 20-30 mm hg thigh highs  Compression Rx status: not requested  Pneumatic pump status: not referred    Objective Measures updated at progress report unless specified.        11/6/2024      Treatment     Nitin received the treatments listed below:       Manual therapy techniques were applied for 60 minutes, including:    SEQUENTIAL COMPRESSION PUMP: full leg sleeve applied to LLE  Biotab with default setting with distal pressures starting at 45mmHg entire extremity simultaneous to education.     MANUAL LYMPHATIC DRAINAGE (MLD):    While supine, stimulation at terminus, abdominal, inguinal, and popliteal lymph nodes, drainage of entire RLE with return proximally.      MULTILAYERED BANDAGING:  issued supplies and bandaged RLE and LLE  base of toes to below knee with cotton  "stockinette, cellona padding, 2 8 cm, and 1 10 cm Rosidal K bandages to leave intact 24-72 hrs as tolerated, discontinue with any problems, return rolled bandages next session. Wash and wear schedules confirmed.        Patient Education and Home Exercises      Education provided:   - remove bandages if painful   - Progress towards goals     Written Home Exercises Provided: yes    "Patient instructed to cont prior HEP"}.  Exercises were reviewed and Nitin was able to demonstrate them prior to the end of the session.  Nitin demonstrated good  understanding of the HEP provided. See EMR under Patient Instructions for exercises provided during therapy sessions.       Assessment     Pt would continue to benefit from skilled PT.     Pt's RLE and LLE were pumped and bandaged with good tolerance. Will continue to progress      Some reductions noted so far in measurements     Nitin is progressing well towards her goals and there are no updates to goals at this time. Pt prognosis is Good.     Pt will continue to benefit from skilled outpatient physical therapy to address the deficits listed in the problem list on initial evaluation provide pt/family education and to maximize pt's level of independence in the home and community environment.     Pt's spiritual, cultural and educational needs considered and pt agreeable to plan of care and goals.    Anticipated barriers to physical therapy: none     Goals:      Short Term Goals: (6 weeks)  1. Patient will show decreased girth in B LE by up to 1 cm to allow for LE symmetry, shoe and clothing choice, and ability to apply needed compression.  (progressing, not met)   2. Patient will demonstrate 100% knowledge of lymphedema precautions and signs of infection to allow for reduced lymphedema risk, infection risk, and/or exacerbation of condition.  (progressing, not met)  3. Patient or caregiver will perform self-bandaging techniques and/or wearing of compression garments to allow " for lymphatic drainage support, skin elasticity, and reduction in shape and size of limb. (progressing, not met)  4. Patient will perform self lymph drainage techniques to areas within reach to enhance lymphatic drainage and skin condition.  (progressing, not met)  5. Patient will tolerate daily activities with multilayered bandaging to allow for lymphatic and venous support.  (progressing, not met)     Long Term Goals: (12  weeks)  1. Patient will show decreased girth in B LE by up to 2 cm  to allow for LE symmetry, shoe and clothing choice, and ability to apply needed compression daily.  (progressing, not met)  2. Patient will show reduction in density to mild or less with improved contour of limb to allow for cosmesis, LE symmetry, infection risk reduction, and clothing and compression choice.   (progressing, not met)  3. Patient to mitul/doff compression garment with daily compliance to assist in lymphedema management, skin elasticity, and tissue density.  (progressing, not met)  4. Pt to show improved postural awareness and alignment.  (progressing, not met)  5. Pt to be I and compliant with HEP to allow for increased function in affected limb.   (progressing, not met)      PLAN     Updates/Grading for next session: continue tx    Continue PT  2x   weekly for Complete Decongestive Therapy:  Manual lymphatic drainage, Multilayered short stretch bandaging, Pneumatic compression, Therapeutic exercises, Patient education as deemed necessary to achieve stated goals.    April Almodovar, PT, PT, DPT, [facility]- [facility]ICK

## 2024-11-07 ENCOUNTER — TELEPHONE (OUTPATIENT)
Dept: INTERNAL MEDICINE | Facility: CLINIC | Age: 49
End: 2024-11-07
Payer: COMMERCIAL

## 2024-11-07 NOTE — TELEPHONE ENCOUNTER
Sent pt a message in Tuee re: ferritin level elevate, CPAP toleration, hydration, and appt reminder.

## 2024-11-07 NOTE — TELEPHONE ENCOUNTER
Pt states in MyChart chat that she had not been using CPAP like she should, but will do better now. Pt had been instructed per Dr. Dixon to use CPAP every night.

## 2024-11-07 NOTE — TELEPHONE ENCOUNTER
----- Message from Chasity Dixon MD sent at 11/6/2024  2:40 PM CST -----  There are a few abnormalities that we will need to discuss. Have you been using the CPAP every night? Have you been drinking enough water? The ferritin level is elevated because of your chronic autoimmune disease (lupus).    Will review the results with you in detail at your upcoming appointment.    Future Appointments  11/12/2024 10:30 AM   Chasity Dixon MD     [facility]

## 2024-11-12 ENCOUNTER — OFFICE VISIT (OUTPATIENT)
Dept: INTERNAL MEDICINE | Facility: CLINIC | Age: 49
End: 2024-11-12
Payer: COMMERCIAL

## 2024-11-12 ENCOUNTER — HOSPITAL ENCOUNTER (OUTPATIENT)
Dept: RADIOLOGY | Facility: HOSPITAL | Age: 49
Discharge: HOME | End: 2024-11-12
Attending: INTERNAL MEDICINE
Payer: COMMERCIAL

## 2024-11-12 VITALS
RESPIRATION RATE: 18 BRPM | OXYGEN SATURATION: 99 % | HEIGHT: 65 IN | SYSTOLIC BLOOD PRESSURE: 112 MMHG | HEART RATE: 69 BPM | DIASTOLIC BLOOD PRESSURE: 84 MMHG | WEIGHT: 277.56 LBS | BODY MASS INDEX: 46.24 KG/M2 | TEMPERATURE: 98 F

## 2024-11-12 DIAGNOSIS — D84.821 IMMUNOSUPPRESSION DUE TO DRUG THERAPY: ICD-10-CM

## 2024-11-12 DIAGNOSIS — Z00.00 ANNUAL PHYSICAL EXAM: Primary | ICD-10-CM

## 2024-11-12 DIAGNOSIS — E04.2 MULTIPLE THYROID NODULES: ICD-10-CM

## 2024-11-12 DIAGNOSIS — Z91.81 HISTORY OF RECENT FALL: ICD-10-CM

## 2024-11-12 DIAGNOSIS — Z98.84 S/P GASTRIC BYPASS: ICD-10-CM

## 2024-11-12 DIAGNOSIS — I87.2 VENOUS INSUFFICIENCY OF BOTH LOWER EXTREMITIES: ICD-10-CM

## 2024-11-12 DIAGNOSIS — I10 ESSENTIAL HYPERTENSION: ICD-10-CM

## 2024-11-12 DIAGNOSIS — Z79.899 IMMUNOSUPPRESSION DUE TO DRUG THERAPY: ICD-10-CM

## 2024-11-12 DIAGNOSIS — I89.0 LYMPHEDEMA OF BOTH LOWER EXTREMITIES: ICD-10-CM

## 2024-11-12 DIAGNOSIS — R74.01 ELEVATED ALT MEASUREMENT: ICD-10-CM

## 2024-11-12 DIAGNOSIS — S89.92XA INJURY OF LEFT KNEE, INITIAL ENCOUNTER: ICD-10-CM

## 2024-11-12 DIAGNOSIS — M1A.9XX0 CHRONIC GOUT WITHOUT TOPHUS, UNSPECIFIED CAUSE, UNSPECIFIED SITE: ICD-10-CM

## 2024-11-12 DIAGNOSIS — G47.33 OSA ON CPAP: ICD-10-CM

## 2024-11-12 DIAGNOSIS — M32.9 SYSTEMIC LUPUS ERYTHEMATOSUS, UNSPECIFIED SLE TYPE, UNSPECIFIED ORGAN INVOLVEMENT STATUS: ICD-10-CM

## 2024-11-12 PROCEDURE — 99999 PR PBB SHADOW E&M-EST. PATIENT-LVL V: CPT | Mod: PBBFAC,,, | Performed by: INTERNAL MEDICINE

## 2024-11-12 PROCEDURE — 1160F RVW MEDS BY RX/DR IN RCRD: CPT | Mod: CPTII,S$GLB,, | Performed by: INTERNAL MEDICINE

## 2024-11-12 PROCEDURE — 3074F SYST BP LT 130 MM HG: CPT | Mod: CPTII,S$GLB,, | Performed by: INTERNAL MEDICINE

## 2024-11-12 PROCEDURE — 73562 X-RAY EXAM OF KNEE 3: CPT | Mod: 26,LT,, | Performed by: RADIOLOGY

## 2024-11-12 PROCEDURE — 3008F BODY MASS INDEX DOCD: CPT | Mod: CPTII,S$GLB,, | Performed by: INTERNAL MEDICINE

## 2024-11-12 PROCEDURE — 3044F HG A1C LEVEL LT 7.0%: CPT | Mod: CPTII,S$GLB,, | Performed by: INTERNAL MEDICINE

## 2024-11-12 PROCEDURE — 1159F MED LIST DOCD IN RCRD: CPT | Mod: CPTII,S$GLB,, | Performed by: INTERNAL MEDICINE

## 2024-11-12 PROCEDURE — 99396 PREV VISIT EST AGE 40-64: CPT | Mod: S$GLB,,, | Performed by: INTERNAL MEDICINE

## 2024-11-12 PROCEDURE — 73562 X-RAY EXAM OF KNEE 3: CPT | Mod: TC,PO,LT

## 2024-11-12 PROCEDURE — 3079F DIAST BP 80-89 MM HG: CPT | Mod: CPTII,S$GLB,, | Performed by: INTERNAL MEDICINE

## 2024-11-12 NOTE — PROGRESS NOTES
Subjective:     PCP: Chasity Dixon MD    Nitin Mcconnell is a 49 y.o. female who presents for an annual exam.    Fall>>>The patient reports that she fell recently landed on her left knee. She also hit her 2nd left toe during the fall. She says that it feels a little better but she is concerned that she may have injured her knee. She is able to place her full weight on her leg but she limps sometimes. She has taken Tylenol as needed and she applied ice to the knee with slight improvement.      Medical History:   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, benign     Lupus mild       Family History: family history includes Diabetes in her maternal grandfather and paternal grandmother; Heart attack in her maternal grandmother; Heart failure in her maternal grandmother and mother; Kidney disease in her maternal grandfather; Rheum arthritis in her mother; Thyroid disease in her maternal aunt and maternal grandmother.    Surgical History:   Past Surgical History:   Procedure Laterality Date    BREAST BIOPSY Right 2008    rt axilla    COLONOSCOPY N/A 05/17/2022    Procedure: COLONOSCOPY;  Surgeon: Lilibeth Merrill MD;  Location: [facility] (4TH FLR);  Service: Endoscopy;  Laterality: N/A;  miralax prep  4/25 fully vaccinated; instructions to portal-  5/13-changed from Jose to Lammi    ENDOSCOPIC ULTRASOUND OF LOWER GASTROINTESTINAL TRACT N/A 6/7/2022    Procedure: ULTRASOUND, LOWER GI TRACT, ENDOSCOPIC;  Surgeon: Enrique Beach MD;  Location: [facility] (2ND FLR);  Service: Endoscopy;  Laterality: N/A;  Enrique Beach MD  Ashlyndonny Grover MA  Need EUS for sigmoid colon nodule. Forward view EUS. Main. 45 minutes.   Thanks,   Enrique Beach MD   5/19:fully vaccinated. instructions mailed-SC    HYSTERECTOMY      @38yrs of age    LIPECTOMY      LYMPH NODE  BIOPSY      right arm    OOPHORECTOMY      @38yrs of age    panniculectomy      JIAN-EN-Y PROCEDURE  08/27/2009    RNY bypass        Social History:  reports that she has never smoked. She has been exposed to tobacco smoke. She has never used smokeless tobacco. She reports current alcohol use. She reports that she does not use drugs.     Allergies:   Review of patient's allergies indicates:   Allergen Reactions    Heparin analogues      Other reaction(s): low platelets    Shellfish containing products Hives     Crawfish, no reaction to IV iodine in the past.       Medications:   Current Outpatient Medications   Medication Sig    allopurinoL (ZYLOPRIM) 100 MG tablet Take 2 tablets (200 mg total) by mouth once daily.    biotin/silicon diox/L-cysteine (HARRIET MATRIX 5000 ORAL) Take 1 capsule by mouth every other day.    CALCIUM CARB/VITAMIN D3/VIT K1 (VIACTIV ORAL) Take by mouth once daily.     colchicine (COLCRYS) 0.6 mg tablet Take 1 tablet (0.6 mg total) by mouth once daily.    cyanocobalamin, vitamin B-12, (VITAMIN B-12) 2,500 mcg Subl Place 1 tablet under the tongue once daily.    desoximetasone (TOPICORT) 0.25 % cream APPLY TO SCALP NIGHTLY FOR 1 WEEK THEN APPLY WEEKLY FOR MAINTENANCE    ergocalciferol, vitamin D2, (VITAMIN D2 ORAL) Take 5,000 Int'l Units by mouth once daily.    hydroCHLOROthiazide (HYDRODIURIL) 25 MG tablet TAKE 1 TABLET(25 MG) BY MOUTH EVERY DAY    hydroxychloroquine (PLAQUENIL) 200 mg tablet TAKE 1 TABLET(200 MG) BY MOUTH TWICE DAILY    montelukast (SINGULAIR) 10 mg tablet Take 1 tablet (10 mg total) by mouth every evening.    multivitamin-Ca-iron-minerals 27-0.4 mg Tab Take 1 tablet by mouth Daily.    triamcinolone (NASACORT) 55 mcg nasal inhaler Daily.     No current facility-administered medications for this visit.       Health Maintenance:   Health Maintenance Topics with due status: Not Due       Topic Last Completion Date    TETANUS VACCINE 09/09/2019    Pneumococcal Vaccines (Age 0-64)  09/14/2020    Colorectal Cancer Screening 05/17/2022    Hemoglobin A1c (Diabetic Prevention Screening) 11/04/2024    Mammogram 11/04/2024    Lipid Panel 11/04/2024    RSV Vaccine (Age 60+ and Pregnant patients) Not Due     Lab Results   Component Value Date    HEPCAB Non-reactive 10/24/2023     Eye Exam:   next month  Dental Exam: due  OB/GYN: not in last few years  S/o hysterectomy & oophorectomy  Mammogram: done  Colonoscopy: Last Colonoscopy completed on 5/17/2022 10 years  HIV: 2/25/19, neg  Hepatitis C  Ab: 10/2023, neg    Vaccinations:  Immunization History   Administered Date(s) Administered    COVID-19, MRNA, LN-S, PF (Pfizer) (Purple Cap) 03/20/2021, 04/11/2021, 10/30/2021    COVID-19, mRNA, LNP-S, PF, alvaro-sucrose, 30 mcg/0.3 mL (Pfizer Ages 12+) 09/18/2024    COVID-19, mRNA, LNP-S, bivalent booster, PF (PFIZER OMICRON) 10/18/2022    Influenza 10/15/2015, 09/28/2016    Influenza - Quadrivalent - PF *Preferred* (6 months and older) 10/24/2013, 10/23/2014, 10/15/2015, 09/28/2016, 10/28/2017, 10/21/2018, 10/05/2019, 10/03/2020, 09/28/2021, 10/18/2022, 10/20/2023    Influenza - Trivalent - Afluria, Fluzone MDV 10/24/2013, 10/23/2014, 10/28/2017    Influenza - Trivalent - Fluarix, Flulaval, Fluzone, Afluria - PF 10/15/2015, 09/28/2016, 09/18/2024    Influenza Split 10/24/2013, 10/23/2014, 10/28/2017    Pneumococcal Conjugate - 13 Valent 03/09/2020    Pneumococcal Polysaccharide - 23 Valent 09/14/2020    Td - PF (ADULT) 09/09/2019     Influenza:   Tetanus: 2019  Pneumovax: 2020  Prevnar-13: 2020  Covid vaccine: boosted    Body mass index is 46.19 kg/m².  Wt Readings from Last 3 Encounters:   12/12/24 125 kg (275 lb 9.2 oz)   11/12/24 125.9 kg (277 lb 9 oz)   10/29/24 125.6 kg (277 lb)       Review of Systems   Constitutional:  Negative for activity change, chills, diaphoresis, fatigue, fever and unexpected weight change.   HENT:  Negative for congestion, dental problem, ear discharge, ear pain, hearing loss,  postnasal drip, rhinorrhea, sinus pressure, sore throat and trouble swallowing.    Eyes:  Negative for discharge, redness and visual disturbance.   Respiratory:  Positive for chest tightness. Negative for cough, shortness of breath and wheezing.         Chest complaints resolved, no associated symptoms   Cardiovascular:  Negative for chest pain, palpitations and leg swelling.   Gastrointestinal:  Negative for abdominal pain, blood in stool, constipation, diarrhea, nausea and vomiting.   Endocrine: Negative for polydipsia and polyuria.   Genitourinary:  Negative for decreased urine volume, difficulty urinating, dysuria, frequency, hematuria and menstrual problem.   Musculoskeletal:  Positive for arthralgias. Negative for back pain, joint swelling, myalgias and neck pain.        Left knee pain    Skin:  Negative for rash and wound.   Neurological:  Negative for dizziness, weakness, numbness and headaches.   Hematological:  Negative for adenopathy.   Psychiatric/Behavioral:  Negative for confusion, dysphoric mood and sleep disturbance. The patient is not nervous/anxious.           Objective:     Physical Exam  Vitals reviewed.   Constitutional:       General: She is awake. She is not in acute distress.     Appearance: Normal appearance. She is well-developed and well-groomed. She is not diaphoretic.   HENT:      Head: Normocephalic and atraumatic.      Right Ear: Hearing, tympanic membrane, ear canal and external ear normal. Tympanic membrane is not erythematous or bulging.      Left Ear: Hearing, tympanic membrane, ear canal and external ear normal. Tympanic membrane is not erythematous or bulging.      Nose: Nose normal. No congestion.      Mouth/Throat:      Mouth: Mucous membranes are moist.      Tongue: No lesions.      Pharynx: Oropharynx is clear. Uvula midline. No oropharyngeal exudate or posterior oropharyngeal erythema.   Eyes:      General: Lids are normal. Vision grossly intact. Gaze aligned appropriately.  No scleral icterus.     Conjunctiva/sclera:      Right eye: Right conjunctiva is not injected.      Left eye: Left conjunctiva is not injected.      Pupils: Pupils are equal, round, and reactive to light.   Neck:      Thyroid: No thyroid mass or thyromegaly.   Cardiovascular:      Rate and Rhythm: Normal rate and regular rhythm.      Pulses: Normal pulses.      Heart sounds: Normal heart sounds. No murmur heard.  Pulmonary:      Effort: Pulmonary effort is normal. No respiratory distress.      Breath sounds: Normal breath sounds. No decreased breath sounds or wheezing.   Abdominal:      General: Bowel sounds are normal. There is no distension.      Palpations: Abdomen is soft. Abdomen is not rigid.      Tenderness: There is no abdominal tenderness. There is no guarding or rebound.   Musculoskeletal:         General: Normal range of motion.      Cervical back: Normal range of motion and neck supple.      Left knee: No erythema. Normal range of motion.      Right lower leg: No edema.      Left lower leg: No edema.   Lymphadenopathy:      Cervical: No cervical adenopathy.      Upper Body:      Right upper body: No supraclavicular adenopathy.      Left upper body: No supraclavicular adenopathy.   Skin:     General: Skin is warm and dry.      Coloration: Skin is not cyanotic.      Findings: No lesion or rash.      Nails: There is no clubbing.   Neurological:      General: No focal deficit present.      Mental Status: She is alert and oriented to person, place, and time.      Sensory: Sensation is intact.      Coordination: Coordination is intact.      Gait: Gait is intact.      Deep Tendon Reflexes: Reflexes are normal and symmetric.   Psychiatric:         Attention and Perception: Attention normal.         Mood and Affect: Mood normal.         Behavior: Behavior is cooperative.            Assessment:        1. Annual physical exam    2. Essential hypertension    3. Systemic lupus erythematosus, unspecified SLE type,  unspecified organ involvement status    4. Injury of left knee, initial encounter    5. Chronic gout without tophus, unspecified cause, unspecified site    6. RAQUEL on CPAP    7. Lymphedema of both lower extremities    8. Venous insufficiency of both lower extremities    9. Elevated ALT measurement    10. Multiple thyroid nodules    11. History of recent fall    12. S/P gastric bypass    13. Immunosuppression due to drug therapy           Plan:     1. Annual physical exam  - reviewed recent labs with patient    2. Essential hypertension  - controlled, continue HCTZ    3. Systemic lupus erythematosus, unspecified SLE type, unspecified organ involvement status  - stable, on hydroxychloroquine, f/u with Rheum as needed    4. Injury of left knee, initial encounter  - X-Ray Knee 3 View Left; Future  - elevate knee, apply ice packs    5. Chronic gout without tophus, unspecified cause, unspecified site  - no recent flare-ups, continue allopurinol    6. RAQUEL on CPAP  - she admits that she recently has not been compliant with CPAP  - urged patient to re-start since she does feel better when she uses it    7,8. Lymphedema of both lower extremities/ Venous insufficiency of both lower extremities  - continue compression stockings daily, elevated legs when needed    9. Elevated ALT measurement  - last US showed possible fatty liver  - will repeat labs and likely refer to Hepatology    10. Multiple thyroid nodules  - US Thyroid; Future    11. History of recent fall  - X-Ray Knee 3 View Left; Future    12. S/P gastric bypass    13. Immunosuppression due to drug therapy      RTC in 6 months for follow-up or sooner if needed    __________________________    Chasity Dixon MD, PharmD  [facility]  American Board of Obesity Medicine diplomate  Office [phone]

## 2024-11-13 ENCOUNTER — HOSPITAL ENCOUNTER (OUTPATIENT)
Dept: RADIOLOGY | Facility: HOSPITAL | Age: 49
Discharge: HOME | End: 2024-11-13
Attending: INTERNAL MEDICINE
Payer: COMMERCIAL

## 2024-11-13 DIAGNOSIS — E04.2 MULTIPLE THYROID NODULES: ICD-10-CM

## 2024-11-13 PROCEDURE — 76536 US EXAM OF HEAD AND NECK: CPT | Mod: 26,,, | Performed by: RADIOLOGY

## 2024-11-13 PROCEDURE — 76536 US EXAM OF HEAD AND NECK: CPT | Mod: TC,PN

## 2024-11-18 ENCOUNTER — CLINICAL SUPPORT (OUTPATIENT)
Dept: REHABILITATION | Facility: HOSPITAL | Age: 49
End: 2024-11-18
Payer: COMMERCIAL

## 2024-11-18 DIAGNOSIS — I87.2 VENOUS INSUFFICIENCY OF BOTH LOWER EXTREMITIES: Primary | ICD-10-CM

## 2024-11-18 DIAGNOSIS — I89.0 LYMPHEDEMA OF BOTH LOWER EXTREMITIES: ICD-10-CM

## 2024-11-18 PROCEDURE — 97016 VASOPNEUMATIC DEVICE THERAPY: CPT

## 2024-11-18 PROCEDURE — 97140 MANUAL THERAPY 1/> REGIONS: CPT

## 2024-11-18 NOTE — PROGRESS NOTES
[facility]  Physical Therapy Treatment Note/ Progress note  Lymphedema    Date: 11/18/2024  Name: Nitin Mcconnell  Clinic Number: [id number]    Therapy Diagnosis:   Encounter Diagnoses   Name Primary?    Venous insufficiency of both lower extremities Yes    Lymphedema of both lower extremities      Physician: Kristian Escobar MD*    Physician Orders: PT Eval and Treat - lymphedema  Medical Diagnosis from Referral: I89.0 (ICD-10-CM) - Lymphedema, not elsewhere classified  Evaluation Date: 10/21/2024  Authorization Period Expiration: 10/17/2025  Plan of Care Expiration: 12/21/2024  Visit # / Visits authorized: 3/ 20     Time In: 4:30pm   Time Out: 5:30pm   Total Billable Time: 60 minutes     Precautions: Standard       SUBJECTIVE     Pt reports: She is doing ok so far   Nitin reported wearing compression outside of therapy visits: Yes - previous compression   She was compliant with home exercise program given last session.   Response to previous treatment:fine   Functional change: none     Pain: 0/10  Location:       OBJECTIVE     Pt arrived in compression    Compression garment status: 20-30 mm hg knee highs and 20-30 mm hg thigh highs  Compression Rx status: not requested  Pneumatic pump status: not referred    Objective Measures updated at progress report unless specified.        11/6/2024      Treatment     Nitin received the treatments listed below:       Manual therapy techniques were applied for 60 minutes, including:    SEQUENTIAL COMPRESSION PUMP: full leg sleeve applied to LLE  Biotab with default setting with distal pressures starting at 45mmHg entire extremity simultaneous to education.     MANUAL LYMPHATIC DRAINAGE (MLD):    While supine, stimulation at terminus, abdominal, inguinal, and popliteal lymph nodes, drainage of entire RLE with return proximally.      MULTILAYERED BANDAGING:  issued supplies and bandaged RLE and LLE  base of toes to below knee with cotton  "stockinette, cellona padding, 2 8 cm, and 1 10 cm Rosidal K bandages to leave intact 24-72 hrs as tolerated, discontinue with any problems, return rolled bandages next session. Wash and wear schedules confirmed.      Komprex used on LLE       Patient Education and Home Exercises      Education provided:   - remove bandages if painful   - Progress towards goals     Written Home Exercises Provided: yes    "Patient instructed to cont prior HEP"}.  Exercises were reviewed and Nitin was able to demonstrate them prior to the end of the session.  Nitin demonstrated good  understanding of the HEP provided. See EMR under Patient Instructions for exercises provided during therapy sessions.       Assessment     Pt would continue to benefit from skilled PT.     Pt's RLE and LLE were pumped and bandaged with good tolerance. Will continue to progress      Some reductions noted so far in measurements     Nitin is progressing well towards her goals and there are no updates to goals at this time. Pt prognosis is Good.     Pt will continue to benefit from skilled outpatient physical therapy to address the deficits listed in the problem list on initial evaluation provide pt/family education and to maximize pt's level of independence in the home and community environment.     Pt's spiritual, cultural and educational needs considered and pt agreeable to plan of care and goals.    Anticipated barriers to physical therapy: none     Goals:      Short Term Goals: (6 weeks)  1. Patient will show decreased girth in B LE by up to 1 cm to allow for LE symmetry, shoe and clothing choice, and ability to apply needed compression.  (progressing, not met)   2. Patient will demonstrate 100% knowledge of lymphedema precautions and signs of infection to allow for reduced lymphedema risk, infection risk, and/or exacerbation of condition.  (progressing, not met)  3. Patient or caregiver will perform self-bandaging techniques and/or wearing of " compression garments to allow for lymphatic drainage support, skin elasticity, and reduction in shape and size of limb. (progressing, not met)  4. Patient will perform self lymph drainage techniques to areas within reach to enhance lymphatic drainage and skin condition.  (progressing, not met)  5. Patient will tolerate daily activities with multilayered bandaging to allow for lymphatic and venous support.  (progressing, not met)     Long Term Goals: (12  weeks)  1. Patient will show decreased girth in B LE by up to 2 cm  to allow for LE symmetry, shoe and clothing choice, and ability to apply needed compression daily.  (progressing, not met)  2. Patient will show reduction in density to mild or less with improved contour of limb to allow for cosmesis, LE symmetry, infection risk reduction, and clothing and compression choice.   (progressing, not met)  3. Patient to mitul/doff compression garment with daily compliance to assist in lymphedema management, skin elasticity, and tissue density.  (progressing, not met)  4. Pt to show improved postural awareness and alignment.  (progressing, not met)  5. Pt to be I and compliant with HEP to allow for increased function in affected limb.   (progressing, not met)      PLAN     Updates/Grading for next session: continue tx    Continue PT  2x   weekly for Complete Decongestive Therapy:  Manual lymphatic drainage, Multilayered short stretch bandaging, Pneumatic compression, Therapeutic exercises, Patient education as deemed necessary to achieve stated goals.    April Almodovar, PT, PT, DPT, [facility]ARON JENNINGS

## 2024-11-19 ENCOUNTER — PATIENT MESSAGE (OUTPATIENT)
Dept: GASTROENTEROLOGY | Facility: CLINIC | Age: 49
End: 2024-11-19
Payer: COMMERCIAL

## 2024-11-20 ENCOUNTER — CLINICAL SUPPORT (OUTPATIENT)
Dept: REHABILITATION | Facility: HOSPITAL | Age: 49
End: 2024-11-20
Payer: COMMERCIAL

## 2024-11-20 DIAGNOSIS — I89.0 LYMPHEDEMA OF BOTH LOWER EXTREMITIES: ICD-10-CM

## 2024-11-20 DIAGNOSIS — I87.2 VENOUS INSUFFICIENCY OF BOTH LOWER EXTREMITIES: Primary | ICD-10-CM

## 2024-11-20 PROCEDURE — 97016 VASOPNEUMATIC DEVICE THERAPY: CPT

## 2024-11-20 PROCEDURE — 97140 MANUAL THERAPY 1/> REGIONS: CPT

## 2024-11-20 NOTE — PROGRESS NOTES
[facility]  Physical Therapy Treatment Note/ Progress note  Lymphedema    Date: 11/20/2024  Name: Nitin Mcconnell  Clinic Number: [id number]    Therapy Diagnosis:   Encounter Diagnoses   Name Primary?    Venous insufficiency of both lower extremities Yes    Lymphedema of both lower extremities      Physician: Kristian Escobar MD*    Physician Orders: PT Eval and Treat - lymphedema  Medical Diagnosis from Referral: I89.0 (ICD-10-CM) - Lymphedema, not elsewhere classified  Evaluation Date: 10/21/2024  Authorization Period Expiration: 10/17/2025  Plan of Care Expiration: 12/21/2024  Visit # / Visits authorized: 4/ 20     Time In: 5:30pm   Time Out: 6:25pm   Total Billable Time: 55 minutes     Precautions: Standard       SUBJECTIVE     Pt reports: the komprex was a little uncomfortable   Nitin reported wearing compression outside of therapy visits: Yes - previous compression   She was compliant with home exercise program given last session.   Response to previous treatment:fine   Functional change: none     Pain: 0/10  Location:       OBJECTIVE     Pt arrived in compression    Compression garment status: 20-30 mm hg knee highs and 20-30 mm hg thigh highs  Compression Rx status: not requested  Pneumatic pump status: not referred    Objective Measures updated at progress report unless specified.        11/6/2024      Treatment     Nitin received the treatments listed below:       Manual therapy techniques were applied for 55 minutes, including:    SEQUENTIAL COMPRESSION PUMP: full leg sleeve applied to LLE  Biotab with default setting with distal pressures starting at 45mmHg entire extremity simultaneous to education.     MANUAL LYMPHATIC DRAINAGE (MLD):    While supine, stimulation at terminus, abdominal, inguinal, and popliteal lymph nodes, drainage of entire RLE with return proximally.      MULTILAYERED BANDAGING:  issued supplies and bandaged RLE and LLE  base of toes to below  "knee with cotton stockinette, cellona padding, 2 8 cm, and 1 10 cm Rosidal K bandages to leave intact 24-72 hrs as tolerated, discontinue with any problems, return rolled bandages next session. Wash and wear schedules confirmed.      Komprex used on LLE       Patient Education and Home Exercises      Education provided:   - remove bandages if painful   - Progress towards goals     Written Home Exercises Provided: yes    "Patient instructed to cont prior HEP"}.  Exercises were reviewed and Nitin was able to demonstrate them prior to the end of the session.  Nitin demonstrated good  understanding of the HEP provided. See EMR under Patient Instructions for exercises provided during therapy sessions.       Assessment     Pt would continue to benefit from skilled PT.     Pt's RLE and LLE were pumped and bandaged with good tolerance. Cellona used instead of komprex today.  Will continue to progress      Some reductions noted so far in measurements     Nitin is progressing well towards her goals and there are no updates to goals at this time. Pt prognosis is Good.     Pt will continue to benefit from skilled outpatient physical therapy to address the deficits listed in the problem list on initial evaluation provide pt/family education and to maximize pt's level of independence in the home and community environment.     Pt's spiritual, cultural and educational needs considered and pt agreeable to plan of care and goals.    Anticipated barriers to physical therapy: none     Goals:      Short Term Goals: (6 weeks)  1. Patient will show decreased girth in B LE by up to 1 cm to allow for LE symmetry, shoe and clothing choice, and ability to apply needed compression.  (progressing, not met)   2. Patient will demonstrate 100% knowledge of lymphedema precautions and signs of infection to allow for reduced lymphedema risk, infection risk, and/or exacerbation of condition.  (progressing, not met)  3. Patient or caregiver will " perform self-bandaging techniques and/or wearing of compression garments to allow for lymphatic drainage support, skin elasticity, and reduction in shape and size of limb. (progressing, not met)  4. Patient will perform self lymph drainage techniques to areas within reach to enhance lymphatic drainage and skin condition.  (progressing, not met)  5. Patient will tolerate daily activities with multilayered bandaging to allow for lymphatic and venous support.  (progressing, not met)     Long Term Goals: (12  weeks)  1. Patient will show decreased girth in B LE by up to 2 cm  to allow for LE symmetry, shoe and clothing choice, and ability to apply needed compression daily.  (progressing, not met)  2. Patient will show reduction in density to mild or less with improved contour of limb to allow for cosmesis, LE symmetry, infection risk reduction, and clothing and compression choice.   (progressing, not met)  3. Patient to mitul/doff compression garment with daily compliance to assist in lymphedema management, skin elasticity, and tissue density.  (progressing, not met)  4. Pt to show improved postural awareness and alignment.  (progressing, not met)  5. Pt to be I and compliant with HEP to allow for increased function in affected limb.   (progressing, not met)      PLAN     Updates/Grading for next session: continue tx    Continue PT  2x   weekly for Complete Decongestive Therapy:  Manual lymphatic drainage, Multilayered short stretch bandaging, Pneumatic compression, Therapeutic exercises, Patient education as deemed necessary to achieve stated goals.    April Almodovar, PT, PT, DPT, CLJOEY- DICK

## 2024-11-25 ENCOUNTER — CLINICAL SUPPORT (OUTPATIENT)
Dept: REHABILITATION | Facility: HOSPITAL | Age: 49
End: 2024-11-25
Payer: COMMERCIAL

## 2024-11-25 DIAGNOSIS — I87.2 VENOUS INSUFFICIENCY OF BOTH LOWER EXTREMITIES: Primary | ICD-10-CM

## 2024-11-25 DIAGNOSIS — I89.0 LYMPHEDEMA OF BOTH LOWER EXTREMITIES: ICD-10-CM

## 2024-11-25 PROCEDURE — 97016 VASOPNEUMATIC DEVICE THERAPY: CPT

## 2024-11-25 PROCEDURE — 97140 MANUAL THERAPY 1/> REGIONS: CPT

## 2024-11-25 NOTE — PROGRESS NOTES
[facility]  Physical Therapy Treatment Note/ Progress note  Lymphedema    Date: 11/25/2024  Name: Nitin Mcconnell  Clinic Number: [id number]    Therapy Diagnosis:   Encounter Diagnoses   Name Primary?    Venous insufficiency of both lower extremities Yes    Lymphedema of both lower extremities      Physician: Kristian Escobar MD*    Physician Orders: PT Eval and Treat - lymphedema  Medical Diagnosis from Referral: I89.0 (ICD-10-CM) - Lymphedema, not elsewhere classified  Evaluation Date: 10/21/2024  Authorization Period Expiration: 10/17/2025  Plan of Care Expiration: 12/21/2024  Visit # / Visits authorized: 5/ 20     Time In: 5:30pm   Time Out: 6:25pm   Total Billable Time: 55 minutes     Precautions: Standard       SUBJECTIVE     Pt reports: tdid well with bandages   Nitin reported wearing compression outside of therapy visits: Yes - previous compression   She was compliant with home exercise program given last session.   Response to previous treatment:fine   Functional change: none     Pain: 0/10  Location:       OBJECTIVE     Pt arrived in compression    Compression garment status: 20-30 mm hg knee highs and 20-30 mm hg thigh highs  Compression Rx status: not requested  Pneumatic pump status: not referred    Objective Measures updated at progress report unless specified.        11/6/2024      Treatment     Nitin received the treatments listed below:       Manual therapy techniques were applied for 55 minutes, including:    SEQUENTIAL COMPRESSION PUMP: full leg sleeve applied to LLE  Biotab with default setting with distal pressures starting at 45mmHg entire extremity simultaneous to education.     MANUAL LYMPHATIC DRAINAGE (MLD):    While supine, stimulation at terminus, abdominal, inguinal, and popliteal lymph nodes, drainage of entire RLE with return proximally.      MULTILAYERED BANDAGING:  issued supplies and bandaged RLE and LLE  base of toes to below knee with cotton  "stockinette, cellona padding, 2 8 cm, and 1 10 cm Rosidal K bandages to leave intact 24-72 hrs as tolerated, discontinue with any problems, return rolled bandages next session. Wash and wear schedules confirmed.      Komprex used on LLE       Patient Education and Home Exercises      Education provided:   - remove bandages if painful   - Progress towards goals     Written Home Exercises Provided: yes    "Patient instructed to cont prior HEP"}.  Exercises were reviewed and Nitin was able to demonstrate them prior to the end of the session.  Nitin demonstrated good  understanding of the HEP provided. See EMR under Patient Instructions for exercises provided during therapy sessions.       Assessment     Pt would continue to benefit from skilled PT.     Pt's RLE and LLE were pumped and bandaged with good tolerance. Cellona used instead of komprex today.  Will continue to progress      Some reductions noted so far in measurements     Nitin is progressing well towards her goals and there are no updates to goals at this time. Pt prognosis is Good.     Pt will continue to benefit from skilled outpatient physical therapy to address the deficits listed in the problem list on initial evaluation provide pt/family education and to maximize pt's level of independence in the home and community environment.     Pt's spiritual, cultural and educational needs considered and pt agreeable to plan of care and goals.    Anticipated barriers to physical therapy: none     Goals:      Short Term Goals: (6 weeks)  1. Patient will show decreased girth in B LE by up to 1 cm to allow for LE symmetry, shoe and clothing choice, and ability to apply needed compression.  (progressing, not met)   2. Patient will demonstrate 100% knowledge of lymphedema precautions and signs of infection to allow for reduced lymphedema risk, infection risk, and/or exacerbation of condition.  (progressing, not met)  3. Patient or caregiver will perform " self-bandaging techniques and/or wearing of compression garments to allow for lymphatic drainage support, skin elasticity, and reduction in shape and size of limb. (progressing, not met)  4. Patient will perform self lymph drainage techniques to areas within reach to enhance lymphatic drainage and skin condition.  (progressing, not met)  5. Patient will tolerate daily activities with multilayered bandaging to allow for lymphatic and venous support.  (progressing, not met)     Long Term Goals: (12  weeks)  1. Patient will show decreased girth in B LE by up to 2 cm  to allow for LE symmetry, shoe and clothing choice, and ability to apply needed compression daily.  (progressing, not met)  2. Patient will show reduction in density to mild or less with improved contour of limb to allow for cosmesis, LE symmetry, infection risk reduction, and clothing and compression choice.   (progressing, not met)  3. Patient to mitul/doff compression garment with daily compliance to assist in lymphedema management, skin elasticity, and tissue density.  (progressing, not met)  4. Pt to show improved postural awareness and alignment.  (progressing, not met)  5. Pt to be I and compliant with HEP to allow for increased function in affected limb.   (progressing, not met)      PLAN     Updates/Grading for next session: continue tx    Continue PT  2x   weekly for Complete Decongestive Therapy:  Manual lymphatic drainage, Multilayered short stretch bandaging, Pneumatic compression, Therapeutic exercises, Patient education as deemed necessary to achieve stated goals.    April Almodovar, PT, PT, DPT, CLJOEY- DICK

## 2024-12-03 ENCOUNTER — TELEPHONE (OUTPATIENT)
Dept: CARDIOLOGY | Facility: CLINIC | Age: 49
End: 2024-12-03
Payer: COMMERCIAL

## 2024-12-03 NOTE — TELEPHONE ENCOUNTER
Lymphedema Pump Progress:  [x] Consult, clinical notes, and face sheet faxed to [facility] for home pneumatic compression.  [x] Reached out to patient for follow up. Wish to inquire about symptoms of lymphedema and if conservative therapy has been effective.  [x] Updated progress note sent to [facility].  [x] Patient pneumatic compression pump approved and shipped by [facility].

## 2024-12-04 ENCOUNTER — CLINICAL SUPPORT (OUTPATIENT)
Dept: REHABILITATION | Facility: HOSPITAL | Age: 49
End: 2024-12-04
Payer: COMMERCIAL

## 2024-12-04 DIAGNOSIS — I89.0 LYMPHEDEMA OF BOTH LOWER EXTREMITIES: ICD-10-CM

## 2024-12-04 DIAGNOSIS — I87.2 VENOUS INSUFFICIENCY OF BOTH LOWER EXTREMITIES: Primary | ICD-10-CM

## 2024-12-04 PROCEDURE — 97140 MANUAL THERAPY 1/> REGIONS: CPT

## 2024-12-11 NOTE — PROGRESS NOTES
[facility]  Physical Therapy Treatment Note/ Progress note  Lymphedema    Date: 12/4/2024  Name: Nitin Mcconnell  Clinic Number: [id number]    Therapy Diagnosis:   Encounter Diagnoses   Name Primary?    Venous insufficiency of both lower extremities Yes    Lymphedema of both lower extremities      Physician: Kristian Escobar MD*    Physician Orders: PT Eval and Treat - lymphedema  Medical Diagnosis from Referral: I89.0 (ICD-10-CM) - Lymphedema, not elsewhere classified  Evaluation Date: 10/21/2024  Authorization Period Expiration: 10/17/2025  Plan of Care Expiration: 12/21/2024  Visit # / Visits authorized: 7/ 20     Time In: 5:30pm   Time Out: 6:20pm   Total Billable Time: 50 minutes     Precautions: Standard       SUBJECTIVE     Pt reports: is still waiting for compression   Nitin reported wearing compression outside of therapy visits: Yes - previous compression   She was compliant with home exercise program given last session.   Response to previous treatment:fine   Functional change: none     Pain: 0/10  Location:       OBJECTIVE     Pt arrived in compression    Compression garment status: 20-30 mm hg knee highs and 20-30 mm hg thigh highs  Compression Rx status: not requested  Pneumatic pump status: not referred    Objective Measures updated at progress report unless specified.        11/6/2024 12/4/2024:       Treatment     Nitin received the treatments listed below:       Manual therapy techniques were applied for 50 minutes, including:    SEQUENTIAL COMPRESSION PUMP: full leg sleeve applied to LLE  Biotab with default setting with distal pressures starting at 45mmHg entire extremity simultaneous to education.     MANUAL LYMPHATIC DRAINAGE (MLD):    While supine, stimulation at terminus, abdominal, inguinal, and popliteal lymph nodes, drainage of entire RLE with return proximally.      MULTILAYERED BANDAGING:  issued supplies and bandaged RLE and LLE  base of  "toes to below knee with cotton stockinette, cellona padding, 2 8 cm, and 1 10 cm Rosidal K bandages to leave intact 24-72 hrs as tolerated, discontinue with any problems, return rolled bandages next session. Wash and wear schedules confirmed.      Komprex II used on LLE       Patient Education and Home Exercises      Education provided:   - remove bandages if painful   - Progress towards goals     Written Home Exercises Provided: yes    "Patient instructed to cont prior HEP"}.  Exercises were reviewed and Nitin was able to demonstrate them prior to the end of the session.  Nitin demonstrated good  understanding of the HEP provided. See EMR under Patient Instructions for exercises provided during therapy sessions.       Assessment     Pt would continue to benefit from skilled PT.     Patient's measurements continues to show excellent reductions. Cellona used instead of komprex today.  Will continue to progress      Some reductions noted so far in measurements     Nitin is progressing well towards her goals and there are no updates to goals at this time. Pt prognosis is Good.     Pt will continue to benefit from skilled outpatient physical therapy to address the deficits listed in the problem list on initial evaluation provide pt/family education and to maximize pt's level of independence in the home and community environment.     Pt's spiritual, cultural and educational needs considered and pt agreeable to plan of care and goals.    Anticipated barriers to physical therapy: none     Goals:      Short Term Goals: (6 weeks)  1. Patient will show decreased girth in B LE by up to 1 cm to allow for LE symmetry, shoe and clothing choice, and ability to apply needed compression.  (progressing, not met)   2. Patient will demonstrate 100% knowledge of lymphedema precautions and signs of infection to allow for reduced lymphedema risk, infection risk, and/or exacerbation of condition.  (progressing, not met)  3. Patient " or caregiver will perform self-bandaging techniques and/or wearing of compression garments to allow for lymphatic drainage support, skin elasticity, and reduction in shape and size of limb. (progressing, not met)  4. Patient will perform self lymph drainage techniques to areas within reach to enhance lymphatic drainage and skin condition.  (progressing, not met)  5. Patient will tolerate daily activities with multilayered bandaging to allow for lymphatic and venous support.  (progressing, not met)     Long Term Goals: (12  weeks)  1. Patient will show decreased girth in B LE by up to 2 cm  to allow for LE symmetry, shoe and clothing choice, and ability to apply needed compression daily.  (progressing, not met)  2. Patient will show reduction in density to mild or less with improved contour of limb to allow for cosmesis, LE symmetry, infection risk reduction, and clothing and compression choice.   (progressing, not met)  3. Patient to mitul/doff compression garment with daily compliance to assist in lymphedema management, skin elasticity, and tissue density.  (progressing, not met)  4. Pt to show improved postural awareness and alignment.  (progressing, not met)  5. Pt to be I and compliant with HEP to allow for increased function in affected limb.   (progressing, not met)      PLAN     Updates/Grading for next session: continue tx    Continue PT  2x   weekly for Complete Decongestive Therapy:  Manual lymphatic drainage, Multilayered short stretch bandaging, Pneumatic compression, Therapeutic exercises, Patient education as deemed necessary to achieve stated goals.    April Almodovar, PT, PT, DPT, CLT- DICK

## 2024-12-12 ENCOUNTER — OFFICE VISIT (OUTPATIENT)
Dept: OTOLARYNGOLOGY | Facility: CLINIC | Age: 49
End: 2024-12-12
Payer: COMMERCIAL

## 2024-12-12 VITALS
BODY MASS INDEX: 45.91 KG/M2 | WEIGHT: 275.56 LBS | HEART RATE: 63 BPM | HEIGHT: 65 IN | DIASTOLIC BLOOD PRESSURE: 80 MMHG | SYSTOLIC BLOOD PRESSURE: 127 MMHG

## 2024-12-12 DIAGNOSIS — J34.3 HYPERTROPHY OF BOTH INFERIOR NASAL TURBINATES: Chronic | ICD-10-CM

## 2024-12-12 DIAGNOSIS — J30.1 SEASONAL ALLERGIC RHINITIS DUE TO POLLEN: Chronic | ICD-10-CM

## 2024-12-12 DIAGNOSIS — J30.89 NON-SEASONAL ALLERGIC RHINITIS DUE TO FUNGAL SPORES: Primary | Chronic | ICD-10-CM

## 2024-12-12 DIAGNOSIS — J30.89 NON-SEASONAL ALLERGIC RHINITIS DUE TO OTHER ALLERGIC TRIGGER: Chronic | ICD-10-CM

## 2024-12-12 PROCEDURE — 3079F DIAST BP 80-89 MM HG: CPT | Mod: CPTII,S$GLB,, | Performed by: OTOLARYNGOLOGY

## 2024-12-12 PROCEDURE — 1159F MED LIST DOCD IN RCRD: CPT | Mod: CPTII,S$GLB,, | Performed by: OTOLARYNGOLOGY

## 2024-12-12 PROCEDURE — 99999 PR PBB SHADOW E&M-EST. PATIENT-LVL III: CPT | Mod: PBBFAC,,, | Performed by: OTOLARYNGOLOGY

## 2024-12-12 PROCEDURE — 3044F HG A1C LEVEL LT 7.0%: CPT | Mod: CPTII,S$GLB,, | Performed by: OTOLARYNGOLOGY

## 2024-12-12 PROCEDURE — 3008F BODY MASS INDEX DOCD: CPT | Mod: CPTII,S$GLB,, | Performed by: OTOLARYNGOLOGY

## 2024-12-12 PROCEDURE — 99214 OFFICE O/P EST MOD 30 MIN: CPT | Mod: S$GLB,,, | Performed by: OTOLARYNGOLOGY

## 2024-12-12 PROCEDURE — 1160F RVW MEDS BY RX/DR IN RCRD: CPT | Mod: CPTII,S$GLB,, | Performed by: OTOLARYNGOLOGY

## 2024-12-12 PROCEDURE — 3074F SYST BP LT 130 MM HG: CPT | Mod: CPTII,S$GLB,, | Performed by: OTOLARYNGOLOGY

## 2024-12-12 RX ORDER — OLOPATADINE HYDROCHLORIDE 665 UG/1
1 SPRAY NASAL 2 TIMES DAILY
Qty: 30.5 G | Refills: 5 | Status: SHIPPED | OUTPATIENT
Start: 2024-12-12 | End: 2025-12-12

## 2024-12-12 RX ORDER — LEVOCETIRIZINE DIHYDROCHLORIDE 5 MG/1
5 TABLET, FILM COATED ORAL NIGHTLY
Qty: 30 TABLET | Refills: 11 | Status: SHIPPED | OUTPATIENT
Start: 2024-12-12 | End: 2025-12-12

## 2024-12-12 NOTE — PROGRESS NOTES
Chief Complaint   Patient presents with    Follow-up     Pt stated that she is having episodes of sneezing, congestion and drippings       HPI:  Patient is a 49 y.o. female who has previously seen me for cerumen impaction, allergic rhinitis.  She had RAST testing performed showing sensitivities to certain weeds, molds, dust mites, and cockroach.  Referral to Allergy/immunology placed.  Patient was prescribed Singulair and nasal steroid for daily use.    Since the last visit, the patient reports she is been doing very well.  She has had no significant sinus or nasal symptoms since maintaining on the Nasacort and Singulair.  She saw Allergy immunology who did not recommend making any changes to her medications as of now, since they were working well.  They did discuss medication measures for her allergic sensitivities.    Interval HPI 7/22/2024 :    Patient reports her symptoms are well controlled on her current medications.  She is using her Nasacort and Singulair daily.  She still gets some minor flare up seasonally, but overall has been well-controlled.    Patient reports a very slight amount of tinnitus that she has noticed in the right ear over the last few months.  It is only evident at night or when it is very quiet.  She has not had recent audiogram and does not report any actual decrease of the hearing.    Interval HPI 12/12/2024 :      Overall she was well-controlled after her visit in July.  Over the last few months she has had 3 or 4 episodes of flare up of her symptoms including sneezing, nasal irritation, rhinorrhea.  She has used Benadryl during these times which helps, but does have sedative and drying side effects.  She has continued to use her Nasacort and Singulair faithfully daily.  She has used p.o. antihistamines OTC in the past without feeling like they helped very much.  No other nasal sprays have been tried.     Latest Reference Range & Units 03/23/23 15:30   A. alternata IgE <0.10 kU/L 1.10  (H)   Altern. alternata Class  CLASS 2   Aspergillus Fumigatus IgE <0.10 kU/L 0.11 (H)   A. fumigatus Class  CLASS 0/1   Allergen Sheep [place] ([place]) IgE <0.10 kU/L <0.10   Allergen Sheep [place] ([place]) Class  CLASS 0   Bald [place] Class  CLASS 0   [place] Grass IgE <0.10 kU/L <0.10   [place] Grass Class  CLASS 0   [place] Maple Tree IgE <0.10 kU/L <0.10   Allergen Maple ([place]) Class  CLASS 0   Cat Dander IgE <0.10 kU/L <0.10   Cat Epithelium Class  CLASS 0   [place] IgE <0.10 kU/L <0.10   [place] Class  CLASS 0   Cladosporium Class  CLASS 0   Cladosporium IgE <0.10 kU/L <0.10   Cocklebur Class  CLASS 0/1   Cocklebur IgE <0.10 kU/L 0.16 (H)   Cockroach, IgE <0.10 kU/L  -  0.23 (H)  CLASS 0/1   [place] <0.10 kU/L <0.10   D. farinae <0.10 kU/L 0.33 (H)   D. farinae Class  CLASS 0/1   D. pteronyssinus Dust Mite IgE <0.10 kU/L 0.39 (H)   D. pteronyssinus Class  CLASS 1   Dog Dander IgE <0.10 kU/L <0.10   Dog Dander Class  CLASS 0   Elm [place], IgE <0.10 kU/L <0.10   Elm [place] Class  CLASS 0   English Plantain IgE <0.10 kU/L <0.10   English Plantain Class  CLASS 0   Feather Panel #2 kU/L <0.35   [place] Grass IgE <0.10 kU/L <0.10   [place] Grass Class  CLASS 0   Lamb [place] IgE <0.10 kU/L <0.10   Allergen [place] Class  CLASS 0   M[place]er IgE <0.10 kU/L 0.25 (H)   Marshelder Class  CLASS 0/1   Meadow Grass ([place]), IgE <0.10 kU/L <0.10   Meadow Grass ([place]) Class  CLASS 0   Mucor racemosus, IgE <0.10 kU/L <0.10   Mucor racemosus Class  CLASS 0   [place] Tree IgE <0.10 kU/L <0.10   Allergen [place] Class  CLASS 0   MUGWORT <0.10 kU/L <0.10   Mugwort Class  CLASS 0   NETTLE <0.10 kU/L 0.10   Nettle Class  CLASS 0/1   [place], Class  CLASS 0   Pecan [place] Tree IgE <0.10 kU/L <0.10   Pecan, Class  CLASS 0   Penicillium IgE <0.10 kU/L <0.10   Penicillium Class  CLASS 0   Pigweed IgE <0.10 kU/L <0.10   Common Pigweed Class  CLASS 0   Ragweed, Short, Class  CLASS 0   Ragweed, Short, IgE  <0.10 kU/L <0.10   RAST Allergen for [place] kU/L <0.35   Silver [place] IgE <0.10 kU/L <0.10   Silver [place] Class  CLASS 0   [place] Grass IgE <0.10 kU/L <0.10   Mickey Grass Class  CLASS 0   White [place] Tree IgE <0.10 kU/L <0.10   White [place] Class  CLASS 0   [place] Tree IgE <0.10 kU/L <0.10   (H): Data is abnormally high      Active Ambulatory Problems     Diagnosis Date Noted    Other specified hypertrophic and atrophic condition of skin 01/17/2012    Ptosis of breast 01/17/2012    Systemic lupus erythematosus 04/10/2012    Unspecified hypertrophic and atrophic condition of skin 01/17/2012    Multiple thyroid nodules 03/18/2018    Essential hypertension 07/09/2018    Alopecia 02/25/2019    Bilateral leg edema 02/25/2019    Pulmonary hypertension 02/28/2019    Immunosuppression 04/24/2019    Class 3 severe obesity due to excess calories with serious comorbidity in adult 04/24/2019    Chronic pulmonary heart disease     Morbid obesity with BMI of 45.0-49.9, adult 09/09/2019    S/P gastric bypass 09/14/2019    Obstructive sleep apnea     Venous insufficiency of both lower extremities 02/17/2020    Lymphedema of both lower extremities 02/17/2020    Lower extremity weakness 03/10/2020    Swelling of lower limb 03/10/2020    Chest pain 02/17/2021    Elevated ALT measurement 11/07/2021    Positive colorectal cancer screening using DNA-based stool test 04/18/2022    Chronic gout without tophus 10/20/2023     Resolved Ambulatory Problems     Diagnosis Date Noted    Localized adiposity 04/16/2012    Other specified disorders of adrenal glands 04/10/2012    S/P hysterectomy 07/23/2013    Vitamin D deficiency 03/22/2018    Lower extremity edema 03/10/2020     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, benign     Lupus mild       Review of Systems  General:  negative for chills, fever or weight loss  Psychological: negative for mood changes or depression  Ophthalmic: negative for blurry vision, photophobia or eye pain  ENT: see HPI  Respiratory: no cough, shortness of breath, or wheezing  Cardiovascular: no chest pain or dyspnea on exertion  Gastrointestinal: no abdominal pain, change in bowel habits, or black/ bloody stools  Musculoskeletal: negative for gait disturbance or muscular weakness  Neurological: no syncope or seizures; no ataxia  Dermatological: negative for pruritis, rash and jaundice  Hematologic/lymphatic: no easy bruising, no new adenopathy    Physical Exam     Vitals:    12/12/24 0752   BP: 127/80   Pulse: 63             Constitutional:   She is oriented to person, place, and time. Vital signs are normal. She appears well-developed and well-nourished. She appears alert. She is cooperative. Normal speech.      Head:  Normocephalic and atraumatic. Salivary glands normal.  Facial strength is normal.      Ears:  Hearing normal to normal and whispered voice; external ear normal without scars, lesions, or masses; ear canal, tympanic membrane, and middle ear normal., right ear hearing normal to normal and whispered voice; external ear normal without scars, lesions, or masses; ear canal, tympanic membrane, and middle ear normal. and left ear hearing normal to normal and whispered voice; external ear normal without scars, lesions, or masses; ear canal, tympanic membrane, and middle ear normal..   Right Ear: Tympanic membrane is not erythematous, not retracted and not bulging. No middle ear effusion.   Left Ear: Tympanic membrane is not erythematous, not retracted and not bulging.  No middle ear effusion.     Nose:  Mucosal edema (Increased erythema and edema bilateral nasal cavities) and rhinorrhea (scattered scant white mucus bilateral nasal cavities, no purulent drainage from middle meatus) present. No septal deviation or polyps. Turbinate hypertrophy (3+,  boggy, congested).  Turbinates normal.  Right sinus exhibits no maxillary sinus tenderness and no frontal sinus tenderness. Left sinus exhibits no maxillary sinus tenderness and no frontal sinus tenderness.     Mouth/Throat  Oropharynx clear and moist without lesions or asymmetry, normal uvula midline, lips, teeth, and gums normal and oropharynx normal. No uvula swelling, oral lesions, trismus or mucous membrane lesions. No oropharyngeal exudate, posterior oropharyngeal edema or posterior oropharyngeal erythema. Mirror exam not performed due to patient tolerance.  Mirror exam not performed due to patient tolerance.      Neck:  Neck normal without thyromegaly masses, asymmetry, normal tracheal structure, crepitus, and tenderness, thyroid normal, trachea normal, phonation normal, full range of motion with neck supple and no adenopathy. No JVD present. Carotid bruit is not present. No thyroid mass and no thyromegaly present.     She has no cervical adenopathy.     Cardiovascular:    Normal rate, regular rhythm and rate and rhythm, heart sounds, and pulses normal.              Pulmonary/Chest:   Effort and breath sounds normal.     Psychiatric:   She has a normal mood and affect. Her speech is normal and behavior is normal.     Neurological:   She is alert and oriented to person, place, and time. She has neurological normal, alert and oriented. No cranial nerve deficit.     Skin:   No abrasions, lacerations, lesions, or rashes.         Assessment/Plan:      ICD-10-CM ICD-9-CM    1. Non-seasonal allergic rhinitis due to fungal spores  J30.89 477.8       2. Seasonal allergic rhinitis due to pollen  J30.1 477.0       3. Hypertrophy of both inferior nasal turbinates  J34.3 478.0       4. Non-seasonal allergic rhinitis due to other allergic trigger  J30.89 477.8             Continue Nasacort and Singulair daily.  Add olopatadine nasal spray daily.    Gave prescription for Xyzal and patient may use this daily or p.r.n.  depending on symptoms.    I discussed the option of seeing Allergy immunology if symptoms are not well-controlled with above maximal medical therapy.    She will let me know how symptoms are progressing over the next few months and we will decide if further therapies or intervention is needed.    Follow-up in 4-6 months.    Yamileth March MD  [facility]

## 2024-12-12 NOTE — PATIENT INSTRUCTIONS
Continue nasacort and singulair.   Add olopatadine nasal spray daily and try adding xyzal daily if needed for extra control of symptoms.     If symptoms still persist with flare ups, let me know and we may consider allergy referral or other therapies.      How do you use a Nasal Corticosteroid Spray?    Make sure you understand your dosing instructions. Spray only the number of prescribed sprays in each nostril. Read the package instructions before using your spray the first time.    Most corticosteroid sprays suggest the following steps:    Wash your hands well.    Gently blow your nose to clear the passageway.    Shake the container several times.    Tilt your head slightly downward.  Use the opposite hand from the nostril you will be spraying to hold the spray bottle.    Block one nostril with your finger.  Insert the nasal applicator into the other nostril.    Aim the spray toward the outer wall of the nostril.  Inhale slowly through the nose and press the .    Breathe out and repeat to apply the prescribed number of sprays.  Repeat these steps for the other nostril.     Avoid sneezing or blowing your nose right after spraying.            
absent

## 2024-12-13 PROBLEM — Z79.899 IMMUNOSUPPRESSION DUE TO DRUG THERAPY: Status: ACTIVE | Noted: 2019-04-24

## 2024-12-13 PROBLEM — D84.821 IMMUNOSUPPRESSION DUE TO DRUG THERAPY: Status: ACTIVE | Noted: 2019-04-24

## 2024-12-30 ENCOUNTER — PATIENT MESSAGE (OUTPATIENT)
Dept: INTERNAL MEDICINE | Facility: CLINIC | Age: 49
End: 2024-12-30
Payer: COMMERCIAL

## 2024-12-30 ENCOUNTER — PATIENT MESSAGE (OUTPATIENT)
Dept: OTOLARYNGOLOGY | Facility: CLINIC | Age: 49
End: 2024-12-30
Payer: COMMERCIAL

## 2024-12-30 DIAGNOSIS — M10.9 GOUT, ARTHRITIS: ICD-10-CM

## 2024-12-30 DIAGNOSIS — R06.02 SOB (SHORTNESS OF BREATH): ICD-10-CM

## 2024-12-30 DIAGNOSIS — E66.813 CLASS 3 SEVERE OBESITY DUE TO EXCESS CALORIES WITHOUT SERIOUS COMORBIDITY WITH BODY MASS INDEX (BMI) OF 45.0 TO 49.9 IN ADULT: ICD-10-CM

## 2024-12-30 DIAGNOSIS — I10 ESSENTIAL HYPERTENSION: ICD-10-CM

## 2024-12-30 DIAGNOSIS — E66.01 CLASS 3 SEVERE OBESITY DUE TO EXCESS CALORIES WITHOUT SERIOUS COMORBIDITY WITH BODY MASS INDEX (BMI) OF 45.0 TO 49.9 IN ADULT: ICD-10-CM

## 2024-12-30 DIAGNOSIS — M32.9 SYSTEMIC LUPUS ERYTHEMATOSUS, UNSPECIFIED SLE TYPE, UNSPECIFIED ORGAN INVOLVEMENT STATUS: ICD-10-CM

## 2024-12-30 DIAGNOSIS — J30.89 NON-SEASONAL ALLERGIC RHINITIS DUE TO FUNGAL SPORES: Chronic | ICD-10-CM

## 2024-12-30 RX ORDER — HYDROXYCHLOROQUINE SULFATE 200 MG/1
TABLET, FILM COATED ORAL
Qty: 60 TABLET | Refills: 0 | Status: SHIPPED | OUTPATIENT
Start: 2024-12-30

## 2024-12-30 RX ORDER — TRIAMCINOLONE ACETONIDE 55 UG/1
2 SPRAY, METERED NASAL DAILY
Qty: 16.9 ML | Refills: 11 | Status: SHIPPED | OUTPATIENT
Start: 2024-12-30

## 2024-12-30 RX ORDER — COLCHICINE 0.6 MG/1
0.6 TABLET ORAL DAILY
Qty: 30 TABLET | Refills: 3 | Status: SHIPPED | OUTPATIENT
Start: 2024-12-30

## 2024-12-30 RX ORDER — ALLOPURINOL 100 MG/1
200 TABLET ORAL DAILY
Qty: 180 TABLET | Refills: 3 | Status: SHIPPED | OUTPATIENT
Start: 2024-12-30

## 2024-12-30 RX ORDER — MONTELUKAST SODIUM 10 MG/1
10 TABLET ORAL NIGHTLY
Qty: 90 TABLET | Refills: 3 | Status: SHIPPED | OUTPATIENT
Start: 2024-12-30 | End: 2025-12-30

## 2024-12-30 RX ORDER — OLOPATADINE HYDROCHLORIDE 665 UG/1
1 SPRAY NASAL 2 TIMES DAILY
Qty: 30.5 G | Refills: 5 | Status: SHIPPED | OUTPATIENT
Start: 2024-12-30 | End: 2025-12-30

## 2024-12-30 RX ORDER — LEVOCETIRIZINE DIHYDROCHLORIDE 5 MG/1
5 TABLET, FILM COATED ORAL NIGHTLY
Qty: 90 TABLET | Refills: 3 | Status: SHIPPED | OUTPATIENT
Start: 2024-12-30 | End: 2025-12-30

## 2024-12-30 NOTE — TELEPHONE ENCOUNTER
No care due was identified.  [facility] Embedded Care Due Messages. Reference number: [id number].   12/30/2024 1:58:26 PM CST

## 2025-01-03 RX ORDER — HYDROCHLOROTHIAZIDE 25 MG/1
25 TABLET ORAL DAILY
Qty: 90 TABLET | Refills: 3 | Status: SHIPPED | OUTPATIENT
Start: 2025-01-03

## 2025-01-07 DIAGNOSIS — E66.813 CLASS 3 SEVERE OBESITY DUE TO EXCESS CALORIES WITHOUT SERIOUS COMORBIDITY WITH BODY MASS INDEX (BMI) OF 45.0 TO 49.9 IN ADULT: ICD-10-CM

## 2025-01-07 DIAGNOSIS — R06.02 SOB (SHORTNESS OF BREATH): ICD-10-CM

## 2025-01-07 DIAGNOSIS — E66.01 CLASS 3 SEVERE OBESITY DUE TO EXCESS CALORIES WITHOUT SERIOUS COMORBIDITY WITH BODY MASS INDEX (BMI) OF 45.0 TO 49.9 IN ADULT: ICD-10-CM

## 2025-01-07 DIAGNOSIS — M32.9 SYSTEMIC LUPUS ERYTHEMATOSUS, UNSPECIFIED SLE TYPE, UNSPECIFIED ORGAN INVOLVEMENT STATUS: ICD-10-CM

## 2025-01-08 RX ORDER — HYDROXYCHLOROQUINE SULFATE 200 MG/1
TABLET, FILM COATED ORAL
Qty: 60 TABLET | Refills: 0 | Status: SHIPPED | OUTPATIENT
Start: 2025-01-08

## 2025-02-04 ENCOUNTER — PATIENT MESSAGE (OUTPATIENT)
Dept: RHEUMATOLOGY | Facility: CLINIC | Age: 50
End: 2025-02-04
Payer: COMMERCIAL

## 2025-02-04 DIAGNOSIS — R53.83 FATIGUE, UNSPECIFIED TYPE: ICD-10-CM

## 2025-02-04 DIAGNOSIS — E66.813 CLASS 3 SEVERE OBESITY DUE TO EXCESS CALORIES WITHOUT SERIOUS COMORBIDITY WITH BODY MASS INDEX (BMI) OF 45.0 TO 49.9 IN ADULT: ICD-10-CM

## 2025-02-04 DIAGNOSIS — M32.9 SYSTEMIC LUPUS ERYTHEMATOSUS, UNSPECIFIED SLE TYPE, UNSPECIFIED ORGAN INVOLVEMENT STATUS: Primary | ICD-10-CM

## 2025-02-04 DIAGNOSIS — E66.01 CLASS 3 SEVERE OBESITY DUE TO EXCESS CALORIES WITHOUT SERIOUS COMORBIDITY WITH BODY MASS INDEX (BMI) OF 45.0 TO 49.9 IN ADULT: ICD-10-CM

## 2025-02-04 DIAGNOSIS — R74.8 ELEVATED CPK: ICD-10-CM

## 2025-02-04 DIAGNOSIS — D84.9 IMMUNOSUPPRESSION: ICD-10-CM

## 2025-02-04 DIAGNOSIS — R06.02 SOB (SHORTNESS OF BREATH): ICD-10-CM

## 2025-02-04 DIAGNOSIS — M10.9 GOUT, ARTHRITIS: ICD-10-CM

## 2025-02-04 RX ORDER — HYDROXYCHLOROQUINE SULFATE 200 MG/1
TABLET, FILM COATED ORAL
Qty: 180 TABLET | Refills: 0 | Status: SHIPPED | OUTPATIENT
Start: 2025-02-04

## 2025-02-07 ENCOUNTER — LAB VISIT (OUTPATIENT)
Dept: LAB | Facility: HOSPITAL | Age: 50
End: 2025-02-07
Attending: INTERNAL MEDICINE
Payer: COMMERCIAL

## 2025-02-07 DIAGNOSIS — M32.9 SYSTEMIC LUPUS ERYTHEMATOSUS, UNSPECIFIED SLE TYPE, UNSPECIFIED ORGAN INVOLVEMENT STATUS: ICD-10-CM

## 2025-02-07 DIAGNOSIS — D84.9 IMMUNOSUPPRESSION: ICD-10-CM

## 2025-02-07 DIAGNOSIS — R53.83 FATIGUE, UNSPECIFIED TYPE: ICD-10-CM

## 2025-02-07 DIAGNOSIS — R74.8 ELEVATED CPK: ICD-10-CM

## 2025-02-07 DIAGNOSIS — M10.9 GOUT, ARTHRITIS: ICD-10-CM

## 2025-02-07 LAB
ALBUMIN SERPL BCP-MCNC: 4.1 G/DL (ref 3.5–5.2)
ALP SERPL-CCNC: 61 U/L (ref 38–126)
ALT SERPL W/O P-5'-P-CCNC: 51 U/L (ref 10–44)
ANION GAP SERPL CALC-SCNC: 6 MMOL/L (ref 8–16)
AST SERPL-CCNC: 47 U/L (ref 15–46)
BASOPHILS # BLD AUTO: 0.04 K/UL (ref 0–0.2)
BASOPHILS NFR BLD: 0.7 % (ref 0–1.9)
BILIRUB SERPL-MCNC: 0.7 MG/DL (ref 0.1–1)
C3 SERPL-MCNC: 130 MG/DL (ref 50–180)
C4 SERPL-MCNC: 42 MG/DL (ref 11–44)
CALCIUM SERPL-MCNC: 9.4 MG/DL (ref 8.7–10.5)
CHLORIDE SERPL-SCNC: 100 MMOL/L (ref 95–110)
CK SERPL-CCNC: 194 U/L (ref 55–170)
CO2 SERPL-SCNC: 33 MMOL/L (ref 23–29)
CREAT SERPL-MCNC: 1.14 MG/DL (ref 0.5–1.4)
CRP SERPL-MCNC: 0.18 MG/DL (ref 0–1)
DIFFERENTIAL METHOD BLD: ABNORMAL
EOSINOPHIL # BLD AUTO: 0.1 K/UL (ref 0–0.5)
EOSINOPHIL NFR BLD: 1.3 % (ref 0–8)
ERYTHROCYTE [DISTWIDTH] IN BLOOD BY AUTOMATED COUNT: 12.5 % (ref 11.5–14.5)
ERYTHROCYTE [SEDIMENTATION RATE] IN BLOOD BY PHOTOMETRIC METHOD: 4 MM/HR (ref 0–36)
EST. GFR  (NO RACE VARIABLE): 59 ML/MIN/1.73 M^2
GLUCOSE SERPL-MCNC: 92 MG/DL (ref 70–110)
HCT VFR BLD AUTO: 40.7 % (ref 37–48.5)
HGB BLD-MCNC: 12.8 G/DL (ref 12–16)
IMM GRANULOCYTES # BLD AUTO: 0.01 K/UL (ref 0–0.04)
IMM GRANULOCYTES NFR BLD AUTO: 0.2 % (ref 0–0.5)
LYMPHOCYTES # BLD AUTO: 1.8 K/UL (ref 1–4.8)
LYMPHOCYTES NFR BLD: 34 % (ref 18–48)
MCH RBC QN AUTO: 28.6 PG (ref 27–31)
MCHC RBC AUTO-ENTMCNC: 31.4 G/DL (ref 32–36)
MCV RBC AUTO: 91 FL (ref 82–98)
MONOCYTES # BLD AUTO: 0.6 K/UL (ref 0.3–1)
MONOCYTES NFR BLD: 11.5 % (ref 4–15)
NEUTROPHILS # BLD AUTO: 2.8 K/UL (ref 1.8–7.7)
NEUTROPHILS NFR BLD: 52.3 % (ref 38–73)
NRBC BLD-RTO: 0 /100 WBC
PLATELET # BLD AUTO: 224 K/UL (ref 150–450)
PMV BLD AUTO: 11 FL (ref 9.2–12.9)
POTASSIUM SERPL-SCNC: 3.7 MMOL/L (ref 3.5–5.1)
PROT SERPL-MCNC: 7.3 G/DL (ref 6–8.4)
RBC # BLD AUTO: 4.48 M/UL (ref 4–5.4)
SODIUM SERPL-SCNC: 139 MMOL/L (ref 136–145)
URATE SERPL-MCNC: 6.4 MG/DL (ref 2.4–5.7)
UUN UR-MCNC: 16 MG/DL (ref 7–17)
WBC # BLD AUTO: 5.41 K/UL (ref 3.9–12.7)

## 2025-02-07 PROCEDURE — 86140 C-REACTIVE PROTEIN: CPT | Mod: PN | Performed by: INTERNAL MEDICINE

## 2025-02-07 PROCEDURE — 86160 COMPLEMENT ANTIGEN: CPT | Mod: PN | Performed by: INTERNAL MEDICINE

## 2025-02-07 PROCEDURE — 85025 COMPLETE CBC W/AUTO DIFF WBC: CPT | Mod: PN | Performed by: INTERNAL MEDICINE

## 2025-02-07 PROCEDURE — 85652 RBC SED RATE AUTOMATED: CPT | Performed by: INTERNAL MEDICINE

## 2025-02-07 PROCEDURE — 86225 DNA ANTIBODY NATIVE: CPT | Mod: PN | Performed by: INTERNAL MEDICINE

## 2025-02-07 PROCEDURE — 86160 COMPLEMENT ANTIGEN: CPT | Mod: 59,PN | Performed by: INTERNAL MEDICINE

## 2025-02-07 PROCEDURE — 82550 ASSAY OF CK (CPK): CPT | Mod: PN | Performed by: INTERNAL MEDICINE

## 2025-02-07 PROCEDURE — 36415 COLL VENOUS BLD VENIPUNCTURE: CPT | Mod: PN | Performed by: INTERNAL MEDICINE

## 2025-02-07 PROCEDURE — 80053 COMPREHEN METABOLIC PANEL: CPT | Mod: PN | Performed by: INTERNAL MEDICINE

## 2025-02-07 PROCEDURE — 84550 ASSAY OF BLOOD/URIC ACID: CPT | Performed by: INTERNAL MEDICINE

## 2025-02-08 LAB — DSDNA AB SER-ACNC: NORMAL [IU]/ML

## 2025-02-10 ENCOUNTER — PATIENT MESSAGE (OUTPATIENT)
Dept: RHEUMATOLOGY | Facility: CLINIC | Age: 50
End: 2025-02-10
Payer: COMMERCIAL

## 2025-02-10 DIAGNOSIS — R82.998 URINE WHITE BLOOD CELLS INCREASED: Primary | ICD-10-CM

## 2025-02-11 ENCOUNTER — PATIENT MESSAGE (OUTPATIENT)
Dept: RHEUMATOLOGY | Facility: CLINIC | Age: 50
End: 2025-02-11
Payer: COMMERCIAL

## 2025-02-11 ENCOUNTER — PATIENT MESSAGE (OUTPATIENT)
Dept: INTERNAL MEDICINE | Facility: CLINIC | Age: 50
End: 2025-02-11
Payer: COMMERCIAL

## 2025-02-11 DIAGNOSIS — R82.90 ABNORMAL URINALYSIS: Primary | ICD-10-CM

## 2025-02-12 ENCOUNTER — LAB VISIT (OUTPATIENT)
Dept: LAB | Facility: HOSPITAL | Age: 50
End: 2025-02-12
Attending: INTERNAL MEDICINE
Payer: COMMERCIAL

## 2025-02-12 DIAGNOSIS — M10.9 GOUT, ARTHRITIS: ICD-10-CM

## 2025-02-12 DIAGNOSIS — R82.90 ABNORMAL URINALYSIS: ICD-10-CM

## 2025-02-12 PROCEDURE — 87086 URINE CULTURE/COLONY COUNT: CPT | Mod: PN | Performed by: INTERNAL MEDICINE

## 2025-02-13 LAB
BACTERIA UR CULT: NORMAL
BACTERIA UR CULT: NORMAL

## 2025-02-18 RX ORDER — COLCHICINE 0.6 MG/1
0.6 TABLET ORAL
Qty: 30 TABLET | Refills: 11 | Status: SHIPPED | OUTPATIENT
Start: 2025-02-18

## 2025-02-19 ENCOUNTER — PATIENT MESSAGE (OUTPATIENT)
Dept: INTERNAL MEDICINE | Facility: CLINIC | Age: 50
End: 2025-02-19
Payer: COMMERCIAL

## 2025-02-19 DIAGNOSIS — N94.9 VAGINAL DISCOMFORT: Primary | ICD-10-CM

## 2025-02-19 RX ORDER — NITROFURANTOIN 25; 75 MG/1; MG/1
100 CAPSULE ORAL 2 TIMES DAILY
Qty: 10 CAPSULE | Refills: 0 | Status: SHIPPED | OUTPATIENT
Start: 2025-02-19 | End: 2025-02-24

## 2025-03-17 ENCOUNTER — OFFICE VISIT (OUTPATIENT)
Dept: RHEUMATOLOGY | Facility: CLINIC | Age: 50
End: 2025-03-17
Payer: COMMERCIAL

## 2025-03-17 VITALS
WEIGHT: 266.75 LBS | HEIGHT: 65 IN | DIASTOLIC BLOOD PRESSURE: 88 MMHG | HEART RATE: 71 BPM | SYSTOLIC BLOOD PRESSURE: 122 MMHG | BODY MASS INDEX: 44.44 KG/M2

## 2025-03-17 DIAGNOSIS — D84.821 IMMUNOSUPPRESSION DUE TO DRUG THERAPY: ICD-10-CM

## 2025-03-17 DIAGNOSIS — M1A.9XX0 CHRONIC GOUT WITHOUT TOPHUS, UNSPECIFIED CAUSE, UNSPECIFIED SITE: ICD-10-CM

## 2025-03-17 DIAGNOSIS — Z79.899 IMMUNOSUPPRESSION DUE TO DRUG THERAPY: ICD-10-CM

## 2025-03-17 DIAGNOSIS — M32.9 SYSTEMIC LUPUS ERYTHEMATOSUS, UNSPECIFIED SLE TYPE, UNSPECIFIED ORGAN INVOLVEMENT STATUS: Primary | ICD-10-CM

## 2025-03-17 DIAGNOSIS — E66.813 CLASS 3 SEVERE OBESITY DUE TO EXCESS CALORIES WITH SERIOUS COMORBIDITY AND BODY MASS INDEX (BMI) OF 45.0 TO 49.9 IN ADULT: ICD-10-CM

## 2025-03-17 DIAGNOSIS — E66.01 CLASS 3 SEVERE OBESITY DUE TO EXCESS CALORIES WITH SERIOUS COMORBIDITY AND BODY MASS INDEX (BMI) OF 45.0 TO 49.9 IN ADULT: ICD-10-CM

## 2025-03-17 PROCEDURE — 99999 PR PBB SHADOW E&M-EST. PATIENT-LVL III: CPT | Mod: PBBFAC,,, | Performed by: INTERNAL MEDICINE

## 2025-03-17 RX ORDER — METHYLPREDNISOLONE 4 MG/1
TABLET ORAL
Qty: 21 TABLET | Refills: 0 | Status: SHIPPED | OUTPATIENT
Start: 2025-03-17

## 2025-03-17 ASSESSMENT — SYSTEMIC LUPUS ERYTHEMATOSUS DISEASE ACTIVITY INDEX (SLEDAI): TOTAL_SCORE: 0

## 2025-03-17 NOTE — PROGRESS NOTES
3/10/2025    10:38 AM   Rapid3 Question Responses and Scores   MDHAQ Score 0   Psychologic Score 0   Pain Score 0   When you awakened in the morning OVER THE LAST WEEK, did you feel stiff? No   Fatigue Score 0   Global Health Score 0.5   RAPID3 Score 0.17     Answers submitted by the patient for this visit:  Rheumatology Questionnaire (Submitted on 3/10/2025)  fever: No  eye redness: No  mouth sores: No  headaches: No  shortness of breath: No  chest pain: No  trouble swallowing: No  diarrhea: No  constipation: No  unexpected weight change: No  genital sore: No  dysuria: No  During the last 3 days, have you had a skin rash?: No  Bruises or bleeds easily: No  cough: No

## 2025-03-17 NOTE — PROGRESS NOTES
"Subjective:      Patient ID: Nitin Mcconnell is a 49 y.o. female.    Chief Complaint: Lupus and gout    HPI female diagnosed at age 31 with lupus.  Started as upper respiratory symptoms that did not respond to antibiotics.  She had fever and low BP as well as SOB.  She was intubated and on ventilator for a week.  She was at [facility].  Dr. Martin diagnosed lupus.  Hospitalized 3/8/07 to 3/30/07.  She had elevated pressure in eyes, kidney involvement.  She was discharged with a PICC line for antibiotics.  She was found to be allergic to heparin due to thrombocytopenia that developed after flushing PICC line with heparin.  She had hair loss.  Had enlarged lymph node under arm in past and biopsy was normal.  Was treated with Plaquenil and steroids.  Only took steroid for 1 year.         Compliant with Plaquenil.  No ulcers in mouth and noses.  No rashes. Continues to get treatments  (); hair is growing back.   Hair improving since going natural.  Lost 9 pounds and is intermittent fasting.      She denies any flares since last visit.    Compliant with CPAP for  sleep apnea and using CPAP.   No gout attacks.   Awaiting compression stockings for lymphedema.      Review of Systems   Constitutional:  Negative for fever and unexpected weight change.   HENT:  Negative for mouth sores and trouble swallowing.    Eyes:  Negative for redness.   Respiratory:  Negative for cough and shortness of breath.    Cardiovascular:  Negative for chest pain.   Gastrointestinal:  Negative for constipation and diarrhea.   Genitourinary:  Negative for dysuria and genital sores.   Skin:  Negative for rash.   Neurological:  Negative for headaches.   Hematological:  Does not bruise/bleed easily.        Objective:   /88 (Patient Position: Sitting)   Pulse 71   Ht 5' 5" (1.651 m)   Wt 121 kg (266 lb 12.1 oz)   LMP 07/13/2013   BMI 44.39 kg/m²   Physical Exam   Constitutional: She is oriented to person, " place, and time. normal appearance.   HENT:   Head: Normocephalic and atraumatic.   Mouth/Throat: Oropharynx is clear.   Eyes: Conjunctivae are normal.   Cardiovascular: Normal rate and regular rhythm.   Pulmonary/Chest: Effort normal and breath sounds normal.   Abdominal: Soft. Bowel sounds are normal.   Musculoskeletal:      Comments: 28 joint count: 0 swollen and 0 tender  No compression MTP   Neurological: She is alert and oriented to person, place, and time.   Skin: No erythema.          LABS    Component      Latest Ref Rn 2/7/2025   WBC      3.90 - 12.70 K/uL 5.41    RBC      4.00 - 5.40 M/uL 4.48    Hemoglobin      12.0 - 16.0 g/dL 12.8    Hematocrit      37.0 - 48.5 % 40.7    MCV      82 - 98 fL 91    MCH      27.0 - 31.0 pg 28.6    MCHC      32.0 - 36.0 g/dL 31.4 (L)    RDW      11.5 - 14.5 % 12.5    Platelet Count      150 - 450 K/uL 224    MPV      9.2 - 12.9 fL 11.0    Immature Granulocytes      0.0 - 0.5 % 0.2    Gran # (ANC)      1.8 - 7.7 K/uL 2.8    Immature Grans (Abs)      0.00 - 0.04 K/uL 0.01    Lymph #      1.0 - 4.8 K/uL 1.8    Mono #      0.3 - 1.0 K/uL 0.6    Eos #      0.0 - 0.5 K/uL 0.1    Baso #      0.00 - 0.20 K/uL 0.04    nRBC      0 /100 WBC 0    Gran %      38.0 - 73.0 % 52.3    Lymph %      18.0 - 48.0 % 34.0    Mono %      4.0 - 15.0 % 11.5    Eos %      0.0 - 8.0 % 1.3    Basophil %      0.0 - 1.9 % 0.7    Differential Method Automated    Sodium      136 - 145 mmol/L 139    Potassium      3.5 - 5.1 mmol/L 3.7    Chloride      95 - 110 mmol/L 100    CO2      23 - 29 mmol/L 33 (H)    Glucose      70 - 110 mg/dL 92    BUN      7 - 17 mg/dL 16    Creatinine      0.50 - 1.40 mg/dL 1.14    Calcium      8.7 - 10.5 mg/dL 9.4    PROTEIN TOTAL      6.0 - 8.4 g/dL 7.3    Albumin      3.5 - 5.2 g/dL 4.1    BILIRUBIN TOTAL      0.1 - 1.0 mg/dL 0.7    ALP      38 - 126 U/L 61    AST      15 - 46 U/L 47 (H)    ALT      10 - 44 U/L 51 (H)    Anion Gap      8 - 16 mmol/L 6 (L)    eGFR      >60  mL/min/1.73 m^2 59.0 !    Specimen UA Urine, Clean Catch    Color, UA      Yellow, Straw, Annabelle  Yellow    Appearance, UA      Clear  Hazy !    pH, UA      5.0 - 8.0  6.0    Spec Grav UA      1.005 - 1.030  >=1.030 !    Protein, UA      Negative  1+ !    Glucose, UA      Negative  Negative    Ketones, UA      Negative  Negative    Bilirubin (UA)      Negative  Negative    Blood, UA      Negative  Negative    NITRITE UA      Negative  Negative    UROBILINOGEN UA      <2.0 EU/dL Negative    Leukocyte Esterase, UA      Negative  Negative    RBC, UA      0 - 4 /hpf 2    WBC, UA      0 - 5 /hpf 15 (H)    Bacteria, UA      None-Occ /hpf Few !    Hyaline Casts, UA      0-1/lpf /lpf 1    Microscopic Comment SEE COMMENT    Urine Protein      0 - 15 mg/dL 20 (H)    Urine Creatinine      15.0 - 325.0 mg/dL >450.0 (H)    Urine Protein/Creatinine Ratio      0.00 - 0.20  Unable to calculate    ds DNA Ab      Negative 1:10  Negative 1:10    Complement (C-3)      50 - 180 mg/dL 130    Complement (C-4)      11 - 44 mg/dL 42    CPK      55 - 170 U/L 194 (H)    Uric Acid      2.4 - 5.7 mg/dL 6.4 (H)    Sed Rate      0 - 36 mm/Hr 4    CRP      0.00 - 1.00 mg/dL 0.18       Legend:  (L) Low  (H) High  ! Abnormal  Assessment:     1. Systemic lupus erythematosus, unspecified SLE type, unspecified organ involvement status.  Doing well on Plaquenil.  Will be due for repeat eye exam 12/2025.   2. Immunosuppression due to drug therapy    3. Class 3 severe obesity due to excess calories with serious comorbidity and body mass index (BMI) of 45.0 to 49.9 in adult    4. Chronic gout without tophus, unspecified cause, unspecified site          Plan:     Problem List Items Addressed This Visit          Immunology/Multi System    Systemic lupus erythematosus - Primary.  Continue PLaquenil    Immunosuppression due to drug therapy       Endocrine    Class 3 severe obesity due to excess calories with serious comorbidity in adult.  Continue to exercise  and intermittent fasting       Orthopedic    Chronic gout without tophus    Relevant Medications    methylPREDNISolone (MEDROL DOSEPACK) 4 mg tablet     RTO 6 months/prn

## 2025-03-18 ENCOUNTER — PATIENT MESSAGE (OUTPATIENT)
Dept: RHEUMATOLOGY | Facility: CLINIC | Age: 50
End: 2025-03-18
Payer: COMMERCIAL

## 2025-04-01 DIAGNOSIS — E66.813 CLASS 3 SEVERE OBESITY DUE TO EXCESS CALORIES WITHOUT SERIOUS COMORBIDITY WITH BODY MASS INDEX (BMI) OF 45.0 TO 49.9 IN ADULT: ICD-10-CM

## 2025-04-01 DIAGNOSIS — E66.01 CLASS 3 SEVERE OBESITY DUE TO EXCESS CALORIES WITHOUT SERIOUS COMORBIDITY WITH BODY MASS INDEX (BMI) OF 45.0 TO 49.9 IN ADULT: ICD-10-CM

## 2025-04-01 DIAGNOSIS — R06.02 SOB (SHORTNESS OF BREATH): ICD-10-CM

## 2025-04-01 DIAGNOSIS — M32.9 SYSTEMIC LUPUS ERYTHEMATOSUS, UNSPECIFIED SLE TYPE, UNSPECIFIED ORGAN INVOLVEMENT STATUS: ICD-10-CM

## 2025-04-02 RX ORDER — HYDROXYCHLOROQUINE SULFATE 200 MG/1
200 TABLET, FILM COATED ORAL 2 TIMES DAILY
Qty: 180 TABLET | Refills: 1 | Status: SHIPPED | OUTPATIENT
Start: 2025-04-02

## 2025-04-15 ENCOUNTER — PATIENT MESSAGE (OUTPATIENT)
Dept: INTERNAL MEDICINE | Facility: CLINIC | Age: 50
End: 2025-04-15
Payer: COMMERCIAL

## 2025-04-16 ENCOUNTER — OFFICE VISIT (OUTPATIENT)
Dept: INTERNAL MEDICINE | Facility: CLINIC | Age: 50
End: 2025-04-16
Payer: COMMERCIAL

## 2025-04-16 VITALS
OXYGEN SATURATION: 99 % | BODY MASS INDEX: 44.44 KG/M2 | TEMPERATURE: 98 F | RESPIRATION RATE: 12 BRPM | HEIGHT: 65 IN | WEIGHT: 266.75 LBS | SYSTOLIC BLOOD PRESSURE: 128 MMHG | HEART RATE: 61 BPM | DIASTOLIC BLOOD PRESSURE: 74 MMHG

## 2025-04-16 DIAGNOSIS — M79.604 LOWER EXTREMITY PAIN, CENTRAL, RIGHT: Primary | ICD-10-CM

## 2025-04-16 DIAGNOSIS — M25.561 POSTERIOR KNEE PAIN, RIGHT: ICD-10-CM

## 2025-04-16 PROCEDURE — 99214 OFFICE O/P EST MOD 30 MIN: CPT | Mod: S$GLB,,,

## 2025-04-16 PROCEDURE — 3078F DIAST BP <80 MM HG: CPT | Mod: CPTII,S$GLB,,

## 2025-04-16 PROCEDURE — 1159F MED LIST DOCD IN RCRD: CPT | Mod: CPTII,S$GLB,,

## 2025-04-16 PROCEDURE — 3074F SYST BP LT 130 MM HG: CPT | Mod: CPTII,S$GLB,,

## 2025-04-16 PROCEDURE — 99999 PR PBB SHADOW E&M-EST. PATIENT-LVL V: CPT | Mod: PBBFAC,,,

## 2025-04-16 PROCEDURE — 3008F BODY MASS INDEX DOCD: CPT | Mod: CPTII,S$GLB,,

## 2025-04-16 RX ORDER — ACETAMINOPHEN 500 MG
1000 TABLET ORAL EVERY 12 HOURS
Start: 2025-04-16 | End: 2025-04-21

## 2025-04-16 NOTE — PROGRESS NOTES
[facility] Note    Subjective:       Patient ID: Nitin Mcconnell is a 49 y.o. female.    Chief Complaint: Leg Pain    History of Present Illness    CHIEF COMPLAINT:  Ms. Mcconnell presents today for right knee pain and calf pain    MUSCULOSKELETAL PAIN:  She reports right knee pain with sharp pain when bending at certain angles, particularly in the morning when sitting on the side of the bed. She has a history of arthritis in the knee diagnosed by an orthopedist years ago. She developed calf pain on Monday while walking at the mall, describing a dull pain behind the knee and midway down the calf. The pain is present both while walking and at rest, with throbbing, twitching, and spasm sensations while sitting at desk. Tylenol provides mild relief of symptoms.    MEDICAL HISTORY:  She has lymphedema and has a lymphedema pump at home, though current leg pain makes it uncomfortable to use. She recently received compression stockings a couple weeks ago. She has a history of gout affecting the toe and ankle without recent flares. She is followed by rheumatology for lupus.    Review of Systems   Constitutional: Negative.    HENT: Negative.     Eyes: Negative.    Respiratory: Negative.     Cardiovascular:  Negative for chest pain and palpitations.   Gastrointestinal: Negative.    Genitourinary: Negative.    Musculoskeletal:  Positive for leg pain.   Neurological: Negative.          Objective:      Physical Exam  Vitals and nursing note reviewed.   Constitutional:       Appearance: Normal appearance.   HENT:      Head: Normocephalic and atraumatic.   Cardiovascular:      Rate and Rhythm: Normal rate.      Pulses: Normal pulses.      Heart sounds: Normal heart sounds.   Pulmonary:      Effort: Pulmonary effort is normal.   Musculoskeletal:      Right knee: Swelling present. No erythema, bony tenderness or crepitus. Normal pulse.      Instability Tests: Anterior drawer test negative. Posterior drawer test  negative.      Left knee: Normal.        Legs:       Comments: Tenderness to posterior side of knee and upper portion of lower leg     Skin:     Capillary Refill: Capillary refill takes less than 2 seconds.   Neurological:      General: No focal deficit present.      Mental Status: She is alert and oriented to person, place, and time.   Psychiatric:         Mood and Affect: Mood normal.         Behavior: Behavior normal.         Physical Exam            Assessment:       1. Lower extremity pain, central, right  - US Lower Extremity Veins Bilateral; Future    2. Posterior knee pain, right  - acetaminophen (TYLENOL) 500 MG tablet; Take 2 tablets (1,000 mg total) by mouth every 12 (twelve) hours. for 5 days  - Ambulatory Referral/Consult to Physical Therapy; Future      Plan:         Assessment & Plan    IMPRESSION:  1. Considered possibility of blood clot due to new onset calf pain, despite normal previous vascular studies.  2. Ruled out gout flare based on history and current symptoms, despite elevated CPK.  3. Evaluated knee pain, suspecting possible worsening of previously diagnosed arthritis.  4. Recommend conservative management with Tylenol and physical therapy before considering orthopedic referral.    RIGHT KNEE OSTEOARTHRITIS:   Evaluated the patient's right knee, noting pain especially when bending or getting up in the morning, described as sharp.   Performed physical exam of the knee, including anterior and posterior drawer tests.   Assessed that the pain could be due to arthritis, previously diagnosed by an orthopedist.   Prescribed Tylenol 1000 mg twice daily for 5 days for pain relief.   Referred the patient to physical therapy for knee stiffness and pain management.    RIGHT LOWER LEG PAIN:   Noted new onset of dull pain in the back of the right calf, starting on Monday, described as constant, throbbing, twitching, and spasm-like.   Performed physical exam of the leg, observing tight muscles in the  affected area.   Suspected muscular pain but ordered an ultrasound of both lower extremities to rule out potential blood clots.   Prescribed Tylenol 1000 mg twice daily for 5 days to address knee and calf pain.    MUSCLE SPASM:   Noted patient reports of twitching and spasm-like sensations in the right lower leg.   Observed tight muscles during physical exam of the leg.   Considered possibility of muscle pain or charley horse, but noted symptoms don't fully match typical muscle cramps.   Prescribed Tylenol for pain relief.   Referred the patient to physical therapy for management of muscle spasms.    FOLLOW-UP:   Ordered bilateral lower extremity US.   Follow up after US results are available to discuss findings and determine next steps.         -Continue current medications and maintain follow up with specialists.  Return to clinic in Follow up if symptoms worsen or fail to improve.     Angelia Rios, MSN, APRN, FNP-C  [facility]    This note was generated with the assistance of ambient listening technology. Verbal consent was obtained by the patient and accompanying visitor(s) for the recording of patient appointment to facilitate this note. I attest to having reviewed and edited the generated note for accuracy, though some syntax or spelling errors may persist. Please contact the author of this note for any clarification.      I spent 34 minutes on the day of this encounter for preparing for, evaluating, treating, and managing this patient.    This includes face-to-face time and non face-to-face time and includes the following:  --preparing to see the patient (eg, obtaining and/or reviewing old records such as, when applicable, primary care notes, specialist notes, hospital notes, review of laboratory tests, and/or radiographic and/or cardiology or other studies)  --performing a medically appropriate review of systems and examination and/or evaluation  --reviewing and independently  interpreting results (not separately reported; eg, lab results) and communicating results to the patient and/or family/caregiver  --placing orders and/or reviewing other physician's orders which can both include medications, laboratory studies, radiographic studies, procedures, referrals etcetera and   --counseling and educating the patient and/or family member/caregiver regarding the treatment plan  --documentation of the visit in the electronic health record  --communicating with other health care providers regarding referrals, studies, follow-up, etc

## 2025-04-21 ENCOUNTER — HOSPITAL ENCOUNTER (OUTPATIENT)
Dept: RADIOLOGY | Facility: HOSPITAL | Age: 50
Discharge: HOME | End: 2025-04-21
Payer: COMMERCIAL

## 2025-04-21 DIAGNOSIS — M79.604 LOWER EXTREMITY PAIN, CENTRAL, RIGHT: ICD-10-CM

## 2025-04-21 PROCEDURE — 93970 EXTREMITY STUDY: CPT | Mod: TC,PN

## 2025-04-21 PROCEDURE — 93970 EXTREMITY STUDY: CPT | Mod: 26,,, | Performed by: STUDENT IN AN ORGANIZED HEALTH CARE EDUCATION/TRAINING PROGRAM

## 2025-04-22 ENCOUNTER — PATIENT MESSAGE (OUTPATIENT)
Dept: INTERNAL MEDICINE | Facility: CLINIC | Age: 50
End: 2025-04-22
Payer: COMMERCIAL

## 2025-04-22 ENCOUNTER — RESULTS FOLLOW-UP (OUTPATIENT)
Dept: INTERNAL MEDICINE | Facility: CLINIC | Age: 50
End: 2025-04-22
Payer: COMMERCIAL

## 2025-05-02 ENCOUNTER — CLINICAL SUPPORT (OUTPATIENT)
Dept: REHABILITATION | Facility: HOSPITAL | Age: 50
End: 2025-05-02
Payer: COMMERCIAL

## 2025-05-02 DIAGNOSIS — M62.81 QUADRICEPS WEAKNESS: ICD-10-CM

## 2025-05-02 DIAGNOSIS — M25.561 POSTERIOR KNEE PAIN, RIGHT: ICD-10-CM

## 2025-05-02 DIAGNOSIS — R29.898 HIP WEAKNESS: Primary | ICD-10-CM

## 2025-05-02 DIAGNOSIS — Z74.09 IMPAIRED FUNCTIONAL MOBILITY AND ACTIVITY TOLERANCE: ICD-10-CM

## 2025-05-02 PROCEDURE — 97161 PT EVAL LOW COMPLEX 20 MIN: CPT | Mod: PO

## 2025-05-02 PROCEDURE — 97530 THERAPEUTIC ACTIVITIES: CPT | Mod: PO

## 2025-05-02 NOTE — PROGRESS NOTES
Outpatient Rehab    Physical Therapy Evaluation    Patient Name: Nitin Mcconnell  MRN: [id number]  YOB: 1975  Encounter Date: 5/2/2025    Therapy Diagnosis:   Encounter Diagnoses   Name Primary?    Posterior knee pain, right     Hip weakness Yes    Quadriceps weakness     Impaired functional mobility and activity tolerance      Physician: Angelia Rios NP    Physician Orders: Eval and Treat  Medical Diagnosis: Posterior knee pain, right    Visit # / Visits Authorized:  1 / 1  Insurance Authorization Period: 4/16/2025 to 4/16/2026  Date of Evaluation: 5/2/2025  Plan of Care Certification: 5/2/2025 to 7/11/2025     Time In: 1200   Time Out: 1246  Total Time: 46   Total Billable Time: 46    Intake Outcome Measure for FOTO Survey    Therapist reviewed FOTO scores for Nitin Mcconnell on 5/2/2025.   FOTO report - see Media section or FOTO account episode details.     Intake Score: 78 (83)%         Subjective   History of Present Illness  Nitin is a 49 y.o. female who reports to physical therapy with a chief concern of right knee pain.         Diagnostic tests related to this condition: Ultrasound.   Ultrasound Details: No evidence of deep venous thrombosis in either lower extremity.    History of Present Condition/Illness: She has had OA for a couple of years. She also has lymphedema and has trouble squatting. She began having a pain in the back of her knee while walking in the mall. She reports this pain would go down to her calf. This was about 4 weeks ago and has not had that same pain since. However bending her knee and squatting are still difficult.     Activities of Daily Living  Social history was obtained from Patient.    General Prior Level of Function Comments: indpednent  General Current Level of Function Comments: indepdnent with increased difficulty squatting, bending, and lifting.  Patient Responsibilities: Community mobility, Driving, Financial management, Health management,  Home management, Laundry, Meal prep, Personal ADL, Shopping, Yard work    Previously independent with activities of daily living? Yes     Currently independent with activities of daily living? No  Activities currently needing assistance include Transfers and Functional mobility.        Previously independent with instrumental activities of daily living? Yes     Currently independent with instrumental activities of daily living? No  Activities currently needing assistance include: Community mobility.            Pain     Patient reports a current pain level of 0/10. Pain at best is reported as 0/10. Pain at worst is reported as 4/10.   Location: right knee  Clinical Progression (since onset): Stable  Pain Qualities: Other (Comment), Tightness, Discomfort, Sharp  Other Pain Qualities: stiffness  Pain-Relieving Factors: Rest  Pain-Aggravating Factors: Bending, Squatting  Certain positions with knee. Twisting.       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    Nitin Mcconnell  has a past surgical history that includes Lymph node biopsy; panniculectomy; Lipectomy; Hysterectomy; Oophorectomy; Helder-en-Y procedure (08/27/2009); Breast biopsy (Right, 2008); Colonoscopy (N/A, 05/17/2022); and Endoscopic ultrasound of lower gastrointestinal tract (N/A, 6/7/2022).    Nitin has a current medication list which includes the following prescription(s): allopurinol, biotin/silicon diox/l-cysteine, calcium carb/vitamin d3/vit k1, colchicine, cyanocobalamin (vitamin b-12), desoximetasone, ergocalciferol (vitamin d2), hydrochlorothiazide, hydroxychloroquine, levocetirizine,  methylprednisolone, montelukast, multivitamin-ca-iron-minerals, olopatadine, and triamcinolone.    Review of patient's allergies indicates:   Allergen Reactions    Heparin analogues      Other reaction(s): low platelets    Shellfish containing products Hives     Crawfish, no reaction to IV iodine in the past.        Objective       Knee Range of Motion   Right Knee   Active (deg) Passive (deg) Pain   Flexion 106       Extension -1           Left Knee   Active (deg) Passive (deg) Pain   Flexion 106       Extension 5                Ankle/Foot Range of Motion   Right Ankle/Foot   Active (deg) Passive (deg) Pain   Dorsiflexion (KE) 15       Dorsiflexion (KF)         Plantar Flexion 34       Ankle Inversion         Ankle Eversion         Subtalar Inversion         Subtalar Eversion         Great Toe MTP Flexion         Great Toe MTP Extension         Great Toe IP Flexion             Left Ankle/Foot   Active (deg) Passive (deg) Pain   Dorsiflexion (KE) 10       Dorsiflexion (KF)         Plantar Flexion 40       Ankle Inversion         Ankle Eversion         Subtalar Inversion         Subtalar Eversion         Great Toe MTP Flexion         Great Toe MTP Extension         Great Toe IP Flexion                            Hip Strength - Planes of Motion   Right Strength Right Pain Left Strength Left  Pain   Flexion (L2) 5   5     Extension 4   5     ABduction 4   5     ADduction 5   5     Internal Rotation 5   5     External Rotation 5   5         Knee Strength   Right Strength Right Pain Left Strength Left  Pain   Flexion (S2) 4   5     Prone Flexion 4   5     Extension (L3) 4   5                   Squat Testing  The patient completed 1 squat repetitions.  Observations  Right Side: Pain  Bilateral: Limited Ankle Dorsiflexion             Gait Analysis  Knee Observations During Gait  Right: Knee Valgus Thrust         Treatment:  Therapeutic Activity  TA 1: Home Exercise Program education and instruction which can be found under  patient instructions.    Time Entry(in minutes):  PT Evaluation (Low) Time Entry: 30  Therapeutic Activity Time Entry: 16    Assessment & Plan   Assessment  Nitin presents with a condition of Low complexity.   Presentation of Symptoms: Evolving  Will Comorbidities Impact Care: Yes  See Past medical history    Functional Limitations: Activity tolerance, Ambulating on uneven surfaces, Bed mobility, Carrying objects, Community integration, Completing self-care activities, Completing work/school activities, Decreased ambulation distance/endurance, Disrupted sleep pattern, Driving, Fine motor coordination, Functional mobility, Gait limitations, Getting off the floor, Gross motor coordination, Increased risk of fall, Maintaining balance, Manipulating objects, Pain when reaching, Pain with ADLs/IADLs, Painful locomotion/ambulation, Participating in leisure activities, Performing household chores, Participating in sports, Proprioception, Range of motion, Reaching, Sitting tolerance, Squatting, Standing tolerance, Transfers  Impairments: Abnormal coordination, Abnormal gait, Abnormal muscle firing, Abnormal muscle tone, Abnormal or restricted range of motion, Activity intolerance, Impaired balance, Impaired physical strength, Lack of appropriate home exercise program, Pain with functional activity, Safety issue, Weight-bearing intolerance  Personal Factors Affecting Prognosis: Pain    Patient Goal for Therapy (PT): Decrease pain, improve mobility  Prognosis: Good  Prognosis Details: Patient has good prognosis when past medical history, prior level of function, current level of function, and objective measurements take in initial evaluation are considered.  Assessment Details: Patient presents with signs and symptoms consistent with their medical diagnosis. Patient presents with impaired tibofemoral mobility, range of motion, gait, and strength. Patient also presents with increased pain that occurs with knee flexion and end  range extension. At this time, the patients limitations are preventing them from performing hobbies and ADLs around their house without increased difficulty, pain, and discomfort.    Plan  From a physical therapy perspective, the patient would benefit from: Skilled Rehab Services    Planned therapy interventions include: Therapeutic exercise, Therapeutic activities, Neuromuscular re-education, Manual therapy, ADLs/IADLs, Canalith repositioning, Cognitive functional training, Community/work reintegration, Gait training, Orthotic management and training, Sensory integration, Work conditioning, Work hardening, and Wound care.    Planned modalities to include: Biofeedback, Cryotherapy (cold pack), Electrical stimulation - attended, Electrical stimulation - passive/unattended, Mechanical traction, Paraffin bath, Thermotherapy (hot pack), and Ultrasound.        Visit Frequency: 2 times Per Week for 6 Weeks.       This plan was discussed with Patient and Therapy assistant.   Discussion participants: Agreed Upon Plan of Care  Plan details: Progress with current plan of care as tolerated using selected interventions and dry needling as needed for pain reduction.          Patient's spiritual, cultural, and educational needs considered and patient agreeable to plan of care and goals.     Education  Education was done with Patient. The patient's learning style includes Demonstration, Listening, and Pictures/video. The patient Demonstrates understanding and Verbalizes understanding.         HEP       Goals:   Active       LTG       Patient will demonstrate a 4+/5 or better on all tested MMT in order to improvr functional mobility and reduce risks for compensation.         Start:  05/02/25    Expected End:  07/11/25            Patinet will demonstrate a FOTO score with a 10% improvement in order to show improved function.         Start:  05/02/25    Expected End:  07/11/25            Patient will have 90/90 HS length test of 20  degrees of better in order to demonstrate improved lower extremity flexibility       Start:  05/02/25    Expected End:  07/11/25               STG       Patient will demonstrate 100% compliance with their Home Exercise Porgram in order to improve their current level of function.         Start:  05/02/25    Expected End:  06/06/25            Patient will demonstrate knee extension degrees of +5 in order to improve functional mobility.          Start:  05/02/25    Expected End:  06/06/25            Patient will demonstrate 120 degrees of right knee flexion in order to improve functional mobility.          Start:  05/02/25    Expected End:  06/06/25                Ronny Sharp, PT

## 2025-05-02 NOTE — PATIENT INSTRUCTIONS
Access Code: [email or web address]  URL: [email or web address]  Date: 05/02/2025  Prepared by: Ronny Sharp    Exercises  - Supine Sciatic Nerve Glide  - 2 x daily - 7 x weekly - 3 sets - 15 reps  - Seated Table Hamstring Stretch  - 1 x daily - 7 x weekly - 5 sets - 15 s hold  - Standing Gastroc Stretch at Counter  - 2 x daily - 7 x weekly - 10 sets - 10 s hold

## 2025-05-06 ENCOUNTER — CLINICAL SUPPORT (OUTPATIENT)
Dept: REHABILITATION | Facility: HOSPITAL | Age: 50
End: 2025-05-06
Payer: COMMERCIAL

## 2025-05-06 DIAGNOSIS — M62.81 QUADRICEPS WEAKNESS: ICD-10-CM

## 2025-05-06 DIAGNOSIS — Z74.09 IMPAIRED FUNCTIONAL MOBILITY AND ACTIVITY TOLERANCE: ICD-10-CM

## 2025-05-06 DIAGNOSIS — R29.898 HIP WEAKNESS: Primary | ICD-10-CM

## 2025-05-06 PROCEDURE — 97112 NEUROMUSCULAR REEDUCATION: CPT | Mod: PO

## 2025-05-06 PROCEDURE — 97110 THERAPEUTIC EXERCISES: CPT | Mod: PO

## 2025-05-06 PROCEDURE — 97140 MANUAL THERAPY 1/> REGIONS: CPT | Mod: PO

## 2025-05-08 ENCOUNTER — CLINICAL SUPPORT (OUTPATIENT)
Dept: REHABILITATION | Facility: HOSPITAL | Age: 50
End: 2025-05-08
Payer: COMMERCIAL

## 2025-05-08 DIAGNOSIS — R29.898 HIP WEAKNESS: Primary | ICD-10-CM

## 2025-05-08 DIAGNOSIS — Z74.09 IMPAIRED FUNCTIONAL MOBILITY AND ACTIVITY TOLERANCE: ICD-10-CM

## 2025-05-08 DIAGNOSIS — M62.81 QUADRICEPS WEAKNESS: ICD-10-CM

## 2025-05-08 PROCEDURE — 97140 MANUAL THERAPY 1/> REGIONS: CPT | Mod: PO

## 2025-05-08 PROCEDURE — 97112 NEUROMUSCULAR REEDUCATION: CPT | Mod: PO

## 2025-05-08 PROCEDURE — 97110 THERAPEUTIC EXERCISES: CPT | Mod: PO

## 2025-05-08 NOTE — PROGRESS NOTES
Outpatient Rehab    Physical Therapy Visit    Patient Name: Nitin Mcconnell  MRN: [id number]  YOB: 1975  Encounter Date: 5/8/2025    Therapy Diagnosis:   Encounter Diagnoses   Name Primary?    Hip weakness Yes    Quadriceps weakness     Impaired functional mobility and activity tolerance      Physician: Angelia Rios NP    Physician Orders: Eval and Treat  Medical Diagnosis: Posterior knee pain, right    Visit # / Visits Authorized:  2 / 20  Insurance Authorization Period: 5/2/2025 to 12/31/2025  Date of Evaluation: 5/2/2025  Plan of Care Certification: 5/2/2025 to 7/11/2025      PT/PTA:   0  Number of PTA visits since last PT visit: 0  Time In: 0700   Time Out: 0756  Total Time (in minutes): 56   Total Billable Time (in minutes): 56    FOTO:  Intake Score:  %  Survey Score 2:  %  Survey Score 3:  %         Subjective   she was a little sore after last visit. no new complaints..  Pain reported as 0/10.      Objective            Treatment:  Therapeutic Exercise  TE 4: DKTCH: 2 x 15  TE 5: supine HS stretch: 3 x 30 each leg  TE 6: prone quad stretch: 2' each leg  TE 7: Heel slides: 25x each leg  TE 8: prone hamstring curls: 2 x 10 with 2# weights  Manual Therapy  MT 1: Knee PROM  MT 2: Knee patellar mobilizations - inderior for improved knee flexion  Balance/Neuromuscular Re-Education  NMR 1: Quad Sets: 30x  NMR 2: Straight Leg Raises 4 x 5  NMR 3: Short Arc Quads: 30x each leg 2# weights  NMR 4: Long Arc Quads: 30x each leg 2# weights  NMR 5: Sciatic nerve glide: 3 x 10 bilateral    Time Entry(in minutes):  Manual Therapy Time Entry: 10  Neuromuscular Re-Education Time Entry: 23  Therapeutic Exercise Time Entry: 23    Assessment & Plan   Assessment: Patient reports for follow up treatment with slight increase in complaint of symptoms that includes sorenss from last visit. Patient tolerated today's session with no complaints and contiues to tolerate exercises well. Today's treatment included  stretching and stability interventions. Additional resistance was added to her program today in order to increase muscle recrutiment and itroduce an increased training load. They remain limited in weakness and stiffness. Pateint is progressing well towards their goals.  Evaluation/Treatment Tolerance: Patient tolerated treatment well    Patient will continue to benefit from skilled outpatient physical therapy to address the deficits listed in the problem list box on initial evaluation, provide pt/family education and to maximize pt's level of independence in the home and community environment.     Patient's spiritual, cultural, and educational needs considered and patient agreeable to plan of care and goals.           Plan: Continue to progress plan of care as tolerated.    Goals:   Active       LTG       Patient will demonstrate a 4+/5 or better on all tested MMT in order to improvr functional mobility and reduce risks for compensation.   (Progressing)       Start:  05/02/25    Expected End:  07/11/25            Patinet will demonstrate a FOTO score with a 10% improvement in order to show improved function.   (Progressing)       Start:  05/02/25    Expected End:  07/11/25            Patient will have 90/90 HS length test of 20 degrees of better in order to demonstrate improved lower extremity flexibility (Progressing)       Start:  05/02/25    Expected End:  07/11/25               STG       Patient will demonstrate 100% compliance with their Home Exercise Porgram in order to improve their current level of function.   (Progressing)       Start:  05/02/25    Expected End:  06/06/25            Patient will demonstrate knee extension degrees of +5 in order to improve functional mobility.    (Progressing)       Start:  05/02/25    Expected End:  06/06/25            Patient will demonstrate 120 degrees of right knee flexion in order to improve functional mobility.    (Progressing)       Start:  05/02/25    Expected End:   06/06/25                Ronny Sharp, PT

## 2025-05-12 ENCOUNTER — CLINICAL SUPPORT (OUTPATIENT)
Dept: REHABILITATION | Facility: HOSPITAL | Age: 50
End: 2025-05-12
Payer: COMMERCIAL

## 2025-05-12 DIAGNOSIS — Z74.09 IMPAIRED FUNCTIONAL MOBILITY AND ACTIVITY TOLERANCE: ICD-10-CM

## 2025-05-12 DIAGNOSIS — R29.898 HIP WEAKNESS: Primary | ICD-10-CM

## 2025-05-12 DIAGNOSIS — M62.81 QUADRICEPS WEAKNESS: ICD-10-CM

## 2025-05-12 PROCEDURE — 97112 NEUROMUSCULAR REEDUCATION: CPT | Mod: PO

## 2025-05-12 PROCEDURE — 97110 THERAPEUTIC EXERCISES: CPT | Mod: PO

## 2025-05-12 NOTE — PROGRESS NOTES
Outpatient Rehab    Physical Therapy Visit    Patient Name: Nitin Mcconnell  MRN: [id number]  YOB: 1975  Encounter Date: 5/12/2025    Therapy Diagnosis:   Encounter Diagnoses   Name Primary?    Hip weakness Yes    Quadriceps weakness     Impaired functional mobility and activity tolerance      Physician: Angelia Rios NP    Physician Orders: Eval and Treat  Medical Diagnosis: Posterior knee pain, right    Visit # / Visits Authorized:  3 / 20  Insurance Authorization Period: 5/2/2025 to 12/31/2025  Date of Evaluation: 5/2/2025  Plan of Care Certification: 5/2/2025 to 7/11/2025      PT/PTA: PT   Number of PTA visits since last PT visit:0  Time In: 0700   Time Out: 0758  Total Time (in minutes): 58   Total Billable Time (in minutes): 58    FOTO:  Intake Score:  %  Survey Score 2:  %  Survey Score 3:  %    Precautions:       Subjective   no new complaints today..  Pain reported as 0/10.      Objective            Treatment:  Therapeutic Exercise  TE 1: Nustep: 10' Lv 2  TE 2: standing calf stretch on board: 1'  TE 3: Heel raises: 3 x 10  TE 4: DKTCH: 2 x 15  TE 5: supine HS stretch: 3 x 30 each leg  TE 6: prone quad stretch: 2' each leg  TE 7: Heel slides: 25x each leg  TE 8: prone hamstring curls: 2 x 10 with 2# weights  TE 9: standing hip 3 way: 15x each leg/direction  Balance/Neuromuscular Re-Education  NMR 1: Quad Sets: 30x  NMR 2: Straight Leg Raises 4 x 5  NMR 3: Short Arc Quads: 30x each leg 2# weights  NMR 4: Long Arc Quads: 30x each leg 3# weights  NMR 5: Sciatic nerve glide: 3 x 10 bilateral    Time Entry(in minutes):  Neuromuscular Re-Education Time Entry: 26  Therapeutic Exercise Time Entry: 32    Assessment & Plan   Assessment: Patient reports for follow up treatment with no change in complaint of symptoms. Patient tolerated today's session with no complaints. Today's treatment included cardiovascular endurance, stretching, strengthening, and weightbearing interventions. Patient  tolerated progressions well and included .  They remain limited in muscle length and stiffness. Pateint is progressing well towards their goals.  Evaluation/Treatment Tolerance: Patient tolerated treatment well    Patient will continue to benefit from skilled outpatient physical therapy to address the deficits listed in the problem list box on initial evaluation, provide pt/family education and to maximize pt's level of independence in the home and community environment.     Patient's spiritual, cultural, and educational needs considered and patient agreeable to plan of care and goals.           Plan: Continue to progress plan of care as tolerated.    Goals:   Active       LTG       Patient will demonstrate a 4+/5 or better on all tested MMT in order to improvr functional mobility and reduce risks for compensation.   (Progressing)       Start:  05/02/25    Expected End:  07/11/25            Patinet will demonstrate a FOTO score with a 10% improvement in order to show improved function.   (Progressing)       Start:  05/02/25    Expected End:  07/11/25            Patient will have 90/90 HS length test of 20 degrees of better in order to demonstrate improved lower extremity flexibility (Progressing)       Start:  05/02/25    Expected End:  07/11/25               STG       Patient will demonstrate 100% compliance with their Home Exercise Porgram in order to improve their current level of function.   (Progressing)       Start:  05/02/25    Expected End:  06/06/25            Patient will demonstrate knee extension degrees of +5 in order to improve functional mobility.    (Progressing)       Start:  05/02/25    Expected End:  06/06/25            Patient will demonstrate 120 degrees of right knee flexion in order to improve functional mobility.    (Progressing)       Start:  05/02/25    Expected End:  06/06/25                Ronny Sharp, PT

## 2025-05-14 ENCOUNTER — CLINICAL SUPPORT (OUTPATIENT)
Dept: REHABILITATION | Facility: HOSPITAL | Age: 50
End: 2025-05-14
Payer: COMMERCIAL

## 2025-05-14 DIAGNOSIS — Z74.09 IMPAIRED FUNCTIONAL MOBILITY AND ACTIVITY TOLERANCE: ICD-10-CM

## 2025-05-14 DIAGNOSIS — M62.81 QUADRICEPS WEAKNESS: ICD-10-CM

## 2025-05-14 DIAGNOSIS — R29.898 HIP WEAKNESS: Primary | ICD-10-CM

## 2025-05-14 PROCEDURE — 97110 THERAPEUTIC EXERCISES: CPT | Mod: PO,CQ

## 2025-05-14 PROCEDURE — 97112 NEUROMUSCULAR REEDUCATION: CPT | Mod: PO,CQ

## 2025-05-14 NOTE — PROGRESS NOTES
Outpatient Rehab    Physical Therapy Visit    Patient Name: Nitin Mcconnell  MRN: [id number]  YOB: 1975  Encounter Date: 5/14/2025    Therapy Diagnosis:   Encounter Diagnoses   Name Primary?    Hip weakness Yes    Quadriceps weakness     Impaired functional mobility and activity tolerance      Physician: Angelia Rios NP    Physician Orders: Eval and Treat  Medical Diagnosis: Posterior knee pain, right    Visit # / Visits Authorized:  4 / 20  Insurance Authorization Period: 5/2/2025 to 12/31/2025  Date of Evaluation: 5/2/2025  Plan of Care Certification: 5/2/2025 to 7/11/2025      PT/PTA: PTA   Number of PTA visits since last PT visit:1  Time In: 0700   Time Out: 0753  Total Time (in minutes): 53   Total Billable Time (in minutes): 53    FOTO:  Intake Score:  %  Survey Score 2:  %  Survey Score 3:  %    Precautions:       Subjective   Pt states that her knees are a little stiff today.         Objective            Treatment:  Therapeutic Exercise  TE 1: Nustep: 10' Lv 2  TE 2: standing calf stretch on board: 1'  TE 3: Heel raises: 3 x 10  TE 4: DKTCH: 2 x 15  TE 5: supine HS stretch: 3 x 30 each leg  TE 6: prone quad stretch: 2' each leg  TE 7: Heel slides: 25x each leg  TE 8: prone hamstring curls: 2 x 10 with 2# weights  TE 9: standing hip 3 way: 15x each leg/direction  Balance/Neuromuscular Re-Education  NMR 1: Quad Sets: 30x  NMR 2: Straight Leg Raises 4 x 5  NMR 3: Short Arc Quads: 30x each leg 2# weights  NMR 4: Long Arc Quads: 30x each leg 3# weights  NMR 5: Sciatic nerve glide: 3 x 10 bilateral    Time Entry(in minutes):  Neuromuscular Re-Education Time Entry: 25  Therapeutic Exercise Time Entry: 28    Assessment & Plan   Assessment: Pt tolerated therapy session well and completed exercise program w/ goals to improve ROM, LE Strength, and Functional Mobility. Pt w/ slight difficulty keeping knee extended when performing hip EXT in standing, and required increased verbal cues to  improve. Mod verbal and manual cues needed throughout therapy session to promote proper exercise form. All exercises were tolerated w/ min < > mod fatigue and min discomfort. Exercises challenged appropriately. Will continue to progress as tolerated.  Evaluation/Treatment Tolerance: Patient tolerated treatment well    Patient will continue to benefit from skilled outpatient physical therapy to address the deficits listed in the problem list box on initial evaluation, provide pt/family education and to maximize pt's level of independence in the home and community environment.     Patient's spiritual, cultural, and educational needs considered and patient agreeable to plan of care and goals.           Plan: Continue to progress plan of care as tolerated.    Goals:   Active       LTG       Patient will demonstrate a 4+/5 or better on all tested MMT in order to improvr functional mobility and reduce risks for compensation.   (Progressing)       Start:  05/02/25    Expected End:  07/11/25            Patinet will demonstrate a FOTO score with a 10% improvement in order to show improved function.   (Progressing)       Start:  05/02/25    Expected End:  07/11/25            Patient will have 90/90 HS length test of 20 degrees of better in order to demonstrate improved lower extremity flexibility (Progressing)       Start:  05/02/25    Expected End:  07/11/25               STG       Patient will demonstrate 100% compliance with their Home Exercise Porgram in order to improve their current level of function.   (Progressing)       Start:  05/02/25    Expected End:  06/06/25            Patient will demonstrate knee extension degrees of +5 in order to improve functional mobility.    (Progressing)       Start:  05/02/25    Expected End:  06/06/25            Patient will demonstrate 120 degrees of right knee flexion in order to improve functional mobility.    (Progressing)       Start:  05/02/25    Expected End:  06/06/25                 Ronny Stinson, PTA

## 2025-05-20 ENCOUNTER — CLINICAL SUPPORT (OUTPATIENT)
Dept: REHABILITATION | Facility: HOSPITAL | Age: 50
End: 2025-05-20
Payer: COMMERCIAL

## 2025-05-20 DIAGNOSIS — Z74.09 IMPAIRED FUNCTIONAL MOBILITY AND ACTIVITY TOLERANCE: ICD-10-CM

## 2025-05-20 DIAGNOSIS — M62.81 QUADRICEPS WEAKNESS: ICD-10-CM

## 2025-05-20 DIAGNOSIS — R29.898 HIP WEAKNESS: Primary | ICD-10-CM

## 2025-05-20 PROCEDURE — 97112 NEUROMUSCULAR REEDUCATION: CPT | Mod: PO

## 2025-05-20 PROCEDURE — 97110 THERAPEUTIC EXERCISES: CPT | Mod: PO

## 2025-05-20 PROCEDURE — 97140 MANUAL THERAPY 1/> REGIONS: CPT | Mod: PO

## 2025-05-20 NOTE — PROGRESS NOTES
"  Outpatient Rehab    Physical Therapy Visit    Patient Name: Nitin Mcconnell  MRN: [id number]  YOB: 1975  Encounter Date: 5/20/2025    Therapy Diagnosis:   Encounter Diagnoses   Name Primary?    Hip weakness Yes    Quadriceps weakness     Impaired functional mobility and activity tolerance      Physician: Angelia Rios NP    Physician Orders: Eval and Treat  Medical Diagnosis: Posterior knee pain, right    Visit # / Visits Authorized:  5 / 20  Insurance Authorization Period: 5/2/2025 to 12/31/2025  Date of Evaluation: 5/2/2025  Plan of Care Certification: 5/2/2025 to 7/11/2025      PT/PTA: PT   Number of PTA visits since last PT visit:0  Time In: 0700   Time Out: 0754  Total Time (in minutes): 54   Total Billable Time (in minutes): 54    FOTO:  Intake Score:  %  Survey Score 2:  %  Survey Score 3:  %    Precautions:       Subjective   She did some increased walking this past weekend and is feeling some soreness in the back of her leg that is still present today..  Pain reported as 2/10.      Objective            Treatment:  Therapeutic Exercise  TE 1: Nustep: 10' Lv 2  TE 2: standing calf stretch on board: 2'  TE 3: Heel raises: 3 x 10  TE 4: DKTCH: 2 x 15 with HS isometric at top for 3" hold  TE 5: supine HS stretch: 3 x 30 each leg  TE 6: prone quad stretch: 2' each leg  TE 8: prone hamstring curls: 3 x 10 with 3# weights  Manual Therapy  MT 1: STM to gastroc and hamstring  MT 3: Tibiofemoral moilizations for improved flexion/extension  MT 4: seated knee distaction - NEXT  Balance/Neuromuscular Re-Education  NMR 1: standing hip 3 way: 15x each leg/direction with 2# weights with no hands to challenge balance  NMR 2: Straight Leg Raises 4 x 5  NMR 3: Short Arc Quads: 30x each leg 2# weights  NMR 4: Long Arc Quads: 30x each leg 3# weights  NMR 5: Sciatic nerve glide: 3 x 10 bilateral  NMR 6: Standing hamstring curls: 3 x 10 with 2# weights    Time Entry(in minutes):  Manual Therapy Time Entry: " 8  Neuromuscular Re-Education Time Entry: 16  Therapeutic Exercise Time Entry: 38    Assessment & Plan   Assessment: Patient reports for follow up treatment with elias taylor in complaint of symptoms. Patient tolerated today's session with no complaints despite showing signs of mucle burning and fatigue. Today's treatment included stretching, strengthening, balance, and neuromusclar activation interventions. Patient tolerated progressions well and included increased balance compnents to exercises and increased strength training.  They remain limited in calf and hamstring tightness and weakness. Pateint is progressing well towards their goals.  Evaluation/Treatment Tolerance: Patient tolerated treatment well    Patient will continue to benefit from skilled outpatient physical therapy to address the deficits listed in the problem list box on initial evaluation, provide pt/family education and to maximize pt's level of independence in the home and community environment.     Patient's spiritual, cultural, and educational needs considered and patient agreeable to plan of care and goals.           Plan: Continue to progress plan of care as tolerated.    Goals:   Active       LTG       Patient will demonstrate a 4+/5 or better on all tested MMT in order to improvr functional mobility and reduce risks for compensation.   (Progressing)       Start:  05/02/25    Expected End:  07/11/25            Patinet will demonstrate a FOTO score with a 10% improvement in order to show improved function.   (Progressing)       Start:  05/02/25    Expected End:  07/11/25            Patient will have 90/90 HS length test of 20 degrees of better in order to demonstrate improved lower extremity flexibility (Progressing)       Start:  05/02/25    Expected End:  07/11/25               STG       Patient will demonstrate 100% compliance with their Home Exercise Porgram in order to improve their current level of function.   (Progressing)        Start:  05/02/25    Expected End:  06/06/25            Patient will demonstrate knee extension degrees of +5 in order to improve functional mobility.    (Progressing)       Start:  05/02/25    Expected End:  06/06/25            Patient will demonstrate 120 degrees of right knee flexion in order to improve functional mobility.    (Progressing)       Start:  05/02/25    Expected End:  06/06/25                Ronny Sharp PT

## 2025-05-22 ENCOUNTER — CLINICAL SUPPORT (OUTPATIENT)
Dept: REHABILITATION | Facility: HOSPITAL | Age: 50
End: 2025-05-22
Payer: COMMERCIAL

## 2025-05-22 DIAGNOSIS — M62.81 QUADRICEPS WEAKNESS: ICD-10-CM

## 2025-05-22 DIAGNOSIS — R29.898 HIP WEAKNESS: Primary | ICD-10-CM

## 2025-05-22 DIAGNOSIS — Z74.09 IMPAIRED FUNCTIONAL MOBILITY AND ACTIVITY TOLERANCE: ICD-10-CM

## 2025-05-22 PROCEDURE — 97112 NEUROMUSCULAR REEDUCATION: CPT | Mod: PO

## 2025-05-22 PROCEDURE — 97110 THERAPEUTIC EXERCISES: CPT | Mod: PO

## 2025-05-22 NOTE — PROGRESS NOTES
"  Outpatient Rehab    Physical Therapy Visit    Patient Name: Nitin Mcconnell  MRN: [id number]  YOB: 1975  Encounter Date: 5/22/2025    Therapy Diagnosis:   Encounter Diagnoses   Name Primary?    Hip weakness Yes    Quadriceps weakness     Impaired functional mobility and activity tolerance      Physician: Angelia Rios NP    Physician Orders: Eval and Treat  Medical Diagnosis: Posterior knee pain, right    Visit # / Visits Authorized:  6 / 20  Insurance Authorization Period: 5/2/2025 to 12/31/2025  Date of Evaluation: 5/2/2025  Plan of Care Certification: 5/2/2025 to 7/11/2025      PT/PTA: PT   Number of PTA visits since last PT visit:0  Time In: 0700   Time Out: 0754  Total Time (in minutes): 54   Total Billable Time (in minutes): 54    FOTO:  Intake Score:  %  Survey Score 2:  %  Survey Score 3:  %    Precautions:     standard      Subjective   She noticed a little bit of twitching in the back of her leg but it is feeling a little better than the other day..  Pain reported as 1/10.      Objective            Treatment:  Therapeutic Exercise  TE 1: Nustep: 10' Lv 2  TE 2: standing calf stretch on board: 2'  TE 3: Heel raises: 3 x 10  TE 4: DKTCH: 2 x 15 with HS isometric at top for 3" hold  Balance/Neuromuscular Re-Education  NMR 1: standing hip 3 way: 15x each leg/direction with 2# weights with no hands to challenge balance  NMR 2: Straight Leg Raises 3 x 10 with 1.5 # weights  NMR 5: Sciatic nerve glide: 3 x 10 bilateral  NMR 6: Standing hamstring curls: 3 x 10 with 2# weights  NMR 7: glute bridges: 3 x 10    Time Entry(in minutes):  Neuromuscular Re-Education Time Entry: 23  Therapeutic Exercise Time Entry: 31    Assessment & Plan   Assessment: Patient reports for follow up treatment with decrease in complaint of symptoms. Patient tolerated today's session with minimal complaints and was able to complete all presribed exercises. Today's treatment included strengthening, stretching, balance, " and cardiovascular endurance interventions. Patient tolerated progressions well and included glute bridges and increased resistance for open chain hip strengthening.  They remain limited in posterior chain tightness and weakness. Pateint is progressing well towards their goals.  Evaluation/Treatment Tolerance: Patient tolerated treatment well    The patient will continue to benefit from skilled outpatient physical therapy in order to address the deficits listed in the problem list on the initial evaluation, provide patient and family education, and maximize the patients level of independence in the home and community environments.     The patient's spiritual, cultural, and educational needs were considered, and the patient is agreeable to the plan of care and goals.           Plan: Continue to progress plan of care as tolerated.    Goals:   Active       LTG       Patient will demonstrate a 4+/5 or better on all tested MMT in order to improvr functional mobility and reduce risks for compensation.   (Progressing)       Start:  05/02/25    Expected End:  07/11/25            Patinet will demonstrate a FOTO score with a 10% improvement in order to show improved function.   (Progressing)       Start:  05/02/25    Expected End:  07/11/25            Patient will have 90/90 HS length test of 20 degrees of better in order to demonstrate improved lower extremity flexibility (Progressing)       Start:  05/02/25    Expected End:  07/11/25               STG       Patient will demonstrate 100% compliance with their Home Exercise Porgram in order to improve their current level of function.   (Progressing)       Start:  05/02/25    Expected End:  06/06/25            Patient will demonstrate knee extension degrees of +5 in order to improve functional mobility.    (Progressing)       Start:  05/02/25    Expected End:  06/06/25            Patient will demonstrate 120 degrees of right knee flexion in order to improve functional  mobility.    (Progressing)       Start:  05/02/25    Expected End:  06/06/25                Ronny Sharp PT

## 2025-05-27 ENCOUNTER — CLINICAL SUPPORT (OUTPATIENT)
Dept: REHABILITATION | Facility: HOSPITAL | Age: 50
End: 2025-05-27
Payer: COMMERCIAL

## 2025-05-27 DIAGNOSIS — R29.898 HIP WEAKNESS: Primary | ICD-10-CM

## 2025-05-27 DIAGNOSIS — Z74.09 IMPAIRED FUNCTIONAL MOBILITY AND ACTIVITY TOLERANCE: ICD-10-CM

## 2025-05-27 DIAGNOSIS — M62.81 QUADRICEPS WEAKNESS: ICD-10-CM

## 2025-05-27 PROCEDURE — 97110 THERAPEUTIC EXERCISES: CPT | Mod: PO

## 2025-05-27 PROCEDURE — 97112 NEUROMUSCULAR REEDUCATION: CPT | Mod: PO

## 2025-05-27 PROCEDURE — 97530 THERAPEUTIC ACTIVITIES: CPT | Mod: PO

## 2025-05-27 NOTE — PROGRESS NOTES
"  Outpatient Rehab    Physical Therapy Visit    Patient Name: Nitin Mcconnell  MRN: [id number]  YOB: 1975  Encounter Date: 5/27/2025    Therapy Diagnosis:   Encounter Diagnoses   Name Primary?    Hip weakness Yes    Quadriceps weakness     Impaired functional mobility and activity tolerance      Physician: Angelia Rios NP    Physician Orders: Eval and Treat  Medical Diagnosis: Posterior knee pain, right    Visit # / Visits Authorized:  7 / 20  Insurance Authorization Period: 5/2/2025 to 12/31/2025  Date of Evaluation: 5/2/2025  Plan of Care Certification: 5/2/2025 to 7/11/2025      PT/PTA: PT   Number of PTA visits since last PT visit:0  Time In: 0700   Time Out: 0756  Total Time (in minutes): 56   Total Billable Time (in minutes): 56    FOTO:  Intake Score:  %  Survey Score 2:  %  Survey Score 3:  %    Precautions:     standard      Subjective   She noticed she is able to raise her legs much higher in a marching position..  Pain reported as 1/10.      Objective            Treatment:  Therapeutic Exercise  TE 1: Nustep: 10' Lv 3  TE 2: standing calf stretch on board: 2'  TE 3: Heel raises: 3 x 10  TE 4: DKTCH: 2 x 15 with HS isometric at top for 3" hold  Balance/Neuromuscular Re-Education  NMR 1: standing hip 3 way: 15x each leg/direction with 2# weights with no hands to challenge balance  NMR 2: Straight Leg Raises 3 x 10 with 1.5 # weights  NMR 3: Short Arc Quads: 30x each leg 4# weights  NMR 4: Long Arc Quads: 30x each leg 4# weights  NMR 5: Sciatic nerve glide: 3 x 10 bilateral  NMR 6: Standing hamstring curls: 3 x 10 with 2# weights  NMR 7: Hamstring bridges: 3 x 10  Therapeutic Activity  TA 1: mini squats: 2 x 10  TA 3: Step ups: 15x each leg on 6 inch step  TA 5: Side stepping with green band  TA 6: standing march with 2# weights: 2 x 10 each leg    Time Entry(in minutes):  Neuromuscular Re-Education Time Entry: 24  Therapeutic Activity Time Entry: 9  Therapeutic Exercise Time Entry: " 23    Assessment & Plan   Assessment: Patient reports for follow up treatment with decrease in complaint of symptoms and improvement in function. Patient tolerated today's session with minimal complaints of fatigue with progressions whcih included more functional activities such as squatting, stepping, and side stepping. Weight was also increased for open chain quad strengthening today which she tolerated well. They remain limited in quad strength and flexability. Pateint is progressing well towards their goals.  Evaluation/Treatment Tolerance: Patient tolerated treatment well    The patient will continue to benefit from skilled outpatient physical therapy in order to address the deficits listed in the problem list on the initial evaluation, provide patient and family education, and maximize the patients level of independence in the home and community environments.     The patient's spiritual, cultural, and educational needs were considered, and the patient is agreeable to the plan of care and goals.           Plan: Continue to progress plan of care as tolerated.    Goals:   Active       LTG       Patient will demonstrate a 4+/5 or better on all tested MMT in order to improvr functional mobility and reduce risks for compensation.   (Progressing)       Start:  05/02/25    Expected End:  07/11/25            Patinet will demonstrate a FOTO score with a 10% improvement in order to show improved function.   (Progressing)       Start:  05/02/25    Expected End:  07/11/25            Patient will have 90/90 HS length test of 20 degrees of better in order to demonstrate improved lower extremity flexibility (Progressing)       Start:  05/02/25    Expected End:  07/11/25               STG       Patient will demonstrate 100% compliance with their Home Exercise Porgram in order to improve their current level of function.   (Progressing)       Start:  05/02/25    Expected End:  06/06/25            Patient will demonstrate knee  extension degrees of +5 in order to improve functional mobility.    (Progressing)       Start:  05/02/25    Expected End:  06/06/25            Patient will demonstrate 120 degrees of right knee flexion in order to improve functional mobility.    (Progressing)       Start:  05/02/25    Expected End:  06/06/25                Ronny Sharp PT

## 2025-05-29 ENCOUNTER — CLINICAL SUPPORT (OUTPATIENT)
Dept: REHABILITATION | Facility: HOSPITAL | Age: 50
End: 2025-05-29
Payer: COMMERCIAL

## 2025-05-29 DIAGNOSIS — M62.81 QUADRICEPS WEAKNESS: ICD-10-CM

## 2025-05-29 DIAGNOSIS — Z74.09 IMPAIRED FUNCTIONAL MOBILITY AND ACTIVITY TOLERANCE: ICD-10-CM

## 2025-05-29 DIAGNOSIS — R29.898 HIP WEAKNESS: Primary | ICD-10-CM

## 2025-05-29 PROCEDURE — 97530 THERAPEUTIC ACTIVITIES: CPT | Mod: PO

## 2025-05-29 PROCEDURE — 97110 THERAPEUTIC EXERCISES: CPT | Mod: PO

## 2025-05-29 PROCEDURE — 97112 NEUROMUSCULAR REEDUCATION: CPT | Mod: PO

## 2025-05-29 NOTE — PATIENT INSTRUCTIONS
Access Code: [email or web address]  URL: [email or web address]  Date: 05/29/2025  Prepared by: Ronny Sharp    Exercises  - Seated Hamstring Stretch  - 1 x daily - 4 x weekly - 3 sets - 30 s hold  - Gastroc Stretch on Wall  - 1 x daily - 4 x weekly - 3 sets - 10 reps - 30 s hold  - Supine Sciatic Nerve Glide  - 1 x daily - 4 x weekly - 3 sets - 10 reps  - Supine Bridge  - 1 x daily - 4 x weekly - 3 sets - 10 reps  - Standing Knee Flexion AROM with Chair Support  - 1 x daily - 4 x weekly - 3 sets - 10 reps  - Marching with Resistance  - 1 x daily - 4 x weekly - 2 sets - 20 reps  - Active Straight Leg Raise with Quad Set  - 1 x daily - 4 x weekly - 3 sets - 10 reps

## 2025-05-29 NOTE — PROGRESS NOTES
Outpatient Rehab    Physical Therapy Discharge    Patient Name: Nitin Mcconnell  MRN: [id number]  YOB: 1975  Encounter Date: 5/29/2025    Therapy Diagnosis:   Encounter Diagnoses   Name Primary?    Hip weakness Yes    Quadriceps weakness     Impaired functional mobility and activity tolerance      Physician: Angelia Rios NP    Physician Orders: Eval and Treat  Medical Diagnosis: Posterior knee pain, right    Visit # / Visits Authorized:  8 / 20  Insurance Authorization Period: 5/2/2025 to 12/31/2025  Date of Evaluation: 5/2/2025  Plan of Care Certification: 5/2/2025 to 7/11/2025      PT/PTA: PT   Number of PTA visits since last PT visit:0  Time In: 0700   Time Out: 0753  Total Time (in minutes): 53   Total Billable Time (in minutes): 53    FOTO:  Intake Score: 78%  Survey Score 2: 77%  Survey Score 3:  %    Precautions:     standard      Subjective   She is okay with trnasisitioning to a home exercise program..  Pain reported as 1/10.      Objective       Knee Range of Motion   Right Knee   Active (deg) Passive (deg) Pain   Flexion 115       Extension 3           Left Knee   Active (deg) Passive (deg) Pain   Flexion 111       Extension 5                Ankle/Foot Range of Motion   Right Ankle/Foot   Active (deg) Passive (deg) Pain   Dorsiflexion (KE) 15       Dorsiflexion (KF)         Plantar Flexion 45       Ankle Inversion         Ankle Eversion         Subtalar Inversion         Subtalar Eversion         Great Toe MTP Flexion         Great Toe MTP Extension         Great Toe IP Flexion             Left Ankle/Foot   Active (deg) Passive (deg) Pain   Dorsiflexion (KE) 15       Dorsiflexion (KF)         Plantar Flexion 45       Ankle Inversion         Ankle Eversion         Subtalar Inversion         Subtalar Eversion         Great Toe MTP Flexion         Great Toe MTP Extension         Great Toe IP Flexion                            Hip Strength - Planes of Motion   Right Strength Right  "Pain Left Strength Left  Pain   Flexion (L2) 5   5     Extension 5   5     ABduction 5   5     ADduction 5   5     Internal Rotation 5   5     External Rotation 5   5         Knee Strength   Right Strength Right Pain Left Strength Left  Pain   Flexion (S2) 5   5     Prone Flexion           Extension (L3) 5   5            Ankle/Foot Strength - Planes of Motion   Right Strength Right Pain Left Strength Left  Pain   Dorsiflexion (L4) 5   5     Plantar Flexion (S1) 5   5     Inversion           Eversion           Great Toe Flexion           Great Toe Extension (L5)           Lesser Toes Flexion           Lesser Toes Extension                     Squat Testing  The patient completed 2 squat repetitions.     Improved squat with decreased valgus force, decreased pain, and proper squat mechanics.             Treatment:  Therapeutic Exercise  TE 1: Nustep: 10' Lv 3  TE 2: standing calf stretch on step and with lunge: 3 x 30"  TE 5: Seated HS stretch: 3 x 30" each leg  Balance/Neuromuscular Re-Education  NMR 2: Straight Leg Raises 3 x 10 with 1.5 # weights  NMR 5: Sciatic nerve glide: 3 x 10 bilateral  NMR 6: Standing hamstring curls: 3 x 10 with red band  NMR 7: Hamstring bridges: 3 x 10  Therapeutic Activity  TA 1: mini squats: 2 x 10  TA 6: standing march with red band: 15x each leg  TA 7: Reassessment + HEP updating    Time Entry(in minutes):  Neuromuscular Re-Education Time Entry: 13  Therapeutic Activity Time Entry: 24  Therapeutic Exercise Time Entry: 16    Assessment & Plan   Assessment: Patient reports for re-assessment and follow up treatment. Patient reports improvement in symptoms since start of care and has met 2/3 STG and 2/3 LTG. Patient demonstrates improvement in knee and ankle range of motion, functional squat, and weakness since the beginning of her treatment. Today's treatment included education, strengthening, and functional mobility interventions with Discharge planning. Patient will no longer continue " to benefit from skilled physical therapy services at this time and will be discharged to independent home exercise program.  Evaluation/Treatment Tolerance: Patient tolerated treatment well    The patient's spiritual, cultural, and educational needs were considered, and the patient is agreeable to the plan of care and goals.           Plan: Discharge to Home Exercise Program (HEP) found under pateint instructions.    Goals:   Active       LTG       Patient will demonstrate a 4+/5 or better on all tested MMT in order to improvr functional mobility and reduce risks for compensation.   (Met)       Start:  05/02/25    Expected End:  07/11/25    Resolved:  05/29/25         Patinet will demonstrate a FOTO score with a 10% improvement in order to show improved function.   (Unable to Meet)       Start:  05/02/25    Expected End:  07/11/25            Patient will have 90/90 HS length test of 20 degrees of better in order to demonstrate improved lower extremity flexibility (Met)       Start:  05/02/25    Expected End:  07/11/25    Resolved:  05/29/25            STG       Patient will demonstrate 100% compliance with their Home Exercise Porgram in order to improve their current level of function.   (Met)       Start:  05/02/25    Expected End:  06/06/25    Resolved:  05/29/25         Patient will demonstrate knee extension degrees of +5 in order to improve functional mobility.    (Met)       Start:  05/02/25    Expected End:  06/06/25    Resolved:  05/29/25         Patient will demonstrate 120 degrees of right knee flexion in order to improve functional mobility.    (Unable to Meet)       Start:  05/02/25    Expected End:  06/06/25                Ronny Sharp, PT

## 2025-06-10 ENCOUNTER — APPOINTMENT (OUTPATIENT)
Dept: LAB | Facility: HOSPITAL | Age: 50
End: 2025-06-10
Attending: INTERNAL MEDICINE
Payer: COMMERCIAL

## 2025-06-11 ENCOUNTER — PATIENT MESSAGE (OUTPATIENT)
Dept: RHEUMATOLOGY | Facility: CLINIC | Age: 50
End: 2025-06-11
Payer: COMMERCIAL

## 2025-06-20 ENCOUNTER — OFFICE VISIT (OUTPATIENT)
Dept: INTERNAL MEDICINE | Facility: CLINIC | Age: 50
End: 2025-06-20
Payer: COMMERCIAL

## 2025-06-20 VITALS
HEART RATE: 73 BPM | OXYGEN SATURATION: 97 % | TEMPERATURE: 98 F | WEIGHT: 262 LBS | SYSTOLIC BLOOD PRESSURE: 120 MMHG | DIASTOLIC BLOOD PRESSURE: 84 MMHG | HEIGHT: 65 IN | RESPIRATION RATE: 16 BRPM | BODY MASS INDEX: 43.65 KG/M2

## 2025-06-20 DIAGNOSIS — E04.2 MULTIPLE THYROID NODULES: ICD-10-CM

## 2025-06-20 DIAGNOSIS — I27.20 PULMONARY HYPERTENSION: ICD-10-CM

## 2025-06-20 DIAGNOSIS — I10 ESSENTIAL HYPERTENSION: ICD-10-CM

## 2025-06-20 DIAGNOSIS — G89.29 CHRONIC PAIN OF RIGHT KNEE: Primary | ICD-10-CM

## 2025-06-20 DIAGNOSIS — R74.01 ELEVATED ALT MEASUREMENT: ICD-10-CM

## 2025-06-20 DIAGNOSIS — M25.561 CHRONIC PAIN OF RIGHT KNEE: Primary | ICD-10-CM

## 2025-06-20 PROCEDURE — 1159F MED LIST DOCD IN RCRD: CPT | Mod: CPTII,S$GLB,, | Performed by: INTERNAL MEDICINE

## 2025-06-20 PROCEDURE — 1160F RVW MEDS BY RX/DR IN RCRD: CPT | Mod: CPTII,S$GLB,, | Performed by: INTERNAL MEDICINE

## 2025-06-20 PROCEDURE — 99214 OFFICE O/P EST MOD 30 MIN: CPT | Mod: S$GLB,,, | Performed by: INTERNAL MEDICINE

## 2025-06-20 PROCEDURE — 99999 PR PBB SHADOW E&M-EST. PATIENT-LVL V: CPT | Mod: PBBFAC,,, | Performed by: INTERNAL MEDICINE

## 2025-06-20 PROCEDURE — 3079F DIAST BP 80-89 MM HG: CPT | Mod: CPTII,S$GLB,, | Performed by: INTERNAL MEDICINE

## 2025-06-20 PROCEDURE — 3008F BODY MASS INDEX DOCD: CPT | Mod: CPTII,S$GLB,, | Performed by: INTERNAL MEDICINE

## 2025-06-20 PROCEDURE — 3074F SYST BP LT 130 MM HG: CPT | Mod: CPTII,S$GLB,, | Performed by: INTERNAL MEDICINE

## 2025-06-20 NOTE — PROGRESS NOTES
"    Subjective:     Nitin Mcconnell is a 50 y.o. female who presents for   Chief Complaint   Patient presents with    Hypertension     6 mos ck.     Leg Pain     Bilat pain.  At 3.       Knee Pain       HPI    Nitin reports right leg pain and stiffness that began at the end of April while walking in the mall. She describes a dull pain (3/10) located in the back of the right calf, present even at rest. She experiences spasms and twitches in the right leg while working from home and driving. Morning stiffness is most pronounced, particularly when attempting to flex the knee, which improves with movement. She denies thigh pain or significant discomfort when not moving. She cannot tolerate standing very long before experiencing leg pain.    She was diagnosed with knee arthritis by an orthopedic specialist before 2012. She recently experienced foot tenderness after her second workout session at the gym, suggesting possible overexertion.    She recently fell in the shower during her birthday celebration, slipping while attempting to grab a towel. The injury resulted in bruising of the leg that remains tender to touch. She denies hitting any other body parts during the fall.    She reports intermittent lower back pain characterized by a mild, dull ache. She expresses concern about potential connection to kidney function.    She reports pressure and discomfort in the anterior neck region when swallowing, describing the sensation as feeling "different" and similar to a mild sore throat. She expresses concern about potential growth of known thyroid nodules. She has a history of right-sided thyroid nodule previously noted as stable, and two left-sided nodules with slight interval enlargement. She denies throat pain, difficulty swallowing, or other systemic symptoms.    She has diagnoses of sleep apnea (compliant with CPAP), lupus, lymphedema, and fatty liver disease. She takes allopurinol and colchicine for gout " management, prescribed by rheumatologist. History of pulmonary hypertension documented and appears to be stable.    She currently weighs 262 lbs and reports successful weight loss through intermittent fasting. She attends line dance classes on Tuesdays and Thursdays and has returned to the gym. 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 She is increasing water intake while reducing soda consumption.      Review of Systems   Constitutional:  Negative for chills, diaphoresis, fatigue and fever.   HENT:  Negative for congestion, ear discharge, ear pain, rhinorrhea, sinus pressure and sore throat.    Eyes:  Negative for discharge and visual disturbance.   Respiratory:  Negative for cough, shortness of breath and wheezing.    Cardiovascular:  Positive for leg swelling (she uses a pneumatic compression device to manage the lymphedema). Negative for chest pain and palpitations.   Gastrointestinal:  Negative for abdominal pain, constipation, diarrhea, nausea and vomiting.        She reports difficulty swallowing and pressure of the anterior neck.   Endocrine: Negative for cold intolerance and heat intolerance.   Musculoskeletal:  Positive for arthralgias, back pain and myalgias (and twitching with calf spasms). Negative for gait problem.        She had been evaluated for leg pain by multiple providers. US of leg was negative for clot. She started PT and reports improvement in ability to complete leg raises.    Skin:  Negative for rash and wound.   Neurological:  Negative for dizziness, tremors, numbness and headaches.   Psychiatric/Behavioral:  Negative for dysphoric mood. The patient is not nervous/anxious.           Objective:     Physical Exam  Vitals reviewed.   Constitutional:       General: She is awake. She is not in acute distress.     Appearance: Normal appearance. She is well-developed and well-groomed.   HENT:      Head:  Normocephalic and atraumatic.      Right Ear: Hearing and external ear normal.      Left Ear: Hearing and external ear normal.      Nose: Nose normal. No congestion.      Mouth/Throat:      Mouth: Mucous membranes are moist.   Eyes:      General: Lids are normal. Vision grossly intact.   Neck:      Thyroid: No thyroid mass. Thyromegaly: palpable thyroid.  Cardiovascular:      Rate and Rhythm: Normal rate and regular rhythm.      Heart sounds: Normal heart sounds. No murmur heard.  Pulmonary:      Effort: Pulmonary effort is normal.      Breath sounds: Normal breath sounds. No decreased breath sounds or wheezing.   Abdominal:      General: Bowel sounds are normal. There is no distension.   Musculoskeletal:         General: Normal range of motion.      Cervical back: Normal range of motion. Spasms and tenderness present. Muscular tenderness present. No pain with movement.      Thoracic back: No spasms.      Lumbar back: Tenderness (right sided) present. No spasms or bony tenderness.      Right hip: No bony tenderness.      Left hip: No bony tenderness.      Right upper leg: Swelling present. No tenderness.      Left upper leg: Swelling present. No tenderness.      Right knee: Swelling present.      Left knee: Swelling and bony tenderness (along the posterior joint in the popliteal fossa) present. No erythema. Normal range of motion. No tenderness.      Right lower leg: Swelling present. No edema.      Left lower leg: Swelling present. No edema.   Lymphadenopathy:      Cervical:      Right cervical: No superficial or posterior cervical adenopathy.     Left cervical: No superficial or posterior cervical adenopathy.      Comments: LN palpated but normal in size. They are mobile.   Skin:     General: Skin is warm and dry.      Findings: No lesion or rash.   Neurological:      Mental Status: She is alert and oriented to person, place, and time.   Psychiatric:         Attention and Perception: Attention normal.         Mood  and Affect: Mood normal.         Behavior: Behavior is cooperative.            Assessment:      1. Chronic pain of right knee    2. Essential hypertension    3. Multiple thyroid nodules    4. Elevated ALT measurement    5. Pulmonary hypertension           Plan:       Assessment & Plan    - Assessed leg pain and stiffness, considering multiple potential causes including arthritis, vascular issues, and lymphedema.  - Evaluated thyroid nodules, noting slight interval enlargement of left-sided nodules since previous ultrasound.  - Considered fatty liver diagnosis and potential for fibrosis based on consistently elevated ALT levels.    ___________________________________________    PAIN IN RIGHT LOWER LEG:   Monitored the patient's dull but persistent pain in the right lower leg, especially in the calf area.   Discussed potential causes, possibly related to muscle tightness and lymphedema.    LYMPHEDEMA:   Noted the patient's use of pump to control the lymphedema component of this leg discomfort.    Discussed how this condition contributes to leg stiffness and swelling.    OSTEOARTHRITIS OF RIGHT KNEE:   Ordered right knee x-ray to assess joint condition and potential arthritis progression.   Noted the patient's reports of stiffness and pain in the right knee, with arthritis diagnosed years ago.   May refer the patient to orthopedics if knee pain worsens.    LOW BACK PAIN:   Monitored the patient's intermittent dull pain in the lower back, possibly related to muscle spasm.   Patient was concerned about kidney problems but labs will determine.    SYSTEMIC LUPUS ERYTHEMATOSUS:   Monitored the patient's lupus symptoms, noting stiffness and pain in the legs possibly related to this condition.   Discussed how lupus may be contributing to these symptoms.    THYROID NODULES:   Ordered thyroid ultrasound to evaluate nodules.   Reviewed results showing slight interval enlargement.   Noted the patient's complaint of pressure when  swallowing, likely due to these nodules.   Referred to endocrinology for comprehensive evaluation.    FATTY LIVER:   Noted the patient's fatty liver diagnosis via previous ultrasound with consistently elevated ALT liver enzyme levels.   Discussed the condition and risk of fibrosis with the patient.   Will refer to hepatology after labs for evaluation of fatty liver and potential fibrosis.    OBSTRUCTIVE SLEEP APNEA:   Confirmed the patient's compliance with CPAP machine usage for sleep apnea.   Discussed the importance of continued CPAP usage for effective management.    FOLLOW-UP:   Return in November for annual visit unless issues arise sooner.       __________________________    Chasity Dixon MD, PharmD  [facility]- [facility]  American Board of Obesity Medicine diplomate  Office [phone]

## 2025-06-23 PROBLEM — M25.561 CHRONIC PAIN OF RIGHT KNEE: Status: ACTIVE | Noted: 2025-06-23

## 2025-06-23 PROBLEM — G89.29 CHRONIC PAIN OF RIGHT KNEE: Status: ACTIVE | Noted: 2025-06-23

## 2025-06-28 ENCOUNTER — HOSPITAL ENCOUNTER (OUTPATIENT)
Dept: RADIOLOGY | Facility: HOSPITAL | Age: 50
Discharge: HOME | End: 2025-06-28
Attending: INTERNAL MEDICINE
Payer: COMMERCIAL

## 2025-06-28 DIAGNOSIS — G89.29 CHRONIC PAIN OF RIGHT KNEE: ICD-10-CM

## 2025-06-28 DIAGNOSIS — E04.2 MULTIPLE THYROID NODULES: ICD-10-CM

## 2025-06-28 DIAGNOSIS — M25.561 CHRONIC PAIN OF RIGHT KNEE: ICD-10-CM

## 2025-06-28 PROCEDURE — 73562 X-RAY EXAM OF KNEE 3: CPT | Mod: 26,RT,, | Performed by: RADIOLOGY

## 2025-06-28 PROCEDURE — 76536 US EXAM OF HEAD AND NECK: CPT | Mod: TC

## 2025-06-28 PROCEDURE — 76536 US EXAM OF HEAD AND NECK: CPT | Mod: 26,,, | Performed by: INTERNAL MEDICINE

## 2025-06-28 PROCEDURE — 73562 X-RAY EXAM OF KNEE 3: CPT | Mod: TC,RT

## 2025-07-11 DIAGNOSIS — M25.561 RIGHT KNEE PAIN, UNSPECIFIED CHRONICITY: Primary | ICD-10-CM

## 2025-07-29 ENCOUNTER — OFFICE VISIT (OUTPATIENT)
Dept: ORTHOPEDICS | Facility: CLINIC | Age: 50
End: 2025-07-29
Payer: COMMERCIAL

## 2025-07-29 ENCOUNTER — HOSPITAL ENCOUNTER (OUTPATIENT)
Facility: HOSPITAL | Age: 50
Discharge: HOME | End: 2025-07-29
Attending: ORTHOPAEDIC SURGERY
Payer: COMMERCIAL

## 2025-07-29 ENCOUNTER — RESEARCH ENCOUNTER (OUTPATIENT)
Dept: RESEARCH | Facility: HOSPITAL | Age: 50
End: 2025-07-29
Payer: COMMERCIAL

## 2025-07-29 VITALS — BODY MASS INDEX: 43.64 KG/M2 | HEIGHT: 65 IN | WEIGHT: 261.94 LBS

## 2025-07-29 DIAGNOSIS — M25.561 CHRONIC PAIN OF RIGHT KNEE: ICD-10-CM

## 2025-07-29 DIAGNOSIS — M25.561 RIGHT KNEE PAIN, UNSPECIFIED CHRONICITY: ICD-10-CM

## 2025-07-29 DIAGNOSIS — M25.561 RIGHT KNEE PAIN, UNSPECIFIED CHRONICITY: Primary | ICD-10-CM

## 2025-07-29 DIAGNOSIS — M17.11 PRIMARY OSTEOARTHRITIS OF RIGHT KNEE: ICD-10-CM

## 2025-07-29 DIAGNOSIS — G89.29 CHRONIC PAIN OF RIGHT KNEE: ICD-10-CM

## 2025-07-29 PROCEDURE — 73564 X-RAY EXAM KNEE 4 OR MORE: CPT | Mod: 26,RT,, | Performed by: RADIOLOGY

## 2025-07-29 PROCEDURE — 1159F MED LIST DOCD IN RCRD: CPT | Mod: CPTII,S$GLB,, | Performed by: ORTHOPAEDIC SURGERY

## 2025-07-29 PROCEDURE — 73564 X-RAY EXAM KNEE 4 OR MORE: CPT | Mod: TC,PN,RT

## 2025-07-29 PROCEDURE — 99204 OFFICE O/P NEW MOD 45 MIN: CPT | Mod: S$GLB,,, | Performed by: ORTHOPAEDIC SURGERY

## 2025-07-29 PROCEDURE — G2211 COMPLEX E/M VISIT ADD ON: HCPCS | Mod: S$GLB,,, | Performed by: ORTHOPAEDIC SURGERY

## 2025-07-29 PROCEDURE — 3008F BODY MASS INDEX DOCD: CPT | Mod: CPTII,S$GLB,, | Performed by: ORTHOPAEDIC SURGERY

## 2025-07-29 PROCEDURE — 99999 PR PBB SHADOW E&M-EST. PATIENT-LVL III: CPT | Mod: PBBFAC,,, | Performed by: ORTHOPAEDIC SURGERY

## 2025-07-29 RX ORDER — DICLOFENAC SODIUM 75 MG/1
75 TABLET, DELAYED RELEASE ORAL 2 TIMES DAILY
Qty: 28 TABLET | Refills: 0 | Status: SHIPPED | OUTPATIENT
Start: 2025-07-29 | End: 2025-08-12

## 2025-07-29 NOTE — PROGRESS NOTES
[facility] [address]          Subjective:     Patient ID: Nitin Mcconnell is a 50 y.o. female.    Chief Complaint: Pain of the Right Knee       Patient ID: Nitin Mcconnell is a 50 y.o. female.    Chief Complaint: Pain of the Right Knee      KNEE PAIN: Complains of pain to the rightknee.   PAIN LOCATED: anterior  ONSET: 5 months ago.  QUALITY:  Patient states the pain is stable  HISTORY: Previous knee injury/surgery: No  Hx: none  ASSOCIATED SYMPTOM AND TRIGGERS:Standing/Weightbearing, walking, trouble w stairs, stiffness, swelling, limping, stiffness w sitting   Has tried and failed cardiovascular activities such as walking, biking and resistance exercises  USES ASSISTIVE DEVICE: none  RELIEVED BY:physical therapy  PATIENT DENIES: bruising, redness, deformity    Patient presents for new evaluation of chronic right knee pain.  Patient reports has been ongoing for several months.  Has tried physical therapy back in May which she reports provided some relief of her pain.  However is remained present with minimal improvement since finishing therapy.  Denies any new trauma or other concerns.      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, benign     Lupus mild        Past Surgical History:   Procedure Laterality Date    BREAST BIOPSY Right 2008    rt axilla    COLONOSCOPY N/A 05/17/2022    Procedure: COLONOSCOPY;  Surgeon: Lilibeth Merrill MD;  Location: [facility] (4TH FLR);  Service: Endoscopy;  Laterality: N/A;  miralax prep  4/25 fully vaccinated; instructions to portal-st  5/13-changed from Jose to Lammi    ENDOSCOPIC ULTRASOUND OF LOWER GASTROINTESTINAL TRACT N/A 6/7/2022    Procedure: ULTRASOUND, LOWER GI TRACT, ENDOSCOPIC;  Surgeon: Enrique Beach MD;  Location: [facility] (2ND FLR);  Service: Endoscopy;  Laterality: N/A;  Enrique Beach MD   Ashlyn Grover MA  Need EUS for sigmoid colon nodule. Forward view EUS. Main. 45 minutes.   Thanks,   Enrique Beach MD   5/19:fully vaccinated. instructions mailed-SC    HYSTERECTOMY      @38yrs of age    LIPECTOMY      LYMPH NODE BIOPSY      right arm    OOPHORECTOMY      @38yrs of age    panniculectomy      JIAN-EN-Y PROCEDURE  08/27/2009    RNY bypass        Current Outpatient Medications   Medication Instructions    allopurinoL (ZYLOPRIM) 200 mg, Oral, Daily    biotin/silicon diox/L-cysteine (HARRIET MATRIX 5000 ORAL) 1 capsule, Every other day    CALCIUM CARB/VITAMIN D3/VIT K1 (VIACTIV ORAL) Daily    colchicine (COLCRYS) 0.6 mg, Oral    cyanocobalamin, vitamin B-12, (VITAMIN B-12) 2,500 mcg Subl 1 tablet, Daily    desoximetasone (TOPICORT) 0.25 % cream APPLY TO SCALP NIGHTLY FOR 1 WEEK THEN APPLY WEEKLY FOR MAINTENANCE    ergocalciferol, vitamin D2, (VITAMIN D2 ORAL) 5,000 Int'l Units, Daily    hydroCHLOROthiazide (HYDRODIURIL) 25 mg, Oral, Daily    hydroxychloroquine (PLAQUENIL) 200 mg, Oral, 2 times daily    levocetirizine (XYZAL) 5 mg, Oral, Nightly    montelukast (SINGULAIR) 10 mg, Oral, Nightly    multivitamin-Ca-iron-minerals 27-0.4 mg Tab 1 tablet, Daily    olopatadine (PATANASE) 0.6 % nasal spray 1 spray, Each Nostril, 2 times daily    triamcinolone (NASACORT) 55 mcg nasal inhaler 2 sprays, Nasal, Daily        Review of patient's allergies indicates:   Allergen Reactions    Heparin analogues      Other reaction(s): low platelets    Shellfish containing products Hives     Crawfish, no reaction to IV iodine in the past.       Social History[1]    Family History   Problem Relation Name Age of Onset    Heart failure Mother      Rheum arthritis Mother      Heart failure Maternal Grandmother      Heart attack Maternal Grandmother      Thyroid disease Maternal Grandmother      Diabetes Maternal Grandfather      Kidney disease Maternal Grandfather      Diabetes Paternal Grandmother      Thyroid disease Maternal  Aunt      Lupus Neg Hx           Review of systems negative except for noted in HPI    Objective:   Physical Exam:     Right knee  Skin atraumatic   mild effusion  TTP mild along anterior knee  ROM 5 - 100  Crepitus with ROM - none  Stable anterior/posterior   Stable varus/valgus  No groin pain with rotation of hip  Grossly NVI distally    Imaging: R Knee   X-Ray knee reviewed, KL 2/3 changes with joint space narrowing, sclerosis, and osteophytosis.      Assessment:        Nitin Mcconnell is a 50 y.o. female with right knee pain consistent with kl grade 2/3 osteoarthritis    Encounter Diagnoses   Name Primary?    Right knee pain, unspecified chronicity Yes    Primary osteoarthritis of right knee        Plan :  Continue home exercises  Will prescribe Diclofenac to help with her pain  Follow up in 3 months      Fortunato Jones MD  [facility] PGY-3  07/29/2025         [1]   Social History  Socioeconomic History    Marital status: Single   Tobacco Use    Smoking status: Never     Passive exposure: Past    Smokeless tobacco: Never   Substance and Sexual Activity    Alcohol use: Yes     Comment: socially    Drug use: No    Sexual activity: Never   Social History Narrative    Works for [employer]     Social Drivers of Health     Financial Resource Strain: Low Risk  (4/16/2025)    Overall Financial Resource Strain (CARDIA)     Difficulty of Paying Living Expenses: Not hard at all   Food Insecurity: No Food Insecurity (4/16/2025)    Hunger Vital Sign     Worried About Running Out of Food in the Last Year: Never true     Ran Out of Food in the Last Year: Never true   Transportation Needs: No Transportation Needs (4/16/2025)    PRAPARE - Transportation     Lack of Transportation (Medical): No     Lack of Transportation (Non-Medical): No   Physical Activity: Inactive (4/16/2025)    Exercise Vital Sign     Days of Exercise per Week: 0 days     Minutes of Exercise per Session: 0 min   Stress: No Stress Concern  Present (4/16/2025)    [facility] - Occupational Stress Questionnaire     Feeling of Stress : Not at all   Housing Stability: Low Risk  (4/16/2025)    Housing Stability Vital Sign     Unable to Pay for Housing in the Last Year: No     Number of Times Moved in the Last Year: 0     Homeless in the Last Year: No

## 2025-07-29 NOTE — PROGRESS NOTES
Protocol: innovations in Genicular Outcomes Registry (Azeem)  Protocol#: Azeem  IRB#: 2021.156  Version Date: 10-Everton-2023  PI: Gurdeep Jones MD  Patient Initials: T.R.     Study Recruitment Note:     Research coordinator met with patient, in private clinic room, to discuss above mentioned protocol. Patient is alert and oriented x 3. Mood and affect appropriate to the situation. Patient states that she is not participating in any other research studies. Patient states understanding about the diagnosis of osteoarthritis of the knee and of the procedure to being done on that knee.  Purpose of study reviewed with the patient.  Patient states understanding of this information.   Length of study and number of participants reviewed with patient; patient states understanding of the length of the study.   Study procedure discussed in detail with patient. Patient states understanding of this information.  Potential benefits of study along with costs and/or payment reimbursement per insurance discussed with patient; Patient states understanding that insurance will cover cost of all standard of care medications and procedures. Patient aware of $20 payment for qualifying visits with completed questionnaires.  Alternative treatment methods discussed with patient; Patient states understanding of this information.   Study related questions/compensation for injury, and whom to contact regarding rights as a research subject all reviewed with patient; Patient verbalizes understanding of this information.   Voluntary participation and withdrawal from research stressed to patient; patient states understanding that she may withdraw consent at any time without compromise to care.   Confidentiality and HIPAA discussed with patient. Patient verbalizes understanding of this information.        Patient states her interest in study and will consider participation at a later date. Study brochure and paper copy of consent form was given to patient  for review. She was instructed to call if she has any questions or concerns about the study or upcoming treatments.

## 2025-07-31 ENCOUNTER — PATIENT MESSAGE (OUTPATIENT)
Dept: INTERNAL MEDICINE | Facility: CLINIC | Age: 50
End: 2025-07-31
Payer: COMMERCIAL

## 2025-08-04 ENCOUNTER — PATIENT MESSAGE (OUTPATIENT)
Dept: RHEUMATOLOGY | Facility: CLINIC | Age: 50
End: 2025-08-04
Payer: COMMERCIAL

## 2025-08-04 PROBLEM — G89.29 CHRONIC PAIN OF RIGHT KNEE: Status: RESOLVED | Noted: 2025-06-23 | Resolved: 2025-08-04

## 2025-08-04 PROBLEM — M25.561 CHRONIC PAIN OF RIGHT KNEE: Status: RESOLVED | Noted: 2025-06-23 | Resolved: 2025-08-04

## 2025-08-19 ENCOUNTER — PATIENT MESSAGE (OUTPATIENT)
Dept: INTERNAL MEDICINE | Facility: CLINIC | Age: 50
End: 2025-08-19
Payer: COMMERCIAL

## 2025-08-19 ENCOUNTER — PATIENT MESSAGE (OUTPATIENT)
Dept: RHEUMATOLOGY | Facility: CLINIC | Age: 50
End: 2025-08-19
Payer: COMMERCIAL